# Patient Record
Sex: MALE | Race: BLACK OR AFRICAN AMERICAN | NOT HISPANIC OR LATINO | Employment: OTHER | URBAN - METROPOLITAN AREA
[De-identification: names, ages, dates, MRNs, and addresses within clinical notes are randomized per-mention and may not be internally consistent; named-entity substitution may affect disease eponyms.]

---

## 2017-01-04 ENCOUNTER — CLINICAL SUPPORT (OUTPATIENT)
Dept: SMOKING CESSATION | Facility: CLINIC | Age: 72
End: 2017-01-04
Payer: COMMERCIAL

## 2017-01-04 DIAGNOSIS — F17.200 SMOKES AND MOTIVATED TO QUIT: Primary | ICD-10-CM

## 2017-01-04 PROCEDURE — 99402 PREV MED CNSL INDIV APPRX 30: CPT | Mod: S$GLB,,,

## 2017-01-05 DIAGNOSIS — F17.200 SMOKES AND MOTIVATED TO QUIT: ICD-10-CM

## 2017-01-05 RX ORDER — DM/P-EPHED/ACETAMINOPH/DOXYLAM 30-7.5/3
LIQUID (ML) ORAL
Qty: 168 LOZENGE | Refills: 0 | Status: SHIPPED | OUTPATIENT
Start: 2017-01-05 | End: 2018-07-09

## 2017-01-05 RX ORDER — IBUPROFEN 200 MG
1 TABLET ORAL DAILY
Qty: 28 PATCH | Refills: 0 | Status: SHIPPED | OUTPATIENT
Start: 2017-01-05 | End: 2018-07-09

## 2017-01-25 ENCOUNTER — OFFICE VISIT (OUTPATIENT)
Dept: DERMATOLOGY | Facility: CLINIC | Age: 72
End: 2017-01-25
Payer: MEDICARE

## 2017-01-25 DIAGNOSIS — L30.9 ECZEMA, UNSPECIFIED TYPE: Primary | ICD-10-CM

## 2017-01-25 DIAGNOSIS — L28.0 LSC (LICHEN SIMPLEX CHRONICUS): ICD-10-CM

## 2017-01-25 PROCEDURE — 1160F RVW MEDS BY RX/DR IN RCRD: CPT | Mod: S$GLB,,, | Performed by: DERMATOLOGY

## 2017-01-25 PROCEDURE — 1157F ADVNC CARE PLAN IN RCRD: CPT | Mod: S$GLB,,, | Performed by: DERMATOLOGY

## 2017-01-25 PROCEDURE — 11900 INJECT SKIN LESIONS </W 7: CPT | Mod: S$GLB,,, | Performed by: DERMATOLOGY

## 2017-01-25 PROCEDURE — 99213 OFFICE O/P EST LOW 20 MIN: CPT | Mod: 25,S$GLB,, | Performed by: DERMATOLOGY

## 2017-01-25 PROCEDURE — 1159F MED LIST DOCD IN RCRD: CPT | Mod: S$GLB,,, | Performed by: DERMATOLOGY

## 2017-01-25 PROCEDURE — 99999 PR PBB SHADOW E&M-EST. PATIENT-LVL II: CPT | Mod: PBBFAC,,, | Performed by: DERMATOLOGY

## 2017-01-25 RX ORDER — ATORVASTATIN CALCIUM 20 MG/1
TABLET, FILM COATED ORAL
Qty: 90 TABLET | Refills: 0 | Status: SHIPPED | OUTPATIENT
Start: 2017-01-25 | End: 2017-03-24 | Stop reason: SDUPTHER

## 2017-01-25 RX ORDER — RAMIPRIL 10 MG/1
CAPSULE ORAL
Qty: 90 CAPSULE | Refills: 0 | Status: SHIPPED | OUTPATIENT
Start: 2017-01-25 | End: 2017-03-24 | Stop reason: SDUPTHER

## 2017-01-25 NOTE — MR AVS SNAPSHOT
[facility]  [address]  [facility] [address]  Phone: [phone]  Fax: [phone]                  Leonides Burns   2017 9:30 AM   Office Visit    Description:  Male : 1945   Provider:  Nona Persaud MD   Department:  [facility]           Reason for Visit     Rash           Diagnoses this Visit        Comments    Eczema, unspecified type    -  Primary     LSC (lichen simplex chronicus)                To Do List           Future Appointments        Provider Department Dept Phone    2/3/2017 9:00 AM [facility] [phone]    2017 8:00 AM Tien Thomas MD [place] [facility] [phone]    3/27/2017 9:30 AM [facility]atology [phone]      Goals (5 Years of Data)     None      Follow-Up and Disposition     Return in about 3 months (around 2017).      [facility]     [facility] Care [facility] -  Assistance  Registered nurses in the [facility] provide clinical advisement, health education, appointment booking, and other advisory services.  Call for this free service at [phone].             Medications                Verify that the below list of medications is an accurate representation of the medications you are currently taking.  If none reported, the list may be blank. If incorrect, please contact your healthcare provider. Carry this list with you in case of emergency.           Current Medications     allopurinol (ZYLOPRIM) 100 MG tablet Take 2 tablets (200 mg total) by mouth once daily.    amlodipine (NORVASC) 5 MG tablet Take 1 tablet (5 mg total) by mouth once daily.    bimatoprost (LUMIGAN) 0.01 % Drop Place 1 drop into both eyes every evening.    clobetasol (TEMOVATE) 0.05 % external solution Pt to mix in 1 jar of cerave cream and apply to affected areas bid    colchicine 0.6 mg tablet Take 1 tablet (0.6 mg total) by mouth 2 (two) times  daily.    furosemide (LASIX) 20 MG tablet Take 1 tablet (20 mg total) by mouth once daily.    guaifenesin (MUCINEX) 600 mg 12 hr tablet Take 1 tablet (600 mg total) by mouth 2 (two) times daily.    nicotine (NICODERM CQ) 14 mg/24 hr Place 1 patch onto the skin once daily.    nicotine polacrilex 2 MG Lozg Nicorette Mini Lozenge -one 2 mg lozenge by mouth as needed, max 5 in 6 hr period. Generic only    sodium bicarbonate 650 MG tablet Take 1 tablet (650 mg total) by mouth 2 (two) times daily.    tadalafil (CIALIS) 10 MG tablet Take 1 tablet (10 mg total) by mouth daily as needed for Erectile Dysfunction (30 minutes prior to sex prn; max 1/day; avoid with NTG).    triamcinolone acetonide 0.1% (KENALOG) 0.1 % cream AAA bid    triamcinolone acetonide 0.1% (KENALOG) 0.1 % ointment AAA hands twice daily under occlusion    atorvastatin (LIPITOR) 20 MG tablet TAKE 1 TABLET EVERY MORNING    mometasone 0.1% (ELOCON) 0.1 % cream Apply topically daily as needed.    ramipril (ALTACE) 10 MG capsule TAKE 1 CAPSULE EVERY DAY    triamterene-hydrochlorothiazide 50-25 mg (DYAZIDE) 50-25 mg Cap TAKE 1 CAPSULE EVERY DAY           Clinical Reference Information           Allergies as of 1/25/2017     Latex, Natural Rubber    Iodine And Iodide Containing Products      Immunizations Administered on Date of Encounter - 1/25/2017     None      Instructions    n- acetyl cystine 600mg -- 3x/day         Smoking Cessation     If you would like to quit smoking:   You may be eligible for free services if you are a [place] resident and started smoking cigarettes before September 1, 1988.  Call the Smoking Cessation Trust (SCT) toll free at ([phone] [phone] or [phone].   Call 2-800-QUIT-NOW if you do not meet the above criteria.

## 2017-01-25 NOTE — PROGRESS NOTES
Subjective:       Patient ID:  Leonides Burns is a 71 y.o. 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 CD4:CD8 5:1, suggestive of Lichenoid dermatitis, cannot rule of mycosis fungoides. t cell gene rearrangement was negative. He notes rash has slightly improved with topical TAC 0.1% cream and intralesional kenalog in past. He notes he rubs his hands and arms frequently. He does not take antihistamines or other antipruritics.       Patient switched to Lasix, back in 6/2016. Patient states he noted eruption starting around or before 4th of July. Since then getting worse.        Rash  - Follow-up  Symptom course: improving (no new lesions)  Currently using: TAC oint bid and clob soln in cerave cream prn.   Affected locations: diffuse  Signs / symptoms: itching (hands and 1 area on forearm - o/w much less)        Review of Systems   Constitutional: Negative for fever, chills, weight loss, fatigue, night sweats and malaise.   Gastrointestinal: Negative for Sensitivity to oral antibiotics.   Skin: Positive for itching and rash.   Psychiatric/Behavioral: Positive for high stress (couple of weeks ago under more stress started itching).        Objective:    Physical Exam   Constitutional: He appears well-developed and well-nourished. No distress.   Neurological: He is alert and oriented to person, place, and time. He is not disoriented.   Psychiatric: He has a normal mood and affect.   Skin:   Areas Examined (abnormalities noted in diagram):   Scalp / Hair Palpated and Inspected  Head / Face Inspection Performed  Neck Inspection Performed  Chest / Axilla Inspection Performed  Abdomen Inspection Performed  Genitals / Buttocks / Groin Inspection Performed  Back Inspection Performed  RUE Inspected  LUE Inspection Performed  RLE Inspected  LLE Inspection  Performed  Nails and Digits Inspection Performed                  Diagram Legend     Erythematous scaling macule/papule c/w actinic keratosis       Vascular papule c/w angioma      Pigmented verrucoid papule/plaque c/w seborrheic keratosis      Yellow umbilicated papule c/w sebaceous hyperplasia      Irregularly shaped tan macule c/w lentigo     1-2 mm smooth white papules consistent with Milia      Movable subcutaneous cyst with punctum c/w epidermal inclusion cyst      Subcutaneous movable cyst c/w pilar cyst      Firm pink to brown papule c/w dermatofibroma      Pedunculated fleshy papule(s) c/w skin tag(s)      Evenly pigmented macule c/w junctional nevus     Mildly variegated pigmented, slightly irregular-bordered macule c/w mildly atypical nevus      Flesh colored to evenly pigmented papule c/w intradermal nevus       Pink pearly papule/plaque c/w basal cell carcinoma      Erythematous hyperkeratotic cursted plaque c/w SCC      Surgical scar with no sign of skin cancer recurrence      Open and closed comedones      Inflammatory papules and pustules      Verrucoid papule consistent consistent with wart     Erythematous eczematous patches and plaques     Dystrophic onycholytic nail with subungual debris c/w onychomycosis     Umbilicated papule    Erythematous-base heme-crusted tan verrucoid plaque consistent with inflamed seborrheic keratosis     Erythematous Silvery Scaling Plaque c/w Psoriasis     See annotation      Assessment / Plan:        Eczema, unspecified type with LSC (lichen simplex chronicus) - improved  Cont good skin care regimen  Cont clob soln in cerave cream bid  Cont TAC oint bid to AA  Intralesional Kenalog 10mg/cc (1.0 cc total) injected into 7 lesions on the arms and legs today after obtaining verbal consent including risk of surrounding hypopigmentation. Patient tolerated procedure well.  Start n- acetyl cystine 600mg tid  D/c manipulation of hands  Increase working hands cream to bid -  tid           Return in about 3 months (around 4/25/2017).

## 2017-03-06 ENCOUNTER — OFFICE VISIT (OUTPATIENT)
Dept: INTERNAL MEDICINE | Facility: CLINIC | Age: 72
End: 2017-03-06
Payer: MEDICARE

## 2017-03-06 VITALS
SYSTOLIC BLOOD PRESSURE: 120 MMHG | BODY MASS INDEX: 27.86 KG/M2 | DIASTOLIC BLOOD PRESSURE: 60 MMHG | WEIGHT: 177.5 LBS | HEIGHT: 67 IN | HEART RATE: 76 BPM

## 2017-03-06 DIAGNOSIS — F17.200 TOBACCO DEPENDENCE: ICD-10-CM

## 2017-03-06 DIAGNOSIS — E66.3 OVERWEIGHT: ICD-10-CM

## 2017-03-06 DIAGNOSIS — I10 ESSENTIAL HYPERTENSION: Primary | ICD-10-CM

## 2017-03-06 PROCEDURE — 3078F DIAST BP <80 MM HG: CPT | Mod: S$GLB,,, | Performed by: INTERNAL MEDICINE

## 2017-03-06 PROCEDURE — 3074F SYST BP LT 130 MM HG: CPT | Mod: S$GLB,,, | Performed by: INTERNAL MEDICINE

## 2017-03-06 PROCEDURE — 1126F AMNT PAIN NOTED NONE PRSNT: CPT | Mod: S$GLB,,, | Performed by: INTERNAL MEDICINE

## 2017-03-06 PROCEDURE — 99213 OFFICE O/P EST LOW 20 MIN: CPT | Mod: S$GLB,,, | Performed by: INTERNAL MEDICINE

## 2017-03-06 PROCEDURE — 1159F MED LIST DOCD IN RCRD: CPT | Mod: S$GLB,,, | Performed by: INTERNAL MEDICINE

## 2017-03-06 PROCEDURE — 1160F RVW MEDS BY RX/DR IN RCRD: CPT | Mod: S$GLB,,, | Performed by: INTERNAL MEDICINE

## 2017-03-06 PROCEDURE — 1157F ADVNC CARE PLAN IN RCRD: CPT | Mod: S$GLB,,, | Performed by: INTERNAL MEDICINE

## 2017-03-06 PROCEDURE — 99499 UNLISTED E&M SERVICE: CPT | Mod: S$GLB,,, | Performed by: INTERNAL MEDICINE

## 2017-03-06 PROCEDURE — 99999 PR PBB SHADOW E&M-EST. PATIENT-LVL III: CPT | Mod: PBBFAC,,, | Performed by: INTERNAL MEDICINE

## 2017-03-06 NOTE — MR AVS SNAPSHOT
[facility]   [place]  [place] [address]  Phone: [phone]  Fax: [phone]                  Leonides Burns   3/6/2017 4:00 PM   Office Visit    Description:  Male : 1945   Provider:  Tien Thomas MD   Department:  [facility]           Reason for Visit     Hypertension           Diagnoses this Visit        Comments    Essential hypertension    -  Primary     Overweight         Tobacco dependence                To Do List           Future Appointments        Provider Department Dept Phone    3/7/2017 7:00 AM [facility] [phone]    3/27/2017 9:30 AM Nona Persaud MD [facility] - Dermatology [phone]    2017 9:00 AM Tien Thomas MD [facility] [phone]      Goals (5 Years of Data)     None      [facility]     [facility] -  Assistance  Registered nurses in the [facility] provide clinical advisement, health education, appointment booking, and other advisory services.  Call for this free service at [phone].             Medications                Verify that the below list of medications is an accurate representation of the medications you are currently taking.  If none reported, the list may be blank. If incorrect, please contact your healthcare provider. Carry this list with you in case of emergency.           Current Medications     allopurinol (ZYLOPRIM) 100 MG tablet Take 2 tablets (200 mg total) by mouth once daily.    amlodipine (NORVASC) 5 MG tablet Take 1 tablet (5 mg total) by mouth once daily.    atorvastatin (LIPITOR) 20 MG tablet TAKE 1 TABLET EVERY MORNING    bimatoprost (LUMIGAN) 0.01 % Drop Place 1 drop into both eyes every evening.    clobetasol (TEMOVATE) 0.05 % external solution Pt to mix in 1 jar of cerave cream and apply to affected areas bid    colchicine 0.6 mg tablet Take 1 tablet (0.6 mg total) by mouth 2 (two) times  "daily.    furosemide (LASIX) 20 MG tablet Take 1 tablet (20 mg total) by mouth once daily.    guaifenesin (MUCINEX) 600 mg 12 hr tablet Take 1 tablet (600 mg total) by mouth 2 (two) times daily.    nicotine (NICODERM CQ) 14 mg/24 hr Place 1 patch onto the skin once daily.    nicotine polacrilex 2 MG Lozg Nicorette Mini Lozenge -one 2 mg lozenge by mouth as needed, max 5 in 6 hr period. Generic only    ramipril (ALTACE) 10 MG capsule TAKE 1 CAPSULE EVERY DAY    sodium bicarbonate 650 MG tablet Take 1 tablet (650 mg total) by mouth 2 (two) times daily.    tadalafil (CIALIS) 10 MG tablet Take 1 tablet (10 mg total) by mouth daily as needed for Erectile Dysfunction (30 minutes prior to sex prn; max 1/day; avoid with NTG).    triamcinolone acetonide 0.1% (KENALOG) 0.1 % cream AAA bid    triamcinolone acetonide 0.1% (KENALOG) 0.1 % ointment AAA hands twice daily under occlusion    triamterene-hydrochlorothiazide 50-25 mg (DYAZIDE) 50-25 mg Cap TAKE 1 CAPSULE EVERY DAY    mometasone 0.1% (ELOCON) 0.1 % cream Apply topically daily as needed.           Clinical Reference Information           Your Vitals Were     BP Pulse Height Weight BMI    120/60 (BP Location: Left arm, Patient Position: Sitting, BP Method: Manual) 76 5' 7" (1.702 m) 80.5 kg (177 lb 7.5 oz) 27.8 kg/m2      Blood Pressure          Most Recent Value    BP  120/60      Allergies as of 3/6/2017     Latex, Natural Rubber    Iodine And Iodide Containing Products      Immunizations Administered on Date of Encounter - 3/6/2017     None      [facility] Sign-Up     Activating your [facility] account is as easy as 1-2-3!     1) Visit [email or web address], select Sign Up Now, enter this activation code and your date of birth, then select Next.  Activation code not generated  Current Patient Portal Status: Account disabled      2) Create a username and password to use when you visit [facility] in the future and select a security question in case you lose your password and " select Next.    3) Enter your e-mail address and click Sign Up!    Additional Information  If you have questions, please e-mail [email or web address] or call [phone] to talk to our [facility] staff. Remember, [facility] is NOT to be used for urgent needs. For medical emergencies, dial 911.         Smoking Cessation     If you would like to quit smoking:   You may be eligible for free services if you are a [place] resident and started smoking cigarettes before September 1, 1988.  Call the Smoking Cessation Trust (SCT) toll free at [phone] or (985) 886-2881.   Call 0-777-QUIT-NOW if you do not meet the above criteria.            Language Assistance Services     ATTENTION: Language assistance services are available, free of charge. Please call [phone].      ATENCIÓN: Si habla bri, tiene a russo disposición servicios gratuitos de asistencia lingüística. Llame al [phone].     CHÚ Ý: N?u b?n nói Ti?ng Vi?t, có các d?ch v? h? tr? ngôn ng? mi?n phí dành cho b?n. G?i s? [phone].         [facility] complies with applicable Federal civil rights laws and does not discriminate on the basis of race, color, national origin, age, disability, or sex.

## 2017-03-06 NOTE — PROGRESS NOTES
Subjective:       Patient ID: Leonides Burns is a 72 y.o. male.    Chief Complaint: Hypertension    HPI Pt. Here for f/u for HTN; BP is WNL;  pt. Has not gotten labs and is scheduled fasting; he does not want HTN digital program; he has lost a few pounds; he smokes PP 2 days and he is in smoking cessation  Review of Systems   Constitutional: Negative for chills, fatigue and fever.   HENT: Negative for congestion, rhinorrhea and sore throat.    Respiratory: Negative for cough, shortness of breath and wheezing.    Cardiovascular: Negative for chest pain.   Gastrointestinal: Negative for abdominal pain, nausea and vomiting.   Genitourinary: Negative for dysuria.   Musculoskeletal: Negative for arthralgias.   Skin: Negative for rash.   Neurological: Negative for dizziness and headaches.   Psychiatric/Behavioral: Negative for sleep disturbance. The patient is not nervous/anxious.        Objective:      Physical Exam   Constitutional: He is oriented to person, place, and time.   overweight   Eyes: EOM are normal.   Neck: Normal range of motion.   Cardiovascular: Normal rate, regular rhythm and normal heart sounds.    Pulmonary/Chest: Effort normal and breath sounds normal.   Abdominal: Soft. There is no tenderness. There is no rebound and no guarding.   Genitourinary: No penile tenderness.   Musculoskeletal: Normal range of motion.   Neurological: He is alert and oriented to person, place, and time.   Skin: No rash noted.       Assessment:       1. Essential hypertension Well controlled   2. Overweight Sub-optimally controlled   3. Tobacco dependence Sub-optimally controlled       Plan:         Essential hypertension  Comments:  continue current regimen and encouraged low Na diet and weight loss    Overweight  Comments:  encouraged diet     Tobacco dependence  Comments:  encouraged cessation and explained risks

## 2017-03-07 ENCOUNTER — LAB VISIT (OUTPATIENT)
Dept: LAB | Facility: HOSPITAL | Age: 72
End: 2017-03-07
Attending: INTERNAL MEDICINE
Payer: MEDICARE

## 2017-03-07 DIAGNOSIS — R79.9 ABNORMAL FINDING OF BLOOD CHEMISTRY: ICD-10-CM

## 2017-03-07 DIAGNOSIS — N18.30 CHRONIC KIDNEY DISEASE, STAGE III (MODERATE): Chronic | ICD-10-CM

## 2017-03-07 DIAGNOSIS — Z00.00 PREVENTATIVE HEALTH CARE: ICD-10-CM

## 2017-03-07 DIAGNOSIS — I10 ESSENTIAL HYPERTENSION: Chronic | ICD-10-CM

## 2017-03-07 DIAGNOSIS — Z12.5 PROSTATE CANCER SCREENING: ICD-10-CM

## 2017-03-07 LAB
ALBUMIN SERPL BCP-MCNC: 4 G/DL
ALP SERPL-CCNC: 71 U/L
ALT SERPL W/O P-5'-P-CCNC: 16 U/L
ANION GAP SERPL CALC-SCNC: 9 MMOL/L
AST SERPL-CCNC: 20 U/L
BASOPHILS # BLD AUTO: 0.04 K/UL
BASOPHILS NFR BLD: 0.8 %
BILIRUB SERPL-MCNC: 0.7 MG/DL
BUN SERPL-MCNC: 49 MG/DL
CALCIUM SERPL-MCNC: 9.7 MG/DL
CHLORIDE SERPL-SCNC: 103 MMOL/L
CHOLEST/HDLC SERPL: 5 {RATIO}
CO2 SERPL-SCNC: 25 MMOL/L
COMPLEXED PSA SERPL-MCNC: 6.6 NG/ML
CREAT SERPL-MCNC: 2.4 MG/DL
DIFFERENTIAL METHOD: ABNORMAL
EOSINOPHIL # BLD AUTO: 0.4 K/UL
EOSINOPHIL NFR BLD: 8.5 %
ERYTHROCYTE [DISTWIDTH] IN BLOOD BY AUTOMATED COUNT: 16 %
EST. GFR  (AFRICAN AMERICAN): 29.8 ML/MIN/1.73 M^2
EST. GFR  (NON AFRICAN AMERICAN): 25.8 ML/MIN/1.73 M^2
GLUCOSE SERPL-MCNC: 98 MG/DL
HCT VFR BLD AUTO: 49.1 %
HDL/CHOLESTEROL RATIO: 19.8 %
HDLC SERPL-MCNC: 207 MG/DL
HDLC SERPL-MCNC: 41 MG/DL
HGB BLD-MCNC: 16.7 G/DL
LDLC SERPL CALC-MCNC: 139.2 MG/DL
LYMPHOCYTES # BLD AUTO: 2 K/UL
LYMPHOCYTES NFR BLD: 39.7 %
MCH RBC QN AUTO: 27.2 PG
MCHC RBC AUTO-ENTMCNC: 34 %
MCV RBC AUTO: 80 FL
MONOCYTES # BLD AUTO: 1.1 K/UL
MONOCYTES NFR BLD: 22.2 %
NEUTROPHILS # BLD AUTO: 1.4 K/UL
NEUTROPHILS NFR BLD: 28.6 %
NONHDLC SERPL-MCNC: 166 MG/DL
PLATELET # BLD AUTO: 272 K/UL
PMV BLD AUTO: 11.3 FL
POTASSIUM SERPL-SCNC: 4.6 MMOL/L
PROT SERPL-MCNC: 8.3 G/DL
RBC # BLD AUTO: 6.13 M/UL
SODIUM SERPL-SCNC: 137 MMOL/L
TRIGL SERPL-MCNC: 134 MG/DL
WBC # BLD AUTO: 5.04 K/UL

## 2017-03-07 PROCEDURE — 85025 COMPLETE CBC W/AUTO DIFF WBC: CPT

## 2017-03-07 PROCEDURE — 80061 LIPID PANEL: CPT

## 2017-03-07 PROCEDURE — 36415 COLL VENOUS BLD VENIPUNCTURE: CPT | Mod: PO

## 2017-03-07 PROCEDURE — 80053 COMPREHEN METABOLIC PANEL: CPT

## 2017-03-07 PROCEDURE — 84153 ASSAY OF PSA TOTAL: CPT

## 2017-03-15 ENCOUNTER — TELEPHONE (OUTPATIENT)
Dept: INTERNAL MEDICINE | Facility: CLINIC | Age: 72
End: 2017-03-15

## 2017-03-15 DIAGNOSIS — R97.20 ELEVATED PSA: Primary | ICD-10-CM

## 2017-03-15 NOTE — TELEPHONE ENCOUNTER
Have pt. Come in to discuss labs; kidney fxn has worsened; pt. Needs to f/u with his nephrologist, Dr. Blackmon for further evaluation within next few weeks and avoid NSAIDs

## 2017-03-15 NOTE — TELEPHONE ENCOUNTER
Spoke to pt and states that [place] 3 m[place] and requesting his results over the phone.

## 2017-03-16 NOTE — TELEPHONE ENCOUNTER
Notify pt. PSA is elevated; refer back to urologist which he has not f/u since 2015; cholesterol is mildly elevated; lower the cholesterol in the diet; kidney function went up further; f/u renal and avoid NSAIDs

## 2017-03-16 NOTE — TELEPHONE ENCOUNTER
Informed pt his PSA is elevated. Pt will follow-up with his Urologist and Nephrologist when he gets back in town. Cholesterol is mildly elevated. Lower cholesterol in diet. Kidney function went up further. Avoid NSAID's.

## 2017-03-24 RX ORDER — RAMIPRIL 10 MG/1
CAPSULE ORAL
Qty: 90 CAPSULE | Refills: 1 | Status: SHIPPED | OUTPATIENT
Start: 2017-03-24 | End: 2017-08-18 | Stop reason: SDUPTHER

## 2017-03-24 RX ORDER — ATORVASTATIN CALCIUM 20 MG/1
20 TABLET, FILM COATED ORAL NIGHTLY
Qty: 90 TABLET | Refills: 1 | Status: SHIPPED | OUTPATIENT
Start: 2017-03-24 | End: 2017-08-18 | Stop reason: SDUPTHER

## 2017-03-28 DIAGNOSIS — L98.9 DISEASE OF SKIN AND SUBCUTANEOUS TISSUE: ICD-10-CM

## 2017-03-28 DIAGNOSIS — M1A.9XX0 CHRONIC GOUT WITHOUT TOPHUS, UNSPECIFIED CAUSE, UNSPECIFIED SITE: ICD-10-CM

## 2017-03-28 NOTE — TELEPHONE ENCOUNTER
----- Message from Lula Hurd sent at 3/28/2017  1:15 PM CDT -----  Contact: [phone]/self   Patient called in requesting to speak with you. Did not specify why. Please advise.

## 2017-03-29 RX ORDER — TRIAMCINOLONE ACETONIDE 1 MG/G
CREAM TOPICAL
Qty: 80 G | Refills: 1 | Status: SHIPPED | OUTPATIENT
Start: 2017-03-29 | End: 2017-09-06 | Stop reason: SDUPTHER

## 2017-03-29 RX ORDER — ALLOPURINOL 100 MG/1
200 TABLET ORAL DAILY
Qty: 180 TABLET | Refills: 1 | Status: SHIPPED | OUTPATIENT
Start: 2017-03-29 | End: 2017-12-27 | Stop reason: SDUPTHER

## 2017-03-29 RX ORDER — SODIUM BICARBONATE 650 MG/1
650 TABLET ORAL 2 TIMES DAILY
Qty: 180 TABLET | Refills: 0 | OUTPATIENT
Start: 2017-03-29 | End: 2017-04-28

## 2017-04-11 ENCOUNTER — CLINICAL SUPPORT (OUTPATIENT)
Dept: SMOKING CESSATION | Facility: CLINIC | Age: 72
End: 2017-04-11
Payer: COMMERCIAL

## 2017-04-11 DIAGNOSIS — F17.200 NICOTINE DEPENDENCE: Primary | ICD-10-CM

## 2017-04-11 PROCEDURE — 99407 BEHAV CHNG SMOKING > 10 MIN: CPT | Mod: S$GLB,,, | Performed by: GENERAL PRACTICE

## 2017-05-29 ENCOUNTER — TELEPHONE (OUTPATIENT)
Dept: INTERNAL MEDICINE | Facility: CLINIC | Age: 72
End: 2017-05-29

## 2017-05-29 DIAGNOSIS — I10 ESSENTIAL HYPERTENSION: ICD-10-CM

## 2017-05-29 RX ORDER — SODIUM BICARBONATE 650 MG/1
650 TABLET ORAL 2 TIMES DAILY
Qty: 180 TABLET | Refills: 3 | OUTPATIENT
Start: 2017-05-29 | End: 2017-06-28

## 2017-05-29 RX ORDER — AMLODIPINE BESYLATE 5 MG/1
5 TABLET ORAL DAILY
Qty: 90 TABLET | Refills: 0 | Status: SHIPPED | OUTPATIENT
Start: 2017-05-29 | End: 2017-09-06 | Stop reason: SDUPTHER

## 2017-05-29 NOTE — TELEPHONE ENCOUNTER
----- Message from Rosanna Brooks sent at 5/29/2017  1:43 PM CDT -----  Contact: Self/[phone]  Patient said he is returning your call. Please advise

## 2017-05-29 NOTE — TELEPHONE ENCOUNTER
----- Message from Eugenia Lockhart sent at 5/29/2017 12:59 PM CDT -----  Contact: [phone]  Patient would like to speak with you regarding his blood pressure medication

## 2017-06-22 ENCOUNTER — TELEPHONE (OUTPATIENT)
Dept: INTERNAL MEDICINE | Facility: CLINIC | Age: 72
End: 2017-06-22

## 2017-06-22 NOTE — TELEPHONE ENCOUNTER
----- Message from Juli Johnson sent at 6/22/2017  3:53 PM CDT -----  Contact: [phone]  Pt returning your call. Please advise.

## 2017-06-22 NOTE — TELEPHONE ENCOUNTER
----- Message from Logan Johnson sent at 6/22/2017  4:01 PM CDT -----  Contact: self/[phone]  Patient is returning your call.  Please advise.

## 2017-08-18 RX ORDER — ATORVASTATIN CALCIUM 20 MG/1
TABLET, FILM COATED ORAL
Qty: 90 TABLET | Refills: 0 | Status: ON HOLD | OUTPATIENT
Start: 2017-08-18 | End: 2022-10-11 | Stop reason: SDUPTHER

## 2017-08-18 RX ORDER — FUROSEMIDE 20 MG/1
TABLET ORAL
Qty: 90 TABLET | Refills: 6 | Status: SHIPPED | OUTPATIENT
Start: 2017-08-18 | End: 2018-07-09

## 2017-08-18 RX ORDER — RAMIPRIL 10 MG/1
CAPSULE ORAL
Qty: 90 CAPSULE | Refills: 0 | Status: SHIPPED | OUTPATIENT
Start: 2017-08-18 | End: 2019-02-28

## 2017-08-21 DIAGNOSIS — Z12.11 COLON CANCER SCREENING: ICD-10-CM

## 2017-09-06 ENCOUNTER — LAB VISIT (OUTPATIENT)
Dept: LAB | Facility: HOSPITAL | Age: 72
End: 2017-09-06
Attending: INTERNAL MEDICINE
Payer: MEDICARE

## 2017-09-06 ENCOUNTER — OFFICE VISIT (OUTPATIENT)
Dept: INTERNAL MEDICINE | Facility: CLINIC | Age: 72
End: 2017-09-06
Payer: MEDICARE

## 2017-09-06 VITALS
DIASTOLIC BLOOD PRESSURE: 80 MMHG | OXYGEN SATURATION: 97 % | HEART RATE: 82 BPM | BODY MASS INDEX: 29.41 KG/M2 | SYSTOLIC BLOOD PRESSURE: 122 MMHG | WEIGHT: 187.38 LBS | HEIGHT: 67 IN

## 2017-09-06 DIAGNOSIS — I10 ESSENTIAL HYPERTENSION: Chronic | ICD-10-CM

## 2017-09-06 DIAGNOSIS — N18.30 CHRONIC KIDNEY DISEASE, STAGE III (MODERATE): Chronic | ICD-10-CM

## 2017-09-06 DIAGNOSIS — F10.10 ALCOHOL ABUSE: ICD-10-CM

## 2017-09-06 DIAGNOSIS — E87.1 HYPONATREMIA: ICD-10-CM

## 2017-09-06 DIAGNOSIS — E78.00 HYPERCHOLESTEREMIA: ICD-10-CM

## 2017-09-06 DIAGNOSIS — R97.20 ELEVATED PSA: ICD-10-CM

## 2017-09-06 DIAGNOSIS — E66.3 OVERWEIGHT (BMI 25.0-29.9): ICD-10-CM

## 2017-09-06 DIAGNOSIS — L40.9 PSORIASIS: ICD-10-CM

## 2017-09-06 DIAGNOSIS — I10 ESSENTIAL HYPERTENSION: Primary | Chronic | ICD-10-CM

## 2017-09-06 DIAGNOSIS — F17.200 TOBACCO DEPENDENCE: ICD-10-CM

## 2017-09-06 DIAGNOSIS — Z13.6 ENCOUNTER FOR ABDOMINAL AORTIC ANEURYSM (AAA) SCREENING: ICD-10-CM

## 2017-09-06 LAB
ALBUMIN SERPL BCP-MCNC: 3.7 G/DL
ALP SERPL-CCNC: 76 U/L
ALT SERPL W/O P-5'-P-CCNC: 26 U/L
ANION GAP SERPL CALC-SCNC: 10 MMOL/L
AST SERPL-CCNC: 33 U/L
BILIRUB SERPL-MCNC: 0.4 MG/DL
BUN SERPL-MCNC: 18 MG/DL
CALCIUM SERPL-MCNC: 9.4 MG/DL
CHLORIDE SERPL-SCNC: 108 MMOL/L
CO2 SERPL-SCNC: 23 MMOL/L
CREAT SERPL-MCNC: 1.5 MG/DL
EST. GFR  (AFRICAN AMERICAN): 52.6 ML/MIN/1.73 M^2
EST. GFR  (NON AFRICAN AMERICAN): 45.5 ML/MIN/1.73 M^2
GLUCOSE SERPL-MCNC: 95 MG/DL
POTASSIUM SERPL-SCNC: 4.3 MMOL/L
PROT SERPL-MCNC: 7.6 G/DL
SODIUM SERPL-SCNC: 141 MMOL/L

## 2017-09-06 PROCEDURE — 36415 COLL VENOUS BLD VENIPUNCTURE: CPT | Mod: PO

## 2017-09-06 PROCEDURE — 1159F MED LIST DOCD IN RCRD: CPT | Mod: S$GLB,,, | Performed by: INTERNAL MEDICINE

## 2017-09-06 PROCEDURE — 80053 COMPREHEN METABOLIC PANEL: CPT

## 2017-09-06 PROCEDURE — 1126F AMNT PAIN NOTED NONE PRSNT: CPT | Mod: S$GLB,,, | Performed by: INTERNAL MEDICINE

## 2017-09-06 PROCEDURE — 99499 UNLISTED E&M SERVICE: CPT | Mod: S$GLB,,, | Performed by: INTERNAL MEDICINE

## 2017-09-06 PROCEDURE — 99214 OFFICE O/P EST MOD 30 MIN: CPT | Mod: S$GLB,,, | Performed by: INTERNAL MEDICINE

## 2017-09-06 PROCEDURE — 3008F BODY MASS INDEX DOCD: CPT | Mod: S$GLB,,, | Performed by: INTERNAL MEDICINE

## 2017-09-06 PROCEDURE — 99999 PR PBB SHADOW E&M-EST. PATIENT-LVL V: CPT | Mod: PBBFAC,,, | Performed by: INTERNAL MEDICINE

## 2017-09-06 PROCEDURE — 3079F DIAST BP 80-89 MM HG: CPT | Mod: S$GLB,,, | Performed by: INTERNAL MEDICINE

## 2017-09-06 PROCEDURE — 3074F SYST BP LT 130 MM HG: CPT | Mod: S$GLB,,, | Performed by: INTERNAL MEDICINE

## 2017-09-06 RX ORDER — AMLODIPINE BESYLATE 5 MG/1
5 TABLET ORAL DAILY
Qty: 90 TABLET | Refills: 1 | Status: SHIPPED | OUTPATIENT
Start: 2017-09-06 | End: 2018-04-28 | Stop reason: SDUPTHER

## 2017-09-06 RX ORDER — TRIAMCINOLONE ACETONIDE 1 MG/G
CREAM TOPICAL
Qty: 45 G | Refills: 0 | Status: SHIPPED | OUTPATIENT
Start: 2017-09-06 | End: 2018-07-09

## 2017-09-06 NOTE — PROGRESS NOTES
Pt. ID: Leonides Burns is a 72 y.o. male      Chief complaint:   Chief Complaint   Patient presents with    Hypertension     follow up       HPI: Pt. Here for f/u for HTN; he is compliant with meds; of note, his PSA was elevated and he has not f/u urology as instructed; I will refer again and explained risks of non-compliance; he still smokes PP1 1/2 days; he was in smoking cessation; he drinks 6 pack beer per day and glass of bourban a day; I reviewed labs dated 3/7/17; sodium has normalized; kidney fxn went up and he is seeing renal and I advised him to f/u; cholesterol was elevated; he has gained weight; he needs refill on steroid cream that he uses for psoriasis prn and norvasc; he is seeing dermatology and is scheduled for f/u in 1 month; I advised him to get future refills from dermatology who is managing; he does not want tetanus, pneumonia or shingles vaccine; I explained risks of non-compliance; colonoscopy was done and he will get date of test; he would like AAA screening US      Review of Systems   Constitutional: Negative for chills and fever.   Respiratory: Negative for cough and shortness of breath.    Cardiovascular: Negative for chest pain.   Gastrointestinal: Negative for abdominal pain, nausea and vomiting.   Genitourinary: Negative for dysuria.   Skin: Positive for rash.        Psoriatic rash noted extensively across extremities and neck   Psychiatric/Behavioral: Negative for depression. The patient is not nervous/anxious.          Objective:    Physical Exam   Constitutional: He is oriented to person, place, and time.   overweight   Eyes: EOM are normal.   Neck: Normal range of motion.   Cardiovascular: Normal rate, regular rhythm and normal heart sounds.    Pulmonary/Chest: Effort normal and breath sounds normal.   Abdominal: Soft. There is no tenderness. There is no rebound and no guarding.   Musculoskeletal: Normal range of motion.   Neurological: He is alert and oriented to person, place,  and time.   Skin: Rash noted.   Psoriatic rash noted to B/L extremities and neck         Health Maintenance   Topic Date Due    Abdominal Aortic Aneurysm Screening  01/31/2009    Influenza Vaccine  08/01/2017    Lipid Panel  03/07/2022    TETANUS VACCINE  09/06/2027    Colonoscopy  09/06/2027    Zoster Vaccine  Addressed    Pneumococcal (65+)  Excluded         Assessment:     1. Essential hypertension Well controlled   2. Elevated PSA Active   3. Chronic kidney disease, stage III (moderate) Sub-optimally controlled   4. Hypercholesteremia Sub-optimally controlled   5. Hyponatremia Well controlled   6. Psoriasis Active   7. Tobacco dependence Sub-optimally controlled   8. Alcohol abuse Sub-optimally controlled   9. Encounter for abdominal aortic aneurysm (AAA) screening    10. Overweight (BMI 25.0-29.9) Sub-optimally controlled         Plan: Leonides was seen today for hypertension.    Diagnoses and all orders for this visit:    Essential hypertension  Comments:  continue current regimen and encouraged low Na diet and weight loss  Orders:  -     Comprehensive metabolic panel; Future  -     amlodipine (NORVASC) 5 MG tablet; Take 1 tablet (5 mg total) by mouth once daily.    Elevated PSA  Comments:  re-refer to urology for evaluation   Orders:  -     Ambulatory Referral to Urology    Chronic kidney disease, stage III (moderate)  Comments:  f/u renal who is managing; repeat CMP and avoid NSAIDs  Orders:  -     Comprehensive metabolic panel; Future    Hypercholesteremia  Comments:  encouraged low cholesterol diet     Hyponatremia  Comments:  normalized; monitor  Orders:  -     Comprehensive metabolic panel; Future    Psoriasis  Comments:  continue steroid cream and f/u dermatology who is managing   Orders:  -     triamcinolone acetonide 0.1% (KENALOG) 0.1 % cream; Apply to affected area daily prn    Tobacco dependence  Comments:  encouraged cessation and explained risks     Alcohol abuse  Comments:  encouraged  cessation and explained risks     Encounter for abdominal aortic aneurysm (AAA) screening  -     US Abdominal Aorta; Future    Overweight (BMI 25.0-29.9)  Comments:  encouraged diet         Problem List Items Addressed This Visit        Psychiatric    Alcohol abuse       Derm    Psoriasis    Relevant Medications    triamcinolone acetonide 0.1% (KENALOG) 0.1 % cream       Cardiac/Vascular    Hypertension - Primary (Chronic)    Relevant Medications    amlodipine (NORVASC) 5 MG tablet    Other Relevant Orders    Comprehensive metabolic panel    Hypercholesteremia (Chronic)    Encounter for abdominal aortic aneurysm (AAA) screening    Relevant Orders    US Abdominal Aorta       Renal/    Elevated PSA    Relevant Orders    Ambulatory Referral to Urology    Hyponatremia    Relevant Orders    Comprehensive metabolic panel    Chronic kidney disease, stage III (moderate) (Chronic)    Relevant Orders    Comprehensive metabolic panel       Endocrine    Overweight (BMI 25.0-29.9)       Other    Tobacco dependence (Chronic)      Other Visit Diagnoses    None.

## 2017-09-07 ENCOUNTER — OFFICE VISIT (OUTPATIENT)
Dept: UROLOGY | Facility: CLINIC | Age: 72
End: 2017-09-07
Payer: MEDICARE

## 2017-09-07 ENCOUNTER — LAB VISIT (OUTPATIENT)
Dept: LAB | Facility: HOSPITAL | Age: 72
End: 2017-09-07
Attending: UROLOGY
Payer: MEDICARE

## 2017-09-07 VITALS
WEIGHT: 187 LBS | BODY MASS INDEX: 29.35 KG/M2 | SYSTOLIC BLOOD PRESSURE: 142 MMHG | HEIGHT: 67 IN | DIASTOLIC BLOOD PRESSURE: 68 MMHG | HEART RATE: 83 BPM

## 2017-09-07 DIAGNOSIS — N52.9 ERECTILE DYSFUNCTION, UNSPECIFIED ERECTILE DYSFUNCTION TYPE: ICD-10-CM

## 2017-09-07 DIAGNOSIS — N42.31 PIN (PROSTATIC INTRAEPITHELIAL NEOPLASIA): ICD-10-CM

## 2017-09-07 DIAGNOSIS — R97.20 ELEVATED PSA: Primary | ICD-10-CM

## 2017-09-07 DIAGNOSIS — N28.1 RENAL CYST: ICD-10-CM

## 2017-09-07 DIAGNOSIS — R82.90 ABNORMAL RESULT ON SCREENING URINE TEST: ICD-10-CM

## 2017-09-07 DIAGNOSIS — R97.20 ELEVATED PSA: ICD-10-CM

## 2017-09-07 DIAGNOSIS — I10 ESSENTIAL HYPERTENSION: ICD-10-CM

## 2017-09-07 DIAGNOSIS — N40.1 BPH NOS W UR OBS/LUTS: ICD-10-CM

## 2017-09-07 LAB
BILIRUB SERPL-MCNC: NORMAL MG/DL
BLOOD URINE, POC: NORMAL
COLOR, POC UA: NORMAL
COMPLEXED PSA SERPL-MCNC: 7.6 NG/ML
GLUCOSE UR QL STRIP: NORMAL
KETONES UR QL STRIP: NORMAL
LEUKOCYTE ESTERASE URINE, POC: NORMAL
MICROSCOPIC COMMENT: NORMAL
NITRITE, POC UA: NORMAL
PH, POC UA: 5
PROTEIN, POC: 250
RBC #/AREA URNS AUTO: 1 /HPF (ref 0–4)
SPECIFIC GRAVITY, POC UA: 1.02
UROBILINOGEN, POC UA: NORMAL
WBC #/AREA URNS AUTO: 2 /HPF (ref 0–5)

## 2017-09-07 PROCEDURE — 99499 UNLISTED E&M SERVICE: CPT | Mod: S$GLB,,, | Performed by: UROLOGY

## 2017-09-07 PROCEDURE — 3008F BODY MASS INDEX DOCD: CPT | Mod: S$GLB,,, | Performed by: UROLOGY

## 2017-09-07 PROCEDURE — 81001 URINALYSIS AUTO W/SCOPE: CPT | Mod: S$GLB,,, | Performed by: UROLOGY

## 2017-09-07 PROCEDURE — 84153 ASSAY OF PSA TOTAL: CPT

## 2017-09-07 PROCEDURE — 81001 URINALYSIS AUTO W/SCOPE: CPT

## 2017-09-07 PROCEDURE — 3077F SYST BP >= 140 MM HG: CPT | Mod: S$GLB,,, | Performed by: UROLOGY

## 2017-09-07 PROCEDURE — 87086 URINE CULTURE/COLONY COUNT: CPT

## 2017-09-07 PROCEDURE — 1126F AMNT PAIN NOTED NONE PRSNT: CPT | Mod: S$GLB,,, | Performed by: UROLOGY

## 2017-09-07 PROCEDURE — 3078F DIAST BP <80 MM HG: CPT | Mod: S$GLB,,, | Performed by: UROLOGY

## 2017-09-07 PROCEDURE — 99999 PR PBB SHADOW E&M-EST. PATIENT-LVL III: CPT | Mod: PBBFAC,,, | Performed by: UROLOGY

## 2017-09-07 PROCEDURE — 99215 OFFICE O/P EST HI 40 MIN: CPT | Mod: 25,S$GLB,, | Performed by: UROLOGY

## 2017-09-07 PROCEDURE — 1159F MED LIST DOCD IN RCRD: CPT | Mod: S$GLB,,, | Performed by: UROLOGY

## 2017-09-07 PROCEDURE — 36415 COLL VENOUS BLD VENIPUNCTURE: CPT

## 2017-09-07 NOTE — PROGRESS NOTES
HPI:  Leonides Burns is a 72 y.o. year old male that  presents with No chief complaint on file.  .  This patient referred by his PCP for all for elevated PSA.  Should previously seen by [facility] in 2015 but is new to me.  He had a prostate biopsy in 2015 for elevated PSA with one core coming back high-grade PIN and has been lost to follow-up.  His most recent PSA was in March of this year which was 6.6.  Back in [place] 2015 PSA was 6.1 with highest value being 17.5 in March 2015.  Lab Results   Component Value Date    PSA 6.6 (H) 03/07/2017    PSA 6.1 (H) 02/05/2015    PSADIAG 4.5 (H) 08/18/2015    PSADIAG 7.5 (H) 03/05/2015       Patient has in-between strength of stream with nocturia ×1-2.  Has no dysuria    There is no family history of prostate cancer and other than prostate biopsies the patient is never had any urological surgery.    Patient takes Cialis for erectile dysfunction.  Chart review shows no from his PCP from yesterday showing high blood pressure chronic kidney disease and referral back to urology.  This month GFR was stable at 52.  In October 2015 renal ultrasound shows right renal cyst.  This is reviewed by me and I agree with the presence of a cyst      Past Medical History:   Diagnosis Date    Disorder of kidney and ureter     Elevated PSA     Glaucoma     Hyperlipidemia     Hypertension     Psoriasis      Social History     Social History    Marital status: Single     Spouse name: N/A    Number of children: N/A    Years of education: N/A     Occupational History    Not on file.     Social History Main Topics    Smoking status: Current Some Day Smoker     Packs/day: 1.00     Years: 40.00    Smokeless tobacco: Current User    Alcohol use 2.4 oz/week     4 Cans of beer per week      Comment: socially    Drug use: No    Sexual activity: Not Currently     Other Topics Concern    Not on file     Social History Narrative    No narrative on file     History reviewed. No  "pertinent surgical history.  Family History   Problem Relation Age of Onset    Hypertension Mother     Diabetes Mother     Kidney disease Neg Hx     Prostate cancer Neg Hx     Melanoma Neg Hx            Review of Systems  The patient has no chest pains.  The patient has no shortness of breath  Patient wears        glasses.  All other review of systems are negative.      Physical Exam:  BP (!) 142/68   Pulse 83   Ht 5' 7" (1.702 m)   Wt 84.8 kg (187 lb)   BMI 29.29 kg/m²   General appearance: alert, cooperative, no distress  Constitutional:Oriented to person, place, and time.appears well-developed and well-nourished.   HEENT: Normocephalic, atraumatic, neck symmetrical, no nasal discharge   Eyes: conjunctivae/corneas clear, PERRL, EOM's intact  Lungs: clear to auscultation bilaterally, no dullness to percussion bilaterally  Heart: regular rate and rhythm without rub; no displacement of the PMI   Abdomen: soft, non-tender; bowel sounds normoactive; no organomegaly  :Penis/perineum without lesions, scrotum without rash/cysts, epididymis nontender bilaterally, urethral meatus in normal location normal size, no penile plaques palpated, prostate:    Smooth enlarged and not suspicious.  Some asymmetry which has been noted on previous exams                   seminal vesicles not palpated.  No rectal masses, sphincter tone normal.  Testes equal in size without masses  Extremities: extremities symmetric; no clubbing, cyanosis, or edema  Integument: Skin color, texture, turgor normal; no rashes; hair distrubution normal  Neurologic: Alert and oriented X 3, normal strength, normal coordination and gait  Psychiatric: no pressured speech; normal affect; no evidence of impaired cognition     LABS:    Complete Blood Count  Lab Results   Component Value Date    RBC 6.13 03/07/2017    HGB 16.7 03/07/2017    HCT 49.1 03/07/2017    MCV 80 (L) 03/07/2017    MCH 27.2 03/07/2017    MCHC 34.0 03/07/2017    RDW 16.0 (H) " 03/07/2017     03/07/2017    MPV 11.3 03/07/2017    GRAN 1.4 (L) 03/07/2017    GRAN 28.6 (L) 03/07/2017    LYMPH 2.0 03/07/2017    LYMPH 39.7 03/07/2017    MONO 1.1 (H) 03/07/2017    MONO 22.2 (H) 03/07/2017    EOS 0.4 03/07/2017    BASO 0.04 03/07/2017    EOSINOPHIL 8.5 (H) 03/07/2017    BASOPHIL 0.8 03/07/2017    DIFFMETHOD Automated 03/07/2017       Comprehensive Metabolic Panel  Lab Results   Component Value Date    GLU 95 09/06/2017    BUN 18 09/06/2017    CREATININE 1.5 (H) 09/06/2017     09/06/2017    K 4.3 09/06/2017     09/06/2017    PROT 7.6 09/06/2017    ALBUMIN 3.7 09/06/2017    BILITOT 0.4 09/06/2017    AST 33 09/06/2017    ALKPHOS 76 09/06/2017    CO2 23 09/06/2017    ALT 26 09/06/2017    ANIONGAP 10 09/06/2017    EGFRNONAA 45.5 (A) 09/06/2017    ESTGFRAFRICA 52.6 (A) 09/06/2017       PSA  Lab Results   Component Value Date    PSA 6.6 (H) 03/07/2017         Assessment:    ICD-10-CM ICD-9-CM    1. Elevated PSA R97.20 790.93 Prostate Specific Antigen, Diagnostic   2. PIN (prostatic intraepithelial neoplasia) N42.31 602.3    3. Renal cyst N28.1 753.10    4. Abnormal result on screening urine test R82.90 791.9 Urinalysis Microscopic      Urine culture      POCT urinalysis, dipstick or tablet reag   5. Essential hypertension I10 401.9    6. BPH NOS w ur obs/LUTS N40.1 600.91    7. Erectile dysfunction, unspecified erectile dysfunction type N52.9 607.84      The primary encounter diagnosis was Elevated PSA. Diagnoses of PIN (prostatic intraepithelial neoplasia), Renal cyst, Abnormal result on screening urine test, Essential hypertension, BPH NOS w ur obs/LUTS, and Erectile dysfunction, unspecified erectile dysfunction type were also pertinent to this visit.      Plan: 1 and 2.  A PSA with prior biopsy showing high-grade PIN.  With stable PSA and unchanged and physical exam, I do not recommend repeat biopsy at this time.  I did emphasize the patient the importance of continued urology  follow-up to which he voices understanding.  Check PSA today and we will contact the patient with any significant finding.  Otherwise recheck 6 months    #3.Renal cyst.  I discussed the benign nature of renal cysts and that no intervention and no further evaluation is needed.    #4.    Dip urine positive for blood.  I discussed with the patient that the dip urine test has a high false positive result for blood.  We will send the patient's urine for microscopic analysis and contact the patient if in fact significant blood is present and if evaluation is indicated.    #5.   Elevated blood pressure.  Plan.  This finding pointed out to the patient.  I recommend that the patient follow up with the patient's PCP for this.    #6.  BPH.  Plan.  I discussed medical therapy for voiding symptoms but patient states that at this point he is satisfied with his voiding pattern on no BPH medicines    #7.  Erectile dysfunction.  Plan.  Patient has prescription for Cialis        Orders Placed This Encounter   Procedures    Urine culture    Urinalysis Microscopic    Prostate Specific Antigen, Diagnostic    POCT urinalysis, dipstick or tablet reag           Nadeem Markham MD    PLEASE NOTE:  Please be advised that portions of this note were dictated using voice recognition software and may contain dictation related errors in spelling/grammar/appropriate pronouns/syntax or other errors that might have not been found and or corrected on text review.

## 2017-09-07 NOTE — LETTER
September 7, 2017      Tien Thomas MD  [address] [facility]  [place] [address]           [facility]  [address] [place] [address]  [place] [address]  Phone: [phone]          Patient: Leonides Burns   MR Number: [id number]   YOB: 1945   Date of Visit: 9/7/2017       Dear Dr. Tien Thomas:    Thank you for referring Leonides Burns to me for evaluation. Attached you will find relevant portions of my assessment and plan of care.    If you have questions, please do not hesitate to call me. I look forward to following Leonides Burns along with you.    Sincerely,    Nadeem Markham MD    Enclosure  CC:  No Recipients    If you would like to receive this communication electronically, please contact [email or web address] or [phone] to request more information on [employer] Link access.    For providers and/or their staff who would like to refer a patient to [facility], please contact us through our one-stop-shop provider referral line, [facility] , at [phone].    If you feel you have received this communication in error or would no longer like to receive these types of communications, please e-mail [email or web address]

## 2017-09-09 LAB — BACTERIA UR CULT: NO GROWTH

## 2017-10-03 ENCOUNTER — OFFICE VISIT (OUTPATIENT)
Dept: DERMATOLOGY | Facility: CLINIC | Age: 72
End: 2017-10-03
Payer: MEDICARE

## 2017-10-03 DIAGNOSIS — L98.9 DISEASE OF SKIN AND SUBCUTANEOUS TISSUE: Primary | ICD-10-CM

## 2017-10-03 PROCEDURE — 99999 PR PBB SHADOW E&M-EST. PATIENT-LVL II: CPT | Mod: PBBFAC,,, | Performed by: DERMATOLOGY

## 2017-10-03 PROCEDURE — 88305 TISSUE EXAM BY PATHOLOGIST: CPT | Performed by: PATHOLOGY

## 2017-10-03 PROCEDURE — 11100 PR BIOPSY OF SKIN LESION: CPT | Mod: S$GLB,,, | Performed by: DERMATOLOGY

## 2017-10-03 PROCEDURE — 99214 OFFICE O/P EST MOD 30 MIN: CPT | Mod: 25,S$GLB,, | Performed by: DERMATOLOGY

## 2017-10-03 PROCEDURE — 11101 PR BIOPSY, EACH ADDED LESION: CPT | Mod: S$GLB,,, | Performed by: DERMATOLOGY

## 2017-10-03 RX ORDER — TRIAMCINOLONE ACETONIDE 1 MG/G
CREAM TOPICAL
Qty: 454 G | Refills: 3 | Status: SHIPPED | OUTPATIENT
Start: 2017-10-03 | End: 2019-02-28

## 2017-10-03 RX ORDER — TRIAMCINOLONE ACETONIDE 1 MG/G
OINTMENT TOPICAL
Qty: 454 G | Refills: 3 | Status: SHIPPED | OUTPATIENT
Start: 2017-10-03 | End: 2018-07-09

## 2017-10-03 NOTE — PATIENT INSTRUCTIONS
Shave Biopsy Wound Care    Your doctor has performed a shave biopsy today.  A band aid and vaseline ointment has been placed over the site.  This should remain in place for 24 hours.  It is recommended that you keep the area dry for the first 24 hours.  After 24 hours, you may remove the band aid and wash the area with warm soap and water and apply Vaseline jelly.  Many patients prefer to use Neosporin or Bacitracin ointment.  This is acceptable; however, know that you can develop an allergy to this medication even if you have used it safely for years.  It is important to keep the area moist.  Letting it dry out and get air slows healing time, and will worsen the scar.  Band aid is optional after first 24 hours.      If you notice increasing redness, tenderness, pain, or yellow drainage at the biopsy site, please notify your doctor.  These are signs of an infection.    If your biopsy site is bleeding, apply firm pressure for 15 minutes straight.  Repeat for another 15 minutes, if it is still bleeding.   If the surgical site continues to bleed, then please contact your doctor.      [facility] [place] [address]/ [phone] FAX/ [email or web address]        XEROSIS (DRY SKIN)        1. Definition    Xerosis is the term for dry skin.  We all have a natural oil coating over our skin produced by the skin oil glands.  If this oil is removed, the skin becomes dry which can lead to cracking, which can lead to inflammation.  Xerosis is usually a long-term problem that recurs often, especially in the winter.    2. Cause     Long hot baths or showers can remove our natural oil and lead to xerosis.  One should never take more than one bath or shower a day and for no longer than ten minutes.   Use of harsh soaps such as Zest, Dial, and Ivory can worsen and cause xerosis.   Cold winter weather worsens xerosis because the amount of moisture contained in cold air is much less than the amount of  moisture in warm air.    3. Treatment     Treatment is intended to restore the natural oil to your skin.  Keep the skin lubricated.     Do not take more than one bath or shower a day.  Use lukewarm water, not hot.  Hot water dries out the skin.     Use a gentle moisturizing soap such as Cetaphil soap, Oil of Olay, Dove, Basis, Ivory moisture care, Restoraderm cleanser.     When toweling dry, dont rub.  Blot the skin so there is still some water left on the skin.  You should apply a moisturizing cream to all of the skin such as Cerave cream, Cetaphil cream, Restoraderm or Eucerin Original Formula cream.   Alpha hydroxyacid lotions, i.e., AmLactin, also work very well for preventing dry skin, but may burn when used on inflamed or reddened skin.     If you like to swim during the winter months, you should not use soap when getting out of the pool.  When you have finished swimming, rinse off the chlorine with cool to warm water.  If this will be the only shower of the day, then you may use Cetaphil or another mild soap to cleanse your skin.  After the shower, apply a moisturizing cream to all of the skin as above.        [address] [place] [address]/ [phone] FAX/ [email or web address]

## 2017-10-03 NOTE — PROGRESS NOTES
Subjective:       Patient ID:  Leonides Burns is a 72 y.o. male who presents for   Chief Complaint   Patient presents with    Eczema     worse, spreading to new areas very itchy only using topicals TAC  cream and oint when needed      11/23/15:  FINAL PATHOLOGIC DIAGNOSIS  1. Skin, upper back, shave biopsy:  - LICHENOID AND FOCALLY SPONGIOTIC DERMATITIS (SEE COMMENT).  MICROSCOPIC DESCRIPTION: Sections show a shave biopsy of skin extending to the level of the middle reticular  dermis. The epidermis exhibits mild acanthosis with overlying orthokeratosis and minimal parakeratosis. Isolated  spongiotic foci are noted with exocytosis of lymphocytes. Multifocal vacuolar interface alteration is also noted along  the dermoepidermal junction with associated colloid bodies and rare apoptotic keratinocytes. This lichenoid  interface alteration extends to involve the hair follicles. There is an immediately underlying superficial perivascular  and lichenoid lymphohistiocytic infiltrate with plasma cells and rarer eosinophils. Prominent melanophages are also  noted within the superficial dermis. PAS stain is negative for fungi. The majority of the lymphocytes within the  superficial infiltrate stain positive for both CD3 and CD4, while CD8 stains a sparser population of cells. The  approximate CD4 to CD8 ratio is 5:1. CD20 stains only a few rare scattered positive cells within the superficial  infiltrate. CD30 is negative. Appropriately reactive controls were reviewed. Multiple levels were examined. [place]  red stain, spirochete stain and T-cell receptor gene rearrangement analysis is negative  COMMENT: These features are most suggestive of a lichenoid dermatitis such as a lichenoid drug eruption,  erythema dyschromicum perstans, or lichen planus pigmentosus. Evolving mycosis fungoides cannot be entirely  Excluded.    Rash began 2003 on hands      Eczema  - Follow-up  Symptom course: worsening (x 1 - 2 months)  Currently  using: TAC cream and TAC oint. last seen by me 1/25/17.  Affected locations: diffuse  Signs / symptoms: itching        Review of Systems   Constitutional: Negative for fever, chills, weight loss, fatigue, night sweats and malaise.   Gastrointestinal: Negative for Sensitivity to oral antibiotics.   Skin: Positive for itching and rash.   Psychiatric/Behavioral: Positive for high stress (more stress more itching ).        Objective:    Physical Exam   Constitutional: He appears well-developed and well-nourished. No distress.   Neurological: He is alert and oriented to person, place, and time. He is not disoriented.   Psychiatric: He has a normal mood and affect.   Skin:   Areas Examined (abnormalities noted in diagram):   Scalp / Hair Palpated and Inspected  Head / Face Inspection Performed  Neck Inspection Performed  Chest / Axilla Inspection Performed  Abdomen Inspection Performed  Genitals / Buttocks / Groin Inspection Performed  Back Inspection Performed  RUE Inspected  LUE Inspection Performed  RLE Inspected  LLE Inspection Performed  Nails and Digits Inspection Performed              Diagram Legend     Erythematous scaling macule/papule c/w actinic keratosis       Vascular papule c/w angioma      Pigmented verrucoid papule/plaque c/w seborrheic keratosis      Yellow umbilicated papule c/w sebaceous hyperplasia      Irregularly shaped tan macule c/w lentigo     1-2 mm smooth white papules consistent with Milia      Movable subcutaneous cyst with punctum c/w epidermal inclusion cyst      Subcutaneous movable cyst c/w pilar cyst      Firm pink to brown papule c/w dermatofibroma      Pedunculated fleshy papule(s) c/w skin tag(s)      Evenly pigmented macule c/w junctional nevus     Mildly variegated pigmented, slightly irregular-bordered macule c/w mildly atypical nevus      Flesh colored to evenly pigmented papule c/w intradermal nevus       Pink pearly papule/plaque c/w basal cell carcinoma      Erythematous  hyperkeratotic cursted plaque c/w SCC      Surgical scar with no sign of skin cancer recurrence      Open and closed comedones      Inflammatory papules and pustules      Verrucoid papule consistent consistent with wart     Erythematous eczematous patches and plaques     Dystrophic onycholytic nail with subungual debris c/w onychomycosis     Umbilicated papule    Erythematous-base heme-crusted tan verrucoid plaque consistent with inflamed seborrheic keratosis     Erythematous Silvery Scaling Plaque c/w Psoriasis     See annotation      Assessment / Plan:      Pathology Orders:      Normal Orders This Visit    Tissue Specimen To Pathology, Dermatology     Questions:    Directional Terms:  Other(comment)    Clinical information:  r/o ACD vs psoriasis vs parapsoriasis vs CTCL    Specific Site:  left lower back    Tissue Specimen To Pathology, Dermatology     Questions:    Directional Terms:  Other(comment)    Clinical information:  r/o ACD vs psoriasis vs parapsoriasis vs CTCL    Specific Site:  left post thigh        Disease of skin and subcutaneous tissue  -     Tissue Specimen To Pathology, Dermatology  -     Tissue Specimen To Pathology, Dermatology  Shave biopsy procedure note:    Shave biopsy x 2 performed after verbal consent including risk of infection, scar, recurrence, need for additional treatment of site. Area prepped with alcohol, anesthetized with approximately 1.0cc of 1% lidocaine with epinephrine. Lesional tissue shaved with razor blade. Hemostasis achieved with application of aluminum chloride followed by hyfrecation. No complications. Dressing applied. Wound care explained.      -     triamcinolone acetonide 0.1% (KENALOG) 0.1 % ointment; AAA bid  Dispense: 454 g; Refill: 3  -     triamcinolone acetonide 0.1% (KENALOG) 0.1 % cream; AAA bid  Dispense: 454 g; Refill: 3    Safe list provided - encouraged pt to buy soap and moisturizer off list. Currently using a generic cream containing DMDM  maría  Pt asked to bring all topicals to next visit           Return in about 2 weeks (around 10/17/2017). to present at staff conference

## 2017-11-28 ENCOUNTER — HOSPITAL ENCOUNTER (OUTPATIENT)
Dept: RADIOLOGY | Facility: HOSPITAL | Age: 72
Discharge: HOME | End: 2017-11-28
Attending: INTERNAL MEDICINE
Payer: MEDICARE

## 2017-11-28 DIAGNOSIS — Z13.6 ENCOUNTER FOR ABDOMINAL AORTIC ANEURYSM (AAA) SCREENING: ICD-10-CM

## 2017-11-28 PROCEDURE — 76775 US EXAM ABDO BACK WALL LIM: CPT | Mod: TC

## 2017-11-28 PROCEDURE — 76775 US EXAM ABDO BACK WALL LIM: CPT | Mod: 26,,, | Performed by: RADIOLOGY

## 2017-12-27 DIAGNOSIS — M1A.9XX0 CHRONIC GOUT WITHOUT TOPHUS, UNSPECIFIED CAUSE, UNSPECIFIED SITE: ICD-10-CM

## 2017-12-27 RX ORDER — ALLOPURINOL 100 MG/1
TABLET ORAL
Qty: 180 TABLET | Refills: 0 | Status: SHIPPED | OUTPATIENT
Start: 2017-12-27 | End: 2018-04-28 | Stop reason: SDUPTHER

## 2018-01-08 ENCOUNTER — OFFICE VISIT (OUTPATIENT)
Dept: INTERNAL MEDICINE | Facility: CLINIC | Age: 73
End: 2018-01-08
Payer: MEDICARE

## 2018-01-08 VITALS
HEIGHT: 67 IN | OXYGEN SATURATION: 97 % | HEART RATE: 70 BPM | WEIGHT: 176.56 LBS | DIASTOLIC BLOOD PRESSURE: 78 MMHG | SYSTOLIC BLOOD PRESSURE: 121 MMHG | BODY MASS INDEX: 27.71 KG/M2

## 2018-01-08 DIAGNOSIS — I10 ESSENTIAL HYPERTENSION: Primary | Chronic | ICD-10-CM

## 2018-01-08 DIAGNOSIS — R97.20 ELEVATED PSA: ICD-10-CM

## 2018-01-08 DIAGNOSIS — Z00.00 PREVENTATIVE HEALTH CARE: ICD-10-CM

## 2018-01-08 DIAGNOSIS — E78.5 HYPERLIPIDEMIA, UNSPECIFIED HYPERLIPIDEMIA TYPE: ICD-10-CM

## 2018-01-08 DIAGNOSIS — F10.10 ALCOHOL ABUSE: ICD-10-CM

## 2018-01-08 DIAGNOSIS — N28.9 RENAL INSUFFICIENCY: ICD-10-CM

## 2018-01-08 DIAGNOSIS — Z00.00 ROUTINE GENERAL MEDICAL EXAMINATION AT A HEALTH CARE FACILITY: ICD-10-CM

## 2018-01-08 DIAGNOSIS — F17.200 TOBACCO DEPENDENCE: Chronic | ICD-10-CM

## 2018-01-08 PROCEDURE — 99499 UNLISTED E&M SERVICE: CPT | Mod: S$GLB,,, | Performed by: INTERNAL MEDICINE

## 2018-01-08 PROCEDURE — 99397 PER PM REEVAL EST PAT 65+ YR: CPT | Mod: S$GLB,,, | Performed by: INTERNAL MEDICINE

## 2018-01-08 PROCEDURE — 99999 PR PBB SHADOW E&M-EST. PATIENT-LVL III: CPT | Mod: PBBFAC,,, | Performed by: INTERNAL MEDICINE

## 2018-01-08 NOTE — PROGRESS NOTES
Pt. ID: Leonides Burns is a 72 y.o. male      Chief complaint:   Chief Complaint   Patient presents with    Hypertension     follow up       HPI: Pt. Here for well visit; of note, urology is managing elevated PSA; he is compliant with meds; he has lost weight and he states he is dieting; he does not want flu, tetanus, shingles, pneumonia shots; he does not want colonoscopy or IFOBT I explained risks of non-compliance; he has cut down ETOH use; he drinks bottle wine a week; he smokes PP2 days and he does not want smoking cessation; kidney fxn is improving on labs dated 9/6/17      Review of Systems   Constitutional: Negative for fever.   Respiratory: Negative for cough and shortness of breath.    Cardiovascular: Negative for chest pain.   Gastrointestinal: Negative for abdominal pain, nausea and vomiting.   Genitourinary: Negative for dysuria.   Psychiatric/Behavioral: Negative for depression. The patient is not nervous/anxious.          Objective:    Physical Exam   Constitutional: He is oriented to person, place, and time.   overweight   Eyes: EOM are normal.   Neck: Normal range of motion.   Cardiovascular: Normal rate, regular rhythm and normal heart sounds.    Pulmonary/Chest: Effort normal and breath sounds normal.   Abdominal: Soft. There is no tenderness. There is no rebound and no guarding.   Musculoskeletal: Normal range of motion.   Neurological: He is alert and oriented to person, place, and time.   Skin: No rash noted.         Health Maintenance   Topic Date Due    Lipid Panel  03/07/2022    TETANUS VACCINE  01/08/2028    Colonoscopy  01/08/2028    Zoster Vaccine  Addressed    Influenza Vaccine  Addressed    Abdominal Aortic Aneurysm Screening  Completed    Pneumococcal (65+)  Excluded         Assessment:     1. Essential hypertension Well controlled   2. Hyperlipidemia, unspecified hyperlipidemia type Active   3. Renal insufficiency Stable   4. Tobacco dependence Sub-optimally controlled   5.  Alcohol abuse Active   6. Elevated PSA Active   7. Routine general medical examination at a health care facility    8. Preventative health care Active         Plan: Essential hypertension  Comments:  continue current regimen and encouraged low Na diet and weight loss   Orders:  -     CBC auto differential; Future; Expected date: 06/08/2018  -     Comprehensive metabolic panel; Future; Expected date: 06/08/2018  -     Urinalysis; Future; Expected date: 06/08/2018    Hyperlipidemia, unspecified hyperlipidemia type  Comments:  continue current regimen and encouraged diet modification   Orders:  -     Lipid panel; Future; Expected date: 06/08/2018    Renal insufficiency  Comments:  improving; monitor and asked pt. to avoid NSAIDs  Orders:  -     Comprehensive metabolic panel; Future; Expected date: 06/08/2018    Tobacco dependence  Comments:  encouraged cessation and explained risks     Alcohol abuse  Comments:  encouraged cessation and explained risks     Elevated PSA  Comments:  f/u urology who is managing    Routine general medical examination at a health care facility    Preventative health care  -     CBC auto differential; Future; Expected date: 06/08/2018  -     Comprehensive metabolic panel; Future; Expected date: 06/08/2018  -     Lipid panel; Future; Expected date: 06/08/2018  -     Urinalysis; Future; Expected date: 06/08/2018        Problem List Items Addressed This Visit        Psychiatric    Alcohol abuse       Cardiac/Vascular    Hypertension - Primary (Chronic)    Relevant Orders    CBC auto differential    Comprehensive metabolic panel    Urinalysis    Hyperlipidemia (Chronic)    Relevant Orders    Lipid panel       Renal/    Elevated PSA    Renal insufficiency    Relevant Orders    Comprehensive metabolic panel       Other    Tobacco dependence (Chronic)    Routine general medical examination at a health care facility

## 2018-01-09 ENCOUNTER — OFFICE VISIT (OUTPATIENT)
Dept: DERMATOLOGY | Facility: CLINIC | Age: 73
End: 2018-01-09
Payer: MEDICARE

## 2018-01-09 DIAGNOSIS — L30.9 ECZEMA, UNSPECIFIED TYPE: Primary | ICD-10-CM

## 2018-01-09 PROCEDURE — 99999 PR PBB SHADOW E&M-EST. PATIENT-LVL II: CPT | Mod: PBBFAC,,, | Performed by: DERMATOLOGY

## 2018-01-09 PROCEDURE — 99213 OFFICE O/P EST LOW 20 MIN: CPT | Mod: S$GLB,,, | Performed by: DERMATOLOGY

## 2018-01-09 NOTE — PATIENT INSTRUCTIONS
XEROSIS (DRY SKIN)        1. Definition    Xerosis is the term for dry skin.  We all have a natural oil coating over our skin produced by the skin oil glands.  If this oil is removed, the skin becomes dry which can lead to cracking, which can lead to inflammation.  Xerosis is usually a long-term problem that recurs often, especially in the winter.    2. Cause     Long hot baths or showers can remove our natural oil and lead to xerosis.  One should never take more than one bath or shower a day and for no longer than ten minutes.   Use of harsh soaps such as Zest, Dial, and Ivory can worsen and cause xerosis.   Cold winter weather worsens xerosis because the amount of moisture contained in cold air is much less than the amount of moisture in warm air.    3. Treatment     Treatment is intended to restore the natural oil to your skin.  Keep the skin lubricated.     Do not take more than one bath or shower a day.  Use lukewarm water, not hot.  Hot water dries out the skin.     Use a gentle moisturizing soap such as Cetaphil soap, Oil of Olay, Dove, Basis, Ivory moisture care, Restoraderm cleanser.     When toweling dry, dont rub.  Blot the skin so there is still some water left on the skin.  You should apply a moisturizing cream to all of the skin such as Cerave cream, Cetaphil cream, Restoraderm or Eucerin Original Formula cream.   Alpha hydroxyacid lotions, i.e., AmLactin, also work very well for preventing dry skin, but may burn when used on inflamed or reddened skin.     If you like to swim during the winter months, you should not use soap when getting out of the pool.  When you have finished swimming, rinse off the chlorine with cool to warm water.  If this will be the only shower of the day, then you may use Cetaphil or another mild soap to cleanse your skin.  After the shower, apply a moisturizing cream to all of the skin as above.        [address] [place] [address]/ [phone]  [phone] FAX/ [email or web address]

## 2018-01-09 NOTE — ASSESSMENT & PLAN NOTE
Today's Plan:      Currently clear with only PIPA  Good skin care regimen discussed including limiting to one bath or shower/day, using lukewarm water with mild soap and moisturizing cream to skin 1 - 2x/day. Brochure was provided and reviewed with patient.  cetaphil soap and cerave cream bid  D/c TAC and only use prn flare    Pt to call prn flare

## 2018-01-09 NOTE — PROGRESS NOTES
Subjective:       Patient ID:  Leonides Burns is a 72 y.o. male who presents for   Chief Complaint   Patient presents with    Rash     f/u much improved not itchy still using topicals oint and cream      Pt with h/o 10+ year itchy diffuse rash. 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  Comment:  These are very interesting slides. Both biopsies show similar histology. There is mild epidermal hyperplasia  associated with spongiosis. I do not see a significant increase in the lymphocytes in the epidermis that would be  more than would be expected with this degree of spongiosis. Within the inflammatory cell infiltrate I do not see  significant cytologic atypia. However, there is evidence of chronicity with fibrosis of the superficial dermis. Evolving  T-cell dyscrasia in a background of eczematous dermatitides can be a difficult diagnosis to make . If there is  continued clinical concern for a lymphoproliferative disorder, then additional biopsies may be indicated      Rash  - Follow-up  Symptom course: resolved  Currently using: TAC cream qam and TAC oint qhs - all over. using same generic cream containing DMDM hydantoin on hands only. no moisturizer for body. dial soap.  Affected locations: currently clear  Signs / symptoms: asymptomatic        Review of Systems   Skin: Negative for itching and rash.        Objective:    Physical Exam   Constitutional: He appears well-developed and well-nourished. No distress.   Neurological: He is alert and oriented to person, place, and time. He is not disoriented.   Psychiatric: He has a normal mood and affect.   Skin:    Areas Examined (abnormalities noted in diagram):   Scalp / Hair Palpated and Inspected  Head / Face Inspection Performed  Neck Inspection Performed  Chest / Axilla Inspection Performed  Abdomen Inspection Performed  Genitals / Buttocks / Groin Inspection Performed  Back Inspection Performed  RUE Inspected  LUE Inspection Performed  RLE Inspected  LLE Inspection Performed  Nails and Digits Inspection Performed              Diagram Legend     Erythematous scaling macule/papule c/w actinic keratosis       Vascular papule c/w angioma      Pigmented verrucoid papule/plaque c/w seborrheic keratosis      Yellow umbilicated papule c/w sebaceous hyperplasia      Irregularly shaped tan macule c/w lentigo     1-2 mm smooth white papules consistent with Milia      Movable subcutaneous cyst with punctum c/w epidermal inclusion cyst      Subcutaneous movable cyst c/w pilar cyst      Firm pink to brown papule c/w dermatofibroma      Pedunculated fleshy papule(s) c/w skin tag(s)      Evenly pigmented macule c/w junctional nevus     Mildly variegated pigmented, slightly irregular-bordered macule c/w mildly atypical nevus      Flesh colored to evenly pigmented papule c/w intradermal nevus       Pink pearly papule/plaque c/w basal cell carcinoma      Erythematous hyperkeratotic cursted plaque c/w SCC      Surgical scar with no sign of skin cancer recurrence      Open and closed comedones      Inflammatory papules and pustules      Verrucoid papule consistent consistent with wart     Erythematous eczematous patches and plaques     Dystrophic onycholytic nail with subungual debris c/w onychomycosis     Umbilicated papule    Erythematous-base heme-crusted tan verrucoid plaque consistent with inflamed seborrheic keratosis     Erythematous Silvery Scaling Plaque c/w Psoriasis     See annotation      Assessment / Plan:        Eczema, unspecified type      Eczema  Today's Plan:      Currently clear with only PIPA  Good skin care regimen  discussed including limiting to one bath or shower/day, using lukewarm water with mild soap and moisturizing cream to skin 1 - 2x/day. Brochure was provided and reviewed with patient.  cetaphil soap and cerave cream bid  D/c TAC and only use prn flare    Pt to call prn flare      Return if symptoms worsen or fail to improve.

## 2018-01-19 ENCOUNTER — PES CALL (OUTPATIENT)
Dept: ADMINISTRATIVE | Facility: CLINIC | Age: 73
End: 2018-01-19

## 2018-03-20 ENCOUNTER — TELEPHONE (OUTPATIENT)
Dept: SMOKING CESSATION | Facility: CLINIC | Age: 73
End: 2018-03-20

## 2018-03-23 ENCOUNTER — TELEPHONE (OUTPATIENT)
Dept: SMOKING CESSATION | Facility: CLINIC | Age: 73
End: 2018-03-23

## 2018-03-27 ENCOUNTER — CLINICAL SUPPORT (OUTPATIENT)
Dept: SMOKING CESSATION | Facility: CLINIC | Age: 73
End: 2018-03-27
Payer: COMMERCIAL

## 2018-03-27 DIAGNOSIS — F17.200 NICOTINE DEPENDENCE: Primary | ICD-10-CM

## 2018-03-27 PROCEDURE — 99407 BEHAV CHNG SMOKING > 10 MIN: CPT | Mod: S$GLB,,,

## 2018-04-28 DIAGNOSIS — I10 ESSENTIAL HYPERTENSION: Chronic | ICD-10-CM

## 2018-04-28 DIAGNOSIS — M1A.9XX0 CHRONIC GOUT WITHOUT TOPHUS, UNSPECIFIED CAUSE, UNSPECIFIED SITE: ICD-10-CM

## 2018-04-30 RX ORDER — ALLOPURINOL 100 MG/1
TABLET ORAL
Qty: 180 TABLET | Refills: 0 | OUTPATIENT
Start: 2018-04-30

## 2018-04-30 RX ORDER — ALLOPURINOL 100 MG/1
TABLET ORAL
Qty: 180 TABLET | Refills: 0 | Status: ON HOLD | OUTPATIENT
Start: 2018-04-30 | End: 2022-11-14

## 2018-04-30 RX ORDER — AMLODIPINE BESYLATE 5 MG/1
TABLET ORAL
Qty: 90 TABLET | Refills: 0 | Status: ON HOLD | OUTPATIENT
Start: 2018-04-30 | End: 2022-10-18 | Stop reason: SDUPTHER

## 2018-04-30 RX ORDER — AMLODIPINE BESYLATE 5 MG/1
TABLET ORAL
Qty: 90 TABLET | Refills: 0 | OUTPATIENT
Start: 2018-04-30

## 2018-06-14 ENCOUNTER — PES CALL (OUTPATIENT)
Dept: ADMINISTRATIVE | Facility: CLINIC | Age: 73
End: 2018-06-14

## 2018-07-02 ENCOUNTER — LAB VISIT (OUTPATIENT)
Dept: LAB | Facility: HOSPITAL | Age: 73
End: 2018-07-02
Attending: INTERNAL MEDICINE
Payer: MEDICARE

## 2018-07-02 DIAGNOSIS — E78.5 HYPERLIPIDEMIA, UNSPECIFIED HYPERLIPIDEMIA TYPE: ICD-10-CM

## 2018-07-02 DIAGNOSIS — Z00.00 PREVENTATIVE HEALTH CARE: ICD-10-CM

## 2018-07-02 DIAGNOSIS — I10 ESSENTIAL HYPERTENSION: Chronic | ICD-10-CM

## 2018-07-02 DIAGNOSIS — N28.9 RENAL INSUFFICIENCY: ICD-10-CM

## 2018-07-02 LAB
ALBUMIN SERPL BCP-MCNC: 3.3 G/DL
ALP SERPL-CCNC: 70 U/L
ALT SERPL W/O P-5'-P-CCNC: 16 U/L
ANION GAP SERPL CALC-SCNC: 9 MMOL/L
AST SERPL-CCNC: 24 U/L
BASOPHILS # BLD AUTO: 0.07 K/UL
BASOPHILS NFR BLD: 0.9 %
BILIRUB SERPL-MCNC: 0.6 MG/DL
BUN SERPL-MCNC: 18 MG/DL
CALCIUM SERPL-MCNC: 9.3 MG/DL
CHLORIDE SERPL-SCNC: 110 MMOL/L
CHOLEST SERPL-MCNC: 191 MG/DL
CHOLEST/HDLC SERPL: 4.2 {RATIO}
CO2 SERPL-SCNC: 21 MMOL/L
CREAT SERPL-MCNC: 1.5 MG/DL
DIFFERENTIAL METHOD: ABNORMAL
EOSINOPHIL # BLD AUTO: 0.5 K/UL
EOSINOPHIL NFR BLD: 7.1 %
ERYTHROCYTE [DISTWIDTH] IN BLOOD BY AUTOMATED COUNT: 18.3 %
EST. GFR  (AFRICAN AMERICAN): 52.3 ML/MIN/1.73 M^2
EST. GFR  (NON AFRICAN AMERICAN): 45.2 ML/MIN/1.73 M^2
GLUCOSE SERPL-MCNC: 108 MG/DL
HCT VFR BLD AUTO: 50.2 %
HDLC SERPL-MCNC: 45 MG/DL
HDLC SERPL: 23.6 %
HGB BLD-MCNC: 16.1 G/DL
IMM GRANULOCYTES # BLD AUTO: 0.06 K/UL
IMM GRANULOCYTES NFR BLD AUTO: 0.8 %
LDLC SERPL CALC-MCNC: 125.6 MG/DL
LYMPHOCYTES # BLD AUTO: 2.1 K/UL
LYMPHOCYTES NFR BLD: 27.4 %
MCH RBC QN AUTO: 27.2 PG
MCHC RBC AUTO-ENTMCNC: 32.1 G/DL
MCV RBC AUTO: 85 FL
MONOCYTES # BLD AUTO: 1.3 K/UL
MONOCYTES NFR BLD: 17.6 %
NEUTROPHILS # BLD AUTO: 3.5 K/UL
NEUTROPHILS NFR BLD: 46.2 %
NONHDLC SERPL-MCNC: 146 MG/DL
NRBC BLD-RTO: 0 /100 WBC
PLATELET # BLD AUTO: 284 K/UL
PMV BLD AUTO: 11.2 FL
POTASSIUM SERPL-SCNC: 4 MMOL/L
PROT SERPL-MCNC: 7 G/DL
RBC # BLD AUTO: 5.92 M/UL
SODIUM SERPL-SCNC: 140 MMOL/L
TRIGL SERPL-MCNC: 102 MG/DL
WBC # BLD AUTO: 7.51 K/UL

## 2018-07-02 PROCEDURE — 85025 COMPLETE CBC W/AUTO DIFF WBC: CPT

## 2018-07-02 PROCEDURE — 36415 COLL VENOUS BLD VENIPUNCTURE: CPT | Mod: PO

## 2018-07-02 PROCEDURE — 80061 LIPID PANEL: CPT

## 2018-07-02 PROCEDURE — 80053 COMPREHEN METABOLIC PANEL: CPT

## 2018-07-09 ENCOUNTER — OFFICE VISIT (OUTPATIENT)
Dept: FAMILY MEDICINE | Facility: CLINIC | Age: 73
End: 2018-07-09
Payer: MEDICARE

## 2018-07-09 ENCOUNTER — OFFICE VISIT (OUTPATIENT)
Dept: INTERNAL MEDICINE | Facility: CLINIC | Age: 73
End: 2018-07-09
Payer: MEDICARE

## 2018-07-09 VITALS
BODY MASS INDEX: 30.13 KG/M2 | SYSTOLIC BLOOD PRESSURE: 156 MMHG | WEIGHT: 192 LBS | HEIGHT: 67 IN | OXYGEN SATURATION: 96 % | HEART RATE: 67 BPM | DIASTOLIC BLOOD PRESSURE: 82 MMHG

## 2018-07-09 VITALS
OXYGEN SATURATION: 96 % | SYSTOLIC BLOOD PRESSURE: 150 MMHG | HEART RATE: 67 BPM | DIASTOLIC BLOOD PRESSURE: 86 MMHG | WEIGHT: 192 LBS | BODY MASS INDEX: 30.07 KG/M2

## 2018-07-09 DIAGNOSIS — E78.5 HYPERLIPIDEMIA, UNSPECIFIED HYPERLIPIDEMIA TYPE: Chronic | ICD-10-CM

## 2018-07-09 DIAGNOSIS — I10 ESSENTIAL HYPERTENSION: Primary | Chronic | ICD-10-CM

## 2018-07-09 DIAGNOSIS — Z00.00 PREVENTATIVE HEALTH CARE: ICD-10-CM

## 2018-07-09 DIAGNOSIS — R41.3 MEMORY IMPAIRMENT: ICD-10-CM

## 2018-07-09 DIAGNOSIS — F10.20 UNCOMPLICATED ALCOHOL DEPENDENCE: Chronic | ICD-10-CM

## 2018-07-09 DIAGNOSIS — E66.9 CLASS 1 OBESITY WITH SERIOUS COMORBIDITY AND BODY MASS INDEX (BMI) OF 30.0 TO 30.9 IN ADULT, UNSPECIFIED OBESITY TYPE: ICD-10-CM

## 2018-07-09 DIAGNOSIS — N18.30 CHRONIC KIDNEY DISEASE, STAGE III (MODERATE): Chronic | ICD-10-CM

## 2018-07-09 DIAGNOSIS — H40.9 GLAUCOMA, UNSPECIFIED GLAUCOMA TYPE, UNSPECIFIED LATERALITY: Chronic | ICD-10-CM

## 2018-07-09 DIAGNOSIS — M1A.0790 IDIOPATHIC CHRONIC GOUT OF FOOT WITHOUT TOPHUS, UNSPECIFIED LATERALITY: ICD-10-CM

## 2018-07-09 DIAGNOSIS — L30.9 ECZEMA, UNSPECIFIED TYPE: ICD-10-CM

## 2018-07-09 DIAGNOSIS — I10 ESSENTIAL HYPERTENSION: Chronic | ICD-10-CM

## 2018-07-09 DIAGNOSIS — F17.200 TOBACCO DEPENDENCE: Chronic | ICD-10-CM

## 2018-07-09 DIAGNOSIS — R80.9 PROTEINURIA, UNSPECIFIED TYPE: ICD-10-CM

## 2018-07-09 DIAGNOSIS — E66.9 OBESITY (BMI 30.0-34.9): ICD-10-CM

## 2018-07-09 DIAGNOSIS — Z00.00 ENCOUNTER FOR PREVENTIVE HEALTH EXAMINATION: Primary | ICD-10-CM

## 2018-07-09 DIAGNOSIS — R79.9 ABNORMAL FINDING OF BLOOD CHEMISTRY: ICD-10-CM

## 2018-07-09 DIAGNOSIS — Z12.11 COLON CANCER SCREENING: ICD-10-CM

## 2018-07-09 PROCEDURE — 99999 PR PBB SHADOW E&M-EST. PATIENT-LVL IV: CPT | Mod: PBBFAC,,, | Performed by: NURSE PRACTITIONER

## 2018-07-09 PROCEDURE — 3077F SYST BP >= 140 MM HG: CPT | Mod: CPTII,S$GLB,, | Performed by: INTERNAL MEDICINE

## 2018-07-09 PROCEDURE — 3079F DIAST BP 80-89 MM HG: CPT | Mod: CPTII,S$GLB,, | Performed by: NURSE PRACTITIONER

## 2018-07-09 PROCEDURE — 3079F DIAST BP 80-89 MM HG: CPT | Mod: CPTII,S$GLB,, | Performed by: INTERNAL MEDICINE

## 2018-07-09 PROCEDURE — G0439 PPPS, SUBSEQ VISIT: HCPCS | Mod: S$GLB,,, | Performed by: NURSE PRACTITIONER

## 2018-07-09 PROCEDURE — 99499 UNLISTED E&M SERVICE: CPT | Mod: S$GLB,,, | Performed by: NURSE PRACTITIONER

## 2018-07-09 PROCEDURE — 3077F SYST BP >= 140 MM HG: CPT | Mod: CPTII,S$GLB,, | Performed by: NURSE PRACTITIONER

## 2018-07-09 PROCEDURE — 99999 PR PBB SHADOW E&M-EST. PATIENT-LVL IV: CPT | Mod: PBBFAC,,, | Performed by: INTERNAL MEDICINE

## 2018-07-09 PROCEDURE — 99214 OFFICE O/P EST MOD 30 MIN: CPT | Mod: S$GLB,,, | Performed by: INTERNAL MEDICINE

## 2018-07-09 RX ORDER — NAPROXEN SODIUM 220 MG/1
81 TABLET, FILM COATED ORAL DAILY
Refills: 0 | Status: ON HOLD | COMMUNITY
Start: 2018-07-09 | End: 2022-10-18

## 2018-07-09 NOTE — PROGRESS NOTES
Leonides Burns presented for a  Medicare AWV and comprehensive Health Risk Assessment today. The following components were reviewed and updated:    · Medical history  · Family History  · Social history  · Allergies and Current Medications  · Health Risk Assessment  · Health Maintenance  · Care Team     ** See Completed Assessments for Annual Wellness Visit within the encounter summary.**       The following assessments were completed:  · Living Situation  · CAGE  · Depression Screening  · Timed Get Up and Go  · Whisper Test  · Cognitive Function Screening  · Nutrition Screening  · ADL Screening  · PAQ Screening    Vitals:    07/09/18 0922   BP: (!) 150/86   BP Location: Right arm   Patient Position: Sitting   BP Method: Medium (Manual)   Pulse: 67   SpO2: 96%   Weight: 87.1 kg (192 lb 0.3 oz)     Body mass index is 30.07 kg/m².     Physical Exam   Constitutional: He is oriented to person, place, and time. He appears well-developed. No distress.   obese   HENT:   Head: Normocephalic and atraumatic.   Eyes: EOM are normal. Pupils are equal, round, and reactive to light.   Neck: Neck supple. No JVD present. No tracheal deviation present.   Cardiovascular: Normal rate, regular rhythm, normal heart sounds and intact distal pulses.    No murmur heard.  Pulmonary/Chest: Effort normal and breath sounds normal. No respiratory distress. He has no wheezes. He has no rales.   Abdominal: Soft. Bowel sounds are normal. He exhibits no distension and no mass. There is no tenderness.   Musculoskeletal: Normal range of motion. He exhibits no edema or tenderness.   Neurological: He is alert and oriented to person, place, and time. Coordination normal.   Skin: Skin is warm and dry. No erythema. No pallor.   Psychiatric: He has a normal mood and affect. His behavior is normal. Judgment and thought content normal. Cognition and memory are impaired. He expresses no homicidal and no suicidal ideation.   Nursing note and vitals reviewed.             Diagnoses and health risks identified today and associated recommendations/orders:    1. Encounter for preventive health examination    2. Essential hypertension  Chronic; stable on medication.  Pt did not take BP medication this morning.  Followed by PCP.    3. Hyperlipidemia, unspecified hyperlipidemia type  Chronic; stable on medication.  Followed by PCP.    4. Chronic kidney disease, stage III (moderate)  Chronic; stable.  Followed by PCP.    5. Idiopathic chronic gout of foot without tophus, unspecified laterality  Chronic; stable on medication.  Followed by PCP.    6. Eczema, unspecified type  Chronic; stable on medication.  Followed by Dermatology.    7. Glaucoma, unspecified glaucoma type, unspecified laterality  Chronic; stable on medication.  Followed by external Ophthalmology.    8. Uncomplicated alcohol dependence  Chronic; stable.  Followed by PCP.    9. Tobacco dependence  Chronic; stable.  Followed by PCP.    10. Memory impairment  Memory deficit on screening; Mini Cognitive score: 4    Referral to Neurology for dementia evaluation refused.    11. Obesity (BMI 30.0-34.9)  Chronic, stable. Therapeutic lifestyle changes discussed. Followed by PCP.      Provided Leonides with a 5-10 year written screening schedule and personal prevention plan. Recommendations were developed using the USPSTF age appropriate recommendations. Education, counseling, and referrals were provided as needed. After Visit Summary printed and given to patient which includes a list of additional screenings\tests needed.    Follow-up for Annual Wellness Visit in 1 year.    Lianet Mancini NP

## 2018-07-09 NOTE — PATIENT INSTRUCTIONS
Counseling and Referral of Other Preventative  (Italic type indicates deductible and co-insurance are waived)    Patient Name: Leonides Burns  Today's Date: 7/9/2018    Health Maintenance       Date Due Completion Date    Influenza Vaccine 08/01/2018 1/8/2018 (Declined)    Override on 1/8/2018: Declined    Lipid Panel 07/02/2023 7/2/2018    TETANUS VACCINE 01/08/2028 1/8/2018 (Declined)    Override on 1/8/2018: Declined    Override on 9/6/2017: Declined    Colonoscopy 01/08/2028 1/8/2018 (Declined)    Override on 1/8/2018: Declined    Override on 9/6/2017: Declined        No orders of the defined types were placed in this encounter.    The following information is provided to all patients.  This information is to help you find resources for any of the problems found today that may be affecting your health:                Living healthy guide: www.[facility].[place].gov      Understanding Diabetes: www.diabetes.org      Eating healthy: www.cdc.gov/healthyweight      [facility] home safety checklist: www.cdc.gov/steadi/patient.html      Agency on Aging: www.goea.[place].[facility]      Alcoholics anonymous (AA): www.aa.org      Physical Activity: www.anjali.nih.gov/uf9orqr      Tobacco use: www.quitwithusla.org

## 2018-07-16 ENCOUNTER — LAB VISIT (OUTPATIENT)
Dept: LAB | Facility: HOSPITAL | Age: 73
End: 2018-07-16
Attending: INTERNAL MEDICINE
Payer: MEDICARE

## 2018-07-16 DIAGNOSIS — Z12.11 COLON CANCER SCREENING: ICD-10-CM

## 2018-07-16 LAB — HEMOCCULT STL QL IA: NEGATIVE

## 2018-07-16 PROCEDURE — 82274 ASSAY TEST FOR BLOOD FECAL: CPT

## 2019-02-22 ENCOUNTER — HOSPITAL ENCOUNTER (EMERGENCY)
Facility: HOSPITAL | Age: 74
Discharge: HOME | End: 2019-02-22
Attending: EMERGENCY MEDICINE
Payer: MEDICARE

## 2019-02-22 VITALS
BODY MASS INDEX: 29.76 KG/M2 | WEIGHT: 190 LBS | DIASTOLIC BLOOD PRESSURE: 79 MMHG | TEMPERATURE: 97 F | RESPIRATION RATE: 20 BRPM | SYSTOLIC BLOOD PRESSURE: 168 MMHG | HEART RATE: 78 BPM | OXYGEN SATURATION: 97 %

## 2019-02-22 DIAGNOSIS — R04.0 ACUTE ANTERIOR EPISTAXIS: Primary | ICD-10-CM

## 2019-02-22 PROCEDURE — 99283 EMERGENCY DEPT VISIT LOW MDM: CPT | Mod: 25,,, | Performed by: EMERGENCY MEDICINE

## 2019-02-22 PROCEDURE — 25000003 PHARM REV CODE 250: Performed by: EMERGENCY MEDICINE

## 2019-02-22 PROCEDURE — 30901 CONTROL OF NOSEBLEED: CPT | Mod: LT,,, | Performed by: EMERGENCY MEDICINE

## 2019-02-22 PROCEDURE — 99283 PR EMERGENCY DEPT VISIT,LEVEL III: ICD-10-PCS | Mod: 25,,, | Performed by: EMERGENCY MEDICINE

## 2019-02-22 PROCEDURE — 99282 EMERGENCY DEPT VISIT SF MDM: CPT

## 2019-02-22 PROCEDURE — 30901 PR CTRL 2SEBLEED,ANTER,SIMPLE: ICD-10-PCS | Mod: LT,,, | Performed by: EMERGENCY MEDICINE

## 2019-02-22 RX ORDER — OXYMETAZOLINE HCL 0.05 %
2 SPRAY, NON-AEROSOL (ML) NASAL
Status: COMPLETED | OUTPATIENT
Start: 2019-02-22 | End: 2019-02-22

## 2019-02-22 RX ORDER — SILVER NITRATE 38.21; 12.74 MG/1; MG/1
2 STICK TOPICAL
Status: DISCONTINUED | OUTPATIENT
Start: 2019-02-22 | End: 2019-02-22 | Stop reason: HOSPADM

## 2019-02-22 RX ORDER — POTASSIUM CHLORIDE 7.45 MG/ML
10 INJECTION INTRAVENOUS
Status: DISCONTINUED | OUTPATIENT
Start: 2019-02-22 | End: 2019-02-22

## 2019-02-22 RX ADMIN — Medication 2 SPRAY: at 12:02

## 2019-02-22 NOTE — ED NOTES
Patient identifiers verified and correct for Leonides Burns.     LOC: The patient is awake, alert and aware of environment with an appropriate affect, the patient is oriented x 3 and speaking appropriately.   APPEARANCE: Patient appears comfortable and in no acute distress, patient is clean and well groomed.  SKIN: The skin is warm and dry, color consistent with ethnicity, patient has normal skin turgor and moist mucus membranes, skin intact, no breakdown or bruising noted.   MUSCULOSKELETAL: Patient moving all extremities spontaneously, no swelling noted.  RESPIRATORY: Airway is open and patent, respirations are spontaneous, patient has a normal effort and rate, no accessory muscle use noted, pt placed on pulse ox with O2 sats noted at 97% on room air. Nose bleeding since 11 this morning.  CARDIAC: Patient has a normal rate and regular rhythm, no edema noted, capillary refill < 3 seconds.   GASTRO: Soft and non tender to palpation, no distention noted. Pt states bowel movements have been regular.  : Pt denies any pain or frequency with urination.  NEURO: Pt opens eyes spontaneously, behavior appropriate to situation, follows commands, facial expression symmetrical, bilateral hand grasp equal and even, purposeful motor response noted, normal sensation in all extremities when touched with a finger.

## 2019-02-22 NOTE — ED PROVIDER NOTES
"Encounter Date: 2/22/2019       History     Chief Complaint   Patient presents with    Epistaxis     started this morning; on aspirin      HPI   73 yo male with hx of HTN, HLD who presents with epistaxis from L nostril. States that bleeding started at 7 am but quickly resolved with pressure. However, bleeding recurred approx 30-40 min pta. He does take aspirin but no other anticoagulants. Reports nosebleeds "as a kid" and one "a few months ago." He states that he just restarted aspirin 4 months ago. Reports recent cough and congestion which he states may be contributing. Denies trauma or intranasal drug use.     Review of patient's allergies indicates:   Allergen Reactions    Latex, natural rubber Rash    Iodine and iodide containing products Rash     Past Medical History:   Diagnosis Date    Disorder of kidney and ureter     Elevated PSA     Glaucoma     Hyperlipidemia     Hypertension     Psoriasis      History reviewed. No pertinent surgical history.  Family History   Problem Relation Age of Onset    Hypertension Mother     Diabetes Mother     No Known Problems Father     Hypertension Sister     KEVIN disease Sister     Anemia Sister     Cancer Brother     Anemia Daughter     No Known Problems Son     Kidney disease Neg Hx     Prostate cancer Neg Hx     Melanoma Neg Hx      Social History     Tobacco Use    Smoking status: Current Some Day Smoker     Packs/day: 0.50     Years: 40.00     Pack years: 20.00     Types: Cigarettes    Smokeless tobacco: Current User   Substance Use Topics    Alcohol use: Yes     Alcohol/week: 5.4 oz     Types: 9 Cans of beer per week    Drug use: No     Review of Systems   Constitutional: Negative for chills and fever.   HENT: Positive for congestion and nosebleeds.    Eyes: Negative for redness and visual disturbance.   Respiratory: Negative for shortness of breath.    Cardiovascular: Negative for chest pain.   Gastrointestinal: Negative for vomiting. "   Genitourinary: Negative for hematuria.   Musculoskeletal: Negative for neck pain.   Neurological: Negative for syncope and weakness.   Psychiatric/Behavioral: Negative for confusion and decreased concentration.       Physical Exam     Initial Vitals [02/22/19 1210]   BP Pulse Resp Temp SpO2   (!) 168/79 78 20 96.6 °F (35.9 °C) 97 %      MAP       --         Physical Exam    Nursing note and vitals reviewed.  Constitutional: He appears well-developed and well-nourished. He is not diaphoretic.   HENT:   Head: Normocephalic and atraumatic.   Nose: Epistaxis (L nare with large clot) is observed.   Eyes: Conjunctivae and EOM are normal. Right eye exhibits no discharge. Left eye exhibits no discharge.   Neck: Normal range of motion. Neck supple.   Cardiovascular: Normal rate and regular rhythm.   Pulmonary/Chest: No respiratory distress.   Musculoskeletal: Normal range of motion.   Neurological: He is alert and oriented to person, place, and time. GCS score is 15. GCS eye subscore is 4. GCS verbal subscore is 5. GCS motor subscore is 6.   Skin: Skin is warm and dry.   Psychiatric: He has a normal mood and affect.         ED Course   Epistaxis Mgmt  Date/Time: 2/22/2019 1:22 PM  Performed by: Wong Melgoza MD  Authorized by: Nicholas Harrison MD   Repair method: oxymetazoline  Post-procedure assessment: bleeding stopped  Treatment complexity: simple  Patient tolerance: Patient tolerated the procedure well with no immediate complications        Labs Reviewed - No data to display       Imaging Results    None                APC / Resident Notes:   .   73 yo male with hx of HTN, HLD presenting with nosebleed from L nostril that began this morning. AFVSS. Large clot removed from L nare. No active bleeding on nasal spec exam. Will have patient hold pressure. Afrin ordered as well as silver nitrate should pt have active bleeding on re examination. Rhino rocket at the bedside.   Wong Melgoza, PGY-3  12:44 PM    No bleeding on  re-examination. No evidence of posterior bleeding. Afrin spray applied. Observed patient for an additional 20 minutes to ensure no rebleeding. Patient remains asymptomatic. Stable for discharge. Counseled patient on use of afrin,   ocean spray, and avoiding trauma. Return precautions discussed.    Wong Melgoza, PGY-3  1:14 PM          Attending Attestation:   Physician Attestation Statement for Resident:  As the supervising MD   Physician Attestation Statement: I have personally seen and examined this patient.   I agree with the above history. -: Symptoms resolved with afrin and pressure.   As the supervising MD I agree with the above PE.    As the supervising MD I agree with the above treatment, course, plan, and disposition.                       Clinical Impression:       ICD-10-CM ICD-9-CM   1. Acute anterior epistaxis R04.0 784.7                                Wong Melgoza MD  Resident  02/22/19 1315       Nicholas Harrison MD  02/22/19 1320

## 2019-02-22 NOTE — ED TRIAGE NOTES
Pt to the ED with nose bleed. States is started around 6am, then stopped. Around 11am the patient began bleeding again and the bleeding has not stopped. No other complaints.

## 2019-02-28 ENCOUNTER — ANESTHESIA (OUTPATIENT)
Dept: SURGERY | Facility: OTHER | Age: 74
End: 2019-02-28
Payer: MEDICARE

## 2019-02-28 ENCOUNTER — HOSPITAL ENCOUNTER (OUTPATIENT)
Facility: OTHER | Age: 74
Discharge: HOME | End: 2019-03-02
Attending: EMERGENCY MEDICINE | Admitting: EMERGENCY MEDICINE
Payer: MEDICARE

## 2019-02-28 ENCOUNTER — ANESTHESIA EVENT (OUTPATIENT)
Dept: SURGERY | Facility: OTHER | Age: 74
End: 2019-02-28
Payer: MEDICARE

## 2019-02-28 DIAGNOSIS — R04.0 EPISTAXIS: Primary | ICD-10-CM

## 2019-02-28 DIAGNOSIS — N18.30 CHRONIC KIDNEY DISEASE, STAGE III (MODERATE): Chronic | ICD-10-CM

## 2019-02-28 DIAGNOSIS — F10.20 UNCOMPLICATED ALCOHOL DEPENDENCE: Chronic | ICD-10-CM

## 2019-02-28 DIAGNOSIS — Z01.818 PRE-OP EVALUATION: ICD-10-CM

## 2019-02-28 DIAGNOSIS — E78.00 PURE HYPERCHOLESTEROLEMIA: Chronic | ICD-10-CM

## 2019-02-28 DIAGNOSIS — I10 ESSENTIAL HYPERTENSION: Chronic | ICD-10-CM

## 2019-02-28 DIAGNOSIS — H40.9 GLAUCOMA, UNSPECIFIED GLAUCOMA TYPE, UNSPECIFIED LATERALITY: Chronic | ICD-10-CM

## 2019-02-28 DIAGNOSIS — R41.3 MEMORY IMPAIRMENT: ICD-10-CM

## 2019-02-28 LAB
ABO + RH BLD: NORMAL
ALBUMIN SERPL BCP-MCNC: 3.2 G/DL
ALP SERPL-CCNC: 69 U/L
ALT SERPL W/O P-5'-P-CCNC: 16 U/L
ANION GAP SERPL CALC-SCNC: 9 MMOL/L
AST SERPL-CCNC: 19 U/L
BACTERIA #/AREA URNS HPF: NORMAL /HPF
BASOPHILS # BLD AUTO: 0.05 K/UL
BASOPHILS NFR BLD: 0.7 %
BILIRUB SERPL-MCNC: 0.5 MG/DL
BILIRUB UR QL STRIP: NEGATIVE
BLD GP AB SCN CELLS X3 SERPL QL: NORMAL
BUN SERPL-MCNC: 37 MG/DL
CALCIUM SERPL-MCNC: 9 MG/DL
CHLORIDE SERPL-SCNC: 112 MMOL/L
CLARITY UR: CLEAR
CO2 SERPL-SCNC: 21 MMOL/L
COLOR UR: YELLOW
CREAT SERPL-MCNC: 1.5 MG/DL
DIFFERENTIAL METHOD: ABNORMAL
EOSINOPHIL # BLD AUTO: 0.5 K/UL
EOSINOPHIL NFR BLD: 6.2 %
ERYTHROCYTE [DISTWIDTH] IN BLOOD BY AUTOMATED COUNT: 16.3 %
EST. GFR  (AFRICAN AMERICAN): 52 ML/MIN/1.73 M^2
EST. GFR  (NON AFRICAN AMERICAN): 45 ML/MIN/1.73 M^2
GLUCOSE SERPL-MCNC: 96 MG/DL
GLUCOSE UR QL STRIP: NEGATIVE
HCT VFR BLD AUTO: 34.6 %
HGB BLD-MCNC: 11.3 G/DL
HGB UR QL STRIP: NEGATIVE
HYALINE CASTS #/AREA URNS LPF: 1 /LPF
INR PPP: 0.9
KETONES UR QL STRIP: NEGATIVE
LEUKOCYTE ESTERASE UR QL STRIP: NEGATIVE
LYMPHOCYTES # BLD AUTO: 1.6 K/UL
LYMPHOCYTES NFR BLD: 20.6 %
MCH RBC QN AUTO: 26.8 PG
MCHC RBC AUTO-ENTMCNC: 32.7 G/DL
MCV RBC AUTO: 82 FL
MICROSCOPIC COMMENT: NORMAL
MONOCYTES # BLD AUTO: 1.2 K/UL
MONOCYTES NFR BLD: 15.1 %
NEUTROPHILS # BLD AUTO: 4.4 K/UL
NEUTROPHILS NFR BLD: 57.1 %
NITRITE UR QL STRIP: NEGATIVE
PH UR STRIP: 6 [PH] (ref 5–8)
PLATELET # BLD AUTO: 299 K/UL
PMV BLD AUTO: 10.7 FL
POTASSIUM SERPL-SCNC: 4.4 MMOL/L
PROT SERPL-MCNC: 7 G/DL
PROT UR QL STRIP: ABNORMAL
PROTHROMBIN TIME: 10 SEC
RBC # BLD AUTO: 4.21 M/UL
RBC #/AREA URNS HPF: 1 /HPF (ref 0–4)
SODIUM SERPL-SCNC: 142 MMOL/L
SP GR UR STRIP: 1.02 (ref 1–1.03)
SQUAMOUS #/AREA URNS HPF: 1 /HPF
TROPONIN I SERPL DL<=0.01 NG/ML-MCNC: 0.01 NG/ML
URN SPEC COLLECT METH UR: ABNORMAL
UROBILINOGEN UR STRIP-ACNC: NEGATIVE EU/DL
WBC # BLD AUTO: 7.63 K/UL
WBC #/AREA URNS HPF: 3 /HPF (ref 0–5)

## 2019-02-28 PROCEDURE — 96360 HYDRATION IV INFUSION INIT: CPT

## 2019-02-28 PROCEDURE — 94761 N-INVAS EAR/PLS OXIMETRY MLT: CPT

## 2019-02-28 PROCEDURE — 85610 PROTHROMBIN TIME: CPT

## 2019-02-28 PROCEDURE — 80053 COMPREHEN METABOLIC PANEL: CPT

## 2019-02-28 PROCEDURE — 93010 ELECTROCARDIOGRAM REPORT: CPT | Mod: ,,, | Performed by: INTERNAL MEDICINE

## 2019-02-28 PROCEDURE — 25000003 PHARM REV CODE 250: Performed by: NURSE ANESTHETIST, CERTIFIED REGISTERED

## 2019-02-28 PROCEDURE — 88305 TISSUE EXAM BY PATHOLOGIST: CPT | Performed by: PATHOLOGY

## 2019-02-28 PROCEDURE — 71000039 HC RECOVERY, EACH ADD'L HOUR: Performed by: OTOLARYNGOLOGY

## 2019-02-28 PROCEDURE — 99220 PR INITIAL OBSERVATION CARE,LEVL III: CPT | Mod: ,,, | Performed by: NURSE PRACTITIONER

## 2019-02-28 PROCEDURE — 84484 ASSAY OF TROPONIN QUANT: CPT

## 2019-02-28 PROCEDURE — 25000003 PHARM REV CODE 250: Performed by: ANESTHESIOLOGY

## 2019-02-28 PROCEDURE — 25000003 PHARM REV CODE 250: Performed by: NURSE PRACTITIONER

## 2019-02-28 PROCEDURE — 88311 TISSUE SPECIMEN TO PATHOLOGY - SURGERY: ICD-10-PCS | Mod: 26,,, | Performed by: PATHOLOGY

## 2019-02-28 PROCEDURE — G0378 HOSPITAL OBSERVATION PER HR: HCPCS

## 2019-02-28 PROCEDURE — 99285 EMERGENCY DEPT VISIT HI MDM: CPT | Mod: 25

## 2019-02-28 PROCEDURE — 99220 PR INITIAL OBSERVATION CARE,LEVL III: ICD-10-PCS | Mod: ,,, | Performed by: NURSE PRACTITIONER

## 2019-02-28 PROCEDURE — 93010 EKG 12-LEAD: ICD-10-PCS | Mod: ,,, | Performed by: INTERNAL MEDICINE

## 2019-02-28 PROCEDURE — 36000711: Performed by: OTOLARYNGOLOGY

## 2019-02-28 PROCEDURE — 96361 HYDRATE IV INFUSION ADD-ON: CPT

## 2019-02-28 PROCEDURE — 88305 TISSUE EXAM BY PATHOLOGIST: CPT | Mod: 26,,, | Performed by: PATHOLOGY

## 2019-02-28 PROCEDURE — 37000008 HC ANESTHESIA 1ST 15 MINUTES: Performed by: OTOLARYNGOLOGY

## 2019-02-28 PROCEDURE — 71000033 HC RECOVERY, INTIAL HOUR: Performed by: OTOLARYNGOLOGY

## 2019-02-28 PROCEDURE — 27201423 OPTIME MED/SURG SUP & DEVICES STERILE SUPPLY: Performed by: OTOLARYNGOLOGY

## 2019-02-28 PROCEDURE — 63600175 PHARM REV CODE 636 W HCPCS: Performed by: NURSE ANESTHETIST, CERTIFIED REGISTERED

## 2019-02-28 PROCEDURE — 85025 COMPLETE CBC W/AUTO DIFF WBC: CPT

## 2019-02-28 PROCEDURE — 86850 RBC ANTIBODY SCREEN: CPT

## 2019-02-28 PROCEDURE — 93005 ELECTROCARDIOGRAM TRACING: CPT

## 2019-02-28 PROCEDURE — 63600175 PHARM REV CODE 636 W HCPCS: Performed by: OTOLARYNGOLOGY

## 2019-02-28 PROCEDURE — 37000009 HC ANESTHESIA EA ADD 15 MINS: Performed by: OTOLARYNGOLOGY

## 2019-02-28 PROCEDURE — 36000710: Performed by: OTOLARYNGOLOGY

## 2019-02-28 PROCEDURE — 88311 DECALCIFY TISSUE: CPT | Mod: 26,,, | Performed by: PATHOLOGY

## 2019-02-28 PROCEDURE — 25000003 PHARM REV CODE 250: Performed by: EMERGENCY MEDICINE

## 2019-02-28 PROCEDURE — 88305 TISSUE SPECIMEN TO PATHOLOGY - SURGERY: ICD-10-PCS | Mod: 26,,, | Performed by: PATHOLOGY

## 2019-02-28 PROCEDURE — P9045 ALBUMIN (HUMAN), 5%, 250 ML: HCPCS | Mod: JG | Performed by: NURSE ANESTHETIST, CERTIFIED REGISTERED

## 2019-02-28 PROCEDURE — 25000003 PHARM REV CODE 250: Performed by: OTOLARYNGOLOGY

## 2019-02-28 PROCEDURE — 81000 URINALYSIS NONAUTO W/SCOPE: CPT

## 2019-02-28 RX ORDER — SODIUM CHLORIDE 0.9 % (FLUSH) 0.9 %
3 SYRINGE (ML) INJECTION
Status: DISCONTINUED | OUTPATIENT
Start: 2019-02-28 | End: 2019-03-02 | Stop reason: HOSPADM

## 2019-02-28 RX ORDER — ONDANSETRON 2 MG/ML
INJECTION INTRAMUSCULAR; INTRAVENOUS
Status: DISCONTINUED | OUTPATIENT
Start: 2019-02-28 | End: 2019-02-28

## 2019-02-28 RX ORDER — EPHEDRINE SULFATE 50 MG/ML
INJECTION, SOLUTION INTRAVENOUS
Status: DISCONTINUED | OUTPATIENT
Start: 2019-02-28 | End: 2019-02-28

## 2019-02-28 RX ORDER — ONDANSETRON 2 MG/ML
4 INJECTION INTRAMUSCULAR; INTRAVENOUS EVERY 8 HOURS PRN
Status: DISCONTINUED | OUTPATIENT
Start: 2019-02-28 | End: 2019-03-02 | Stop reason: HOSPADM

## 2019-02-28 RX ORDER — IBUPROFEN 200 MG
16 TABLET ORAL
Status: DISCONTINUED | OUTPATIENT
Start: 2019-02-28 | End: 2019-03-02 | Stop reason: HOSPADM

## 2019-02-28 RX ORDER — MEPERIDINE HYDROCHLORIDE 25 MG/ML
12.5 INJECTION INTRAMUSCULAR; INTRAVENOUS; SUBCUTANEOUS ONCE AS NEEDED
Status: DISCONTINUED | OUTPATIENT
Start: 2019-02-28 | End: 2019-02-28

## 2019-02-28 RX ORDER — LIDOCAINE HYDROCHLORIDE AND EPINEPHRINE 10; 10 MG/ML; UG/ML
INJECTION, SOLUTION INFILTRATION; PERINEURAL
Status: DISCONTINUED | OUTPATIENT
Start: 2019-02-28 | End: 2019-02-28 | Stop reason: HOSPADM

## 2019-02-28 RX ORDER — ALPRAZOLAM 0.5 MG/1
0.5 TABLET ORAL EVERY 8 HOURS PRN
Status: DISCONTINUED | OUTPATIENT
Start: 2019-02-28 | End: 2019-03-02 | Stop reason: HOSPADM

## 2019-02-28 RX ORDER — OXYCODONE HYDROCHLORIDE 5 MG/1
5 TABLET ORAL
Status: DISCONTINUED | OUTPATIENT
Start: 2019-02-28 | End: 2019-02-28

## 2019-02-28 RX ORDER — ALBUMIN HUMAN 50 G/1000ML
SOLUTION INTRAVENOUS CONTINUOUS PRN
Status: DISCONTINUED | OUTPATIENT
Start: 2019-02-28 | End: 2019-02-28

## 2019-02-28 RX ORDER — FENTANYL CITRATE 50 UG/ML
25 INJECTION, SOLUTION INTRAMUSCULAR; INTRAVENOUS EVERY 5 MIN PRN
Status: DISCONTINUED | OUTPATIENT
Start: 2019-02-28 | End: 2019-02-28 | Stop reason: HOSPADM

## 2019-02-28 RX ORDER — SODIUM CHLORIDE 9 MG/ML
500 INJECTION, SOLUTION INTRAVENOUS
Status: COMPLETED | OUTPATIENT
Start: 2019-02-28 | End: 2019-02-28

## 2019-02-28 RX ORDER — LISINOPRIL 20 MG/1
20 TABLET ORAL DAILY
Status: DISCONTINUED | OUTPATIENT
Start: 2019-02-28 | End: 2019-03-02 | Stop reason: HOSPADM

## 2019-02-28 RX ORDER — KETOCONAZOLE 20 MG/G
CREAM TOPICAL DAILY
Status: ON HOLD | COMMUNITY
End: 2022-10-18 | Stop reason: HOSPADM

## 2019-02-28 RX ORDER — ONDANSETRON 2 MG/ML
4 INJECTION INTRAMUSCULAR; INTRAVENOUS DAILY PRN
Status: DISCONTINUED | OUTPATIENT
Start: 2019-02-28 | End: 2019-02-28

## 2019-02-28 RX ORDER — LISINOPRIL 20 MG/1
20 TABLET ORAL DAILY
Status: ON HOLD | COMMUNITY
End: 2022-10-18 | Stop reason: HOSPADM

## 2019-02-28 RX ORDER — LIDOCAINE HCL/PF 100 MG/5ML
SYRINGE (ML) INTRAVENOUS
Status: DISCONTINUED | OUTPATIENT
Start: 2019-02-28 | End: 2019-02-28

## 2019-02-28 RX ORDER — ATORVASTATIN CALCIUM 20 MG/1
20 TABLET, FILM COATED ORAL NIGHTLY
Status: DISCONTINUED | OUTPATIENT
Start: 2019-02-28 | End: 2019-03-02 | Stop reason: HOSPADM

## 2019-02-28 RX ORDER — HYDROCODONE BITARTRATE AND ACETAMINOPHEN 5; 325 MG/1; MG/1
1 TABLET ORAL EVERY 4 HOURS PRN
Status: DISCONTINUED | OUTPATIENT
Start: 2019-02-28 | End: 2019-03-02 | Stop reason: HOSPADM

## 2019-02-28 RX ORDER — SODIUM CHLORIDE 9 MG/ML
500 INJECTION, SOLUTION INTRAVENOUS CONTINUOUS
Status: DISCONTINUED | OUTPATIENT
Start: 2019-02-28 | End: 2019-03-02 | Stop reason: HOSPADM

## 2019-02-28 RX ORDER — PHENYLEPHRINE HYDROCHLORIDE 10 MG/ML
INJECTION INTRAVENOUS
Status: DISCONTINUED | OUTPATIENT
Start: 2019-02-28 | End: 2019-02-28

## 2019-02-28 RX ORDER — SUCCINYLCHOLINE CHLORIDE 20 MG/ML
INJECTION INTRAMUSCULAR; INTRAVENOUS
Status: DISCONTINUED | OUTPATIENT
Start: 2019-02-28 | End: 2019-02-28

## 2019-02-28 RX ORDER — ACETAMINOPHEN 325 MG/1
650 TABLET ORAL EVERY 4 HOURS PRN
Status: DISCONTINUED | OUTPATIENT
Start: 2019-02-28 | End: 2019-03-02 | Stop reason: HOSPADM

## 2019-02-28 RX ORDER — EPINEPHRINE 1 MG/ML
INJECTION, SOLUTION INTRACARDIAC; INTRAMUSCULAR; INTRAVENOUS; SUBCUTANEOUS
Status: DISCONTINUED | OUTPATIENT
Start: 2019-02-28 | End: 2019-02-28 | Stop reason: HOSPADM

## 2019-02-28 RX ORDER — BACITRACIN ZINC 500 UNIT/G
OINTMENT (GRAM) TOPICAL
Status: DISCONTINUED | OUTPATIENT
Start: 2019-02-28 | End: 2019-02-28 | Stop reason: HOSPADM

## 2019-02-28 RX ORDER — NEOSTIGMINE METHYLSULFATE 1 MG/ML
INJECTION, SOLUTION INTRAVENOUS
Status: DISCONTINUED | OUTPATIENT
Start: 2019-02-28 | End: 2019-02-28

## 2019-02-28 RX ORDER — SODIUM CHLORIDE 0.9 % (FLUSH) 0.9 %
5 SYRINGE (ML) INJECTION
Status: DISCONTINUED | OUTPATIENT
Start: 2019-02-28 | End: 2019-03-02 | Stop reason: HOSPADM

## 2019-02-28 RX ORDER — ONDANSETRON 8 MG/1
8 TABLET, ORALLY DISINTEGRATING ORAL EVERY 8 HOURS PRN
Status: DISCONTINUED | OUTPATIENT
Start: 2019-02-28 | End: 2019-03-02 | Stop reason: HOSPADM

## 2019-02-28 RX ORDER — GLUCAGON 1 MG
1 KIT INJECTION
Status: DISCONTINUED | OUTPATIENT
Start: 2019-02-28 | End: 2019-03-02 | Stop reason: HOSPADM

## 2019-02-28 RX ORDER — AMLODIPINE BESYLATE 5 MG/1
5 TABLET ORAL DAILY
Status: DISCONTINUED | OUTPATIENT
Start: 2019-03-01 | End: 2019-02-28

## 2019-02-28 RX ORDER — HYDROMORPHONE HYDROCHLORIDE 2 MG/ML
0.4 INJECTION, SOLUTION INTRAMUSCULAR; INTRAVENOUS; SUBCUTANEOUS EVERY 5 MIN PRN
Status: DISCONTINUED | OUTPATIENT
Start: 2019-02-28 | End: 2019-02-28

## 2019-02-28 RX ORDER — LISINOPRIL 10 MG/1
20 TABLET ORAL DAILY
Status: DISCONTINUED | OUTPATIENT
Start: 2019-03-01 | End: 2019-02-28

## 2019-02-28 RX ORDER — GLYCOPYRROLATE 0.2 MG/ML
INJECTION INTRAMUSCULAR; INTRAVENOUS
Status: DISCONTINUED | OUTPATIENT
Start: 2019-02-28 | End: 2019-02-28

## 2019-02-28 RX ORDER — FENTANYL CITRATE 50 UG/ML
INJECTION, SOLUTION INTRAMUSCULAR; INTRAVENOUS
Status: DISCONTINUED | OUTPATIENT
Start: 2019-02-28 | End: 2019-02-28

## 2019-02-28 RX ORDER — OXYCODONE HYDROCHLORIDE 5 MG/1
10 TABLET ORAL EVERY 4 HOURS PRN
Status: DISCONTINUED | OUTPATIENT
Start: 2019-02-28 | End: 2019-03-02 | Stop reason: HOSPADM

## 2019-02-28 RX ORDER — IBUPROFEN 200 MG
24 TABLET ORAL
Status: DISCONTINUED | OUTPATIENT
Start: 2019-02-28 | End: 2019-03-02 | Stop reason: HOSPADM

## 2019-02-28 RX ORDER — PROPOFOL 10 MG/ML
VIAL (ML) INTRAVENOUS
Status: DISCONTINUED | OUTPATIENT
Start: 2019-02-28 | End: 2019-02-28

## 2019-02-28 RX ORDER — ROCURONIUM BROMIDE 10 MG/ML
INJECTION, SOLUTION INTRAVENOUS
Status: DISCONTINUED | OUTPATIENT
Start: 2019-02-28 | End: 2019-02-28

## 2019-02-28 RX ORDER — SODIUM CHLORIDE, SODIUM LACTATE, POTASSIUM CHLORIDE, CALCIUM CHLORIDE 600; 310; 30; 20 MG/100ML; MG/100ML; MG/100ML; MG/100ML
INJECTION, SOLUTION INTRAVENOUS CONTINUOUS PRN
Status: DISCONTINUED | OUTPATIENT
Start: 2019-02-28 | End: 2019-02-28

## 2019-02-28 RX ORDER — AMLODIPINE BESYLATE 5 MG/1
5 TABLET ORAL DAILY
Status: DISCONTINUED | OUTPATIENT
Start: 2019-02-28 | End: 2019-03-02 | Stop reason: HOSPADM

## 2019-02-28 RX ADMIN — FENTANYL CITRATE 50 MCG: 50 INJECTION, SOLUTION INTRAMUSCULAR; INTRAVENOUS at 05:02

## 2019-02-28 RX ADMIN — ATORVASTATIN CALCIUM 20 MG: 20 TABLET, FILM COATED ORAL at 09:02

## 2019-02-28 RX ADMIN — PHENYLEPHRINE HYDROCHLORIDE 200 MCG: 10 INJECTION INTRAVENOUS at 04:02

## 2019-02-28 RX ADMIN — EPHEDRINE SULFATE 5 MG: 50 INJECTION INTRAMUSCULAR; INTRAVENOUS; SUBCUTANEOUS at 04:02

## 2019-02-28 RX ADMIN — EPHEDRINE SULFATE 15 MG: 50 INJECTION INTRAMUSCULAR; INTRAVENOUS; SUBCUTANEOUS at 05:02

## 2019-02-28 RX ADMIN — ALBUMIN (HUMAN): 2.5 SOLUTION INTRAVENOUS at 04:02

## 2019-02-28 RX ADMIN — PHENYLEPHRINE HYDROCHLORIDE 100 MCG: 10 INJECTION INTRAVENOUS at 04:02

## 2019-02-28 RX ADMIN — EPHEDRINE SULFATE 10 MG: 50 INJECTION INTRAMUSCULAR; INTRAVENOUS; SUBCUTANEOUS at 05:02

## 2019-02-28 RX ADMIN — NEOSTIGMINE METHYLSULFATE 5 MG: 1 INJECTION INTRAVENOUS at 06:02

## 2019-02-28 RX ADMIN — PHYTONADIONE 10 MG: 10 INJECTION, EMULSION INTRAMUSCULAR; INTRAVENOUS; SUBCUTANEOUS at 07:02

## 2019-02-28 RX ADMIN — ROCURONIUM BROMIDE 20 MG: 10 INJECTION, SOLUTION INTRAVENOUS at 04:02

## 2019-02-28 RX ADMIN — SODIUM CHLORIDE, SODIUM LACTATE, POTASSIUM CHLORIDE, AND CALCIUM CHLORIDE: .6; .31; .03; .02 INJECTION, SOLUTION INTRAVENOUS at 05:02

## 2019-02-28 RX ADMIN — PROPOFOL 150 MG: 10 INJECTION, EMULSION INTRAVENOUS at 04:02

## 2019-02-28 RX ADMIN — LISINOPRIL 20 MG: 10 TABLET ORAL at 02:02

## 2019-02-28 RX ADMIN — OXYCODONE HYDROCHLORIDE 5 MG: 5 TABLET ORAL at 06:02

## 2019-02-28 RX ADMIN — AMLODIPINE BESYLATE 5 MG: 5 TABLET ORAL at 02:02

## 2019-02-28 RX ADMIN — ROCURONIUM BROMIDE 10 MG: 10 INJECTION, SOLUTION INTRAVENOUS at 04:02

## 2019-02-28 RX ADMIN — LIDOCAINE HYDROCHLORIDE 100 MG: 20 INJECTION, SOLUTION INTRAVENOUS at 04:02

## 2019-02-28 RX ADMIN — FENTANYL CITRATE 50 MCG: 50 INJECTION, SOLUTION INTRAMUSCULAR; INTRAVENOUS at 04:02

## 2019-02-28 RX ADMIN — FENTANYL CITRATE 100 MCG: 50 INJECTION, SOLUTION INTRAMUSCULAR; INTRAVENOUS at 04:02

## 2019-02-28 RX ADMIN — EPHEDRINE SULFATE 10 MG: 50 INJECTION INTRAMUSCULAR; INTRAVENOUS; SUBCUTANEOUS at 04:02

## 2019-02-28 RX ADMIN — SUCCINYLCHOLINE CHLORIDE 140 MG: 20 INJECTION, SOLUTION INTRAMUSCULAR; INTRAVENOUS at 04:02

## 2019-02-28 RX ADMIN — ROCURONIUM BROMIDE 10 MG: 10 INJECTION, SOLUTION INTRAVENOUS at 05:02

## 2019-02-28 RX ADMIN — SODIUM CHLORIDE, SODIUM LACTATE, POTASSIUM CHLORIDE, AND CALCIUM CHLORIDE: .6; .31; .03; .02 INJECTION, SOLUTION INTRAVENOUS at 03:02

## 2019-02-28 RX ADMIN — SODIUM CHLORIDE 500 ML: 0.9 INJECTION, SOLUTION INTRAVENOUS at 12:02

## 2019-02-28 RX ADMIN — CARBOXYMETHYLCELLULOSE SODIUM 2 DROP: 2.5 SOLUTION/ DROPS OPHTHALMIC at 04:02

## 2019-02-28 RX ADMIN — GLYCOPYRROLATE 0.6 MG: 0.2 INJECTION, SOLUTION INTRAMUSCULAR; INTRAVENOUS at 06:02

## 2019-02-28 NOTE — ED TRIAGE NOTES
Pt presents to ED with c/o nosebleed since Friday. Pt seen by Dr. Serrano today in office who packed his nose and called ED to request packing not be removed, pt receive basic blood work in preparation for surgery later today. Pt ambulatory, AAOx4, calm, cooperative, responding appropriately to questions, speaking in full sentences, respirations even and unlabored, skin warm and dry, pt appears in NAD.

## 2019-02-28 NOTE — H&P
[facility] Medicine  History & Physical    Patient Name: Leonides Burns  MRN: [id number]  Admission Date: 2/28/2019  Attending Physician: Marian Howard NP  Primary Care Provider: Tien Thomas MD         Patient information was obtained from relative(s), past medical records and ER records.     Subjective:     Principal Problem:Epistaxis    Chief Complaint:   Chief Complaint   Patient presents with    Epistaxis     Pt c/o epistaxis since Friday. Pt arrives to triage with nasal packing & moustache dressing. Pt sent to ED for H&H level before surgery today.        HPI: Leonides Burns is a 74 year old male with a medical  history of hypertension, gout, glaucoma and recent history of epistaxis who presented to the [facility] with complaint of continued nosebleeds despite multiple attempts to stop bleeding.  The patient reports bleeding began approximately six days ago. He reports left nostril was packed [facility] 2/24 He received bilateral nasal packing by Dr. Serrano in the ENT clinic prior to arrival in the ED today.  He denies shortness of breath, chest pain, nausea, vomiting, headache, lightheadedness, dizziness, or weakness. He reports taking 81 mg of Asprin, but states his last dose was 6 days ago at onset of bleeding. He denies other blood thinners. He reports using Bleedcease with little relief. He states he has not missed a dose of antihypertensives in a few weeks. He reports first episode of epistasis was 12 years ago, but denies having them regularly. He will be admitted under Observation. He is scheduled to have FESS surgery per ENT.         Past Medical History:   Diagnosis Date    Disorder of kidney and ureter     Elevated PSA     Glaucoma     Hyperlipidemia     Hypertension     Psoriasis        History reviewed. No pertinent surgical history.    Review of patient's allergies indicates:   Allergen Reactions    Latex, natural rubber  Rash    Iodine and iodide containing products Rash       Current Facility-Administered Medications on File Prior to Encounter   Medication    [COMPLETED] phenylephrine HCL 0.5% nasal spray 2 spray     Current Outpatient Medications on File Prior to Encounter   Medication Sig    allopurinol (ZYLOPRIM) 100 MG tablet TAKE 2 TABLETS BY MOUTH EVERY DAY    amLODIPine (NORVASC) 5 MG tablet TAKE 1 TABLET(5 MG) BY MOUTH EVERY DAY    aspirin 81 MG Chew Take 1 tablet (81 mg total) by mouth once daily.    atorvastatin (LIPITOR) 20 MG tablet TAKE 1 TABLET EVERY EVENING    bimatoprost (LUMIGAN) 0.01 % Drop Place 1 drop into both eyes every evening.    ketoconazole (NIZORAL) 2 % cream Apply topically once daily.    lisinopril (PRINIVIL,ZESTRIL) 20 MG tablet Take 20 mg by mouth once daily.    [DISCONTINUED] colchicine 0.6 mg tablet Take 1 tablet (0.6 mg total) by mouth 2 (two) times daily.    [DISCONTINUED] guaifenesin (MUCINEX) 600 mg 12 hr tablet Take 1 tablet (600 mg total) by mouth 2 (two) times daily.    [DISCONTINUED] ramipril (ALTACE) 10 MG capsule TAKE 1 CAPSULE EVERY DAY    [DISCONTINUED] sodium bicarbonate 650 MG tablet Take 1 tablet (650 mg total) by mouth 2 (two) times daily.    [DISCONTINUED] tadalafil (CIALIS) 10 MG tablet Take 1 tablet (10 mg total) by mouth daily as needed for Erectile Dysfunction (30 minutes prior to sex prn; max 1/day; avoid with NTG).    [DISCONTINUED] triamcinolone acetonide 0.1% (KENALOG) 0.1 % cream AAA bid    [DISCONTINUED] triamterene-hydrochlorothiazide 50-25 mg (DYAZIDE) 50-25 mg Cap Take 1 capsule by mouth once daily.     Family History     Problem Relation (Age of Onset)    Anemia Sister, Daughter    Cancer Brother    Diabetes Mother    KEVIN disease Sister    Hypertension Mother, Sister    No Known Problems Father, Son        Tobacco Use    Smoking status: Current Some Day Smoker     Packs/day: 0.50     Years: 40.00     Pack years: 20.00     Types: Cigarettes     Smokeless tobacco: Current User   Substance and Sexual Activity    Alcohol use: Yes     Alcohol/week: 5.4 oz     Types: 9 Cans of beer per week    Drug use: No    Sexual activity: Yes     Partners: Female     Review of Systems   Constitutional: Negative for chills and fever.   HENT: Positive for congestion, nosebleeds and postnasal drip. Negative for trouble swallowing.    Eyes: Negative for visual disturbance.   Respiratory: Negative for cough, chest tightness and shortness of breath.    Cardiovascular: Negative for chest pain, palpitations and leg swelling.   Gastrointestinal: Negative for abdominal pain, diarrhea, nausea and vomiting.   Endocrine: Negative.    Genitourinary: Negative for dysuria and hematuria.   Musculoskeletal: Negative.    Skin: Negative.    Allergic/Immunologic: Positive for environmental allergies.   Neurological: Negative for seizures, weakness and headaches.   Hematological: Does not bruise/bleed easily.   Psychiatric/Behavioral: Negative.      Objective:     Vital Signs (Most Recent):  Temp: 97.7 °F (36.5 °C) (02/28/19 1413)  Pulse: 98 (02/28/19 1413)  Resp: 20 (02/28/19 1413)  BP: (!) 185/85 (02/28/19 1413)  SpO2: 99 % (02/28/19 1556) Vital Signs (24h Range):  Temp:  [97.7 °F (36.5 °C)-98.4 °F (36.9 °C)] 97.7 °F (36.5 °C)  Pulse:  [72-98] 98  Resp:  [13-20] 20  SpO2:  [97 %-100 %] 99 %  BP: (152-185)/(71-85) 185/85     Weight: 80.6 kg (177 lb 11.1 oz)  Body mass index is 30.5 kg/m².    Physical Exam   Constitutional: He is oriented to person, place, and time. He appears well-developed and well-nourished. No distress.   HENT:   Head: Normocephalic and atraumatic.   Nose: Epistaxis is observed. Right sinus exhibits frontal sinus tenderness. Left sinus exhibits frontal sinus tenderness.   Mouth/Throat: Oropharynx is clear and moist.   Eyes: Conjunctivae and lids are normal. Pupils are equal, round, and reactive to light.   Neck: No JVD present.   Cardiovascular: Normal rate, regular  rhythm and normal heart sounds.   Pulmonary/Chest: Effort normal and breath sounds normal.   Abdominal: Soft. Normal appearance and bowel sounds are normal. There is no tenderness.   Neurological: He is alert and oriented to person, place, and time. He has normal strength.   Skin: Skin is warm, dry and intact.   Psychiatric: He has a normal mood and affect. His behavior is normal. Cognition and memory are normal.   Nursing note and vitals reviewed.        CRANIAL NERVES     CN III, IV, VI   Pupils are equal, round, and reactive to light.       Significant Labs:   CBC:   Recent Labs   Lab 02/28/19  1119   WBC 7.63   HGB 11.3*   HCT 34.6*        CMP:   Recent Labs   Lab 02/28/19  1119      K 4.4   *   CO2 21*   GLU 96   BUN 37*   CREATININE 1.5*   CALCIUM 9.0   PROT 7.0   ALBUMIN 3.2*   BILITOT 0.5   ALKPHOS 69   AST 19   ALT 16   ANIONGAP 9   EGFRNONAA 45*     Coagulation:   Recent Labs   Lab 02/28/19  1119   INR 0.9     All pertinent labs within the past 24 hours have been reviewed.    Significant Imaging: I have reviewed all pertinent imaging results/findings within the past 24 hours.     Imaging Results          X-Ray Chest PA And Lateral (Final result)  Result time 02/28/19 11:45:34    Final result by Nain Duran MD (02/28/19 11:45:34)                 Impression:      No acute abnormality.      Electronically signed by: Nain Duran MD  Date:    02/28/2019  Time:    11:45             Narrative:    EXAMINATION:  XR CHEST PA AND LATERAL    CLINICAL HISTORY:  bleeding;    TECHNIQUE:  PA and lateral views of the chest were performed.    COMPARISON:  None    FINDINGS:  The lungs are clear, with normal appearance of pulmonary vasculature and no pleural effusion or pneumothorax.    The cardiac silhouette is normal in size. The hilar and mediastinal contours are unremarkable.    Bones are intact.                                      Assessment/Plan:     * Epistaxis    - ENT to take patient  for FESS  - rhino rockets in place   - H/H mild decrease from baseline; will monitor         Hypertension    -continue home dose of amlodipine 5 mg daily and lisinopril 20 mg       Chronic kidney disease, stage III (moderate)    - Cr 1.5 and appears to be at baseline  - intravenous fluid hydration  - will monitor       Hyperlipidemia    - continue home dose of atorvastatin 20 mg daily       Memory impairment    - per chart review; per patient son he is stable and baseline       Glaucoma    - stable per patient       Tobacco dependence             VTE Risk Mitigation (From admission, onward)        Ordered     IP VTE HIGH RISK PATIENT  Once      02/28/19 1426     Place sequential compression device  Until discontinued      02/28/19 1426     Place sequential compression device  Until discontinued      02/28/19 1225             Marian Howard NP  Department of Hospital Medicine   [facility], [place], operated by [facility]

## 2019-02-28 NOTE — ED PROVIDER NOTES
Encounter Date: 2/28/2019    SCRIBE #1 NOTE: Lucille MALDONADO am scribing for, and in the presence of, Dr. Crisostomo.       History     Chief Complaint   Patient presents with    Epistaxis     Pt c/o epistaxis since Friday. Pt arrives to triage with nasal packing & moustache dressing. Pt sent to ED for H&H level before surgery today.     Time seen by provider: 11:43 AM    This is a 74 y.o. male, with a history of HTN, who presents with complaint of nosebleed that worsened today. Patient reports intermittent nosebleeds that began six days ago. He received bilateral nasal packing by Dr. Serrano in the ENT clinic prior to arrival in the ED by Dr. Serrano, ENT. He reports left nostril was packed [facility] 2/24. Patient was seen in the ED in [facility] this morning for the same complaint. He denies shortness of breath, chest pain, nausea, vomiting, headache, lightheadedness, dizziness, or weakness. He reports taking 81 mg of Asprin, but states his last dose was 6 days ago at onset of bleeding. He denies other blood thinners. He reports using Bleedcease with little relief. He states he has not missed a dose of antihypertensives in a few weeks. He reports first episode of epistasis was 12 years ago, but denies having them regularly.   Son present at bedside.      The history is provided by the patient and a relative (Son).     Review of patient's allergies indicates:   Allergen Reactions    Latex, natural rubber Rash    Iodine and iodide containing products Rash     Past Medical History:   Diagnosis Date    Disorder of kidney and ureter     Elevated PSA     Glaucoma     Hyperlipidemia     Hypertension     Psoriasis      History reviewed. No pertinent surgical history.  Family History   Problem Relation Age of Onset    Hypertension Mother     Diabetes Mother     No Known Problems Father     Hypertension Sister     KEVIN disease Sister     Anemia Sister     Cancer Brother     Anemia  Daughter     No Known Problems Son     Kidney disease Neg Hx     Prostate cancer Neg Hx     Melanoma Neg Hx      Social History     Tobacco Use    Smoking status: Current Some Day Smoker     Packs/day: 0.50     Years: 40.00     Pack years: 20.00     Types: Cigarettes    Smokeless tobacco: Current User   Substance Use Topics    Alcohol use: Yes     Alcohol/week: 5.4 oz     Types: 9 Cans of beer per week    Drug use: No     Review of Systems   Constitutional: Negative for fever.   HENT: Positive for nosebleeds. Negative for sore throat.    Respiratory: Negative for cough and shortness of breath.    Cardiovascular: Negative for chest pain.   Gastrointestinal: Negative for abdominal pain, diarrhea, nausea and vomiting.   Genitourinary: Negative for dysuria.   Musculoskeletal: Negative for back pain.   Skin: Negative for rash.   Neurological: Negative for dizziness, weakness, light-headedness and headaches.       Physical Exam     Initial Vitals [02/28/19 1050]   BP Pulse Resp Temp SpO2   (!) 172/71 74 18 98.4 °F (36.9 °C) 99 %      MAP       --         Physical Exam    Nursing note and vitals reviewed.  Constitutional: He appears well-developed and well-nourished. No distress.   Holding emesis bag with blood. Evidence of blood on 4x4 under his nose.    HENT:   Head: Normocephalic and atraumatic.   Mouth/Throat: Mucous membranes are dry.   Nares bilaterally occluded with 4x4. No active brisk bleeding at this time. Oropharynx showing tinged saliva without bleeding noted posterior pharynx. Airway patent.    Eyes: Conjunctivae are normal. Pupils are equal, round, and reactive to light.   Neck: Normal range of motion. No JVD present.   Cardiovascular: Normal rate, regular rhythm, S1 normal, S2 normal and normal heart sounds.   Pulmonary/Chest: Breath sounds normal. No respiratory distress. He has no wheezes. He has no rhonchi. He has no rales.   Abdominal: Soft. There is no tenderness.   Musculoskeletal: Normal range  of motion. He exhibits no edema (  No pretibial edema) or tenderness.   Radial pulses equal.    Neurological: He is alert and oriented to person, place, and time. He has normal strength.   Cranial nerves intact. Equal strength bilaterally.    Skin: Skin is warm and dry.         ED Course   Procedures  Labs Reviewed   CBC W/ AUTO DIFFERENTIAL - Abnormal; Notable for the following components:       Result Value    RBC 4.21 (*)     Hemoglobin 11.3 (*)     Hematocrit 34.6 (*)     MCH 26.8 (*)     RDW 16.3 (*)     Mono # 1.2 (*)     Mono% 15.1 (*)     All other components within normal limits   COMPREHENSIVE METABOLIC PANEL - Abnormal; Notable for the following components:    Chloride 112 (*)     CO2 21 (*)     BUN, Bld 37 (*)     Creatinine 1.5 (*)     Albumin 3.2 (*)     eGFR if  52 (*)     eGFR if non  45 (*)     All other components within normal limits   URINALYSIS - Abnormal; Notable for the following components:    Protein, UA 2+ (*)     All other components within normal limits   PROTIME-INR   TROPONIN I   URINALYSIS MICROSCOPIC   TYPE & SCREEN     EKG Readings: (Independently Interpreted)   Normal sinus rhythm at a rate of 63 bpm. Left axis deviation. Narrow QRS. T wave flattening in aVL. T wave inversions in v5 and v6. Compared to April 21, 2016 T wave inversions are new.        Imaging Results          X-Ray Chest PA And Lateral (Final result)  Result time 02/28/19 11:45:34    Final result by Nain Duran MD (02/28/19 11:45:34)                 Impression:      No acute abnormality.      Electronically signed by: Nain Duran MD  Date:    02/28/2019  Time:    11:45             Narrative:    EXAMINATION:  XR CHEST PA AND LATERAL    CLINICAL HISTORY:  bleeding;    TECHNIQUE:  PA and lateral views of the chest were performed.    COMPARISON:  None    FINDINGS:  The lungs are clear, with normal appearance of pulmonary vasculature and no pleural effusion or  pneumothorax.    The cardiac silhouette is normal in size. The hilar and mediastinal contours are unremarkable.    Bones are intact.                              X-Rays:   Independently Interpreted Readings:   Chest X-Ray: No pneumothorax. No effusion. No infiltrate.      Medical Decision Making:   Independently Interpreted Test(s):   I have ordered and independently interpreted X-rays - see prior notes.  I have ordered and independently interpreted EKG Reading(s) - see prior notes  Clinical Tests:   Lab Tests: Ordered and Reviewed  Radiological Study: Ordered and Reviewed  Medical Tests: Ordered and Reviewed  ED Management:  12:12 PM Case discussed with Dr. Serrano, ENT, who recommends for hospitalist to admit and check labs.     Additional MDM:   Comments: 74-YEAR-OLD MALE WITH HISTORY OF HYPERTENSION PRESENTING WITH PERSISTENT EPISTAXIS.  PATIENT REPORTS BEING COMPLIANT WITH THIS MEDICATION ALTHOUGH HE DOES HAVE A HISTORY OF OCCASIONAL NONCOMPLIANCE.  ALSO REPORTS TAKING A BABY ASPIRIN EVERY DAY UNTIL FRIDAY WHEN HIS NOSE 1ST STARTED BLEEDING.  HE HAD PACKING A FEW DAYS DAYS AGO WHICH WAS REMOVED AND THEN BLEEDING RESTARTED.  HE WAS SEEN THIS MORNING HAD NASAL PACKING AND THEN SENT TO DR. Serrano OFFICE, ENT.  DR. Maggie ASH RECOMMENDED HE COME TO [facility] TO GET LAB WORK AND THEN BE NPO IN PREPARATION FOR SURGERY/PROCEDURE.  PATIENT PRESENTS HYPERTENSIVE WITH SOME OOZING BUT PROTECTING HIS AIRWAY AND NO ACTIVE BRISK BLEEDING.  I CHECKED COAGS AS WELL AS H&H.  TYPED AND SCREENED TALKED TO dr SERRANO AND ADMITTED TO THE HOSPITALIST..          Scribe Attestation:   Scribe #1: I performed the above scribed service and the documentation accurately describes the services I performed. I attest to the accuracy of the note.    Attending Attestation:           Physician Attestation for Scribe:  Physician Attestation Statement for Scribe #1: I, Dr. Crisostomo, reviewed documentation, as scribed by Lucille Brand in my  presence, and it is both accurate and complete.                    Clinical Impression:     1. Epistaxis    2. Pre-op evaluation                                   Gricelda Crisostomo MD  02/28/19 [id number]

## 2019-02-28 NOTE — ASSESSMENT & PLAN NOTE
- ENT to take patient for FESS  - rhino rockets in place   - H/H mild decrease from baseline; will monitor

## 2019-02-28 NOTE — ED NOTES
Pt saturating bandage in place, advised per ED MD to only apply additional bandage to site, rather than remove existing bandage, per ENT. 4x4 folded gauze applied to nose, secured w/ silk tape, patient tolerated well, educated on limiting movements to avoid increased bleeding.

## 2019-02-28 NOTE — ANESTHESIA PREPROCEDURE EVALUATION
02/28/2019  Leonides Burns is a 74 y.o., male.    Anesthesia Evaluation    I have reviewed the Patient Summary Reports.    I have reviewed the Nursing Notes.   I have reviewed the Medications.     Review of Systems  Anesthesia Hx:  No problems with previous Anesthesia    Social:  Non-Smoker    Cardiovascular:   Hypertension, well controlled    Pulmonary:  Pulmonary Normal    Renal/:   Chronic Renal Disease, CRI    Hepatic/GI:  Hepatic/GI Normal    Endocrine:  Endocrine Normal    Psych:   Psychiatric History          Physical Exam  General:  Obesity    Airway/Jaw/Neck:  Airway Findings: Mouth Opening: Normal Tongue: Normal  General Airway Assessment: Adult  Mallampati: II  TM Distance: Normal, at least 6 cm  Jaw/Neck Findings:     Neck ROM: Normal ROM      Dental:  Dental Findings:              Anesthesia Plan  Type of Anesthesia, risks & benefits discussed:  Anesthesia Type:  general  Patient's Preference:   Intra-op Monitoring Plan:   Intra-op Monitoring Plan Comments:   Post Op Pain Control Plan:   Post Op Pain Control Plan Comments:   Induction:   IV  Beta Blocker:         Informed Consent: Patient understands risks and agrees with Anesthesia plan.  Questions answered. Anesthesia consent signed with patient.  ASA Score: 3  emergent   Day of Surgery Review of History & Physical:    H&P update referred to the surgeon.         Ready For Surgery From Anesthesia Perspective.

## 2019-02-28 NOTE — SUBJECTIVE & OBJECTIVE
Past Medical History:   Diagnosis Date    Disorder of kidney and ureter     Elevated PSA     Glaucoma     Hyperlipidemia     Hypertension     Psoriasis        History reviewed. No pertinent surgical history.    Review of patient's allergies indicates:   Allergen Reactions    Latex, natural rubber Rash    Iodine and iodide containing products Rash       Current Facility-Administered Medications on File Prior to Encounter   Medication    [COMPLETED] phenylephrine HCL 0.5% nasal spray 2 spray     Current Outpatient Medications on File Prior to Encounter   Medication Sig    allopurinol (ZYLOPRIM) 100 MG tablet TAKE 2 TABLETS BY MOUTH EVERY DAY    amLODIPine (NORVASC) 5 MG tablet TAKE 1 TABLET(5 MG) BY MOUTH EVERY DAY    aspirin 81 MG Chew Take 1 tablet (81 mg total) by mouth once daily.    atorvastatin (LIPITOR) 20 MG tablet TAKE 1 TABLET EVERY EVENING    bimatoprost (LUMIGAN) 0.01 % Drop Place 1 drop into both eyes every evening.    ketoconazole (NIZORAL) 2 % cream Apply topically once daily.    lisinopril (PRINIVIL,ZESTRIL) 20 MG tablet Take 20 mg by mouth once daily.    [DISCONTINUED] colchicine 0.6 mg tablet Take 1 tablet (0.6 mg total) by mouth 2 (two) times daily.    [DISCONTINUED] guaifenesin (MUCINEX) 600 mg 12 hr tablet Take 1 tablet (600 mg total) by mouth 2 (two) times daily.    [DISCONTINUED] ramipril (ALTACE) 10 MG capsule TAKE 1 CAPSULE EVERY DAY    [DISCONTINUED] sodium bicarbonate 650 MG tablet Take 1 tablet (650 mg total) by mouth 2 (two) times daily.    [DISCONTINUED] tadalafil (CIALIS) 10 MG tablet Take 1 tablet (10 mg total) by mouth daily as needed for Erectile Dysfunction (30 minutes prior to sex prn; max 1/day; avoid with NTG).    [DISCONTINUED] triamcinolone acetonide 0.1% (KENALOG) 0.1 % cream AAA bid    [DISCONTINUED] triamterene-hydrochlorothiazide 50-25 mg (DYAZIDE) 50-25 mg Cap Take 1 capsule by mouth once daily.     Family History     Problem Relation (Age of Onset)     Anemia Sister, Daughter    Cancer Brother    Diabetes Mother    KEVIN disease Sister    Hypertension Mother, Sister    No Known Problems Father, Son        Tobacco Use    Smoking status: Current Some Day Smoker     Packs/day: 0.50     Years: 40.00     Pack years: 20.00     Types: Cigarettes    Smokeless tobacco: Current User   Substance and Sexual Activity    Alcohol use: Yes     Alcohol/week: 5.4 oz     Types: 9 Cans of beer per week    Drug use: No    Sexual activity: Yes     Partners: Female     Review of Systems   Constitutional: Negative for chills and fever.   HENT: Positive for congestion, nosebleeds and postnasal drip. Negative for trouble swallowing.    Eyes: Negative for visual disturbance.   Respiratory: Negative for cough, chest tightness and shortness of breath.    Cardiovascular: Negative for chest pain, palpitations and leg swelling.   Gastrointestinal: Negative for abdominal pain, diarrhea, nausea and vomiting.   Endocrine: Negative.    Genitourinary: Negative for dysuria and hematuria.   Musculoskeletal: Negative.    Skin: Negative.    Allergic/Immunologic: Positive for environmental allergies.   Neurological: Negative for seizures, weakness and headaches.   Hematological: Does not bruise/bleed easily.   Psychiatric/Behavioral: Negative.      Objective:     Vital Signs (Most Recent):  Temp: 97.7 °F (36.5 °C) (02/28/19 1413)  Pulse: 98 (02/28/19 1413)  Resp: 20 (02/28/19 1413)  BP: (!) 185/85 (02/28/19 1413)  SpO2: 99 % (02/28/19 1556) Vital Signs (24h Range):  Temp:  [97.7 °F (36.5 °C)-98.4 °F (36.9 °C)] 97.7 °F (36.5 °C)  Pulse:  [72-98] 98  Resp:  [13-20] 20  SpO2:  [97 %-100 %] 99 %  BP: (152-185)/(71-85) 185/85     Weight: 80.6 kg (177 lb 11.1 oz)  Body mass index is 30.5 kg/m².    Physical Exam   Constitutional: He is oriented to person, place, and time. He appears well-developed and well-nourished. No distress.   HENT:   Head: Normocephalic and atraumatic.   Nose: Epistaxis is observed.  Right sinus exhibits frontal sinus tenderness. Left sinus exhibits frontal sinus tenderness.   Mouth/Throat: Oropharynx is clear and moist.   Eyes: Conjunctivae and lids are normal. Pupils are equal, round, and reactive to light.   Neck: No JVD present.   Cardiovascular: Normal rate, regular rhythm and normal heart sounds.   Pulmonary/Chest: Effort normal and breath sounds normal.   Abdominal: Soft. Normal appearance and bowel sounds are normal. There is no tenderness.   Neurological: He is alert and oriented to person, place, and time. He has normal strength.   Skin: Skin is warm, dry and intact.   Psychiatric: He has a normal mood and affect. His behavior is normal. Cognition and memory are normal.   Nursing note and vitals reviewed.        CRANIAL NERVES     CN III, IV, VI   Pupils are equal, round, and reactive to light.       Significant Labs:   CBC:   Recent Labs   Lab 02/28/19  1119   WBC 7.63   HGB 11.3*   HCT 34.6*        CMP:   Recent Labs   Lab 02/28/19  1119      K 4.4   *   CO2 21*   GLU 96   BUN 37*   CREATININE 1.5*   CALCIUM 9.0   PROT 7.0   ALBUMIN 3.2*   BILITOT 0.5   ALKPHOS 69   AST 19   ALT 16   ANIONGAP 9   EGFRNONAA 45*     Coagulation:   Recent Labs   Lab 02/28/19  1119   INR 0.9     All pertinent labs within the past 24 hours have been reviewed.    Significant Imaging: I have reviewed all pertinent imaging results/findings within the past 24 hours.     Imaging Results          X-Ray Chest PA And Lateral (Final result)  Result time 02/28/19 11:45:34    Final result by Nain Duran MD (02/28/19 11:45:34)                 Impression:      No acute abnormality.      Electronically signed by: Nain Duran MD  Date:    02/28/2019  Time:    11:45             Narrative:    EXAMINATION:  XR CHEST PA AND LATERAL    CLINICAL HISTORY:  bleeding;    TECHNIQUE:  PA and lateral views of the chest were performed.    COMPARISON:  None    FINDINGS:  The lungs are clear, with  normal appearance of pulmonary vasculature and no pleural effusion or pneumothorax.    The cardiac silhouette is normal in size. The hilar and mediastinal contours are unremarkable.    Bones are intact.

## 2019-02-28 NOTE — HPI
Leonides Burns is a 74 year old male with a medical  history of hypertension, gout, glaucoma and recent history of epistaxis who presented to the [facility] with complaint of continued nosebleeds despite multiple attempts to stop bleeding.  The patient reports bleeding began approximately six days ago. He reports left nostril was packed [facility] 2/24 He received bilateral nasal packing by Dr. Serrano in the ENT clinic prior to arrival in the ED today.  He denies shortness of breath, chest pain, nausea, vomiting, headache, lightheadedness, dizziness, or weakness. He reports taking 81 mg of Asprin, but states his last dose was 6 days ago at onset of bleeding. He denies other blood thinners. He reports using Bleedcease with little relief. He states he has not missed a dose of antihypertensives in a few weeks. He reports first episode of epistasis was 12 years ago, but denies having them regularly. He will be admitted under Observation. He is scheduled to have FESS surgery per ENT.

## 2019-03-01 LAB
ANION GAP SERPL CALC-SCNC: 10 MMOL/L
BUN SERPL-MCNC: 34 MG/DL
CALCIUM SERPL-MCNC: 8.9 MG/DL
CHLORIDE SERPL-SCNC: 112 MMOL/L
CO2 SERPL-SCNC: 20 MMOL/L
CREAT SERPL-MCNC: 1.6 MG/DL
ERYTHROCYTE [DISTWIDTH] IN BLOOD BY AUTOMATED COUNT: 16 %
EST. GFR  (AFRICAN AMERICAN): 48 ML/MIN/1.73 M^2
EST. GFR  (NON AFRICAN AMERICAN): 42 ML/MIN/1.73 M^2
GLUCOSE SERPL-MCNC: 112 MG/DL
HCT VFR BLD AUTO: 31.2 %
HGB BLD-MCNC: 10.3 G/DL
MCH RBC QN AUTO: 27 PG
MCHC RBC AUTO-ENTMCNC: 33 G/DL
MCV RBC AUTO: 82 FL
PLATELET # BLD AUTO: 299 K/UL
PMV BLD AUTO: 10 FL
POTASSIUM SERPL-SCNC: 4.5 MMOL/L
RBC # BLD AUTO: 3.82 M/UL
SODIUM SERPL-SCNC: 142 MMOL/L
WBC # BLD AUTO: 10.77 K/UL

## 2019-03-01 PROCEDURE — 25000003 PHARM REV CODE 250: Performed by: NURSE PRACTITIONER

## 2019-03-01 PROCEDURE — G0378 HOSPITAL OBSERVATION PER HR: HCPCS

## 2019-03-01 PROCEDURE — 85027 COMPLETE CBC AUTOMATED: CPT

## 2019-03-01 PROCEDURE — 36415 COLL VENOUS BLD VENIPUNCTURE: CPT

## 2019-03-01 PROCEDURE — 94761 N-INVAS EAR/PLS OXIMETRY MLT: CPT

## 2019-03-01 PROCEDURE — 80048 BASIC METABOLIC PNL TOTAL CA: CPT

## 2019-03-01 RX ORDER — POLYETHYLENE GLYCOL 3350 17 G/17G
17 POWDER, FOR SOLUTION ORAL DAILY
Status: DISCONTINUED | OUTPATIENT
Start: 2019-03-01 | End: 2019-03-02 | Stop reason: HOSPADM

## 2019-03-01 RX ADMIN — AMLODIPINE BESYLATE 5 MG: 5 TABLET ORAL at 08:03

## 2019-03-01 RX ADMIN — LISINOPRIL 20 MG: 10 TABLET ORAL at 08:03

## 2019-03-01 RX ADMIN — POLYETHYLENE GLYCOL 3350 17 G: 17 POWDER, FOR SOLUTION ORAL at 11:03

## 2019-03-01 RX ADMIN — ATORVASTATIN CALCIUM 20 MG: 20 TABLET, FILM COATED ORAL at 09:03

## 2019-03-01 NOTE — PROGRESS NOTES
Stable overnight without epistaxis.  Will leave packing in place.  Ok for discharge today with follow up with Dr. Martin at 9 am on Monday.

## 2019-03-01 NOTE — PLAN OF CARE
03/01/19 1629   Final Note   Assessment Type Final Discharge Note   Hospital Follow Up  Appt(s) scheduled? Yes   Discharge plans and expectations educations in teach back method with documentation complete? Yes   Right Care Referral Info   Post Acute Recommendation No Care

## 2019-03-01 NOTE — OP NOTE
[facility]  Surgery Department  Operative Note    SUMMARY     Patient: Leonides Burns    Medical Record: [id number]    Date of Procedure: 2/28/2019     Surgeon: Surgeon(s) and Role:     * Mikael Serrano MD - Primary    Assisting Surgeon: None    Pre-Operative Diagnosis: Bleeding from the nose [R04.0]    Post-Operative Diagnosis: Post-Op Diagnosis Codes:     * Bleeding from the nose [R04.0]    Procedure:  1.  Nasal septoplasty 2.  Diagnostic nasal endoscopy, bilateral 3.  Anterior and posterior bilateral nasal packing      Operative  Findings:  Severe septal deviation to the left with marked excoriation of the septum with bleeding of the nasal septum and inferior turbinates, old coagulated blood in the nasopharynx and posterior nasal cavity and left maxillary sinus    Anesthesia:  General endotracheal    EBL:  Minimal    Specimens:  Nasal septal cartilage    Implants:  1.  Left Chetan pack between the septum and middle turbinates posteriorly 2.  Left Villanueva pack left nasal cavity 3.  Right Villanueva Merocel pack right inferior nasal cavity    Complications:  None    Indications:  The patient is 74 years old and was seen in the office yesterday and today for severe epistaxis.  He had been seen in the emergency room several times prior to admission yesterday with epistaxis and had been packed several times per the ER when he presented to the office with a pack half and half out.  He was controlled yesterday with vaso constriction and some packing with absorbable gelatin sponge on the left side only to present to the emergency room today with bilateral partially placed packs.  After a significant time today was in a was controlled with Vaseline gauze that because of the severity of the bleeding for the last 3 days it was recommended that he be taken operating room today for exam under anesthesia a septoplasty for the severe deviation left side which really precluded access to the entire nose to  determine where he was bleeding from and packing and cautery as needed form consent was obtained    Procedure in Detail:  The patient was taken operating room placed under general endotracheal anesthesia. A time-out was then performed according joint commission standards.  The Vaseline streps the 0.5 in by 72 in gauze was removed from the left and right nasal cavities.  The nasal cavities were decongested with Afrin-soaked pledgets.  The nasal septum was injected with local anesthetic using a total of 6 cc 1% lidocaine with epinephrine.    Nasal endoscopy was then performed with a 0 degree telescopes.  The both left and right nasal cavities were inspected.  There was no significant major epistaxis at the current time.  The nasal septum was found to be severely deviated with a large spur on the left side which really precluded access to the posterior left side of the nose and would allow adequate visualization or packing in the office.  Visualization endoscopy around the spur and the spur did read did not reveal any significant bleeding in the nasopharynx posteriorly or along the septum superiorly.  In the right side was also inspected in a diagnostic endoscopy revealed no significant pathology on that side.  The decision was made to address the septum.    The septum was addressed through a left-sided [place] incision.  Mucoperichondrial periosteal flaps over the large spur were elevated.  The deviated portions of cartilage and bone were then carefully removed with combination of instruments which included double-action forceps, well Amrik sleeve forceps, osteotomes and a Bulloch.  Once the deviated portions of cartilage and bone were removed the flaps were reapproximated with 4 plain on a Ricardo needle using a interrupted mattress-type suture.  Two separate 4 0 Ricardo sutures were used.  Complete apposition of the flaps was performed both to prevent septal hematoma but also to prevent significant nasal  hemorrhage.    At that point diagnostic nasal endoscopy was again repeated.  There was some significant trauma along the inferior turbinate on the left side.  The area between the septum and middle turbinate had some minimal bleeding and some old blood but no active bleeding.  There is no active bleeding at the posterior attachment of the middle turbinate where an area of the sphenopalatine artery.  It was inspected with the 0 and 30 degree telescopes.  There is no bleeding seen under the inferior turbinate either.  There was some old blood evacuated from the os of the maxillary sinus on the left.  Decision was made at this time to pack the nose on each side since definitive diagnosis of the epistaxis could not be obtained.  He was not clear whether this was from an ethmoid arteries sphenopalatine.  It is also not clear whether was from the septum or from the turbinate area.  Decision was made to pack.  Packing was placed with a small Chetan sinus pack between the septum and the middle turbinates.  Two nguyen 8 cm packs were placed against the inferior meatus in the septum.  Stomach contents were then evacuated with an OG tube.  Patient was then brought the anesthetic extubated brought to recovery in stable condition  No discharge procedures on file.

## 2019-03-01 NOTE — TRANSFER OF CARE
"Anesthesia Transfer of Care Note    Patient: Leonides Burns    Procedure(s) Performed: Procedure(s) (LRB):  FESS, USING COMPUTER-ASSISTED NAVIGATION (N/A)  SEPTOPLASTY, NOSE (N/A)    Patient location: PACU    Anesthesia Type: general    Transport from OR: Transported from OR on 6-10 L/min O2 by face mask with adequate spontaneous ventilation    Post pain: adequate analgesia    Post assessment: no apparent anesthetic complications    Post vital signs: stable    Level of consciousness: awake, alert and oriented    Nausea/Vomiting: no nausea/vomiting    Complications: none    Transfer of care protocol was followed      Last vitals:   Visit Vitals  BP (!) 185/85   Pulse 98   Temp 36.5 °C (97.7 °F) (Axillary)   Resp 20   Ht 5' 4" (1.626 m)   Wt 80.6 kg (177 lb 11.1 oz)   SpO2 99%   BMI 30.50 kg/m²     "

## 2019-03-01 NOTE — DISCHARGE INSTRUCTIONS
Nosebleed (Adult)    Bleeding from the nose most commonly occurs because of injury or drying and cracking of the inner lining of the nose. Most nosebleeds are because of dry air or nose-picking. They can occur during a common cold or an allergy attack. They can also occur on a very hot day, or from dry air in the winter.  If the bleeding site is found, it may be cauterized. This means it is treated to cause a blood clot to form. This may be done with a chemical, heat, or electricity. If the bleeding continues after the site is cauterized, or if the site cannot be found, packing may be put in your nose. This is to apply pressure and stop the bleeding. The packing may be made of gauze or sponge. A small balloon catheter is sometimes used. These must be removed by your doctor. Some types of packing dissolve on their own.  Home care  · If packing was put in your nose, unless told otherwise, do not pull on it or try to remove it yourself. You will be given an appointment to have it removed. You may also have been given antibiotics to prevent a sinus infection. If so, finish all of the medicine.  · Do not blow your nose for 12 hours after the bleeding stops. This will allow a strong blood clot to form. Do not pick your nose. This may restart bleeding.  · Avoid drinking alcohol and hot liquids for the next 2 days. Alcohol or hot liquids in your mouth can dilate blood vessels in your nose. This can cause bleeding to start again.  · Do not take ibuprofen, naproxen, or medicines that contain aspirin. These thin the blood and may cause your nose to bleed. You may take acetaminophen for pain, unless another pain medicine was prescribed.  · If the bleeding starts again, sit up and lean forward to prevent swallowing blood. Pinch your nose tightly on both sides, as shown above, for 10 to 15 minutes. Time yourself. Dont release the pressure on your nose until 10 minutes is up. If bleeding does not stop, continue to pinch your  nose and call your healthcare provider or return to this facility.  · If you have a cold, allergies, or dry nasal membranes, lubricate the nasal passages. Apply a small amount of petroleum jelly inside the nose with a cotton swab twice a day (morning and night).  · Avoid overheating your home. This can dry the air and make your condition worse.  · Put a humidifier in the room where you sleep. This will add moisture to the air.  · Use a saline nasal spray to keep nasal passages moist.  · Do not pick your nose. Keep fingernails trimmed to decrease risk of bleeds.  · Do not smoke.  Follow-up care  Follow up with your healthcare provider, or as advised. Nasal packing should be rechecked or removed within 2 to 3 days.  When to seek medical advice  Call your healthcare provider right away if any of these occur.  · You have another nosebleed that you cannot control  · Dizziness, weakness, or fainting  · You become tired or confused  · Fever of 100.4ºF (38ºC) or higher, or as directed by your healthcare provider  · Headache  · Sinus or facial pain  · Shortness of breath or trouble breathing  Date Last Reviewed: 3/22/2015  © 6236-6407 [employer]. [address] [place] [address]. All rights reserved. This information is not intended as a substitute for professional medical care. Always follow your healthcare professional's instructions.        Nasal Surgery: Septoplasty    Youre scheduled to have nasal surgery. The type of nasal surgery youre having is called septoplasty. Read on to learn more about what to expect during this surgery.During surgery, the surgeon may remove cartilage and bone to reshape the deviated septum. After surgery, there is more breathing space. Enough cartilage and bone remain to give the nose support.  What to expect during septoplasty  This surgery repairs a blockage inside the nose caused by a deviated septum. With a deviated septum, there is a problem with the wall that  divides the nose into two chambers. A deviated septum may block air coming through one or both nostrils. This makes it harder for you to breathe through your nose. During septoplasty, the surgeon makes incisions inside the nose. Then the surgeon trims, reshapes, moves, or removes cartilage and sometimes bone from the septum.  Risks and possible complications  As with any surgery, nasal surgery has some risks. These include a slight risk of bleeding and infection. Your doctor will discuss any other risks and complications with you.   After septoplasty  After septoplasty, youll be taken to a recovery area or to your hospital room. Your experience may be as follows:  · You may have packing material inside your nose. This reduces bleeding and promotes healing. You may also have bandages (dressings) on the outside of your nose.  · Its normal to have some mucus and blood drain from your nose. Until packing is removed, you may have to breathe through your mouth.  · You may have some swelling or bruising around the eyelids if a rhinoplasty was also done.  · Expect some throat dryness and irritation.  · Pain medicine will be prescribed as needed. Dont take medicine that contains aspirin or ibuprofen. These can cause increased bleeding.  Follow-up care  Youll need to follow up with your doctor within a week after your surgery. Here is what to expect:  · Any packing, splint, or dressings will probably be removed. You may feel slight discomfort and bleed a little when this is done.  · After the splint or packing is removed, youll most likely breathe better than you did before surgery.  · You may have minor numbness, pain, swelling, and a little stiffness under the tip of the nose.  · In a few days, the inside of your nose may swell and briefly block your breathing. Or a scab or crust may block breathing for a short time. Leave the scab alone. Your doctor can remove it. Using saline (irrigation or aerosol) regularly after  surgery helps to reduce the amount of crusting at each visit.  · Contact your healthcare provider if you have any questions or concerns.  Date Last Reviewed: 11/1/2016 © 2000-2017 [employer]. [address] [place] [address], [place] [address]. All rights reserved. This information is not intended as a substitute for professional medical care. Always follow your healthcare professional's instructions.

## 2019-03-01 NOTE — ANESTHESIA POSTPROCEDURE EVALUATION
"Anesthesia Post Evaluation    Patient: Leonides Burns    Procedure(s) Performed: Procedure(s) (LRB):  FESS, USING COMPUTER-ASSISTED NAVIGATION (N/A)  SEPTOPLASTY, NOSE (N/A)    Final Anesthesia Type: general  Patient location during evaluation: PACU  Patient participation: Yes- Able to Participate  Level of consciousness: awake and alert  Post-procedure vital signs: reviewed and stable  Pain management: adequate  Airway patency: patent  PONV status at discharge: No PONV  Anesthetic complications: no      Cardiovascular status: hemodynamically stable  Respiratory status: unassisted  Hydration status: euvolemic  Follow-up not needed.        Visit Vitals  BP (!) 174/76   Pulse 86   Temp 36.4 °C (97.6 °F) (Axillary)   Resp 18   Ht 5' 4" (1.626 m)   Wt 80.6 kg (177 lb 11.1 oz)   SpO2 95%   BMI 30.50 kg/m²       Pain/Farrah Score: Pain Rating Prior to Med Admin: 4 (2/28/2019  6:35 PM)  Farrah Score: 8 (2/28/2019  6:14 PM)        "

## 2019-03-01 NOTE — PLAN OF CARE
SW met with pt at bedside to complete discharge assessment; pt's PCP is at [facility] and uses [facility].  Pt's son, Leonides will provide transportation home.  Friend, Shanelle Molina [phone] will provide assistance, if needed.  Pt's address is [address] [place] [address].  No needs identified that this time.     03/01/19 1047   Discharge Assessment   Assessment Type Discharge Planning Assessment   Confirmed/corrected address and phone number on facesheet? Yes   Assessment information obtained from? Patient   Communicated expected length of stay with patient/caregiver no   Prior to hospitilization cognitive status: Alert/Oriented   Prior to hospitalization functional status: Independent   Current cognitive status: Alert/Oriented   Current Functional Status: Independent   Lives With alone   Able to Return to Prior Arrangements yes   Is patient able to care for self after discharge? Unable to determine at this time (comments)   Who are your caregiver(s) and their phone number(s)? (Shanelle Molina, friend [phone])   Readmission Within the Last 30 Days no previous admission in last 30 days   Patient currently being followed by outpatient case management? No   Patient currently receives any other outside agency services? No   Equipment Currently Used at Home cane, straight   Do you have any problems affording any of your prescribed medications? No   Is the patient taking medications as prescribed? yes   Does the patient have transportation home? Yes   Transportation Anticipated family or friend will provide   Does the patient receive services at the [facility]madin Clinic? No   Discharge Plan A Home   DME Needed Upon Discharge  none   Patient/Family in Agreement with Plan yes

## 2019-03-01 NOTE — NURSING
Patient has discharge orders but no transportation home until son (Leonides Blackmon) gets off of work between 12am-1am.  Remains free from fall, injury, and skin breakdown. Ambulates independently to bathroom. SS stable on RA and afebrile. Positions self independently. Refused SCDs due to frequent ambulation. Tolerating ordered diet. IV site WNL. Plan of care reviewed with patient and all questions answered. Bed low, locked w/ bed alarm on. Call light within reach. Purposeful rounding performed. No other complaints at this time.

## 2019-03-02 VITALS
SYSTOLIC BLOOD PRESSURE: 170 MMHG | HEIGHT: 64 IN | HEART RATE: 89 BPM | WEIGHT: 184.06 LBS | DIASTOLIC BLOOD PRESSURE: 81 MMHG | OXYGEN SATURATION: 98 % | BODY MASS INDEX: 31.42 KG/M2 | TEMPERATURE: 98 F | RESPIRATION RATE: 20 BRPM

## 2019-03-02 PROCEDURE — 99217 PR OBSERVATION CARE DISCHARGE: ICD-10-PCS | Mod: ,,, | Performed by: NURSE PRACTITIONER

## 2019-03-02 PROCEDURE — 99217 PR OBSERVATION CARE DISCHARGE: CPT | Mod: ,,, | Performed by: NURSE PRACTITIONER

## 2019-03-02 PROCEDURE — G0378 HOSPITAL OBSERVATION PER HR: HCPCS

## 2019-03-02 NOTE — DISCHARGE SUMMARY
[facility]  Hospital Medicine  Discharge Summary      Patient Name: Leonides Burns  MRN: [id number]  Admission Date: 2/28/2019  Hospital Length of Stay: 0 days  Discharge Date and Time: 3/2/2019  1:32 AM  Attending Physician: Miladis Aguirre MD   Discharging Provider: Marian Howard NP  Primary Care Provider: Tien Thomas MD      HPI:   Leonides Burns is a 74 year old male with a medical  history of hypertension, gout, glaucoma and recent history of epistaxis who presented to the [facility] with complaint of continued nosebleeds despite multiple attempts to stop bleeding.  The patient reports bleeding began approximately six days ago. He reports left nostril was packed [facility] 2/24 He received bilateral nasal packing by Dr. Serrano in the ENT clinic prior to arrival in the ED today.  He denies shortness of breath, chest pain, nausea, vomiting, headache, lightheadedness, dizziness, or weakness. He reports taking 81 mg of Asprin, but states his last dose was 6 days ago at onset of bleeding. He denies other blood thinners. He reports using Bleedcease with little relief. He states he has not missed a dose of antihypertensives in a few weeks. He reports first episode of epistasis was 12 years ago, but denies having them regularly. He will be admitted under Observation. He is scheduled to have FESS surgery per ENT.         Procedure(s) (LRB):  FESS, USING COMPUTER-ASSISTED NAVIGATION (ADD ON ) (N/A)  SEPTOPLASTY, NOSE (N/A)      Hospital Course:   Leonides Burns was admitted under Observation for nose bleed.  He underwent diagnostic bilateral nasal endoscopy, nasal septoplasty and anterior and posterior bilateral nasal packing under general anesthesia per ENT on March 1, 2019.   The patient tolerated the procedure well.  The patient was without evidence of bleeding and there were not post-operative or post-anesthesia complications.  He was discharged in good  condition with son and will follow up with his ENT as an outpatient.        Consults:   ENT    Final Active Diagnoses:    Diagnosis Date Noted POA    PRINCIPAL PROBLEM:  Epistaxis [R04.0] 02/28/2019 Yes    Hypertension [I10] 02/02/2015 Yes     Chronic    Chronic kidney disease, stage III (moderate) [N18.3] 06/02/2015 Yes     Chronic    Hyperlipidemia [E78.5] 02/02/2015 Yes     Chronic    Memory impairment [R41.3] 07/09/2018 Yes    Glaucoma [H40.9] 02/02/2015 Yes     Chronic      Problems Resolved During this Admission:       Discharged Condition: stable    Disposition: Home or Self Care    Follow Up:  Follow-up Information     Leisa Martin MD. Go on 3/4/2019.    Specialty:  Otolaryngology  Why:  follow up after surgical procedure at 9:00 am  Contact information:  [address]  [place] [facility] [address]  [phone]                 Patient Instructions:      Diet Cardiac     Notify your health care provider if you experience any of the following:  increased confusion or weakness     Notify your health care provider if you experience any of the following:  temperature >100.4     Notify your health care provider if you experience any of the following:  severe uncontrolled pain     Notify your health care provider if you experience any of the following:  redness, tenderness, or signs of infection (pain, swelling, redness, odor or green/yellow discharge around incision site)     Activity as tolerated       Significant Diagnostic Studies: Labs:   CMP   Recent Labs   Lab 03/01/19  0545      K 4.5   *   CO2 20*   *   BUN 34*   CREATININE 1.6*   CALCIUM 8.9   ANIONGAP 10   ESTGFRAFRICA 48*   EGFRNONAA 42*   , CBC   Recent Labs   Lab 03/01/19  0545   WBC 10.77   HGB 10.3*   HCT 31.2*        INR   Lab Results   Component Value Date    INR 0.9 02/28/2019    and All labs within the past 24 hours have been reviewed        Medications:  Reconciled Home  Medications:      Medication List      CONTINUE taking these medications    allopurinol 100 MG tablet  Commonly known as:  ZYLOPRIM  TAKE 2 TABLETS BY MOUTH EVERY DAY     amLODIPine 5 MG tablet  Commonly known as:  NORVASC  TAKE 1 TABLET(5 MG) BY MOUTH EVERY DAY     aspirin 81 MG Chew  Take 1 tablet (81 mg total) by mouth once daily.     atorvastatin 20 MG tablet  Commonly known as:  LIPITOR  TAKE 1 TABLET EVERY EVENING     ketoconazole 2 % cream  Commonly known as:  NIZORAL  Apply topically once daily.     lisinopril 20 MG tablet  Commonly known as:  PRINIVIL,ZESTRIL  Take 20 mg by mouth once daily.     LUMIGAN 0.01 % Drop  Generic drug:  bimatoprost  Place 1 drop into both eyes every evening.             Time spent on the discharge of patient: >30 minutes  Patient was seen and examined on the date of discharge and determined to be suitable for discharge.         Marian Howard NP  Department of Hospital Medicine  [facility]

## 2019-03-02 NOTE — NURSING
Patient in no acute distress/discomfort. Packing and dressing intact, clean and dry to nose and bilateral nares. Patient denies pain. No SOB. Discharge paperwork discussed with patient and son and both verbalized understanding. IV removed by writer without incidence. AVS completed. Patient is ambulating with SBA of son out of facility. Will ride home in personal vehicle.

## 2019-03-02 NOTE — HOSPITAL COURSE
Leonides Burns was admitted under Observation for nose bleed.  He underwent diagnostic bilateral nasal endoscopy, nasal septoplasty and anterior and posterior bilateral nasal packing under general anesthesia per ENT on March 1, 2019.   The patient tolerated the procedure well.  The patient was without evidence of bleeding and there were not post-operative or post-anesthesia complications.  He was discharged in good condition with son and will follow up with his ENT as an outpatient.

## 2019-03-02 NOTE — NURSING
Patient has discharge orders. States his son will be here to pick him up tonight after he gets off from work.Patient is pain free. No acute distress/discomfort observed. Assessment per flow sheet. Bed in lowest position, locked and call bell in reach.

## 2019-03-15 ENCOUNTER — PES CALL (OUTPATIENT)
Dept: ADMINISTRATIVE | Facility: CLINIC | Age: 74
End: 2019-03-15

## 2019-04-26 ENCOUNTER — PES CALL (OUTPATIENT)
Dept: ADMINISTRATIVE | Facility: CLINIC | Age: 74
End: 2019-04-26

## 2019-11-11 NOTE — TELEPHONE ENCOUNTER
Assessment/Plan:    Osteoarthritis of left knee  Continue with home exercises and PRN NSAIDs  Essential hypertension  Will start lisinopril 5 mg daily  Follow up in 1 month  Diagnoses and all orders for this visit:    Primary osteoarthritis of left knee    Essential hypertension  -     lisinopril (ZESTRIL) 5 mg tablet; Take 1 tablet (5 mg total) by mouth daily    BMI 38 0-38 9,adult                Subjective:      Patient ID: Kate Winston is a 27 y o  male  27year old male is seen today for follow up of chronic conditions  No active complaint or concerns at this time  No labs to review today  He main concern is his left knee pain, which is intermittent and worse with over exertion  He was adhering to a diet and exercise regimen, however was not adherent since 1-2 months  Hypertension   This is a chronic problem  The current episode started more than 1 month ago  The problem is unchanged  The problem is uncontrolled  Pertinent negatives include no anxiety, blurred vision, chest pain, headaches, malaise/fatigue, neck pain, orthopnea, palpitations, peripheral edema, PND, shortness of breath or sweats  Risk factors for coronary artery disease include male gender and obesity  Past treatments include lifestyle changes  The current treatment provides no improvement  There are no compliance problems  Knee Pain    There was no injury mechanism  The pain is present in the left knee  The pain is mild  The pain has been intermittent since onset  Pertinent negatives include no numbness  He reports no foreign bodies present  He has tried NSAIDs for the symptoms  The treatment provided mild relief         The following portions of the patient's history were reviewed and updated as appropriate: allergies, current medications, past family history, past medical history, past social history, past surgical history and problem list     Review of Systems   Constitutional: Negative for activity change, Notify pt. That he would need to get sodium bicarb refill from renal and I can refill allopurinal; please find out why he needs kenalog cream refill so I can refill   appetite change, chills, diaphoresis, fatigue, fever and malaise/fatigue  HENT: Negative for congestion, postnasal drip, rhinorrhea, sinus pressure, sinus pain, sneezing and sore throat  Eyes: Negative for blurred vision and visual disturbance  Respiratory: Negative for apnea, cough, choking, chest tightness, shortness of breath and wheezing  Cardiovascular: Negative for chest pain, palpitations, orthopnea, leg swelling and PND  Gastrointestinal: Negative for abdominal distention, abdominal pain, anal bleeding, blood in stool, constipation, diarrhea, nausea and vomiting  Endocrine: Negative for cold intolerance and heat intolerance  Genitourinary: Negative for difficulty urinating, dysuria and hematuria  Musculoskeletal: Negative  Negative for neck pain  Skin: Negative  Neurological: Negative for dizziness, weakness, light-headedness, numbness and headaches  Hematological: Negative for adenopathy  Psychiatric/Behavioral: Negative for agitation, sleep disturbance and suicidal ideas  All other systems reviewed and are negative  History reviewed  No pertinent past medical history  Current Outpatient Medications:     lisinopril (ZESTRIL) 5 mg tablet, Take 1 tablet (5 mg total) by mouth daily, Disp: 30 tablet, Rfl: 1    No Known Allergies    Social History   History reviewed  No pertinent surgical history    Family History   Problem Relation Age of Onset    Hypothyroidism Mother     Heart attack Father     Hypothyroidism Maternal Grandmother     Arthritis Maternal Grandmother     Arthritis Maternal Grandfather     Diabetes Paternal Grandmother     Diabetes Paternal Grandfather        Objective:  /100 (BP Location: Left arm, Patient Position: Sitting, Cuff Size: Large)   Pulse 68   Temp 98 °F (36 7 °C) (Oral)   Ht 5' 9" (1 753 m)   Wt 115 kg (254 lb 6 4 oz)   SpO2 99%   BMI 37 57 kg/m²     No results found for this or any previous visit (from the past 9307 hour(s))  Physical Exam   Constitutional: He is oriented to person, place, and time  He appears well-developed and well-nourished  No distress  HENT:   Head: Normocephalic and atraumatic  Eyes: Pupils are equal, round, and reactive to light  Conjunctivae and EOM are normal  Right eye exhibits no discharge  Left eye exhibits no discharge  No scleral icterus  Neck: Normal range of motion  Neck supple  No JVD present  No thyromegaly present  Cardiovascular: Normal rate, regular rhythm, normal heart sounds and intact distal pulses  Exam reveals no gallop and no friction rub  No murmur heard  Pulmonary/Chest: Effort normal and breath sounds normal  No respiratory distress  He has no wheezes  He has no rales  He exhibits no tenderness  Abdominal: Soft  Bowel sounds are normal  He exhibits no distension and no mass  There is no tenderness  There is no rebound and no guarding  Musculoskeletal: Normal range of motion  He exhibits no edema, tenderness or deformity  Lymphadenopathy:     He has no cervical adenopathy  Neurological: He is alert and oriented to person, place, and time  He has normal reflexes  No cranial nerve deficit  Coordination normal    Skin: Skin is warm and dry  No rash noted  He is not diaphoretic  No erythema  No pallor  Psychiatric: He has a normal mood and affect  His behavior is normal  Judgment and thought content normal    Nursing note and vitals reviewed

## 2021-01-16 ENCOUNTER — IMMUNIZATION (OUTPATIENT)
Dept: INTERNAL MEDICINE | Facility: CLINIC | Age: 76
End: 2021-01-16
Payer: MEDICARE

## 2021-01-16 DIAGNOSIS — Z23 NEED FOR VACCINATION: Primary | ICD-10-CM

## 2021-01-16 PROCEDURE — 91300 COVID-19, MRNA, LNP-S, PF, 30 MCG/0.3 ML DOSE VACCINE: ICD-10-PCS | Mod: ,,, | Performed by: INTERNAL MEDICINE

## 2021-01-16 PROCEDURE — 0001A COVID-19, MRNA, LNP-S, PF, 30 MCG/0.3 ML DOSE VACCINE: ICD-10-PCS | Mod: CV19,,, | Performed by: INTERNAL MEDICINE

## 2021-01-16 PROCEDURE — 91300 COVID-19, MRNA, LNP-S, PF, 30 MCG/0.3 ML DOSE VACCINE: CPT | Mod: ,,, | Performed by: INTERNAL MEDICINE

## 2021-01-16 PROCEDURE — 0001A COVID-19, MRNA, LNP-S, PF, 30 MCG/0.3 ML DOSE VACCINE: CPT | Mod: CV19,,, | Performed by: INTERNAL MEDICINE

## 2021-02-06 ENCOUNTER — IMMUNIZATION (OUTPATIENT)
Dept: INTERNAL MEDICINE | Facility: CLINIC | Age: 76
End: 2021-02-06
Payer: MEDICARE

## 2021-02-06 DIAGNOSIS — Z23 NEED FOR VACCINATION: Primary | ICD-10-CM

## 2021-02-06 PROCEDURE — 0002A COVID-19, MRNA, LNP-S, PF, 30 MCG/0.3 ML DOSE VACCINE: CPT | Mod: CV19,,, | Performed by: INTERNAL MEDICINE

## 2021-02-06 PROCEDURE — 91300 COVID-19, MRNA, LNP-S, PF, 30 MCG/0.3 ML DOSE VACCINE: CPT | Mod: ,,, | Performed by: INTERNAL MEDICINE

## 2021-02-06 PROCEDURE — 91300 COVID-19, MRNA, LNP-S, PF, 30 MCG/0.3 ML DOSE VACCINE: ICD-10-PCS | Mod: ,,, | Performed by: INTERNAL MEDICINE

## 2021-02-06 PROCEDURE — 0002A COVID-19, MRNA, LNP-S, PF, 30 MCG/0.3 ML DOSE VACCINE: ICD-10-PCS | Mod: CV19,,, | Performed by: INTERNAL MEDICINE

## 2021-12-09 NOTE — PROGRESS NOTES
Individual Follow-Up Form    1/5/2017    Quit Date:   Has not quit yet    Clinical Status of Patient: Outpatient    Length of Service: 30 minutes    Continuing Medication:   Nicotine patch & lozenges    Target Symptoms: Withdrawal and medication side effects. The following were  rated moderate (3) to severe (4) on TCRS:  · Moderate (3): urges  · Severe (4): none    Comments:   Patient still smoking 3 - 4 cigarettes/day.  He is still buying them.  He has limited the places where he allows himself to smoke, doesn't smoke in the house or when he is driving.  He identified alcohol as the major triggering factor for him.  He said he has been buying his own patches and I reminded him he didn't need to do that because the [facility] would pay for them.  We discussed his readiness, reasons, picking a quit date, strategies to get past triggers without smoking, and intent to quit completely.  Patient said he quit completely in the past and knew he could do it again.    Diagnosis: F17.210    Next Visit:   2/2/17   home

## 2022-10-09 ENCOUNTER — HOSPITAL ENCOUNTER (INPATIENT)
Facility: HOSPITAL | Age: 77
LOS: 17 days | Discharge: NURSING, REHAB OR LONG-TERM CARE | DRG: 065 | End: 2022-10-28
Attending: STUDENT IN AN ORGANIZED HEALTH CARE EDUCATION/TRAINING PROGRAM | Admitting: STUDENT IN AN ORGANIZED HEALTH CARE EDUCATION/TRAINING PROGRAM
Payer: MEDICARE

## 2022-10-09 DIAGNOSIS — I63.9 CVA (CEREBRAL VASCULAR ACCIDENT): ICD-10-CM

## 2022-10-09 DIAGNOSIS — R56.9 SEIZURE: Primary | ICD-10-CM

## 2022-10-09 DIAGNOSIS — I10 ESSENTIAL HYPERTENSION: Chronic | ICD-10-CM

## 2022-10-09 DIAGNOSIS — R29.898 RIGHT LEG WEAKNESS: ICD-10-CM

## 2022-10-09 DIAGNOSIS — G45.9 TIA (TRANSIENT ISCHEMIC ATTACK): ICD-10-CM

## 2022-10-09 PROCEDURE — 25000003 PHARM REV CODE 250: Performed by: PHYSICIAN ASSISTANT

## 2022-10-09 PROCEDURE — G0378 HOSPITAL OBSERVATION PER HR: HCPCS

## 2022-10-09 PROCEDURE — G0379 DIRECT REFER HOSPITAL OBSERV: HCPCS

## 2022-10-09 RX ORDER — ACETAMINOPHEN 500 MG
500 TABLET ORAL EVERY 6 HOURS PRN
Status: DISCONTINUED | OUTPATIENT
Start: 2022-10-09 | End: 2022-10-28 | Stop reason: HOSPADM

## 2022-10-09 RX ORDER — SODIUM CHLORIDE 0.9 % (FLUSH) 0.9 %
10 SYRINGE (ML) INJECTION
Status: DISCONTINUED | OUTPATIENT
Start: 2022-10-09 | End: 2022-10-28 | Stop reason: HOSPADM

## 2022-10-09 RX ORDER — ATORVASTATIN CALCIUM 10 MG/1
20 TABLET, FILM COATED ORAL NIGHTLY
Status: DISCONTINUED | OUTPATIENT
Start: 2022-10-09 | End: 2022-10-10

## 2022-10-09 RX ORDER — AMOXICILLIN 250 MG
1 CAPSULE ORAL DAILY PRN
Status: DISCONTINUED | OUTPATIENT
Start: 2022-10-09 | End: 2022-10-28 | Stop reason: HOSPADM

## 2022-10-09 RX ORDER — TALC
6 POWDER (GRAM) TOPICAL NIGHTLY PRN
Status: DISCONTINUED | OUTPATIENT
Start: 2022-10-09 | End: 2022-10-28 | Stop reason: HOSPADM

## 2022-10-09 RX ADMIN — ATORVASTATIN CALCIUM 20 MG: 10 TABLET, FILM COATED ORAL at 11:10

## 2022-10-10 PROBLEM — I63.9 CVA (CEREBRAL VASCULAR ACCIDENT): Status: ACTIVE | Noted: 2022-10-10

## 2022-10-10 LAB
ALBUMIN SERPL BCP-MCNC: 3 G/DL (ref 3.5–5.2)
ALP SERPL-CCNC: 63 U/L (ref 55–135)
ALT SERPL W/O P-5'-P-CCNC: 14 U/L (ref 10–44)
ANION GAP SERPL CALC-SCNC: 8 MMOL/L (ref 8–16)
APTT BLDCRRT: 29.9 SEC (ref 21–32)
ASCENDING AORTA: 3.18 CM
AST SERPL-CCNC: 33 U/L (ref 10–40)
AV PEAK GRADIENT: 5 MMHG
AV VELOCITY RATIO: 0.8
BASOPHILS # BLD AUTO: 0.07 K/UL (ref 0–0.2)
BASOPHILS NFR BLD: 1.1 % (ref 0–1.9)
BILIRUB SERPL-MCNC: 0.5 MG/DL (ref 0.1–1)
BSA FOR ECHO PROCEDURE: 2.1 M2
BUN SERPL-MCNC: 22 MG/DL (ref 8–23)
CALCIUM SERPL-MCNC: 9.3 MG/DL (ref 8.7–10.5)
CHLORIDE SERPL-SCNC: 108 MMOL/L (ref 95–110)
CK MB SERPL-MCNC: 8.9 NG/ML (ref 0.1–6.5)
CK MB SERPL-RTO: 0.6 % (ref 0–5)
CK SERPL-CCNC: 1516 U/L (ref 20–200)
CO2 SERPL-SCNC: 23 MMOL/L (ref 23–29)
CREAT SERPL-MCNC: 1.8 MG/DL (ref 0.5–1.4)
CV ECHO LV RWT: 0.43 CM
DIFFERENTIAL METHOD: ABNORMAL
DOP CALC AO PEAK VEL: 1.13 M/S
DOP CALC LVOT AREA: 3.2 CM2
DOP CALC LVOT DIAMETER: 2.01 CM
DOP CALC LVOT PEAK VEL: 0.9 M/S
DOP CALC LVOT STROKE VOLUME: 54.23 CM3
DOP CALC MV VTI: 0 CM
DOP CALCLVOT PEAK VEL VTI: 17.1 CM
E WAVE DECELERATION TIME: 166.73 MSEC
E/A RATIO: 0.51
ECHO LV POSTERIOR WALL: 1.1 CM (ref 0.6–1.1)
EJECTION FRACTION: 60 %
EOSINOPHIL # BLD AUTO: 0.7 K/UL (ref 0–0.5)
EOSINOPHIL NFR BLD: 11.4 % (ref 0–8)
ERYTHROCYTE [DISTWIDTH] IN BLOOD BY AUTOMATED COUNT: 19.6 % (ref 11.5–14.5)
EST. GFR  (NO RACE VARIABLE): 38 ML/MIN/1.73 M^2
ESTIMATED AVG GLUCOSE: 114 MG/DL (ref 68–131)
FRACTIONAL SHORTENING: 34 % (ref 28–44)
GLUCOSE SERPL-MCNC: 85 MG/DL (ref 70–110)
HBA1C MFR BLD: 5.6 % (ref 4–5.6)
HCT VFR BLD AUTO: 45.6 % (ref 40–54)
HGB BLD-MCNC: 14.5 G/DL (ref 14–18)
IMM GRANULOCYTES # BLD AUTO: 0.02 K/UL (ref 0–0.04)
IMM GRANULOCYTES NFR BLD AUTO: 0.3 % (ref 0–0.5)
INR PPP: 1 (ref 0.8–1.2)
INTERVENTRICULAR SEPTUM: 0.96 CM (ref 0.6–1.1)
IVC DIAMETER: 1.55 CM
IVRT: 89.97 MSEC
LA MAJOR: 5.63 CM
LA MINOR: 5.4 CM
LA WIDTH: 4.7 CM
LEFT ATRIUM SIZE: 3.62 CM
LEFT ATRIUM VOLUME INDEX: 38.7 ML/M2
LEFT ATRIUM VOLUME: 79.72 CM3
LEFT INTERNAL DIMENSION IN SYSTOLE: 3.37 CM (ref 2.1–4)
LEFT VENTRICLE DIASTOLIC VOLUME INDEX: 59.38 ML/M2
LEFT VENTRICLE DIASTOLIC VOLUME: 122.32 ML
LEFT VENTRICLE MASS INDEX: 94 G/M2
LEFT VENTRICLE SYSTOLIC VOLUME INDEX: 22.5 ML/M2
LEFT VENTRICLE SYSTOLIC VOLUME: 46.31 ML
LEFT VENTRICULAR INTERNAL DIMENSION IN DIASTOLE: 5.07 CM (ref 3.5–6)
LEFT VENTRICULAR MASS: 193.74 G
LVOT MG: 1.99 MMHG
LVOT MV: 0.67 CM/S
LYMPHOCYTES # BLD AUTO: 1.9 K/UL (ref 1–4.8)
LYMPHOCYTES NFR BLD: 30.5 % (ref 18–48)
MAGNESIUM SERPL-MCNC: 2.2 MG/DL (ref 1.6–2.6)
MCH RBC QN AUTO: 25.8 PG (ref 27–31)
MCHC RBC AUTO-ENTMCNC: 31.8 G/DL (ref 32–36)
MCV RBC AUTO: 81 FL (ref 82–98)
MONOCYTES # BLD AUTO: 1.2 K/UL (ref 0.3–1)
MONOCYTES NFR BLD: 18.2 % (ref 4–15)
MV MEAN GRADIENT: 0 MMHG
MV PEAK A VEL: 1 M/S
MV PEAK E VEL: 0.51 M/S
MV PEAK GRADIENT: 0 MMHG
MV STENOSIS PRESSURE HALF TIME: 48.35 MS
MV VALVE AREA P 1/2 METHOD: 4.55 CM2
NEUTROPHILS # BLD AUTO: 2.4 K/UL (ref 1.8–7.7)
NEUTROPHILS NFR BLD: 38.5 % (ref 38–73)
NRBC BLD-RTO: 0 /100 WBC
PHOSPHATE SERPL-MCNC: 3.9 MG/DL (ref 2.7–4.5)
PISA TR MAX VEL: 2.5 M/S
PLATELET # BLD AUTO: 318 K/UL (ref 150–450)
PMV BLD AUTO: 10.5 FL (ref 9.2–12.9)
POTASSIUM SERPL-SCNC: 4.6 MMOL/L (ref 3.5–5.1)
PROT SERPL-MCNC: 6.8 G/DL (ref 6–8.4)
PROTHROMBIN TIME: 10.8 SEC (ref 9–12.5)
PV PEAK VELOCITY: 0.78 CM/S
RA MAJOR: 5.31 CM
RA PRESSURE: 3 MMHG
RA WIDTH: 4.3 CM
RBC # BLD AUTO: 5.62 M/UL (ref 4.6–6.2)
RIGHT VENTRICULAR END-DIASTOLIC DIMENSION: 3.59 CM
SINUS: 3.5 CM
SODIUM SERPL-SCNC: 139 MMOL/L (ref 136–145)
STJ: 2.41 CM
TR MAX PG: 25 MMHG
TRICUSPID ANNULAR PLANE SYSTOLIC EXCURSION: 2.1 CM
TROPONIN I SERPL DL<=0.01 NG/ML-MCNC: 0.02 NG/ML (ref 0–0.03)
TV REST PULMONARY ARTERY PRESSURE: 28 MMHG
WBC # BLD AUTO: 6.33 K/UL (ref 3.9–12.7)

## 2022-10-10 PROCEDURE — 92523 SPEECH SOUND LANG COMPREHEN: CPT

## 2022-10-10 PROCEDURE — 84484 ASSAY OF TROPONIN QUANT: CPT | Performed by: PHYSICIAN ASSISTANT

## 2022-10-10 PROCEDURE — 99222 1ST HOSP IP/OBS MODERATE 55: CPT | Mod: ,,, | Performed by: PSYCHIATRY & NEUROLOGY

## 2022-10-10 PROCEDURE — 83036 HEMOGLOBIN GLYCOSYLATED A1C: CPT | Performed by: PHYSICIAN ASSISTANT

## 2022-10-10 PROCEDURE — 83735 ASSAY OF MAGNESIUM: CPT | Performed by: PHYSICIAN ASSISTANT

## 2022-10-10 PROCEDURE — 99222 PR INITIAL HOSPITAL CARE,LEVL II: ICD-10-PCS | Mod: ,,, | Performed by: PSYCHIATRY & NEUROLOGY

## 2022-10-10 PROCEDURE — 92610 EVALUATE SWALLOWING FUNCTION: CPT

## 2022-10-10 PROCEDURE — 85610 PROTHROMBIN TIME: CPT | Performed by: PHYSICIAN ASSISTANT

## 2022-10-10 PROCEDURE — 97161 PT EVAL LOW COMPLEX 20 MIN: CPT

## 2022-10-10 PROCEDURE — 84100 ASSAY OF PHOSPHORUS: CPT | Performed by: PHYSICIAN ASSISTANT

## 2022-10-10 PROCEDURE — 25000003 PHARM REV CODE 250: Performed by: STUDENT IN AN ORGANIZED HEALTH CARE EDUCATION/TRAINING PROGRAM

## 2022-10-10 PROCEDURE — 82553 CREATINE MB FRACTION: CPT | Performed by: PHYSICIAN ASSISTANT

## 2022-10-10 PROCEDURE — 97165 OT EVAL LOW COMPLEX 30 MIN: CPT

## 2022-10-10 PROCEDURE — 80053 COMPREHEN METABOLIC PANEL: CPT | Performed by: PHYSICIAN ASSISTANT

## 2022-10-10 PROCEDURE — 85730 THROMBOPLASTIN TIME PARTIAL: CPT | Performed by: PHYSICIAN ASSISTANT

## 2022-10-10 PROCEDURE — G0378 HOSPITAL OBSERVATION PER HR: HCPCS

## 2022-10-10 PROCEDURE — 36415 COLL VENOUS BLD VENIPUNCTURE: CPT | Performed by: PHYSICIAN ASSISTANT

## 2022-10-10 PROCEDURE — 85025 COMPLETE CBC W/AUTO DIFF WBC: CPT | Performed by: PHYSICIAN ASSISTANT

## 2022-10-10 RX ORDER — ATORVASTATIN CALCIUM 40 MG/1
40 TABLET, FILM COATED ORAL NIGHTLY
Status: DISCONTINUED | OUTPATIENT
Start: 2022-10-10 | End: 2022-10-28 | Stop reason: HOSPADM

## 2022-10-10 RX ORDER — ASPIRIN 81 MG/1
81 TABLET ORAL DAILY
Status: DISCONTINUED | OUTPATIENT
Start: 2022-10-10 | End: 2022-10-28 | Stop reason: HOSPADM

## 2022-10-10 RX ADMIN — ATORVASTATIN CALCIUM 40 MG: 40 TABLET, FILM COATED ORAL at 09:10

## 2022-10-10 RX ADMIN — ASPIRIN 81 MG: 81 TABLET, COATED ORAL at 03:10

## 2022-10-10 NOTE — ASSESSMENT & PLAN NOTE
Presents with 2 days of right leg weakness and dyscoordination. CT head without acute hemorrhage or infarct.  MRI with Small acute punctate left parasagittal frontal lobe  infarct is suspected on diffusion imaging.  Patient also has multiple somewhat larger remote infarcts in bilateral frontal lobes and cerebellar hemispheres as well as a small remote right putamen hemorrhage.  Echo/carotid US pending  Continue home statin  Received ASA, will hold further given report severe epistaxis previous requiring stopping of antiplatelets  PT/OT/SLP  Neuro checks/fall precautions/aspiration precautions/telemetry  Permissive HTN  Nursing swallow assessment  Neurology consulted

## 2022-10-10 NOTE — H&P
[facility] Medicine  History & Physical    Patient Name: Leonides Burns  MRN: [id number]  Patient Class: OP- Observation  Admission Date: 10/9/2022  Attending Physician: Srinivasa Andujar III, MD   Primary Care Provider: Suzette Akhtar MD         Patient information was obtained from patient, past medical records and ER records.     Subjective:     Principal Problem:Right leg weakness    Chief Complaint: No chief complaint on file.       HPI: Leonides Burns 77 y.o. male with CKD, HTN, tobacco abuse presents to the hospital with a chief complaint of right leg weakness.  He reports 2 days of right lower extremity weakness and discoordination and double vision.  States the symptoms have been constant without aggravating factors.  He sometimes feels that improved with lying flat.  He reports he previously had stop aspirin due to severe and recurrent epistaxis.  He denies fevers chest pain shortness breast nausea vomiting abdominal pain leg swelling syncope numbness weakness of an arm incontinence weakness of his left leg.    In the ED, creatinine 2.1 CT head without acute abnormality, chest x-ray without focal consolidation afebrile without leukocytosis COVID negative LDL 80.6 TSH within normal limits.      Past Medical History:   Diagnosis Date    Disorder of kidney and ureter     Elevated PSA     Glaucoma     Hyperlipidemia     Hypertension     Psoriasis        Past Surgical History:   Procedure Laterality Date    FUNCTIONAL ENDOSCOPIC SINUS SURGERY (FESS) USING COMPUTER-ASSISTED NAVIGATION N/A 2/28/2019    Procedure: FESS, USING COMPUTER-ASSISTED NAVIGATION (ADD ON );  Surgeon: Mikael Serrano MD;  Location: [facility] OR;  Service: ENT;  Laterality: N/A;  (ADD ON )    NASAL SEPTOPLASTY N/A 2/28/2019    Procedure: SEPTOPLASTY, NOSE;  Surgeon: Mikael Serrano MD;  Location: [facility] OR;  Service: ENT;  Laterality: N/A;       Review of patient's allergies indicates:    Allergen Reactions    Latex, natural rubber Rash    Iodine and iodide containing products Rash       Current Facility-Administered Medications on File Prior to Encounter   Medication    [COMPLETED] aspirin chewable tablet 81 mg     Current Outpatient Medications on File Prior to Encounter   Medication Sig    allopurinol (ZYLOPRIM) 100 MG tablet TAKE 2 TABLETS BY MOUTH EVERY DAY    amLODIPine (NORVASC) 5 MG tablet TAKE 1 TABLET(5 MG) BY MOUTH EVERY DAY    aspirin 81 MG Chew Take 1 tablet (81 mg total) by mouth once daily.    atorvastatin (LIPITOR) 20 MG tablet TAKE 1 TABLET EVERY EVENING    bimatoprost (LUMIGAN) 0.01 % Drop Place 1 drop into both eyes every evening.    ketoconazole (NIZORAL) 2 % cream Apply topically once daily.    lisinopril (PRINIVIL,ZESTRIL) 20 MG tablet Take 20 mg by mouth once daily.     Family History       Problem Relation (Age of Onset)    Anemia Sister, Daughter    Cancer Brother    Diabetes Mother    KEVIN disease Sister    Hypertension Mother, Sister    No Known Problems Father, Son          Tobacco Use    Smoking status: Some Days     Packs/day: 0.50     Years: 40.00     Pack years: 20.00     Types: Cigarettes    Smokeless tobacco: Current   Substance and Sexual Activity    Alcohol use: Yes     Alcohol/week: 9.0 standard drinks     Types: 9 Cans of beer per week    Drug use: No    Sexual activity: Yes     Partners: Female     Review of Systems   Constitutional:  Negative for chills and fever.   HENT:  Negative for nosebleeds and tinnitus.    Eyes:  Negative for photophobia and visual disturbance.   Respiratory:  Negative for shortness of breath and wheezing.    Cardiovascular:  Negative for chest pain, palpitations and leg swelling.   Gastrointestinal:  Negative for abdominal distention, nausea and vomiting.   Genitourinary:  Negative for dysuria, flank pain and hematuria.   Musculoskeletal:  Negative for gait problem and joint swelling.   Skin:  Negative for rash and  wound.   Neurological:  Positive for weakness. Negative for seizures and syncope.   Objective:     Vital Signs (Most Recent):  Temp: 98.1 °F (36.7 °C) (10/09/22 2000)  Pulse: 71 (10/09/22 2000)  Resp: 18 (10/09/22 2000)  BP: (!) 155/72 (10/09/22 2000)  SpO2: 97 % (10/09/22 2000)   Vital Signs (24h Range):  Temp:  [97.7 °F (36.5 °C)-98.1 °F (36.7 °C)] 98.1 °F (36.7 °C)  Pulse:  [68-80] 71  Resp:  [18] 18  SpO2:  [95 %-98 %] 97 %  BP: (155-176)/(72-83) 155/72     Weight: 92.1 kg (203 lb)  Body mass index is 30.87 kg/m².    Physical Exam  Vitals and nursing note reviewed.   Constitutional:       General: He is not in acute distress.     Appearance: He is well-developed. He is not ill-appearing.   HENT:      Head: Normocephalic and atraumatic.      Right Ear: External ear normal.      Left Ear: External ear normal.   Eyes:      General:         Right eye: No discharge.         Left eye: No discharge.      Conjunctiva/sclera: Conjunctivae normal.   Neck:      Thyroid: No thyromegaly.   Cardiovascular:      Rate and Rhythm: Normal rate and regular rhythm.      Heart sounds: No murmur heard.  Pulmonary:      Effort: Pulmonary effort is normal. No respiratory distress.      Breath sounds: Normal breath sounds.   Abdominal:      General: Bowel sounds are normal. There is no distension.      Palpations: Abdomen is soft. There is no mass.      Tenderness: There is no abdominal tenderness.   Musculoskeletal:         General: No deformity.      Cervical back: Normal range of motion and neck supple.   Skin:     General: Skin is warm and dry.   Neurological:      Mental Status: He is alert and oriented to person, place, and time.      Sensory: No sensory deficit.      Comments: Symmetric movement of BUE and BLE against gravity. . No sensation deficits, no past pointing on finger to nose. Heel to shin intact.    Psychiatric:         Mood and Affect: Mood normal.         Behavior: Behavior normal.           Significant Labs: CBC:    Recent Labs   Lab 10/09/22  1248   WBC 7.39   HGB 14.8   HCT 45.9        CMP:   Recent Labs   Lab 10/09/22  1248      K 4.6      CO2 21*   GLU 77   BUN 24*   CREATININE 2.1*   CALCIUM 9.6   PROT 7.6   ALBUMIN 3.3*   BILITOT 0.4   ALKPHOS 67   AST 23   ALT 15   ANIONGAP 11     Lipid Panel:   Recent Labs   Lab 10/09/22  1248   CHOL 159   HDL 48   LDLCALC 80.6   TRIG 152*   CHOLHDL 30.2     TSH:   Recent Labs   Lab 10/09/22  1248   TSH 2.018       Significant Imaging:   Chest x-ray  FINDINGS:  Heart size is not enlarged.  No pleural effusion.  Calcification present along the wall of the aorta.  No focal consolidation.     Impression:     As above        Electronically signed by: Nona Cadena MD  Date:                                            10/09/2022  Time:                                           13:49      CT head  Narrative & Impression  EXAMINATION:  CT HEAD WITHOUT CONTRAST     CLINICAL HISTORY:  Neuro deficit, acute, stroke suspected; right lower extremity weakness.     TECHNIQUE:  Low dose axial images were obtained through the head.  Coronal and sagittal reformations were also performed. Contrast was not administered.     COMPARISON:  02/16/2016     FINDINGS:  There is no evidence of hydrocephalus mass effect or intracranial hemorrhage.     The right basal ganglia lacunar infarct is again identified.  Since the prior study there has been the development of new infarcts involving the bilateral frontal cortex/subcortical white matter and left cerebellum.  These are thought to be more likely chronic in nature.  No acute territorial infarct is identified.     Decreased attenuation within the periventricular white matter is nonspecific but may reflect moderate chronic small vessel ischemic change.     Marked atherosclerotic vascular calcifications are noted at the skull base.  Tiny calcification right frontal cortical region of unclear etiology but may be vascular or dystrophic.     The  visualized sinuses and mastoid air cells are clear.     Impression:     No intracranial hemorrhage or definite acute territorial infarct.  Multiple infarcts are new from 2016 which are thought more likely chronic.  A left frontal infarct potentially could cause right lower extremity weakness in light of the history however this also appears more likely chronic.  If suspicious for acute ischemic injury, MR imaging to be considered.        Electronically signed by: Biju Estrada  Date:                                            10/09/2022  Time:                                           14:37    Assessment/Plan:     * Right leg weakness  Presents with 2 days of right leg weakness and dyscoordination. CT head without acute hemorrhage or infarct.  MRI/echo/carotid US pending  Continue home statin  Received ASA, will hold further given report severe epistaxis previous requiring stopping of antiplatelets  PT/OT/SLP  Neuro checks/fall precautions/aspiration precautions/telemetry  Permissive HTN  Nursing swallow assessment    Obesity (BMI 30.0-34.9)  Will recommend diet and lifestyle modifications outpatient    Tobacco dependence  Smokes rare cigarettes per day. Greater than 3 minutes spent counseling patient on dangers of continued tobacco abuse. Will provide tobacco cessation education prior to discharge    Chronic kidney disease, stage III (moderate)  Cr today of 2.1. baseline of 1.7 to 2.1  Renal dose medications avoid nephrotoxic drugs monitor intake and output    Hypertension  Holding home amlodipine and lisinopril for permissive HTN pending MRI    VTE Risk Mitigation (From admission, onward)         Ordered     IP VTE HIGH RISK PATIENT  Once         10/09/22 2026     Place sequential compression device  Until discontinued         10/09/22 2026               VTE: SCd  Code: Full  Diet: renal passed MADDY  Dispo: pending MRI and neuro eval  As clarification, on 10/9/2022, patient should be admitted for hospital  observation services under my care in collaboration with Juventino Turner MD. Louie Farley PA-C  Department of [facility] Medicine   [facility] [place] [facility]

## 2022-10-10 NOTE — ASSESSMENT & PLAN NOTE
Cr today of 2.1. baseline of 1.7 to 2.1  Renal dose medications avoid nephrotoxic drugs monitor intake and output

## 2022-10-10 NOTE — PLAN OF CARE
Problem: Physical Therapy  Goal: Physical Therapy Goal  Description: Goals to be met by: 10/14/22     Patient will increase functional independence with mobility by performin. Pt to be mod I with bed mobility.  2. Pt to transfer with supervision.  3. Pt to ambulate 150' w/sc and supervision  4. Pt to be (I) with written HEP.    Outcome: Ongoing, Progressing   Initial eval completed. See in chart for details.

## 2022-10-10 NOTE — PT/OT/SLP PROGRESS
Physical Therapy      Patient Name:  Leonides Burns   MRN:  [id number]    Patient not seen today secondary to Testing/imaging (xray/CT/MRI) (Pt in echo). Will follow-up later.

## 2022-10-10 NOTE — ASSESSMENT & PLAN NOTE
Smokes rare cigarettes per day. Greater than 3 minutes spent counseling patient on dangers of continued tobacco abuse. Will provide tobacco cessation education prior to discharge

## 2022-10-10 NOTE — PT/OT/SLP EVAL
Occupational Therapy   Evaluation    Name: Leonides Burns  MRN: [id number]  Admitting Diagnosis:  Right leg weakness  Recent Surgery: * No surgery found *      Recommendations:     Discharge Recommendations: home health OT (with family assistance)  Discharge Equipment Recommendations:  bath bench  Barriers to discharge:  None    Assessment:     Leonides Burns is a 77 y.o. male with a medical diagnosis of Right leg weakness. Performance deficits affecting function: weakness, gait instability, impaired endurance, impaired balance, decreased upper extremity function, decreased lower extremity function, decreased safety awareness, impaired self care skills, impaired functional mobility.      OT rec HHOT with bath bench with family assistance at d/c in order to increase safety and independence with ADLs and all aspects of functional mobility.     Mild shakiness to BUE noted with FMC assessment and grooming ADLs at the sink- pt reports this is new. Pt still able to complete ADLs without difficulty.     Rehab Prognosis: Good; patient would benefit from acute skilled OT services to address these deficits and reach maximum level of function.       Plan:     Patient to be seen 5 x/week to address the above listed problems via self-care/home management, therapeutic activities, therapeutic exercises  Plan of Care Expires: 10/24/22  Plan of Care Reviewed with: patient    Subjective     Chief Complaint: has been in bed for 2 days   Patient/Family Comments/goals: nervous to get up for the first time; happy to be up in the chair; supportive son present     Occupational Profile:  Living Environment: pt lives alone on the second floor apartment with elevator access. Bathroom set-up: tub/shower combo.   Previous level of function: MOD I with functional mobility using str c. MOD I with ADLs.   Roles and Routines: does not drive; walks to the bus station or son assists with transportation   Equipment Used at Home:  cane,  straight  Assistance upon Discharge: son; other siblings live in [place], [place] or [place], [place]     Pain/Comfort:  Pain Rating 1: 0/10    Patients cultural, spiritual, Yarsanism conflicts given the current situation: no    Objective:     Communicated with: nurseSteevn, prior to session.  Patient found HOB elevated with bed alarm, peripheral IV, telemetry upon OT entry to room.    General Precautions: Standard, fall, hearing impaired   Orthopedic Precautions:N/A   Braces: N/A  Respiratory Status: Room air    Occupational Performance:    Bed Mobility:    Patient completed Scooting with supervision  Patient completed Supine to Sit with supervision and HOB elevated    Functional Mobility/Transfers:  Patient completed Sit <> Stand Transfer with contact guard assistance  with  straight cane   Patient completed Bed <> Chair Transfer using Step Transfer technique with SBA-CGA with straight cane  Functional Mobility:  Please refer to PT note for gait assessment. pt completed in-room functional mobility using straight cane with CGA.  Please refer to PT note for gait assessment.     Activities of Daily Living:  Feeding:  independence drinking lemonade seated in the chair   Grooming: stand by assistance standing at the sink to open ADL items and brush teeth, use mouthwash, and wash face   Upper Body Dressing: supervision seated EOB to don back gown   Lower Body Dressing: supervision seated EOB to adjust B/L socks     Cognitive/Visual Perceptual:  Cognitive/Psychosocial Skills:     -       Oriented to: Person, Place, Time, and Situation   -       Follows Commands/attention:Follows multistep  commands  -       Communication: clear/fluent  -       Memory: No Deficits noted  -       Safety awareness/insight to disability: mildly impaired    -       Mood/Affect/Coping skills/emotional control: Cooperative and Pleasant  Visual/Perceptual:      -Intact  R/L discrimination      Physical Exam:  Balance:    -       seated: SUP; standing:  CGA with str c  Postural examination/scapula alignment:    -       Rounded shoulders  Skin integrity: Visible skin intact  Edema:  no BUE edema noted   Sensation:    -       Intact  light/touch BUE  Dominant hand:    -       Right  Upper Extremity Range of Motion:     -       Right Upper Extremity: WFL  -       Left Upper Extremity: WFL  Upper Extremity Strength:    -       Right Upper Extremity: WFL  -       Left Upper Extremity: WFL   Strength:    -       Right Upper Extremity: WFL  -       Left Upper Extremity: WFL  Fine Motor Coordination:    -       Intact  Left hand, finger to nose, Right hand, finger to nose, Left hand thumb/finger opposition skills, Right hand thumb/finger opposition skills, Left hand, manipulation of objects, and Right hand, manipulation of objects  -       mild shakiness to BUE   Gross motor coordination:   WFL    AMPAC 6 Click ADL:  AMPAC Total Score: 22    Treatment & Education:  Pt educated on OT role/POC.   Importance of OOB activity with staff assistance. OT provided a chair to promote compliance with OOB daily.   Safety during functional t/f and mobility   Multiple self-care tasks/functional mobility completed- assistance level noted above   All questions/concerns answered within OT scope of practice       Patient left  seated in the chair with tray table in front of pt with lunch set-up  with all lines intact, call button in reach, nurse, Steven, notified, son and SLP, Annmarie, present, and all needs met/within reach     GOALS:   Multidisciplinary Problems       Occupational Therapy Goals          Problem: Occupational Therapy    Goal Priority Disciplines Outcome Interventions   Occupational Therapy Goal     OT, PT/OT Ongoing, Progressing    Description: Goals to be met by: 10/24/22    Patient will increase functional independence with ADLs by performing:    UE Dressing with Modified [place].  LE Dressing with Modified [place].  Grooming while standing at sink with  Modified [place].  Toileting from toilet with Modified [place] for hygiene and clothing management.   Supine to sit with Modified [place].  Step transfer with Modified [place]  Toilet transfer to toilet with Modified [place].  Upper extremity exercise program x15 reps per handout, with independence.                         History:     Past Medical History:   Diagnosis Date    Disorder of kidney and ureter     Elevated PSA     Glaucoma     Hyperlipidemia     Hypertension     Psoriasis          Past Surgical History:   Procedure Laterality Date    FUNCTIONAL ENDOSCOPIC SINUS SURGERY (FESS) USING COMPUTER-ASSISTED NAVIGATION N/A 2/28/2019    Procedure: FESS, USING COMPUTER-ASSISTED NAVIGATION (ADD ON );  Surgeon: Mikael Serrano MD;  Location: [facility];  Service: ENT;  Laterality: N/A;  (ADD ON )    NASAL SEPTOPLASTY N/A 2/28/2019    Procedure: SEPTOPLASTY, NOSE;  Surgeon: Mikael Serrano MD;  Location: [facility];  Service: ENT;  Laterality: N/A;       Time Tracking:     OT Date of Treatment: 10/10/22  OT Start Time: 1152  OT Stop Time: 1211  OT Total Time (min): 19 min    Billable Minutes:Evaluation 19 min     10/10/2022

## 2022-10-10 NOTE — PLAN OF CARE
Problem: Adjustment to Illness (Stroke, Ischemic/Transient Ischemic Attack)  Goal: Optimal Coping  Outcome: Ongoing, Progressing     Problem: Cerebral Tissue Perfusion (Stroke, Ischemic/Transient Ischemic Attack)  Goal: Optimal Cerebral Tissue Perfusion  Outcome: Ongoing, Progressing     Problem: Cognitive Impairment (Stroke, Ischemic/Transient Ischemic Attack)  Goal: Optimal Cognitive Function  Outcome: Ongoing, Progressing     Problem: Functional Ability Impaired (Stroke, Ischemic/Transient Ischemic Attack)  Goal: Optimal Functional Ability  Outcome: Ongoing, Progressing     Problem: Swallowing Impairment (Stroke, Ischemic/Transient Ischemic Attack)  Goal: Optimal Eating and Swallowing without Aspiration  Outcome: Ongoing, Progressing     Problem: Fall Injury Risk  Goal: Absence of Fall and Fall-Related Injury  Outcome: Ongoing, Progressing

## 2022-10-10 NOTE — CONSULTS
[facility] - Telemetry  Neurology  Consult Note    Patient Name: Leonides Burns  MRN: [id number]  Admission Date: 10/9/2022  Hospital Length of Stay: 0 days  Code Status: Full Code   Attending Provider: Srinivasa Andujar III, MD   Consulting Provider: Ahmet Lara MD  Primary Care Physician: Suzette Akhtar MD  Principal Problem:Right leg weakness    Inpatient consult to Neurology  Consult performed by: Ahmet Lara MD  Consult ordered by: Louie Farley PA-C      Subjective:     Chief Complaint:  Right leg weakness    HPI: 76 y/o male with CKD, HTN, tobacco abuse presents to the hospital with a chief complaint of right leg weakness.  He reports 2 days of right lower extremity weakness and discoordination and double vision.  States the symptoms have been constant without aggravating factors.  He sometimes feels that improved with lying flat.  He reports he previously had stop aspirin due to severe and recurrent epistaxis.  He denies fevers chest pain shortness breast nausea vomiting abdominal pain leg swelling syncope numbness weakness of an arm incontinence weakness of his left leg.    Past Medical History:   Diagnosis Date    Disorder of kidney and ureter     Elevated PSA     Glaucoma     Hyperlipidemia     Hypertension     Psoriasis        Past Surgical History:   Procedure Laterality Date    FUNCTIONAL ENDOSCOPIC SINUS SURGERY (FESS) USING COMPUTER-ASSISTED NAVIGATION N/A 2/28/2019    Procedure: FESS, USING COMPUTER-ASSISTED NAVIGATION (ADD ON );  Surgeon: Mikael Serrano MD;  Location: [facility];  Service: ENT;  Laterality: N/A;  (ADD ON )    NASAL SEPTOPLASTY N/A 2/28/2019    Procedure: SEPTOPLASTY, NOSE;  Surgeon: Mikael Serrano MD;  Location: [facility];  Service: ENT;  Laterality: N/A;       Review of patient's allergies indicates:   Allergen Reactions    Latex, natural rubber Rash    Iodine and iodide containing products Rash       Current Neurological Medications:     Current  Facility-Administered Medications on File Prior to Encounter   Medication    [COMPLETED] aspirin chewable tablet 81 mg     Current Outpatient Medications on File Prior to Encounter   Medication Sig    allopurinol (ZYLOPRIM) 100 MG tablet TAKE 2 TABLETS BY MOUTH EVERY DAY    amLODIPine (NORVASC) 5 MG tablet TAKE 1 TABLET(5 MG) BY MOUTH EVERY DAY    aspirin 81 MG Chew Take 1 tablet (81 mg total) by mouth once daily.    atorvastatin (LIPITOR) 20 MG tablet TAKE 1 TABLET EVERY EVENING    bimatoprost (LUMIGAN) 0.01 % Drop Place 1 drop into both eyes every evening.    ketoconazole (NIZORAL) 2 % cream Apply topically once daily.    lisinopril (PRINIVIL,ZESTRIL) 20 MG tablet Take 20 mg by mouth once daily.      Family History       Problem Relation (Age of Onset)    Anemia Sister, Daughter    Cancer Brother    Diabetes Mother    KEVIN disease Sister    Hypertension Mother, Sister    No Known Problems Father, Son          Tobacco Use    Smoking status: Some Days     Packs/day: 0.50     Years: 40.00     Pack years: 20.00     Types: Cigarettes    Smokeless tobacco: Current   Substance and Sexual Activity    Alcohol use: Yes     Alcohol/week: 9.0 standard drinks     Types: 9 Cans of beer per week    Drug use: No    Sexual activity: Yes     Partners: Female     Review of Systems   Constitutional:  Negative for fever.   HENT:  Negative for trouble swallowing.    Eyes:  Negative for photophobia.   Respiratory:  Negative for shortness of breath.    Cardiovascular:  Negative for chest pain.   Gastrointestinal:  Negative for abdominal pain.   Genitourinary:  Negative for flank pain.   Musculoskeletal:  Negative for back pain.   Neurological:  Negative for speech difficulty.   Psychiatric/Behavioral:  Negative for confusion.    Objective:     Vital Signs (Most Recent):  Temp: 97.4 °F (36.3 °C) (10/10/22 1147)  Pulse: 78 (10/10/22 1147)  Resp: 18 (10/10/22 1147)  BP: (!) 175/79 (10/10/22 1147)  SpO2: 95 % (10/10/22 1147)   Vital Signs  (24h Range):  Temp:  [97.4 °F (36.3 °C)-98.1 °F (36.7 °C)] 97.4 °F (36.3 °C)  Pulse:  [63-80] 78  Resp:  [18-19] 18  SpO2:  [93 %-98 %] 95 %  BP: (154-178)/(72-86) 175/79     Weight: 92.1 kg (203 lb)  Body mass index is 30.87 kg/m².    Physical Exam  Constitutional:       General: He is not in acute distress.  HENT:      Head: Normocephalic.   Eyes:      General:         Right eye: No discharge.         Left eye: No discharge.   Cardiovascular:      Rate and Rhythm: Normal rate.   Pulmonary:      Effort: No respiratory distress.   Abdominal:      Palpations: Abdomen is soft.   Musculoskeletal:         General: No swelling.      Cervical back: Neck supple.   Skin:     Findings: No rash.   Neurological:      Mental Status: He is oriented to person, place, and time.   Psychiatric:         Speech: Speech normal.       NEUROLOGICAL EXAMINATION:     MENTAL STATUS   Oriented to person, place, and time.   Speech: speech is normal   Level of consciousness: alert    CRANIAL NERVES     CN III, IV, VI   Right pupil: Size: 2 mm. Shape: regular.   Left pupil: Size: 2 mm. Shape: regular.   Nystagmus: none   Conjugate gaze: present    CN V   Right facial sensation deficit: none  Left facial sensation deficit: none    CN VII   Right facial weakness: central  Left facial weakness: none    CN XII   Tongue deviation: none    MOTOR EXAM     Strength   Strength 5/5 except as noted.        RLE drift before 5 seconds.     SENSORY EXAM   Right arm light touch: normal  Left arm light touch: normal  Right leg light touch: normal  Left leg light touch: normal      Significant Labs: CBC:   Recent Labs   Lab 10/09/22  1248 10/10/22  0408   WBC 7.39 6.33   HGB 14.8 14.5   HCT 45.9 45.6    318     CMP:   Recent Labs   Lab 10/09/22  1248 10/10/22  0407   GLU 77 85    139   K 4.6 4.6    108   CO2 21* 23   BUN 24* 22   CREATININE 2.1* 1.8*   CALCIUM 9.6 9.3   MG  --  2.2   PROT 7.6 6.8   ALBUMIN 3.3* 3.0*   BILITOT 0.4 0.5    ALKPHOS 67 63   AST 23 33   ALT 15 14   ANIONGAP 11 8       Significant Imaging: I have reviewed all pertinent imaging results/findings within the past 24 hours.    MRI brain  Impression:     Small acute punctate left parasagittal frontal lobe  infarct is suspected on diffusion imaging.  Patient also has multiple somewhat larger remote infarcts in bilateral frontal lobes and cerebellar hemispheres as well as a small remote right putamen hemorrhage.     This report was flagged in Epic as abnormal.        Electronically signed by: Wong Jimenez MD  Date:                                            10/10/2022  Time:                                           11:31    Assessment and Plan:     76 y/o male with acute stroke    Stroke: small stroke on paramedian left frontal lobe. PAM territory. US of carotids with no significant stenosis. No LAE on echo.  ASA 81 mg daily and clopidogrel 75 mg daily x 21 days is recommended. Noted Hx of epistaxis.  Statin.  PT/OT.  OK for -160's.      Active Diagnoses:    Diagnosis Date Noted POA    PRINCIPAL PROBLEM:  Right leg weakness [R29.898] 10/09/2022 Yes    CVA (cerebral vascular accident) [I63.9] 10/10/2022 Yes    Obesity (BMI 30.0-34.9) [E66.9] 07/09/2018 Yes    Tobacco dependence [F17.200] 02/17/2016 Yes     Chronic    Chronic kidney disease, stage III (moderate) [N18.30] 06/02/2015 Yes     Chronic    Hypertension [I10] 02/02/2015 Yes     Chronic      Problems Resolved During this Admission:       VTE Risk Mitigation (From admission, onward)           Ordered     IP VTE HIGH RISK PATIENT  Once         10/09/22 2026     Place sequential compression device  Until discontinued         10/09/22 2026                    Thank you for your consult. I will follow-up with patient. Please contact us if you have any additional questions.    Ahmet aLra MD  Neurology  [facility]

## 2022-10-10 NOTE — SUBJECTIVE & OBJECTIVE
Interval History: Pt states he is still trying to tell if his right leg weakness is improved. Currently working with speech. Pt states he is feeling fine otherwise. Pt denies cp, sob, fever,chills, n/v at this time. Awaiting test results and pt/ot    Review of Systems  Objective:     Vital Signs (Most Recent):  Temp: 98.1 °F (36.7 °C) (10/10/22 0801)  Pulse: 63 (10/10/22 0801)  Resp: 18 (10/10/22 0801)  BP: (!) 158/78 (10/10/22 0801)  SpO2: (!) 93 % (10/10/22 0801)   Vital Signs (24h Range):  Temp:  [97.7 °F (36.5 °C)-98.1 °F (36.7 °C)] 98.1 °F (36.7 °C)  Pulse:  [63-80] 63  Resp:  [18-19] 18  SpO2:  [93 %-98 %] 93 %  BP: (154-178)/(72-86) 158/78     Weight: 92.1 kg (203 lb)  Body mass index is 30.87 kg/m².    Intake/Output Summary (Last 24 hours) at 10/10/2022 0907  Last data filed at 10/10/2022 0845  Gross per 24 hour   Intake 120 ml   Output 750 ml   Net -630 ml      Physical Exam  Vitals reviewed.   Constitutional:       General: He is not in acute distress.  HENT:      Head: Normocephalic and atraumatic.      Nose: No congestion or rhinorrhea.   Eyes:      General: No scleral icterus.     Extraocular Movements: Extraocular movements intact.   Cardiovascular:      Rate and Rhythm: Normal rate and regular rhythm.   Pulmonary:      Effort: Pulmonary effort is normal. No respiratory distress.   Abdominal:      General: There is no distension.      Palpations: Abdomen is soft.      Tenderness: There is no abdominal tenderness.   Musculoskeletal:         General: No swelling or tenderness.      Cervical back: Neck supple. No rigidity.   Skin:     General: Skin is warm and dry.   Neurological:      General: No focal deficit present.      Mental Status: He is alert and oriented to person, place, and time.   Psychiatric:         Mood and Affect: Mood normal.         Behavior: Behavior normal.       Significant Labs: All pertinent labs within the past 24 hours have been reviewed.    Significant Imaging: I have reviewed  all pertinent imaging results/findings within the past 24 hours.

## 2022-10-10 NOTE — ASSESSMENT & PLAN NOTE
Presents with 2 days of right leg weakness and dyscoordination. CT head without acute hemorrhage or infarct.  MRI/echo/carotid US pending  Continue home statin  Received ASA, will hold further given report severe epistaxis previous requiring stopping of antiplatelets  PT/OT/SLP  Neuro checks/fall precautions/aspiration precautions/telemetry  Permissive HTN  Nursing swallow assessment

## 2022-10-10 NOTE — PROGRESS NOTES
[facility] Medicine  Progress Note    Patient Name: Leonides Burns  MRN: [id number]  Patient Class: OP- Observation   Admission Date: 10/9/2022  Length of Stay: 0 days  Attending Physician: Srinivasa Andujar III, MD  Primary Care Provider: Suzette Akhtar MD        Subjective:     Principal Problem:Right leg weakness        HPI:  Leonides Burns 77 y.o. male with CKD, HTN, tobacco abuse presents to the hospital with a chief complaint of right leg weakness.  He reports 2 days of right lower extremity weakness and discoordination and double vision.  States the symptoms have been constant without aggravating factors.  He sometimes feels that improved with lying flat.  He reports he previously had stop aspirin due to severe and recurrent epistaxis.  He denies fevers chest pain shortness breast nausea vomiting abdominal pain leg swelling syncope numbness weakness of an arm incontinence weakness of his left leg.    In the ED, creatinine 2.1 CT head without acute abnormality, chest x-ray without focal consolidation afebrile without leukocytosis COVID negative LDL 80.6 TSH within normal limits.      Overview/Hospital Course:  No notes on file    Interval History: Pt states he is still trying to tell if his right leg weakness is improved. Currently working with speech. Pt states he is feeling fine otherwise. Pt denies cp, sob, fever,chills, n/v at this time. Awaiting test results and pt/ot    Review of Systems  Objective:     Vital Signs (Most Recent):  Temp: 98.1 °F (36.7 °C) (10/10/22 0801)  Pulse: 63 (10/10/22 0801)  Resp: 18 (10/10/22 0801)  BP: (!) 158/78 (10/10/22 0801)  SpO2: (!) 93 % (10/10/22 0801)   Vital Signs (24h Range):  Temp:  [97.7 °F (36.5 °C)-98.1 °F (36.7 °C)] 98.1 °F (36.7 °C)  Pulse:  [63-80] 63  Resp:  [18-19] 18  SpO2:  [93 %-98 %] 93 %  BP: (154-178)/(72-86) 158/78     Weight: 92.1 kg (203 lb)  Body mass index is 30.87 kg/m².    Intake/Output Summary (Last 24 hours) at  10/10/2022 0907  Last data filed at 10/10/2022 0845  Gross per 24 hour   Intake 120 ml   Output 750 ml   Net -630 ml      Physical Exam  Vitals reviewed.   Constitutional:       General: He is not in acute distress.  HENT:      Head: Normocephalic and atraumatic.      Nose: No congestion or rhinorrhea.   Eyes:      General: No scleral icterus.     Extraocular Movements: Extraocular movements intact.   Cardiovascular:      Rate and Rhythm: Normal rate and regular rhythm.   Pulmonary:      Effort: Pulmonary effort is normal. No respiratory distress.   Abdominal:      General: There is no distension.      Palpations: Abdomen is soft.      Tenderness: There is no abdominal tenderness.   Musculoskeletal:         General: No swelling or tenderness.      Cervical back: Neck supple. No rigidity.   Skin:     General: Skin is warm and dry.   Neurological:      General: No focal deficit present.      Mental Status: He is alert and oriented to person, place, and time.   Psychiatric:         Mood and Affect: Mood normal.         Behavior: Behavior normal.       Significant Labs: All pertinent labs within the past 24 hours have been reviewed.    Significant Imaging: I have reviewed all pertinent imaging results/findings within the past 24 hours.      Assessment/Plan:      * Right leg weakness  Presents with 2 days of right leg weakness and dyscoordination. CT head without acute hemorrhage or infarct.  MRI with Small acute punctate left parasagittal frontal lobe  infarct is suspected on diffusion imaging.  Patient also has multiple somewhat larger remote infarcts in bilateral frontal lobes and cerebellar hemispheres as well as a small remote right putamen hemorrhage.  Echo/carotid US pending  Continue home statin  Received ASA, will hold further given report severe epistaxis previous requiring stopping of antiplatelets  PT/OT/SLP  Neuro checks/fall precautions/aspiration precautions/telemetry  Permissive HTN  Nursing swallow  assessment  Neurology consulted    CVA (cerebral vascular accident)  tx as stated above      Obesity (BMI 30.0-34.9)  Will recommend diet and lifestyle modifications outpatient    Tobacco dependence  Smokes rare cigarettes per day. Greater than 3 minutes spent counseling patient on dangers of continued tobacco abuse. Will provide tobacco cessation education prior to discharge    Chronic kidney disease, stage III (moderate)  Crt on admission of 2.1. baseline of 1.7 to 2.1  Renal dose medications avoid nephrotoxic drugs monitor intake and output  -improved and at baseline      Hypertension  Holding home amlodipine and lisinopril for permissive HTN      VTE Risk Mitigation (From admission, onward)         Ordered     IP VTE HIGH RISK PATIENT  Once         10/09/22 2026     Place sequential compression device  Until discontinued         10/09/22 2026                Discharge Planning   DEVIKA:      Code Status: Full Code   Is the patient medically ready for discharge?:     Reason for patient still in hospital (select all that apply): Treatment, Consult recommendations and PT / OT recommendations                     Srinivasa Andujar III, MD  Department of Hospital Medicine   [facility] [place] - [facility] hospital

## 2022-10-10 NOTE — ASSESSMENT & PLAN NOTE
Crt on admission of 2.1. baseline of 1.7 to 2.1  Renal dose medications avoid nephrotoxic drugs monitor intake and output  -improved and at baseline

## 2022-10-10 NOTE — PT/OT/SLP EVAL
Speech Language Pathology Evaluation  Cognitive/Bedside Swallow    Patient Name:  Leonides Burns   MRN:  [id number]  Admitting Diagnosis: Right leg weakness    Recommendations:                  General Recommendations:  Cognitive-linguistic therapy  Diet recommendations:  Regular, Thin   Aspiration Precautions: 1 bite/sip at a time   General Precautions: Standard,    Communication strategies:   Pt [place]    History:     Past Medical History:   Diagnosis Date    Disorder of kidney and ureter     Elevated PSA     Glaucoma     Hyperlipidemia     Hypertension     Psoriasis        Past Surgical History:   Procedure Laterality Date    FUNCTIONAL ENDOSCOPIC SINUS SURGERY (FESS) USING COMPUTER-ASSISTED NAVIGATION N/A 2/28/2019    Procedure: FESS, USING COMPUTER-ASSISTED NAVIGATION (ADD ON );  Surgeon: Mikael Serrano MD;  Location: [facility];  Service: ENT;  Laterality: N/A;  (ADD ON )    NASAL SEPTOPLASTY N/A 2/28/2019    Procedure: SEPTOPLASTY, NOSE;  Surgeon: Mikael Serrano MD;  Location: [facility];  Service: ENT;  Laterality: N/A;       Social History: Patient lives with in senior apartments     Chest X-Rays: 10/9/22 Heart size is not enlarged.  No pleural effusion.  Calcification present along the wall of the aorta.  No focal consolidation.    Prior diet: unrestricted    Occupation/hobbies/homemaking: retired steel     Subjective   Pt and son reporting Pt's cognitive linguistic skills are about at baseline however performance on some ST tasks revealed some skills are slightly below. Pt was noted to struggle with specificity at level of conversation.  Of noted he reported concern for recent onset of worsening UE tremors.   Patient goals: return home    Pain/Comfort:  Pain Rating 1: 0/10    Respiratory Status: Room air    Objective:     Cognitive Status:    Ox4  Pt immediately recalled up to 7 digits and up to 3 words  Attention was wfl  Pt required mod assist to recall 3 unrelated targets after 5  minute delay     Receptive Language:   Comprehension:   Simple personal y/n questions were answered with 100% acc, comparative y/n questions revealed dealy and requirement for repetition.   Pt performed 3 step commands with 100% acc. Pt required max assist and model to follow commands regarding before and after, significant delay noted.     Pragmatics:    wfl    Expressive Language:  Verbal:    Confrontational and responsive naming 100%  Pt required mod assist to perform divergent naming task  Speech at level of conversation was frequently non specific and delayed for word finding/organization.       Motor Speech:  WFL    Voice:   WFL    Visual-Spatial:  WFL      Oral Musculature Evaluation  Oral Musculature: WFL  Dentition: scattered dentition  Secretion Management: adequate  Mucosal Quality: good  Mandibular Strength and Mobility: WFL  Oral Labial Strength and Mobility: [facility]  Lingual Strength and Mobility: [facility]  Velar Elevation: [facility]  Buccal Strength and Mobility: [facility]    Bedside Swallow Eval:   Consistencies Assessed:  Thin liquids X5+ self presented  Solids X5+ self presented      Oral Phase:   WFL    Pharyngeal Phase:   no overt clinical signs/symptoms of aspiration    Compensatory Strategies  None    Treatment: please note silent aspiration cannot be r/o at bedside ST RECS discussed with Pt and son to include ST post d/c for cognitive linguistic deficits     Handout attached to chart    Assessment:     Leonides Burns is a 77 y.o. male with dx of CVA he presents with functional swallow and mild cognitive linguistic deficits slightly below baseline for which he would benefit from ST post d/c.     Goals:   Multidisciplinary Problems       SLP Goals          Problem: SLP    Goal Priority Disciplines Outcome   SLP Goal    Low SLP Ongoing, Progressing   Description: STGS  1. Pt will perform multiple step commands regarding before and after with 90% acc.  2. Pt will perform divergent naming tasks with min assist  3. Pt  will delayed recall tasks with min assist.                        Plan:     Patient to be seen:  3 x/week   Plan of Care expires:  10/20/22  Plan of Care reviewed with:  patient   SLP Follow-Up:  Yes       Discharge recommendations:  Discharge Facility/Level of Care Needs: home health speech therapy   Barriers to Discharge:  None    Time Tracking:     SLP Treatment Date:   10/10/22  Speech Start Time:  1208  Speech Stop Time:  1241     Speech Total Time (min):  33 min    Billable Minutes: Eval 17  and Eval Swallow and Oral Function 16    10/10/2022

## 2022-10-10 NOTE — CONSULTS
Consulted for Cardiac Diet education. Per pt age, hx of memory impairment, diet education not provided - Liberalize diet / pt diet as pt desires - attached clinical reference materials handouts to discharge documents, in-person education as warranted per pt request - education materials to be provided with discharge instructions.      Please re-consult as needed.  Thank you.   Anusha Ramirez, BS, RDN, LDN

## 2022-10-10 NOTE — SUBJECTIVE & OBJECTIVE
Past Medical History:   Diagnosis Date    Disorder of kidney and ureter     Elevated PSA     Glaucoma     Hyperlipidemia     Hypertension     Psoriasis        Past Surgical History:   Procedure Laterality Date    FUNCTIONAL ENDOSCOPIC SINUS SURGERY (FESS) USING COMPUTER-ASSISTED NAVIGATION N/A 2/28/2019    Procedure: FESS, USING COMPUTER-ASSISTED NAVIGATION (ADD ON );  Surgeon: Mikael Serrano MD;  Location: [facility];  Service: ENT;  Laterality: N/A;  (ADD ON )    NASAL SEPTOPLASTY N/A 2/28/2019    Procedure: SEPTOPLASTY, NOSE;  Surgeon: Mikael Serrano MD;  Location: [facility];  Service: ENT;  Laterality: N/A;       Review of patient's allergies indicates:   Allergen Reactions    Latex, natural rubber Rash    Iodine and iodide containing products Rash       Current Facility-Administered Medications on File Prior to Encounter   Medication    [COMPLETED] aspirin chewable tablet 81 mg     Current Outpatient Medications on File Prior to Encounter   Medication Sig    allopurinol (ZYLOPRIM) 100 MG tablet TAKE 2 TABLETS BY MOUTH EVERY DAY    amLODIPine (NORVASC) 5 MG tablet TAKE 1 TABLET(5 MG) BY MOUTH EVERY DAY    aspirin 81 MG Chew Take 1 tablet (81 mg total) by mouth once daily.    atorvastatin (LIPITOR) 20 MG tablet TAKE 1 TABLET EVERY EVENING    bimatoprost (LUMIGAN) 0.01 % Drop Place 1 drop into both eyes every evening.    ketoconazole (NIZORAL) 2 % cream Apply topically once daily.    lisinopril (PRINIVIL,ZESTRIL) 20 MG tablet Take 20 mg by mouth once daily.     Family History       Problem Relation (Age of Onset)    Anemia Sister, Daughter    Cancer Brother    Diabetes Mother    KEVIN disease Sister    Hypertension Mother, Sister    No Known Problems Father, Son          Tobacco Use    Smoking status: Some Days     Packs/day: 0.50     Years: 40.00     Pack years: 20.00     Types: Cigarettes    Smokeless tobacco: Current   Substance and Sexual Activity    Alcohol use: Yes     Alcohol/week: 9.0 standard  drinks     Types: 9 Cans of beer per week    Drug use: No    Sexual activity: Yes     Partners: Female     Review of Systems   Constitutional:  Negative for chills and fever.   HENT:  Negative for nosebleeds and tinnitus.    Eyes:  Negative for photophobia and visual disturbance.   Respiratory:  Negative for shortness of breath and wheezing.    Cardiovascular:  Negative for chest pain, palpitations and leg swelling.   Gastrointestinal:  Negative for abdominal distention, nausea and vomiting.   Genitourinary:  Negative for dysuria, flank pain and hematuria.   Musculoskeletal:  Negative for gait problem and joint swelling.   Skin:  Negative for rash and wound.   Neurological:  Positive for weakness. Negative for seizures and syncope.   Objective:     Vital Signs (Most Recent):  Temp: 98.1 °F (36.7 °C) (10/09/22 2000)  Pulse: 71 (10/09/22 2000)  Resp: 18 (10/09/22 2000)  BP: (!) 155/72 (10/09/22 2000)  SpO2: 97 % (10/09/22 2000)   Vital Signs (24h Range):  Temp:  [97.7 °F (36.5 °C)-98.1 °F (36.7 °C)] 98.1 °F (36.7 °C)  Pulse:  [68-80] 71  Resp:  [18] 18  SpO2:  [95 %-98 %] 97 %  BP: (155-176)/(72-83) 155/72     Weight: 92.1 kg (203 lb)  Body mass index is 30.87 kg/m².    Physical Exam  Vitals and nursing note reviewed.   Constitutional:       General: He is not in acute distress.     Appearance: He is well-developed. He is not ill-appearing.   HENT:      Head: Normocephalic and atraumatic.      Right Ear: External ear normal.      Left Ear: External ear normal.   Eyes:      General:         Right eye: No discharge.         Left eye: No discharge.      Conjunctiva/sclera: Conjunctivae normal.   Neck:      Thyroid: No thyromegaly.   Cardiovascular:      Rate and Rhythm: Normal rate and regular rhythm.      Heart sounds: No murmur heard.  Pulmonary:      Effort: Pulmonary effort is normal. No respiratory distress.      Breath sounds: Normal breath sounds.   Abdominal:      General: Bowel sounds are normal. There is no  distension.      Palpations: Abdomen is soft. There is no mass.      Tenderness: There is no abdominal tenderness.   Musculoskeletal:         General: No deformity.      Cervical back: Normal range of motion and neck supple.   Skin:     General: Skin is warm and dry.   Neurological:      Mental Status: He is alert and oriented to person, place, and time.      Sensory: No sensory deficit.      Comments: Symmetric movement of BUE and BLE against gravity. . No sensation deficits, no past pointing on finger to nose. Heel to shin intact.    Psychiatric:         Mood and Affect: Mood normal.         Behavior: Behavior normal.           Significant Labs: CBC:   Recent Labs   Lab 10/09/22  1248   WBC 7.39   HGB 14.8   HCT 45.9        CMP:   Recent Labs   Lab 10/09/22  1248      K 4.6      CO2 21*   GLU 77   BUN 24*   CREATININE 2.1*   CALCIUM 9.6   PROT 7.6   ALBUMIN 3.3*   BILITOT 0.4   ALKPHOS 67   AST 23   ALT 15   ANIONGAP 11     Lipid Panel:   Recent Labs   Lab 10/09/22  1248   CHOL 159   HDL 48   LDLCALC 80.6   TRIG 152*   CHOLHDL 30.2     TSH:   Recent Labs   Lab 10/09/22  1248   TSH 2.018       Significant Imaging:   Chest x-ray  FINDINGS:  Heart size is not enlarged.  No pleural effusion.  Calcification present along the wall of the aorta.  No focal consolidation.     Impression:     As above        Electronically signed by: Nona Cadena MD  Date:                                            10/09/2022  Time:                                           13:49      CT head  Narrative & Impression  EXAMINATION:  CT HEAD WITHOUT CONTRAST     CLINICAL HISTORY:  Neuro deficit, acute, stroke suspected; right lower extremity weakness.     TECHNIQUE:  Low dose axial images were obtained through the head.  Coronal and sagittal reformations were also performed. Contrast was not administered.     COMPARISON:  02/16/2016     FINDINGS:  There is no evidence of hydrocephalus mass effect or intracranial  hemorrhage.     The right basal ganglia lacunar infarct is again identified.  Since the prior study there has been the development of new infarcts involving the bilateral frontal cortex/subcortical white matter and left cerebellum.  These are thought to be more likely chronic in nature.  No acute territorial infarct is identified.     Decreased attenuation within the periventricular white matter is nonspecific but may reflect moderate chronic small vessel ischemic change.     Marked atherosclerotic vascular calcifications are noted at the skull base.  Tiny calcification right frontal cortical region of unclear etiology but may be vascular or dystrophic.     The visualized sinuses and mastoid air cells are clear.     Impression:     No intracranial hemorrhage or definite acute territorial infarct.  Multiple infarcts are new from 2016 which are thought more likely chronic.  A left frontal infarct potentially could cause right lower extremity weakness in light of the history however this also appears more likely chronic.  If suspicious for acute ischemic injury, MR imaging to be considered.        Electronically signed by: Biju Estrada  Date:                                            10/09/2022  Time:                                           14:37

## 2022-10-10 NOTE — HPI
Leonides Burns 77 y.o. male with CKD, HTN, tobacco abuse presents to the hospital with a chief complaint of right leg weakness.  He reports 2 days of right lower extremity weakness and discoordination and double vision.  States the symptoms have been constant without aggravating factors.  He sometimes feels that improved with lying flat.  He reports he previously had stop aspirin due to severe and recurrent epistaxis.  He denies fevers chest pain shortness breast nausea vomiting abdominal pain leg swelling syncope numbness weakness of an arm incontinence weakness of his left leg.    In the ED, creatinine 2.1 CT head without acute abnormality, chest x-ray without focal consolidation afebrile without leukocytosis COVID negative LDL 80.6 TSH within normal limits.

## 2022-10-10 NOTE — PT/OT/SLP EVAL
Physical Therapy Evaluation    Patient Name:  Leonides Burns   MRN:  [id number]    Recommendations:     Discharge Recommendations:  home health PT   Discharge Equipment Recommendations: none   Barriers to discharge: None    Assessment:     Leonides Burns is a 77 y.o. male admitted with a medical diagnosis of Right leg weakness.  He presents with the following impairments/functional limitations:  impaired functional mobility, gait instability .    Rehab Prognosis: Good; patient would benefit from acute skilled PT services to address these deficits and reach maximum level of function.    Recent Surgery: * No surgery found *      Plan:     During this hospitalization, patient to be seen 5 x/week to address the identified rehab impairments via gait training, therapeutic activities, therapeutic exercises and progress toward the following goals:    Plan of Care Expires:  10/14/22    Subjective     Chief Complaint: None  Patient/Family Comments/goals: Pt agreeable to PT eval  Pain/Comfort:  Pain Rating 1: 0/10    Patients cultural, spiritual, Quaker conflicts given the current situation: no    Living Environment:  PTA pt lived alone in second floor apartment with elevator access.  Prior to admission, patients level of function was mod I.  Equipment used at home: cane, straight.  DME owned (not currently used):  quad cane .  Upon discharge, patient will have assistance from family.    Objective:     Communicated with nurseSteven prior to session.  Patient found  asleep in bed, awakened by PT  with telemetry  upon PT entry to room.    General Precautions: Standard,     Orthopedic Precautions:N/A   Braces: N/A  Respiratory Status: Room air    Exams:  RLE ROM: WFL  RLE Strength: WFL  LLE ROM: WFL  LLE Strength: WFL    Functional Mobility:  Bed Mobility:     Supine to Sit: SBA with multiple unsuccessful attempts  Transfers:     Sit to Stand:  stand by assistance with straight cane  Gait: 100' w/sc and S  Balance: Fair +  static/Fair dynamic standing    Therapeutic Activities and Exercises:   Educated on role of PT and POC    AM-PAC 6 CLICK MOBILITY  Total Score:20     Patient left up in chair with call button in reach, son present, and all needs in reach .    GOALS:   Multidisciplinary Problems       Physical Therapy Goals          Problem: Physical Therapy    Goal Priority Disciplines Outcome Goal Variances Interventions   Physical Therapy Goal     PT, PT/OT Ongoing, Progressing     Description: Goals to be met by: 10/14/22     Patient will increase functional independence with mobility by performin. Pt to be mod I with bed mobility.  2. Pt to transfer with supervision.  3. Pt to ambulate 150' w/sc and supervision  4. Pt to be (I) with written HEP.                         History:     Past Medical History:   Diagnosis Date    Disorder of kidney and ureter     Elevated PSA     Glaucoma     Hyperlipidemia     Hypertension     Psoriasis        Past Surgical History:   Procedure Laterality Date    FUNCTIONAL ENDOSCOPIC SINUS SURGERY (FESS) USING COMPUTER-ASSISTED NAVIGATION N/A 2019    Procedure: FESS, USING COMPUTER-ASSISTED NAVIGATION (ADD ON );  Surgeon: Mikael Serrano MD;  Location: [facility];  Service: ENT;  Laterality: N/A;  (ADD ON )    NASAL SEPTOPLASTY N/A 2019    Procedure: SEPTOPLASTY, NOSE;  Surgeon: Mikael Serrano MD;  Location: [facility];  Service: ENT;  Laterality: N/A;       Time Tracking:     PT Received On: 10/10/22  PT Start Time: 1408     PT Stop Time: 1420  PT Total Time (min): 12 min     Billable Minutes: Evaluation 12      10/10/2022

## 2022-10-10 NOTE — PLAN OF CARE
Problem: Occupational Therapy  Goal: Occupational Therapy Goal  Description: Goals to be met by: 10/24/22    Patient will increase functional independence with ADLs by performing:    UE Dressing with Modified [place].  LE Dressing with Modified [place].  Grooming while standing at sink with Modified [place].  Toileting from toilet with Modified [place] for hygiene and clothing management.   Supine to sit with Modified [place].  Step transfer with Modified [place]  Toilet transfer to toilet with Modified [place].  Upper extremity exercise program x15 reps per handout, with independence.    Outcome: Ongoing, Progressing     OT rec HHOT with bath bench with family assistance at d/c in order to increase safety and independence with ADLs and all aspects of functional mobility.

## 2022-10-11 PROBLEM — R29.898 RIGHT LEG WEAKNESS: Status: RESOLVED | Noted: 2022-10-09 | Resolved: 2022-10-11

## 2022-10-11 LAB
ALBUMIN SERPL BCP-MCNC: 2.9 G/DL (ref 3.5–5.2)
ALP SERPL-CCNC: 61 U/L (ref 55–135)
ALT SERPL W/O P-5'-P-CCNC: 14 U/L (ref 10–44)
ANION GAP SERPL CALC-SCNC: 8 MMOL/L (ref 8–16)
AST SERPL-CCNC: 32 U/L (ref 10–40)
BASOPHILS # BLD AUTO: 0.07 K/UL (ref 0–0.2)
BASOPHILS NFR BLD: 1.2 % (ref 0–1.9)
BILIRUB SERPL-MCNC: 0.4 MG/DL (ref 0.1–1)
BUN SERPL-MCNC: 25 MG/DL (ref 8–23)
CALCIUM SERPL-MCNC: 9 MG/DL (ref 8.7–10.5)
CHLORIDE SERPL-SCNC: 109 MMOL/L (ref 95–110)
CO2 SERPL-SCNC: 22 MMOL/L (ref 23–29)
CREAT SERPL-MCNC: 1.7 MG/DL (ref 0.5–1.4)
DIFFERENTIAL METHOD: ABNORMAL
EOSINOPHIL # BLD AUTO: 0.7 K/UL (ref 0–0.5)
EOSINOPHIL NFR BLD: 12 % (ref 0–8)
ERYTHROCYTE [DISTWIDTH] IN BLOOD BY AUTOMATED COUNT: 19.5 % (ref 11.5–14.5)
EST. GFR  (NO RACE VARIABLE): 41 ML/MIN/1.73 M^2
GLUCOSE SERPL-MCNC: 88 MG/DL (ref 70–110)
HCT VFR BLD AUTO: 44.8 % (ref 40–54)
HGB BLD-MCNC: 14.7 G/DL (ref 14–18)
IMM GRANULOCYTES # BLD AUTO: 0.02 K/UL (ref 0–0.04)
IMM GRANULOCYTES NFR BLD AUTO: 0.3 % (ref 0–0.5)
LYMPHOCYTES # BLD AUTO: 2.1 K/UL (ref 1–4.8)
LYMPHOCYTES NFR BLD: 34.2 % (ref 18–48)
MCH RBC QN AUTO: 26.7 PG (ref 27–31)
MCHC RBC AUTO-ENTMCNC: 32.8 G/DL (ref 32–36)
MCV RBC AUTO: 81 FL (ref 82–98)
MONOCYTES # BLD AUTO: 1 K/UL (ref 0.3–1)
MONOCYTES NFR BLD: 16.3 % (ref 4–15)
NEUTROPHILS # BLD AUTO: 2.2 K/UL (ref 1.8–7.7)
NEUTROPHILS NFR BLD: 36 % (ref 38–73)
NRBC BLD-RTO: 0 /100 WBC
PLATELET # BLD AUTO: 331 K/UL (ref 150–450)
PMV BLD AUTO: 10.9 FL (ref 9.2–12.9)
POTASSIUM SERPL-SCNC: 4.7 MMOL/L (ref 3.5–5.1)
PROT SERPL-MCNC: 6.7 G/DL (ref 6–8.4)
RBC # BLD AUTO: 5.51 M/UL (ref 4.6–6.2)
SODIUM SERPL-SCNC: 139 MMOL/L (ref 136–145)
WBC # BLD AUTO: 6.08 K/UL (ref 3.9–12.7)

## 2022-10-11 PROCEDURE — 25000003 PHARM REV CODE 250: Performed by: STUDENT IN AN ORGANIZED HEALTH CARE EDUCATION/TRAINING PROGRAM

## 2022-10-11 PROCEDURE — 97535 SELF CARE MNGMENT TRAINING: CPT

## 2022-10-11 PROCEDURE — 21400001 HC TELEMETRY ROOM

## 2022-10-11 PROCEDURE — 80053 COMPREHEN METABOLIC PANEL: CPT | Performed by: PHYSICIAN ASSISTANT

## 2022-10-11 PROCEDURE — 85025 COMPLETE CBC W/AUTO DIFF WBC: CPT | Performed by: PHYSICIAN ASSISTANT

## 2022-10-11 PROCEDURE — 36415 COLL VENOUS BLD VENIPUNCTURE: CPT | Performed by: PHYSICIAN ASSISTANT

## 2022-10-11 PROCEDURE — 97535 SELF CARE MNGMENT TRAINING: CPT | Mod: CO

## 2022-10-11 PROCEDURE — 97116 GAIT TRAINING THERAPY: CPT

## 2022-10-11 PROCEDURE — 92507 TX SP LANG VOICE COMM INDIV: CPT

## 2022-10-11 PROCEDURE — 25000003 PHARM REV CODE 250: Performed by: HOSPITALIST

## 2022-10-11 RX ORDER — CLOPIDOGREL BISULFATE 75 MG/1
75 TABLET ORAL DAILY
Qty: 21 TABLET | Refills: 0 | Status: ON HOLD | OUTPATIENT
Start: 2022-10-11 | End: 2022-11-14 | Stop reason: HOSPADM

## 2022-10-11 RX ORDER — CLOPIDOGREL BISULFATE 75 MG/1
75 TABLET ORAL DAILY
Status: DISCONTINUED | OUTPATIENT
Start: 2022-10-11 | End: 2022-10-28 | Stop reason: HOSPADM

## 2022-10-11 RX ORDER — ATORVASTATIN CALCIUM 40 MG/1
40 TABLET, FILM COATED ORAL NIGHTLY
Qty: 90 TABLET | Refills: 3 | Status: ON HOLD | OUTPATIENT
Start: 2022-10-11 | End: 2022-11-14

## 2022-10-11 RX ADMIN — ASPIRIN 81 MG: 81 TABLET, COATED ORAL at 08:10

## 2022-10-11 RX ADMIN — ATORVASTATIN CALCIUM 40 MG: 40 TABLET, FILM COATED ORAL at 09:10

## 2022-10-11 RX ADMIN — CLOPIDOGREL BISULFATE 75 MG: 75 TABLET ORAL at 12:10

## 2022-10-11 NOTE — SUBJECTIVE & OBJECTIVE
Interval History: no complaints.  Weaker right leg per therapy.    Review of Systems   HENT:  Negative for ear discharge and ear pain.    Eyes:  Negative for discharge and itching.   Endocrine: Negative for cold intolerance and heat intolerance.   Neurological:  Negative for seizures and syncope.   Objective:     Vital Signs (Most Recent):  Temp: 96.9 °F (36.1 °C) (10/11/22 0748)  Pulse: 64 (10/11/22 0748)  Resp: 18 (10/11/22 0748)  BP: (!) 171/76 (10/11/22 0748)  SpO2: 97 % (10/11/22 0748)   Vital Signs (24h Range):  Temp:  [96.9 °F (36.1 °C)-98.7 °F (37.1 °C)] 96.9 °F (36.1 °C)  Pulse:  [64-75] 64  Resp:  [18] 18  SpO2:  [96 %-98 %] 97 %  BP: (146-171)/(71-86) 171/76     Weight: 92.1 kg (203 lb)  Body mass index is 30.87 kg/m².    Intake/Output Summary (Last 24 hours) at 10/11/2022 1150  Last data filed at 10/11/2022 0748  Gross per 24 hour   Intake 240 ml   Output --   Net 240 ml      Physical Exam  Vitals reviewed.   Constitutional:       General: He is not in acute distress.  HENT:      Head: Normocephalic and atraumatic.      Nose: No congestion or rhinorrhea.   Eyes:      General: No scleral icterus.     Extraocular Movements: Extraocular movements intact.   Cardiovascular:      Rate and Rhythm: Normal rate and regular rhythm.   Pulmonary:      Effort: Pulmonary effort is normal. No respiratory distress.   Abdominal:      General: There is no distension.      Palpations: Abdomen is soft.      Tenderness: There is no abdominal tenderness.   Musculoskeletal:         General: No swelling or tenderness.      Cervical back: Neck supple. No rigidity.   Skin:     General: Skin is warm and dry.   Neurological:      Mental Status: He is alert and oriented to person, place, and time.      Gait: Gait abnormal.   Psychiatric:         Mood and Affect: Mood normal.         Behavior: Behavior normal.       Significant Labs: All pertinent labs within the past 24 hours have been reviewed.  BMP:   Recent Labs   Lab  10/10/22  0407 10/11/22  0336   GLU 85 88    139   K 4.6 4.7    109   CO2 23 22*   BUN 22 25*   CREATININE 1.8* 1.7*   CALCIUM 9.3 9.0   MG 2.2  --      CBC:   Recent Labs   Lab 10/09/22  1248 10/10/22  0408 10/11/22  0336   WBC 7.39 6.33 6.08   HGB 14.8 14.5 14.7   HCT 45.9 45.6 44.8    318 331       Significant Imaging: I have reviewed all pertinent imaging results/findings within the past 24 hours.

## 2022-10-11 NOTE — DISCHARGE SUMMARY
[facility] Medicine  Discharge Summary      Patient Name: Leonides Burns  MRN: [id number]  Patient Class: OP- Observation  Admission Date: 10/9/2022  Hospital Length of Stay: 0 days  Discharge Date and Time:  10/11/2022 8:41 AM  Attending Physician: Juventino Turner MD   Discharging Provider: Juventino Turner MD  Primary Care Provider: Suzette Akhtar MD      HPI:   Leonides Burns 77 y.o. male with CKD, HTN, tobacco abuse presents to the hospital with a chief complaint of right leg weakness.  He reports 2 days of right lower extremity weakness and discoordination and double vision.  States the symptoms have been constant without aggravating factors.  He sometimes feels that improved with lying flat.  He reports he previously had stop aspirin due to severe and recurrent epistaxis.  He denies fevers chest pain shortness breast nausea vomiting abdominal pain leg swelling syncope numbness weakness of an arm incontinence weakness of his left leg.    In the ED, creatinine 2.1 CT head without acute abnormality, chest x-ray without focal consolidation afebrile without leukocytosis COVID negative LDL 80.6 TSH within normal limits.      * No surgery found *      Hospital Course:   77M with pmh of  CKD, HTN, tobacco abuse presents to the hospital with a chief complaint of right leg weakness. MRI with Small acute punctate left parasagittal frontal lobe. PT/OT/SLP consulted. Echo and carotids unremarkable. Neurology consulted.  Patient continued on ASA.  His Statin increased to 40 mg daily and he will be started on Plavix for 21 days.  No progression of weakness and he has remained stable.  He has remained afebrile and hemodynamically stable.  PT/OT recommending HH.  He will be discharged home with HH to follow up with PCP and Neurology.         Goals of Care Treatment Preferences:  Code Status: Full Code      Consults:   Consults (From admission, onward)        Status Ordering Provider      "Inpatient consult to Registered Dietitian/Nutritionist  Once        Provider:  (Not yet assigned)    Completed DEANDRE SAM     IP consult to case management/social work  Once        Provider:  (Not yet assigned)    Acknowledged DEANDRE SAM     Inpatient consult to Neurology  Once        Provider:  Ahmet Lara MD    Completed DEANDRE SAM          No new Assessment & Plan notes have been filed under this hospital service since the last note was generated.  Service: Hospital Medicine    Final Active Diagnoses:    Diagnosis Date Noted POA    CVA (cerebral vascular accident) [I63.9] 10/10/2022 Yes    Obesity (BMI 30.0-34.9) [E66.9] 07/09/2018 Yes    Tobacco dependence [F17.200] 02/17/2016 Yes     Chronic    Chronic kidney disease, stage III (moderate) [N18.30] 06/02/2015 Yes     Chronic    Hypertension [I10] 02/02/2015 Yes     Chronic      Problems Resolved During this Admission:    Diagnosis Date Noted Date Resolved POA    PRINCIPAL PROBLEM:  Right leg weakness [R29.898] 10/09/2022 10/11/2022 Yes       Discharged Condition: stable    [place] [facility] [place]   Follow-up Information     Suzette Akhtar MD Follow up in 1 week(s).    Specialty: Geriatric Medicine  Contact information:  [address] [facility] [id number] [phone]                       Patient Instructions:      TRANSFER TUB BENCH FOR HOME USE     Order Specific Question Answer Comments   Type of Transfer Tub Bench: Unpadded    Height: 5' 8" (1.727 m)    Weight: 92.1 kg (203 lb)    Does patient have medical equipment at home? cane, straight    Length of need (1-99 months): 99    Patient notified - Not covered by insurance considered a convenience item No    Discussed financial responsibility with responsible party No      Ambulatory referral/consult to Neurology   Standing Status: Future   Referral Priority: Routine Referral Type: Consultation   Referral " Reason: Specialty Services Required   Requested Specialty: Neurology   Number of Visits Requested: 1     Diet renal     Notify your health care provider if you experience any of the following:  temperature >100.4     Notify your health care provider if you experience any of the following:  persistent nausea and vomiting or diarrhea     Notify your health care provider if you experience any of the following:  severe uncontrolled pain     Notify your health care provider if you experience any of the following:  difficulty breathing or increased cough     Notify your health care provider if you experience any of the following:  persistent dizziness, light-headedness, or visual disturbances     Notify your health care provider if you experience any of the following:  increased confusion or weakness     Activity as tolerated         Pending Diagnostic Studies:     Procedure Component Value Units Date/Time    Echo Saline Bubble? Yes [[id number]]     Order Status: Sent Lab Status: No result          Medications:  Reconciled Home Medications:      Medication List      START taking these medications    clopidogreL 75 mg tablet  Commonly known as: PLAVIX  Take 1 tablet (75 mg total) by mouth once daily. for 21 days        CHANGE how you take these medications    atorvastatin 40 MG tablet  Commonly known as: LIPITOR  Take 1 tablet (40 mg total) by mouth every evening.  What changed:   · medication strength  · how much to take        CONTINUE taking these medications    allopurinoL 100 MG tablet  Commonly known as: ZYLOPRIM  TAKE 2 TABLETS BY MOUTH EVERY DAY     amLODIPine 5 MG tablet  Commonly known as: NORVASC  TAKE 1 TABLET(5 MG) BY MOUTH EVERY DAY     aspirin 81 MG Chew  Take 1 tablet (81 mg total) by mouth once daily.     ketoconazole 2 % cream  Commonly known as: NIZORAL  Apply topically once daily.     lisinopriL 20 MG tablet  Commonly known as: PRINIVIL,ZESTRIL  Take 20 mg by mouth once daily.     LUMIGAN 0.01 %  Drop  Generic drug: bimatoprost  Place 1 drop into both eyes every evening.            Indwelling Lines/Drains at time of discharge:   Lines/Drains/Airways     None                 Time spent on the discharge of patient: >30 minutes         Juventino Turner MD  Department of [facility] Medicine  [facility] [place] - Telemetry

## 2022-10-11 NOTE — PT/OT/SLP PROGRESS
Speech Language Pathology Treatment    Patient Name:  Leonides Burns   MRN:  [id number]  Admitting Diagnosis: CVA (cerebral vascular accident)    Recommendations:                 General Recommendations:  Cognitive-linguistic therapy  Diet recommendations:  Regular, Liquid Diet Level: Thin   Aspiration Precautions: 1 bite/sip at a time   General Precautions: Standard, aspiration  Communication strategies:  provide increased time to answer    Subjective   Pt's son reporting he feels Pt's cognitive linguistic skills appeared obviously worsened as a result of this event and decline even since yesterday's initial eval.  PT/OT report worsening of weakness.   Patient goals: return to baseline    Pain/Comfort:  Pain Rating 1: 0/10    Respiratory Status: Room air    Objective:     Has the patient been evaluated by SLP for swallowing?   Yes  Keep patient NPO? No     Pt seen upright in chair for cognitive linguistic therapy. Pt performed divergent naming task with significant delay and moderate assistance. Pt performed automatic speech acts with 100% accuracy and trace-mild delay. Pt completed closed sentences with moderate delay. Pt's speech during informal conversation was noted with marked worsening of organization, delay, increased non specific speech acts and distractibility. At level of conversation he was noted to repeat information within 2 minutes of initial presentation. No change in articulation. No reported change in swallow function. MD informed.     Assessment:     Leonides Burns is a 77 y.o. male with dx of CVA he presents with worsening moderate cognitive linguistic deficits.     Goals:   Multidisciplinary Problems       SLP Goals          Problem: SLP    Goal Priority Disciplines Outcome   SLP Goal    Low SLP Ongoing, Not Progressing   Description: STGS  1. Pt will perform multiple step commands regarding before and after with 90% acc.  2. Pt will perform divergent naming tasks with min assist  3. Pt will  delayed recall tasks with min assist.                        Plan:     Patient to be seen:  3 x/week   Plan of Care expires:  10/20/22  Plan of Care reviewed with:  patient   SLP Follow-Up:  Yes       Discharge recommendations:  TBD  Barriers to Discharge:  Level of Skilled Assistance Needed .    Time Tracking:     SLP Treatment Date:   10/11/22  Speech Start Time:  1422  Speech Stop Time:  1451     Speech Total Time (min):  29 min    Billable Minutes: Speech Therapy Individual 15 and Self Care/Home Management Training 14    10/11/2022

## 2022-10-11 NOTE — HOSPITAL COURSE
Mr Leonides Burns is a 77 y.o. man admitted with R leg weakness secondary to acute punctate left parasagittal frontal lobe. PT/OT/SLP consulted. Echo and carotids unremarkable. Neurology consulted.  Patient continued on ASA, added plavix.  His Statin increased to 40 mg daily. Resumed BP medications. Plan was inpatient rehab, but he then had seizure activity on 10/13, prompting step up to ICU. Started keppra. He had difficulty with aspiration events. Speech therapy following; now safe for mechanical soft diet and thin liquids. He has been working with PT, OT.   Continued with intermittent dysphagia and ST continues to work with patient and adjust diet consistency as tolerated.  He is now medically stable for discharge to inpatient rehab when arranged.  Insurance denied inpatient rehab.  This has been appealed.  BP uncontrolled and started on oral Hydralazine. Now plan is for SNF. Patient was discharged to SNF on 10/28/22. Activity as tolerated. Diet- low NA. Follow up with PCP after SNF.

## 2022-10-11 NOTE — PROGRESS NOTES
[facility] Medicine  Progress Note    Patient Name: Leonides Burns  MRN: [id number]  Patient Class: OP- Observation   Admission Date: 10/9/2022  Length of Stay: 0 days  Attending Physician: Juventino Turner MD  Primary Care Provider: Suzette Akhtar MD        Subjective:     Principal Problem:CVA (cerebral vascular accident)        HPI:  Leonides Burns 77 y.o. male with CKD, HTN, tobacco abuse presents to the hospital with a chief complaint of right leg weakness.  He reports 2 days of right lower extremity weakness and discoordination and double vision.  States the symptoms have been constant without aggravating factors.  He sometimes feels that improved with lying flat.  He reports he previously had stop aspirin due to severe and recurrent epistaxis.  He denies fevers chest pain shortness breast nausea vomiting abdominal pain leg swelling syncope numbness weakness of an arm incontinence weakness of his left leg.    In the ED, creatinine 2.1 CT head without acute abnormality, chest x-ray without focal consolidation afebrile without leukocytosis COVID negative LDL 80.6 TSH within normal limits.      Overview/Hospital Course:  77M with pmh of  CKD, HTN, tobacco abuse presents to the hospital with a chief complaint of right leg weakness. MRI with Small acute punctate left parasagittal frontal lobe. PT/OT/SLP consulted. Echo and carotids unremarkable. Neurology consulted.  Patient continued on ASA.  His Statin increased to 40 mg daily and he will be started on Plavix for 21 days.        Interval History: no complaints.  Weaker right leg per therapy.    Review of Systems   HENT:  Negative for ear discharge and ear pain.    Eyes:  Negative for discharge and itching.   Endocrine: Negative for cold intolerance and heat intolerance.   Neurological:  Negative for seizures and syncope.   Objective:     Vital Signs (Most Recent):  Temp: 96.9 °F (36.1 °C) (10/11/22 0748)  Pulse: 64 (10/11/22  0748)  Resp: 18 (10/11/22 0748)  BP: (!) 171/76 (10/11/22 0748)  SpO2: 97 % (10/11/22 0748)   Vital Signs (24h Range):  Temp:  [96.9 °F (36.1 °C)-98.7 °F (37.1 °C)] 96.9 °F (36.1 °C)  Pulse:  [64-75] 64  Resp:  [18] 18  SpO2:  [96 %-98 %] 97 %  BP: (146-171)/(71-86) 171/76     Weight: 92.1 kg (203 lb)  Body mass index is 30.87 kg/m².    Intake/Output Summary (Last 24 hours) at 10/11/2022 1150  Last data filed at 10/11/2022 0748  Gross per 24 hour   Intake 240 ml   Output --   Net 240 ml      Physical Exam  Vitals reviewed.   Constitutional:       General: He is not in acute distress.  HENT:      Head: Normocephalic and atraumatic.      Nose: No congestion or rhinorrhea.   Eyes:      General: No scleral icterus.     Extraocular Movements: Extraocular movements intact.   Cardiovascular:      Rate and Rhythm: Normal rate and regular rhythm.   Pulmonary:      Effort: Pulmonary effort is normal. No respiratory distress.   Abdominal:      General: There is no distension.      Palpations: Abdomen is soft.      Tenderness: There is no abdominal tenderness.   Musculoskeletal:         General: No swelling or tenderness.      Cervical back: Neck supple. No rigidity.   Skin:     General: Skin is warm and dry.   Neurological:      Mental Status: He is alert and oriented to person, place, and time.      Gait: Gait abnormal.   Psychiatric:         Mood and Affect: Mood normal.         Behavior: Behavior normal.       Significant Labs: All pertinent labs within the past 24 hours have been reviewed.  BMP:   Recent Labs   Lab 10/10/22  0407 10/11/22  0336   GLU 85 88    139   K 4.6 4.7    109   CO2 23 22*   BUN 22 25*   CREATININE 1.8* 1.7*   CALCIUM 9.3 9.0   MG 2.2  --      CBC:   Recent Labs   Lab 10/09/22  1248 10/10/22  0408 10/11/22  0336   WBC 7.39 6.33 6.08   HGB 14.8 14.5 14.7   HCT 45.9 45.6 44.8    318 385       Significant Imaging: I have reviewed all pertinent imaging results/findings within the past  24 hours.      Assessment/Plan:      * CVA (cerebral vascular accident)  Patient presents with right leg weakness.  Started on ASA.  Statin increased to 40 mg daily.  Started on Plavix for 21 days.  PT/OT evaluated patient yesterday.  He was doing well and planned to be discharged with HH today.  Therapy evaluated patient today and weakness apparently worse.  Patient dragging right leg and unable to ambulate like yesterday.  Cancel discharge.  Continue therapy and await further recommendations.      Obesity (BMI 30.0-34.9)  Will recommend diet and lifestyle modifications outpatient    Tobacco dependence  Smokes rare cigarettes per day. Greater than 3 minutes spent counseling patient on dangers of continued tobacco abuse. Will provide tobacco cessation education prior to discharge    Chronic kidney disease, stage III (moderate)  Crt on admission of 2.1. baseline of 1.7 to 2.1  Renal dose medications avoid nephrotoxic drugs monitor intake and output  -improved and at baseline      Hypertension  Holding home amlodipine and lisinopril for permissive HTN      VTE Risk Mitigation (From admission, onward)         Ordered     IP VTE HIGH RISK PATIENT  Once         10/09/22 2026     Place sequential compression device  Until discontinued         10/09/22 2026                Discharge Planning   DEVIKA: 10/11/2022     Code Status: Full Code   Is the patient medically ready for discharge?:     Reason for patient still in hospital (select all that apply): Patient trending condition  Discharge Plan A: Home Health   Discharge Delays: None known at this time              Juventino Turner MD  Department of Hospital Medicine   [facility]

## 2022-10-11 NOTE — PLAN OF CARE
[facility] - Telemetry  Discharge Final Note    Primary Care Provider: Suzette Akhtar MD    Expected Discharge Date: 10/11/2022    All needs met. Appointment scheduled. [facility] to provide home health services. SW notified nurse Leia that patient is ready for discharge from case management standpoint.     Final Discharge Note (most recent)       Final Note - 10/11/22 1009          Final Note    Assessment Type Final Discharge Note     Anticipated Discharge Disposition Home-Health Care Svc     What phone number can be called within the next 1-3 days to see how you are doing after discharge? [phone]     Hospital Resources/Appts/Education Provided Appointments scheduled and added to AVS;Appointments scheduled by Navigator/Coordinator        Post-Acute Status    Post-Acute Authorization Home Health     Home Health Status Set-up Complete/Auth obtained     Discharge Delays None known at this time                     Important Message from Medicare             Contact Info       Suzette Akhtar MD   Specialty: Geriatric Medicine   Relationship: PCP - General    [address] [facility] [address]   Phone: [phone]       Next Steps: Go on 10/18/2022    Instructions: Hospital F/U with at 9:10 am.    [facility]   Specialty: Home Health Services, Home Therapy Services, Home Living Aide Services    [address]  [place] [address]   Phone: [phone]       Next Steps: Follow up    Instructions: Home Health will call to schedule first visit.

## 2022-10-11 NOTE — NURSING
Report received from off going nurse LAURO Mcintosh. Patient AAO. No signs of distress noted. Call light in reach. Bed low and locked. Will continue plan of care.

## 2022-10-11 NOTE — PLAN OF CARE
Problem: Occupational Therapy  Goal: Occupational Therapy Goal  Description: Goals to be met by: 10/24/22    Patient will increase functional independence with ADLs by performing:    UE Dressing with Modified [place]ie.  LE Dressing with Modified [place]ie.  Grooming while standing at sink with Modified [place].  Toileting from toilet with Modified [place] for hygiene and clothing management.   Supine to sit with Modified [place].  Step transfer with Modified [place]ie  Toilet transfer to toilet with Modified [place]ie.  Upper extremity exercise program x15 reps per handout, with independence.    Outcome: Ongoing, Progressing   Pt with increased RLE weakness today and unable to ambulate with SC. R foot externally rotated and dragging with mobility. Pt had increased difficulty mobilizing today. OTR to reassess tomorrow. MD and nurse notified.

## 2022-10-11 NOTE — PT/OT/SLP PROGRESS
Physical Therapy Treatment    Patient Name:  Leonides Burns   MRN:  [id number]    Recommendations:     Discharge Recommendations:   (TBD)   Discharge Equipment Recommendations: none   Barriers to discharge:  Pt with decline in mobility today; RLE externally rotated and patient dragging it.    Assessment:     Leonides Burns is a 77 y.o. male admitted with a medical diagnosis of CVA (cerebral vascular accident).  He presents with the following impairments/functional limitations:  weakness, impaired functional mobility, gait instability, decreased lower extremity function .    Rehab Prognosis: Good; patient would benefit from acute skilled PT services to address these deficits and reach maximum level of function.    Recent Surgery: * No surgery found *      Plan:     During this hospitalization, patient to be seen 5 x/week to address the identified rehab impairments via gait training, therapeutic activities, therapeutic exercises and progress toward the following goals:    Plan of Care Expires:  10/14/22    Subjective     Chief Complaint: Pt reports he has been waking up with weakness RLE the past few days.  Patient/Family Comments/goals: Pt agreeable to treatment.      Objective:     Communicated with nurse prior to session.  Patient found up in chair with telemetry upon PT entry to room. Tatianna FRAGA present attempting sit>stand from B/S chair to ambulate to BR.    General Precautions: Standard,     Orthopedic Precautions:N/A   Braces: N/A  Respiratory Status: Room air     Functional Mobility:  Transfers:     Sit to Stand:  moderate assistance and of 2 persons with straight cane  Gait: 10' x2 w/RW mod A and VCs to increase step length RLE and rotate forward.      AM-PAC 6 CLICK MOBILITY  Turning over in bed (including adjusting bedclothes, sheets and blankets)?: 3  Sitting down on and standing up from a chair with arms (e.g., wheelchair, bedside commode, etc.): 2  Moving from lying on back to sitting on the side of the  bed?: 3  Moving to and from a bed to a chair (including a wheelchair)?: 2  Need to walk in hospital room?: 2  Climbing 3-5 steps with a railing?: 2  Basic Mobility Total Score: 14       Therapeutic Activities and Exercises:   Pt assisted to BR with FRAGA then stood at sink to wash hands and do ADLs with FRAGA.  Spoke with nurse, Leia and Dr. Turner re: decline in status today.  Pt unsafe for discharge to home alone; at high risk for falls.    Patient left up in chair with call button in reach and nurse notified..    GOALS:   Multidisciplinary Problems       Physical Therapy Goals          Problem: Physical Therapy    Goal Priority Disciplines Outcome Goal Variances Interventions   Physical Therapy Goal     PT, PT/OT Ongoing, Not Progressing     Description: Goals to be met by: 10/14/22     Patient will increase functional independence with mobility by performin. Pt to be mod I with bed mobility.  2. Pt to transfer with supervision.  3. Pt to ambulate 150' w/sc and supervision  4. Pt to be (I) with written HEP.                         Time Tracking:     PT Received On: 10/11/22  PT Start Time: 1117     PT Stop Time: 1135  PT Total Time (min): 18 min     Billable Minutes: Gait Training 18    Treatment Type: Treatment  PT/PTA: PT           10/11/2022

## 2022-10-11 NOTE — PLAN OF CARE
[facility] Telemetry    HOME HEALTH ORDERS  FACE TO FACE ENCOUNTER    Patient Name: Leonides Burns  YOB: 1945    PCP: Suzette Akhtar MD   PCP Address: [address] [place] [address] / [facility]*  PCP Phone Number: [phone]  PCP Fax: [phone]       Encounter Date: 10/11/2022    Admit to Home Health    Diagnoses:  Active Hospital Problems    Diagnosis  POA    CVA (cerebral vascular accident) [I63.9]  Yes    Obesity (BMI 30.0-34.9) [E66.9]  Yes    Tobacco dependence [F17.200]  Yes     Chronic    Chronic kidney disease, stage III (moderate) [N18.30]  Yes     Chronic    Hypertension [I10]  Yes     Chronic      Resolved Hospital Problems    Diagnosis Date Resolved POA    *Right leg weakness [R29.898] 10/11/2022 Yes       No future appointments.   Follow-up Information       Suzette Akhtar MD Follow up in 1 week(s).    Specialty: Geriatric Medicine  Contact information:  [address] [place] [id number]  [phone]                               I have seen and examined this patient face to face today. My clinical findings that support the need for the home health skilled services and home bound status are the following:  Weakness/numbness causing balance and gait disturbance due to Stroke making it taxing to leave home.    Allergies:  Review of patient's allergies indicates:   Allergen Reactions    Latex, natural rubber Rash    Iodine and iodide containing products Rash       Diet: renal diet    Activities: activity as tolerated    Nursing:   SN to complete comprehensive assessment including routine vital signs. Instruct on disease process and s/s of complications to report to MD. Review/verify medication list sent home with the patient at time of discharge  and instruct patient/caregiver as needed. Frequency may be adjusted depending on start of care date.    Notify MD if SBP > 160 or < 90; DBP > 90 or <  50; HR > 120 or < 50; Temp > 101      CONSULTS:    Physical Therapy to evaluate and treat. Evaluate for home safety and equipment needs; Establish/upgrade home exercise program. Perform / instruct on therapeutic exercises, gait training, transfer training, and Range of Motion.  Occupational Therapy to evaluate and treat. Evaluate home environment for safety and equipment needs. Perform/Instruct on transfers, ADL training, ROM, and therapeutic exercises.    Medications: Review discharge medications with patient and family and provide education.      Current Discharge Medication List        START taking these medications    Details   clopidogreL (PLAVIX) 75 mg tablet Take 1 tablet (75 mg total) by mouth once daily. for 21 days  Qty: 21 tablet, Refills: 0           CONTINUE these medications which have CHANGED    Details   atorvastatin (LIPITOR) 40 MG tablet Take 1 tablet (40 mg total) by mouth every evening.  Qty: 90 tablet, Refills: 3           CONTINUE these medications which have NOT CHANGED    Details   allopurinol (ZYLOPRIM) 100 MG tablet TAKE 2 TABLETS BY MOUTH EVERY DAY  Qty: 180 tablet, Refills: 0    Associated Diagnoses: Chronic gout without tophus, unspecified cause, unspecified site      amLODIPine (NORVASC) 5 MG tablet TAKE 1 TABLET(5 MG) BY MOUTH EVERY DAY  Qty: 90 tablet, Refills: 0    Associated Diagnoses: Essential hypertension      aspirin 81 MG Chew Take 1 tablet (81 mg total) by mouth once daily.  Refills: 0      bimatoprost (LUMIGAN) 0.01 % Drop Place 1 drop into both eyes every evening.      ketoconazole (NIZORAL) 2 % cream Apply topically once daily.      lisinopril (PRINIVIL,ZESTRIL) 20 MG tablet Take 20 mg by mouth once daily.             I certify that this patient is confined to his home and needs intermittent skilled nursing care, physical therapy, and speech therapy.

## 2022-10-11 NOTE — PT/OT/SLP PROGRESS
Occupational Therapy   Treatment    Name: Leonides Burns  MRN: [id number]  Admitting Diagnosis:  CVA (cerebral vascular accident)       Recommendations:     Discharge Recommendations: OTR to reassess 10/11/22  Discharge Equipment Recommendations:  bath bench  Barriers to discharge: Pt requiring increased assistance to mobilize today.      Assessment:     Leonides Burns is a 77 y.o. male with a medical diagnosis of CVA (cerebral vascular accident).  He presents with the following performance deficits affecting function: weakness, impaired self care skills, impaired balance, decreased coordination, decreased safety awareness, decreased ROM, decreased lower extremity function, decreased upper extremity function, impaired functional mobility, impaired endurance, gait instability.     Pt with decline in functional status today. Unable to safely ambulate with SC and required mod A x 2 persons for sit<>stand, and mod A , RW to ambulate to sink and bathroom for self care. RLE dragging and externally rotated with mobility today; notified MD regarding pt decline. OTR to reassess tomorrow.     Rehab Prognosis:  Good; patient would benefit from acute skilled OT services to address these deficits and reach maximum level of function.       Plan:     Patient to be seen 5 x/week to address the above listed problems via self-care/home management, therapeutic activities, therapeutic exercises  Plan of Care Expires: 10/24/22  Plan of Care Reviewed with: patient    Subjective     Pain/Comfort:  Pain Rating 1: 0/10    Objective:     Communicated with: Nurse Dupree prior to session.  Patient found up in chair with telemetry upon OT entry to room.    General Precautions: Standard, fall, hearing impaired   Orthopedic Precautions:N/A   Braces: N/A  Respiratory Status: Room air     Occupational Performance:     Bed Mobility:    Pt found sitting up in chair.     Functional Mobility/Transfers:  Patient completed Sit <> Stand Transfer with  moderate assistance and of 2 persons  with  straight cane x 1 trial from chair, x 1 trial with RW from toilet  Patient completed Toilet Transfer with  moderate assistance and rolling walker; max verbal/tactile cues for safety and hand  /foot placement  Functional Mobility: ~10 ft x 2 with mod A, RW. RLE dragging and externally rotated. Required increased assistance for mobility and safety today. Unable to safely ambulate with straight cane today.    Activities of Daily Living:  Grooming: minimum assistance to maintain standing balance at sink while performing oral care, hand hygiene, and to wash face  Upper Body Dressing: stand by assistance to don back gown seated in chair  Toileting: able to perform posterior pericare in standing with set-up A; required MIN/MOD A to maintain standing balance during task      [facility] 6 Click ADL: 21    Treatment & Education:  Functional transfer/mobility , and ADL completed as noted above.   Pt educated on safety awareness with all OOB mobility and ADL .   Encouraged increased OOB activity with nursing staff.    Patient left up in chair with all lines intact, call button in reach, and Nurse and MD notified    GOALS:   Multidisciplinary Problems       Occupational Therapy Goals          Problem: Occupational Therapy    Goal Priority Disciplines Outcome Interventions   Occupational Therapy Goal     OT, PT/OT Ongoing, Progressing    Description: Goals to be met by: 10/24/22    Patient will increase functional independence with ADLs by performing:    UE Dressing with Modified [place].  LE Dressing with Modified [place].  Grooming while standing at sink with Modified [place].  Toileting from toilet with Modified [place] for hygiene and clothing management.   Supine to sit with Modified [place].  Step transfer with Modified [place]  Toilet transfer to toilet with Modified [place].  Upper extremity exercise program x15 reps per handout, with  independence.                         Time Tracking:     OT Date of Treatment: 10/11/22  OT Start Time: 1117  OT Stop Time: 1135  OT Total Time (min): 18 min    Billable Minutes:Self Care/Home Management 18    OT/CHANDNI: CHANDNI     CHANDNI Visit Number: 1    10/11/2022

## 2022-10-11 NOTE — PLAN OF CARE
Problem: Physical Therapy  Goal: Physical Therapy Goal  Description: Goals to be met by: 10/14/22     Patient will increase functional independence with mobility by performin. Pt to be mod I with bed mobility.  2. Pt to transfer with supervision.  3. Pt to ambulate 150' w/sc and supervision  4. Pt to be (I) with written HEP.    Outcome: Ongoing, Not Progressing   Pt with decline in status today; ambulating by dragging RLE which is externally rotated.

## 2022-10-11 NOTE — ASSESSMENT & PLAN NOTE
Patient presents with right leg weakness.  Started on ASA.  Statin increased to 40 mg daily.  Started on Plavix for 21 days.  PT/OT evaluated patient yesterday.  He was doing well and planned to be discharged with  today.  Therapy evaluated patient today and weakness apparently worse.  Patient dragging right leg and unable to ambulate like yesterday.  Cancel discharge.  Continue therapy and await further recommendations.

## 2022-10-11 NOTE — CARE UPDATE
Patient seen by PT/OT and much worse than yesterday.  Apparently dragging right leg and unable to ambulate like yesterday.  Will cancel discharge.  Continue with therapy and await new recommendations.

## 2022-10-11 NOTE — PLAN OF CARE
[facility] - Telemetry  Discharge Assessment    Primary Care Provider: Suzette Akhtar MD     Discharge Assessment (most recent)       BRIEF DISCHARGE ASSESSMENT - 10/11/22 0932          Discharge Planning    Assessment Type Discharge Planning Brief Assessment     Resource/Environmental Concerns none     Support Systems Children;Friends/neighbors     Equipment Currently Used at Home cane, straight     Current Living Arrangements home/apartment/condo     Patient/Family Anticipates Transition to home     Patient/Family Anticipated Services at Transition home health care     DME Needed Upon Discharge  none     Discharge Plan A Home Health     Discharge Plan B Home with family

## 2022-10-11 NOTE — PLAN OF CARE
Problem: Adjustment to Illness (Stroke, Ischemic/Transient Ischemic Attack)  Goal: Optimal Coping  Intervention: Support Psychosocial Response to Stroke  Flowsheets (Taken 10/11/2022 1801)  Supportive Measures:   positive reinforcement provided   active listening utilized  Family/Support System Care: presence promoted     Problem: Bowel Elimination Impaired (Stroke, Ischemic/Transient Ischemic Attack)  Goal: Effective Bowel Elimination  Intervention: Promote Effective Bowel Elimination  Flowsheets (Taken 10/11/2022 1801)  Bowel Elimination Management: relaxation techniques promoted     Problem: Cerebral Tissue Perfusion (Stroke, Ischemic/Transient Ischemic Attack)  Goal: Optimal Cerebral Tissue Perfusion  Intervention: Protect and Optimize Cerebral Perfusion  Flowsheets (Taken 10/11/2022 1801)  Cerebral Perfusion Promotion: blood pressure monitored     Problem: Communication Impairment (Stroke, Ischemic/Transient Ischemic Attack)  Goal: Improved Communication Skills  Intervention: Optimize Communication Skills  Flowsheets (Taken 10/11/2022 1801)  Communication Enhancement Strategies: call light answered in person     Problem: Sensorimotor Impairment (Stroke, Ischemic/Transient Ischemic Attack)  Goal: Improved Sensorimotor Function  Intervention: Optimize Range of Motion, Motor Control and Function  Flowsheets (Taken 10/11/2022 1801)  Range of Motion: active ROM (range of motion) encouraged     Problem: Fall Injury Risk  Goal: Absence of Fall and Fall-Related Injury  Intervention: Identify and Manage Contributors  Flowsheets (Taken 10/11/2022 1801)  Self-Care Promotion: safe use of adaptive equipment encouraged  Medication Review/Management: medications reviewed

## 2022-10-11 NOTE — PLAN OF CARE
LACY notified patient's son, Leonides that patient will not be discharging today. LACY informed Leonides that Dr. Turner cancelled discharge because patient is dragging right leg and unable to ambulate. SW informed that therapy is going to continue working with him and will give new recommendations. LACY inquired about SNF preferences. Leonides said that he lives in [place] and patient lives in the [facility]. Leonides stated that he would prefer that patient go to a facility near his home. Leonides stated that he works in [place] near the [employer]. LACY inquired about [facility] and gave location. Leonides stated that he will ride around facility today. Leonides stated that his mother in law went to to SNF before so he will ask his wife where she went. LACY informed Leonides that she will keep him updated.

## 2022-10-12 PROCEDURE — 97535 SELF CARE MNGMENT TRAINING: CPT

## 2022-10-12 PROCEDURE — 25000003 PHARM REV CODE 250: Performed by: STUDENT IN AN ORGANIZED HEALTH CARE EDUCATION/TRAINING PROGRAM

## 2022-10-12 PROCEDURE — 25000003 PHARM REV CODE 250: Performed by: HOSPITALIST

## 2022-10-12 PROCEDURE — 92507 TX SP LANG VOICE COMM INDIV: CPT

## 2022-10-12 PROCEDURE — 97116 GAIT TRAINING THERAPY: CPT

## 2022-10-12 PROCEDURE — 97164 PT RE-EVAL EST PLAN CARE: CPT

## 2022-10-12 PROCEDURE — 21400001 HC TELEMETRY ROOM

## 2022-10-12 PROCEDURE — 97168 OT RE-EVAL EST PLAN CARE: CPT

## 2022-10-12 PROCEDURE — 92526 ORAL FUNCTION THERAPY: CPT

## 2022-10-12 RX ORDER — AMLODIPINE BESYLATE 5 MG/1
10 TABLET ORAL DAILY
Status: DISCONTINUED | OUTPATIENT
Start: 2022-10-12 | End: 2022-10-28 | Stop reason: HOSPADM

## 2022-10-12 RX ADMIN — ASPIRIN 81 MG: 81 TABLET, COATED ORAL at 10:10

## 2022-10-12 RX ADMIN — ATORVASTATIN CALCIUM 40 MG: 40 TABLET, FILM COATED ORAL at 09:10

## 2022-10-12 RX ADMIN — AMLODIPINE BESYLATE 10 MG: 5 TABLET ORAL at 12:10

## 2022-10-12 RX ADMIN — CLOPIDOGREL BISULFATE 75 MG: 75 TABLET ORAL at 10:10

## 2022-10-12 NOTE — PLAN OF CARE
Problem: Adjustment to Illness (Stroke, Ischemic/Transient Ischemic Attack)  Goal: Optimal Coping  Intervention: Support Psychosocial Response to Stroke  Flowsheets (Taken 10/12/2022 1725)  Supportive Measures:   active listening utilized   positive reinforcement provided     Problem: Cognitive Impairment (Stroke, Ischemic/Transient Ischemic Attack)  Goal: Optimal Cognitive Function  Intervention: Optimize Cognitive Function  Flowsheets (Taken 10/12/2022 1725)  Environment Familiarity/Consistency: daily routine followed  Reorientation Measures: clock in view  Sensory Stimulation Regulation: quiet environment promoted     Problem: Functional Ability Impaired (Stroke, Ischemic/Transient Ischemic Attack)  Goal: Optimal Functional Ability  Intervention: Optimize Functional Ability  Flowsheets (Taken 10/12/2022 1725)  Self-Care Promotion: BADL personal objects within reach  Activity Management:   Rolling - L1   Walk with assistive devise and /or staff member - L3

## 2022-10-12 NOTE — PLAN OF CARE
Improved cognitive communication as compared to yesterday however impairments persist below baseline

## 2022-10-12 NOTE — PT/OT/SLP RE-EVAL
Occupational Therapy   Re-evaluation    Name: Leonides Burns  MRN: [id number]  Admitting Diagnosis:  CVA (cerebral vascular accident)  Recent Surgery: * No surgery found *      Recommendations:     Discharge Recommendations: rehabilitation facility  Discharge Equipment Recommendations:   (TBD)  Barriers to discharge:   (not at PLOF; high risk of falls, unplanned readmission, and morbidity if d/c home; pt was MOD I with functional mobility and ADLs PTA and now, MAX A x2 with ~8 ft and increased assist with ADLs)    Assessment:     Leonides Burns is a 77 y.o. male with a medical diagnosis of CVA (cerebral vascular accident). Performance deficits affecting function are weakness, impaired endurance, impaired cognition, decreased ROM, abnormal tone, impaired coordination, impaired self care skills, decreased upper extremity function, impaired fine motor, decreased lower extremity function, impaired functional mobility, decreased safety awareness, gait instability, impaired balance.      Re-evaluation completed 2* change in status. On 10/10/22, pt was CGA using personal straight cane for in-room functional mobility, supervision for upper/lower body dressing, and SBA for standing grooming ADLs at the sink. Today, pt is MAX A x2 with RW for ~8 ft and MOD A for attempt to complete grooming ADLs standing, requiring task adjusted to seated 2* fall risk.      OT rec inpatient rehab at d/c in order to increase safety and independence with ADLs and all aspects of functional mobility. Pt was living alone, walking to the bus stop with str c, and MOD I with ADLs PTA. Pt would greatly benefit from IPR in order to regain PLOF. Pt is motivated to participate.     Rehab Prognosis:  Good; patient would benefit from acute skilled OT services to address these deficits and reach maximum level of function.       Plan:     Patient to be seen  (5-6x/week) to address the above listed problems via self-care/home management, therapeutic activities,  therapeutic exercises, neuromuscular re-education  Plan of Care Expires: 10/24/22  Plan of Care Reviewed with: patient    Subjective     Chief Complaint: scared and frustrated that he can't move his R leg   Patient/Family stated goals: agreeable to IPR in order to get better; had difficulty when OT asked if he was dizzy sitting up     Pain/Comfort:  Pain Rating 1: 0/10    Objective:     Communicated with: nurseLeia, prior to session.  Patient found HOB elevated with: peripheral IV (avasys) upon OT entry to room.    General Precautions: Standard, fall   Orthopedic Precautions:N/A   Braces: N/A  Respiratory Status: Room air    Occupational Performance:    Bed Mobility:  pt required max verbal/tactile cueing for body mechanics with all parts of functional mobility  Patient completed Rolling/Turning to Right with stand by assistance and with side rail  Patient completed Scooting anteriorly with minimum assistance  Patient completed Supine to Sit with minimum assistance, with side rail, and HOB elevated   Patient completed Scooting in supine with minimal assistance for body mechanics   Patient completed Scooting posteriorly in the chair after ADLs with MAX A x2 with confusion on cueing.     Functional Mobility/Transfers:  Patient completed Sit <> Stand Transfer with moderate assistance and of 2 persons  with  rolling walker   Functional Mobility: Gait belt donned prior to transfer for safety during mobility/transfers. pt completed ~8 ft with MAX A x2 using RW. MAX verbal/tactile cueing for body mechanics.  Please refer to PT note for gait assessment. MAX A for RLE placement seated in the chair for ADLs.     Activities of Daily Living:  Grooming: pt attempted to brush teeth standing, but after pt required MOD A for dynamic standing balance while opening toothpaste, OT changed task to seated; pt with increased shakiness to RUE while reaching for water cup. OT directed pt to sit in the chair behind him with increased  time to process simple cue to place hand on RW first, as pt did not move hands from the sink counter. Pt easily distracted by running water, which OT turned off. Once seated, pt showed improved initiation to complete oral hygiene (brush teeth, wash face, use mouthwash) with SBA and min verbal cueing.   Lower Body Dressing: total assistance to don R sock seated EOB     Cognitive/Visual Perceptual:  Cognitive/Psychosocial Skills:     -       Follows Commands/attention:follows some simple commands  -       Communication: delay with speech   -       Memory: No Deficits noted  -       Safety awareness/insight to disability: impaired   -       Mood/Affect/Coping skills/emotional control: Appropriate to situation  Visual/Perceptual:      -Impaired  R/L discrimination      Physical Exam:  Balance:    -       seated: SBA/CGA; standing: MOD-MAX x2 with RW   Postural examination/scapula alignment:    -       Rounded shoulders  -       Forward head  Skin integrity: Visible skin intact  Edema:  no BUE edema noted   Sensation:    -       Intact  light/touch BUE; pt reports tingling to R hand   Dominant hand:    -       Right  Upper Extremity Range of Motion:     -       Right Upper Extremity: WFL  -       Left Upper Extremity: WFL  Upper Extremity Strength:    -       Right Upper Extremity: 4-/5 shoulder flexion; 5/5 elbow flexion   -       Left Upper Extremity: WFL   Strength:    -       Right Upper Extremity: WFL  -       Left Upper Extremity: WFL  Fine Motor Coordination:    -       Intact  Right hand, finger to nose and Right hand thumb/finger opposition skills  -       Impaired  Right hand, manipulation of objects    Gross motor coordination:   impaired     AMPAC 6 Click:  AMPAC Total Score: 15    Treatment & Education:  Pt re-educated on OT role/POC.   Importance of OOB activity with therapy assistance.  Safety during functional t/f and mobility   Multiple self-care tasks/functional mobility completed- assistance level  noted above   All questions/concerns answered within OT scope of practice       Patient left  reclined in the chair with B/L heels offloaded  with all lines intact, call button in reach, bed alarm on, nurse, Leia, notified, [employer]ys present, and tray table in front of pt with door left open. Lights on, tv on, blinds opened.     GOALS:   Multidisciplinary Problems       Occupational Therapy Goals          Problem: Occupational Therapy    Goal Priority Disciplines Outcome Interventions   Occupational Therapy Goal     OT, PT/OT Ongoing, Progressing    Description: Goals to be met by: 10/24/22    Patient will increase functional independence with ADLs by performing:    UE Dressing with Modified [place].  LE Dressing with Modified [place].  Grooming while standing at sink with Modified [place].  Toileting from toilet with Modified [place] for hygiene and clothing management.   Supine to sit with Modified [place].  Step transfer with Modified [place]  Toilet transfer to toilet with Modified [place].  Upper extremity exercise program x15 reps per handout, with independence.                         History:     Past Medical History:   Diagnosis Date    Disorder of kidney and ureter     Elevated PSA     Glaucoma     Hyperlipidemia     Hypertension     Psoriasis          Past Surgical History:   Procedure Laterality Date    FUNCTIONAL ENDOSCOPIC SINUS SURGERY (FESS) USING COMPUTER-ASSISTED NAVIGATION N/A 2/28/2019    Procedure: FESS, USING COMPUTER-ASSISTED NAVIGATION (ADD ON );  Surgeon: Mikael Serrano MD;  Location: [facility] OR;  Service: ENT;  Laterality: N/A;  (ADD ON )    NASAL SEPTOPLASTY N/A 2/28/2019    Procedure: SEPTOPLASTY, NOSE;  Surgeon: Mikael Serrano MD;  Location: [facility];  Service: ENT;  Laterality: N/A;       Time Tracking:     OT Date of Treatment: 10/12/22  OT Start Time: 0929  OT Stop Time: 1000  OT Total Time (min): 31 min    Billable Minutes:Re-eval 15  min  Self Care/Home Management 16 min  Total Time 31 min (PT present)     Co- treatment performed due to patient's multiple deficits requiring two skilled therapists to appropriately and safely assess patient's strength and endurance while facilitating functional tasks in addition to accommodating for patient's activity tolerance      10/12/2022

## 2022-10-12 NOTE — NURSING
Report received from off going nurse, LAURO Bell. Patient AAO. No signs of distress noted. Call light in reach. Bed low and locked. Will continue plan of care.

## 2022-10-12 NOTE — PLAN OF CARE
LACY discussed discharge planning with sonLeonides. LACY informed that PT/OT re-evaluated patient and recommended Rehab. LACY informed that patient has an [facility] [employer] plan and listed facilities on the [employer] that are in network with insurance. LACY inquired if referral could be sent to all facilities. Son said yes. SW sent referrals via [employer].        10/12/22 [id number]   Post-Acute Status   Post-Acute Authorization Placement;Home Health  (Rehab)   Post-Acute Placement Status Pending post-acute provider review/more information requested   Home Health Status Discharge Plan Changed   Discharge Delays (!) Post-Acute Set-up   Discharge Plan   Discharge Plan A Rehab   Discharge Plan B Home Health

## 2022-10-12 NOTE — PT/OT/SLP RE-EVAL
"Physical Therapy Re-evaluation    Patient Name:  Leonides Burns   MRN:  [id number]    Recommendations:     Discharge Recommendations:  rehabilitation facility   Discharge Equipment Recommendations:  (Per IPR)   Barriers to discharge:  Pt with decreased functional mobility/[place] since Initial PT evaluation dated 10/10/22.  Pt now requires Max A x 2 persons to ambulate with RW approx 9'.  IPR recommended as pt requires and aggressive Multidisciplinary approach to help restore PLOF and return to Independent Living    Assessment:     Leonides Burns is a 77 y.o. male admitted with a medical diagnosis of CVA (cerebral vascular accident).  He presents with the following impairments/functional limitations:  weakness, gait instability, decreased upper extremity function, impaired endurance, impaired balance, decreased lower extremity function, impaired coordination, decreased safety awareness, impaired fine motor, impaired self care skills, impaired cognition, impaired functional mobility, decreased coordination, abnormal tone Pt with decreased R UE/LE strength and increased tone in R LE today.  Difficulty following commands..    Rehab Prognosis:  Good; patient would benefit from acute skilled PT services to address these deficits and reach maximum level of function.      Recent Surgery: * No surgery found *      Plan:     During this hospitalization, patient to be seen  (5-6x/wk) to address the above listed problems via gait training, therapeutic activities, therapeutic exercises, neuromuscular re-education, wheelchair management/training  Plan of Care Expires:  10/26/22  Plan of Care Reviewed with: patient    Subjective     Communicated with nsg prior to session.  Patient found  slid down to bottom of bed with feet jammed against foot board.  with peripheral IV (Avasys) upon PT entry to room, agreeable to evaluation.      Chief Complaint: Pt states that he cannot move his R LE, R UE/LE "tingly"   Patient " "comments/goals: Pt agreeable to therapy session, Ultimate goal is to return to Independent Living    Pain/Comfort:  Pain Rating 1: 0/10    Patients cultural, spiritual, Confucianism conflicts given the current situation: no      Objective:     Patient found with: peripheral IV (Avasys)     General Precautions: Standard, fall   Orthopedic Precautions:N/A   Braces:  N/A  Respiratory Status: Room air    Exams:  Cognitive Exam:  Patient is oriented to Person and Place, does not remember what day he came to hospital or what day it is.  Gross Motor Coordination:  Impaired 2/2 R UE/LE weakness and increased tone R LE  Postural Exam:  Patient presented with the following abnormalities:    -       Rounded shoulders  -       Forward head  -       R LE in full extension in bed  Sensation:    -       Intact  light/touch B LE's, but pt states that R LE is "tingly"  Skin Integrity/Edema:      -       Skin integrity: Visible skin intact  -       Edema: None noted     RLE ROM: No AROM, unable to test PROM 2/2 increased ext tone in bed.  Able to sit at EOB 90/90  RLE Strength: 0/5  LLE ROM: WFL  LLE Strength: WFL  Tone:  Increased Ext tone in supine and increased flexor tone sitting at EOB for R LE     Functional Mobility:  Bed Mobility:     Rolling Right: minimum assistance for R LE and with VC/TC for reaching or BR  Scooting: minimum assistance and for R LE with Max VC/TC to bridge with L LE and hand placement   Supine to Sit: minimum assistance with max VC/TC for hand placement and body mechanics  Transfers:     Sit to Stand:  moderate assistance, of 2 persons, and    with  VC/TC for hand placement and forward weight shift over YOVANA with RW  Gait: Gait training with RW and Max A x 2 persons for balance, walker management, and advancement of R LE and max VC/TC for sequencing, R foot placement and walker management approx 9'.  Pt with poor coordination advancing R LE then inability to advance R LE.  R LE in max external " "Rotation  Balance: Fair+ static sit, Fair static stand    AM-PAC 6 CLICK MOBILITY  Total Score:13       Therapeutic Activities and Exercises:   Re-evaluation as above, bed mobility, transfer, and gait training as above to sink for ADL's with OT.  Pt unable to perform ADL's at sink with OT with Mod A for balance and RW.  Pt performed stand to sit with Min A and Max Vc/TC for hand placement and controlled descent and required Max A x 2 persons to scoot posteriorly in chair.    Patient left up in chair with all lines intact, call button in reach, and nsg notified.    GOALS:   Multidisciplinary Problems       Physical Therapy Goals          Problem: Physical Therapy    Goal Priority Disciplines Outcome Goal Variances Interventions   Physical Therapy Goal     PT, PT/OT Unable to Meet, Plan Revised     Description: Goals to be met by: 10/14/22     Patient will increase functional independence with mobility by performin. Pt to be mod I with bed mobility.  2. Pt to transfer sit to stand with Supervision.  3. Pt to ambulate with RW and SBA x 50'  4. Pt to be (I) with written HEP  5. Pt to transfer bed to chair with Supervision  6. Pt to ascend/descend 4" curb with RW and CGA                       History:     Past Medical History:   Diagnosis Date    Disorder of kidney and ureter     Elevated PSA     Glaucoma     Hyperlipidemia     Hypertension     Psoriasis        Past Surgical History:   Procedure Laterality Date    FUNCTIONAL ENDOSCOPIC SINUS SURGERY (FESS) USING COMPUTER-ASSISTED NAVIGATION N/A 2019    Procedure: FESS, USING COMPUTER-ASSISTED NAVIGATION (ADD ON );  Surgeon: Mikael Serrano MD;  Location: [facility];  Service: ENT;  Laterality: N/A;  (ADD ON )    NASAL SEPTOPLASTY N/A 2019    Procedure: SEPTOPLASTY, NOSE;  Surgeon: Mikael Serrano MD;  Location: [facility];  Service: ENT;  Laterality: N/A;       Time Tracking:     PT Received On: 10/12/22  PT Start Time: 928     PT Stop Time: " 1000  PT Total Time (min): 32 min     Billable Minutes: Re-eval 8  and Gait Training 8  Co-treat with OT      10/12/2022

## 2022-10-12 NOTE — NURSING
Patient awake, alert, oriented resting comfortably. No signs of distress observed. bed low and locked. Call light in reach. Report given to oncoming nurse, LAURO Bell. 12hour chart check complete. Will continue plan of care.

## 2022-10-12 NOTE — PLAN OF CARE
"  Problem: Physical Therapy  Goal: Physical Therapy Goal  Description: Goals to be met by: 10/14/22     Patient will increase functional independence with mobility by performin. Pt to be mod I with bed mobility.  2. Pt to transfer sit to stand with Supervision.  3. Pt to ambulate with RW and SBA x 50'  4. Pt to be (I) with written HEP  5. Pt to transfer bed to chair with Supervision  6. Pt to ascend/descend 4" curb with RW and CGA  Outcome: Unable to Meet, Plan Revised   PT Re-evaluation completed today.  Pt could benefit from skilled Pt services 5-6x/wk in order to maximize function prior to D/C.  Pt found to have R UE/LE weakness and increased tone in R LE.  Pt requires Max A x 2 persons to ambulate with RW x 9', is having increased difficulty with following commands, and is unable to perform ADL's in standing.  Goals and POC updated accordingly.  IPR recommended.    "

## 2022-10-12 NOTE — PLAN OF CARE
Problem: Occupational Therapy  Goal: Occupational Therapy Goal  Description: Goals to be met by: 10/24/22    Patient will increase functional independence with ADLs by performing:    UE Dressing with Modified [place].  LE Dressing with Modified [place].  Grooming while standing at sink with Modified [place].  Toileting from toilet with Modified [place] for hygiene and clothing management.   Supine to sit with Modified [place].  Step transfer with Modified [place]  Toilet transfer to toilet with Modified [place].  Upper extremity exercise program x15 reps per handout, with independence.    Outcome: Ongoing, Progressing     Re-evaluation completed 2* change in status. On 10/10/22, pt was CGA using personal straight cane for in-room functional mobility, supervision for upper/lower body dressing, and SBA for standing grooming ADLs at the sink. Today, pt is MAX A x2 with RW for ~8 ft and MOD A for attempt to complete grooming ADLs standing, requiring task adjusted to seated.     OT rec inpatient rehab at d/c in order to increase safety and independence with ADLs and all aspects of functional mobility. Pt was living alone, walking to the bus stop with str c, and MOD I with ADLs PTA. Pt would greatly benefit from IPR in order to regain PLOF. Pt is motivated to participate.

## 2022-10-12 NOTE — PROGRESS NOTES
[facility] Medicine  Progress Note    Patient Name: Leonides Burns  MRN: [id number]  Patient Class: IP- Inpatient   Admission Date: 10/9/2022  Length of Stay: 1 days  Attending Physician: Gerard Solano MD  Primary Care Provider: Suzette Akhtar MD        Subjective:     Principal Problem:CVA (cerebral vascular accident)        HPI:  Leonides Burns 77 y.o. male with CKD, HTN, tobacco abuse presents to the hospital with a chief complaint of right leg weakness.  He reports 2 days of right lower extremity weakness and discoordination and double vision.  States the symptoms have been constant without aggravating factors.  He sometimes feels that improved with lying flat.  He reports he previously had stop aspirin due to severe and recurrent epistaxis.  He denies fevers chest pain shortness breast nausea vomiting abdominal pain leg swelling syncope numbness weakness of an arm incontinence weakness of his left leg.    In the ED, creatinine 2.1 CT head without acute abnormality, chest x-ray without focal consolidation afebrile without leukocytosis COVID negative LDL 80.6 TSH within normal limits.      Overview/Hospital Course:  77M with pmh of  CKD, HTN, tobacco abuse presents to the hospital with a chief complaint of right leg weakness. MRI with Small acute punctate left parasagittal frontal lobe. PT/OT/SLP consulted. Echo and carotids unremarkable. Neurology consulted.  Patient continued on ASA.  His Statin increased to 40 mg daily and he will be started on Plavix for 21 days.  Started on BP med;s  PT,OT recommending rehab,consulted SW.  CC is weakness.          Interval History: no complaints.  Weaker right leg per therapy.    Review of Systems   HENT:  Negative for ear discharge and ear pain.    Eyes:  Negative for discharge and itching.   Endocrine: Negative for cold intolerance and heat intolerance.   Neurological:  Negative for seizures and syncope.   Objective:     Vital  Signs (Most Recent):  Temp: 97.1 °F (36.2 °C) (10/12/22 0829)  Pulse: 83 (10/12/22 0829)  Resp: 18 (10/12/22 0829)  BP: (!) 155/80 (10/12/22 0829)  SpO2: 97 % (10/12/22 0829)   Vital Signs (24h Range):  Temp:  [96.1 °F (35.6 °C)-98.7 °F (37.1 °C)] 97.1 °F (36.2 °C)  Pulse:  [67-83] 83  Resp:  [18-20] 18  SpO2:  [94 %-98 %] 97 %  BP: (155-181)/(75-90) 155/80     Weight: 92.1 kg (203 lb)  Body mass index is 30.87 kg/m².    Intake/Output Summary (Last 24 hours) at 10/12/2022 1037  Last data filed at 10/12/2022 0829  Gross per 24 hour   Intake 120 ml   Output 600 ml   Net -480 ml        Physical Exam  Vitals reviewed.   Constitutional:       General: He is not in acute distress.  HENT:      Head: Normocephalic and atraumatic.      Nose: No congestion or rhinorrhea.   Eyes:      General: No scleral icterus.     Extraocular Movements: Extraocular movements intact.   Cardiovascular:      Rate and Rhythm: Normal rate and regular rhythm.   Pulmonary:      Effort: Pulmonary effort is normal. No respiratory distress.   Abdominal:      General: There is no distension.      Palpations: Abdomen is soft.      Tenderness: There is no abdominal tenderness.   Musculoskeletal:         General: No swelling or tenderness.      Cervical back: Neck supple. No rigidity.   Skin:     General: Skin is warm and dry.   Neurological:      Mental Status: He is alert and oriented to person, place, and time.      Gait: Gait abnormal.   Psychiatric:         Mood and Affect: Mood normal.         Behavior: Behavior normal.       Significant Labs: All pertinent labs within the past 24 hours have been reviewed.  BMP:   Recent Labs   Lab 10/11/22  0336   GLU 88      K 4.7      CO2 22*   BUN 25*   CREATININE 1.7*   CALCIUM 9.0       CBC:   Recent Labs   Lab 10/11/22  0336   WBC 6.08   HGB 14.7   HCT 44.8            Significant Imaging: I have reviewed all pertinent imaging results/findings within the past 24 hours.    CC is  weakness.  Assessment/Plan:      * CVA (cerebral vascular accident)  Patient presents with right leg weakness.  Started on ASA.  Statin increased to 40 mg daily.  Started on Plavix for 21 days.  PT/OT evaluated patient yesterday.  He was doing well and planned to be discharged with  today.  Therapy evaluated patient today and weakness apparently worse.  Patient dragging right leg and unable to ambulate like yesterday.  Cancel discharge.  Continue therapy and await further recommendations.      PT,OT recommending rehab,consulted SW.    Obesity (BMI 30.0-34.9)  Will recommend diet and lifestyle modifications outpatient    Tobacco dependence  Smokes rare cigarettes per day. Greater than 3 minutes spent counseling patient on dangers of continued tobacco abuse. Will provide tobacco cessation education prior to discharge    Chronic kidney disease, stage III (moderate)  Crt on admission of 2.1. baseline of 1.7 to 2.1  Renal dose medications avoid nephrotoxic drugs monitor intake and output  -improved and at baseline      Hypertension  Was Holding home amlodipine and lisinopril for permissive HTN,  Started back on amlodipine today.      VTE Risk Mitigation (From admission, onward)         Ordered     IP VTE HIGH RISK PATIENT  Once         10/09/22 2026     Place sequential compression device  Until discontinued         10/09/22 2026                Discharge Planning   DEVIKA: 10/11/2022     Code Status: Full Code   Is the patient medically ready for discharge?:     Reason for patient still in hospital (select all that apply): Patient trending condition  Discharge Plan A: Home Health   Discharge Delays: None known at this time              Gerard Solano MD  Department of Hospital Medicine   [facility]

## 2022-10-12 NOTE — PT/OT/SLP PROGRESS
Speech Language Pathology Treatment    Patient Name:  Leonides Burns   MRN:  [id number]  Admitting Diagnosis: CVA (cerebral vascular accident)    Recommendations:                 General Recommendations:  Cognitive-linguistic therapy  Diet recommendations:  Regular, Liquid Diet Level: Thin   Aspiration Precautions: 1 bite/sip at a time   General Precautions: Standard, aspiration  Communication strategies:  provide increased time to answer    Subjective   Pt improved response time. Some non specific speech persists at level of conversation however marked improvement as compared to yesterday. Pt had brief tearful moment but was able to be distracted with humor, motivation was good throughout.   Patient goals: return to baseline     Pain/Comfort:  Pain Rating 1: 0/10    Respiratory Status: Room air    Objective:     Has the patient been evaluated by SLP for swallowing?   Yes  Keep patient NPO? No     Pt performed divergent naming task with min-mod assist. Pt was independently able to recognize when he repeated a target which is improved from yesterday. He was able to perform simple sequential story with significantly improved timeliness and specificity as compared to yesterday. Pt independently named 3 descriptors given target without delay conversely  Pt independently named simple target given 3 descriptors without delay. During informal conversation Pt self identified episode of anomia. He was educated regarding word finding strategies and performed circumlocution tasks with mod assist and independently performed gestures. Pt performed multiple step commands regarding before and after mild delay    Assessment:     Leonides Burns is a 77 y.o. male with dx of CVA he presents with moderate cognitive linguistic deficits improved from yesterday however deficits below baseline persist.     Goals:   Multidisciplinary Problems       SLP Goals          Problem: SLP    Goal Priority Disciplines Outcome   SLP Goal    Low SLP  Ongoing, Progressing   Description: STGS  1. Pt will perform multiple step commands regarding before and after with 90% acc.  2. Pt will perform divergent naming tasks with min assist  3. Pt will delayed recall tasks with min assist.                        Plan:     Patient to be seen:  3-5x  Plan of Care expires:  10/20/22  Plan of Care reviewed with:  patient   SLP Follow-Up:  Yes       Discharge recommendations: inpatient rehab  Barriers to Discharge:  None    Time Tracking:     SLP Treatment Date:   10/12/22  Speech Start Time:  1250  Speech Stop Time:  1314     Speech Total Time (min):  24 min    Billable Minutes: Speech Therapy Individual 12 and Treatment Swallowing Dysfunction 12    10/12/2022

## 2022-10-12 NOTE — SUBJECTIVE & OBJECTIVE
Interval History: no complaints.  Weaker right leg per therapy.    Review of Systems   HENT:  Negative for ear discharge and ear pain.    Eyes:  Negative for discharge and itching.   Endocrine: Negative for cold intolerance and heat intolerance.   Neurological:  Negative for seizures and syncope.   Objective:     Vital Signs (Most Recent):  Temp: 97.1 °F (36.2 °C) (10/12/22 0829)  Pulse: 83 (10/12/22 0829)  Resp: 18 (10/12/22 0829)  BP: (!) 155/80 (10/12/22 0829)  SpO2: 97 % (10/12/22 0829)   Vital Signs (24h Range):  Temp:  [96.1 °F (35.6 °C)-98.7 °F (37.1 °C)] 97.1 °F (36.2 °C)  Pulse:  [67-83] 83  Resp:  [18-20] 18  SpO2:  [94 %-98 %] 97 %  BP: (155-181)/(75-90) 155/80     Weight: 92.1 kg (203 lb)  Body mass index is 30.87 kg/m².    Intake/Output Summary (Last 24 hours) at 10/12/2022 1037  Last data filed at 10/12/2022 0829  Gross per 24 hour   Intake 120 ml   Output 600 ml   Net -480 ml        Physical Exam  Vitals reviewed.   Constitutional:       General: He is not in acute distress.  HENT:      Head: Normocephalic and atraumatic.      Nose: No congestion or rhinorrhea.   Eyes:      General: No scleral icterus.     Extraocular Movements: Extraocular movements intact.   Cardiovascular:      Rate and Rhythm: Normal rate and regular rhythm.   Pulmonary:      Effort: Pulmonary effort is normal. No respiratory distress.   Abdominal:      General: There is no distension.      Palpations: Abdomen is soft.      Tenderness: There is no abdominal tenderness.   Musculoskeletal:         General: No swelling or tenderness.      Cervical back: Neck supple. No rigidity.   Skin:     General: Skin is warm and dry.   Neurological:      Mental Status: He is alert and oriented to person, place, and time.      Gait: Gait abnormal.   Psychiatric:         Mood and Affect: Mood normal.         Behavior: Behavior normal.       Significant Labs: All pertinent labs within the past 24 hours have been reviewed.  BMP:   Recent Labs   Lab  10/11/22  0336   GLU 88      K 4.7      CO2 22*   BUN 25*   CREATININE 1.7*   CALCIUM 9.0       CBC:   Recent Labs   Lab 10/11/22  0336   WBC 6.08   HGB 14.7   HCT 44.8            Significant Imaging: I have reviewed all pertinent imaging results/findings within the past 24 hours.

## 2022-10-12 NOTE — ASSESSMENT & PLAN NOTE
Patient presents with right leg weakness.  Started on ASA.  Statin increased to 40 mg daily.  Started on Plavix for 21 days.  PT/OT evaluated patient yesterday.  He was doing well and planned to be discharged with  today.  Therapy evaluated patient today and weakness apparently worse.  Patient dragging right leg and unable to ambulate like yesterday.  Cancel discharge.  Continue therapy and await further recommendations.      PT,OT recommending rehab,consulted SW.

## 2022-10-12 NOTE — PLAN OF CARE
[facility] on [facility]. There was no answer. LACY left a message for Mini.     LACY called [facility] to follow up on referral. There was no answer. LACY left a message.

## 2022-10-13 LAB
ALBUMIN SERPL BCP-MCNC: 3 G/DL (ref 3.5–5.2)
ALP SERPL-CCNC: 72 U/L (ref 55–135)
ALT SERPL W/O P-5'-P-CCNC: 16 U/L (ref 10–44)
ANION GAP SERPL CALC-SCNC: 10 MMOL/L (ref 8–16)
AST SERPL-CCNC: 24 U/L (ref 10–40)
BILIRUB SERPL-MCNC: 0.4 MG/DL (ref 0.1–1)
BUN SERPL-MCNC: 26 MG/DL (ref 8–23)
CALCIUM SERPL-MCNC: 9.2 MG/DL (ref 8.7–10.5)
CHLORIDE SERPL-SCNC: 110 MMOL/L (ref 95–110)
CO2 SERPL-SCNC: 20 MMOL/L (ref 23–29)
CREAT SERPL-MCNC: 2 MG/DL (ref 0.5–1.4)
EST. GFR  (NO RACE VARIABLE): 34 ML/MIN/1.73 M^2
GLUCOSE SERPL-MCNC: 138 MG/DL (ref 70–110)
MAGNESIUM SERPL-MCNC: 2.1 MG/DL (ref 1.6–2.6)
POCT GLUCOSE: 104 MG/DL (ref 70–110)
POCT GLUCOSE: 116 MG/DL (ref 70–110)
POCT GLUCOSE: 127 MG/DL (ref 70–110)
POTASSIUM SERPL-SCNC: 4.6 MMOL/L (ref 3.5–5.1)
PROT SERPL-MCNC: 7 G/DL (ref 6–8.4)
SODIUM SERPL-SCNC: 140 MMOL/L (ref 136–145)

## 2022-10-13 PROCEDURE — 99223 1ST HOSP IP/OBS HIGH 75: CPT | Mod: ,,, | Performed by: REGISTERED NURSE

## 2022-10-13 PROCEDURE — 63600175 PHARM REV CODE 636 W HCPCS: Performed by: HOSPITALIST

## 2022-10-13 PROCEDURE — 27000221 HC OXYGEN, UP TO 24 HOURS

## 2022-10-13 PROCEDURE — 20000000 HC ICU ROOM

## 2022-10-13 PROCEDURE — 99497 ADVNCD CARE PLAN 30 MIN: CPT | Mod: 25,,, | Performed by: REGISTERED NURSE

## 2022-10-13 PROCEDURE — 25000003 PHARM REV CODE 250: Performed by: STUDENT IN AN ORGANIZED HEALTH CARE EDUCATION/TRAINING PROGRAM

## 2022-10-13 PROCEDURE — 25000003 PHARM REV CODE 250: Performed by: HOSPITALIST

## 2022-10-13 PROCEDURE — 99497 PR ADVNCD CARE PLAN 30 MIN: ICD-10-PCS | Mod: 25,,, | Performed by: REGISTERED NURSE

## 2022-10-13 PROCEDURE — 36415 COLL VENOUS BLD VENIPUNCTURE: CPT | Performed by: HOSPITALIST

## 2022-10-13 PROCEDURE — 63600175 PHARM REV CODE 636 W HCPCS: Performed by: INTERNAL MEDICINE

## 2022-10-13 PROCEDURE — 99223 PR INITIAL HOSPITAL CARE,LEVL III: ICD-10-PCS | Mod: ,,, | Performed by: REGISTERED NURSE

## 2022-10-13 PROCEDURE — 80053 COMPREHEN METABOLIC PANEL: CPT | Performed by: HOSPITALIST

## 2022-10-13 PROCEDURE — 83735 ASSAY OF MAGNESIUM: CPT | Performed by: HOSPITALIST

## 2022-10-13 PROCEDURE — 25000003 PHARM REV CODE 250

## 2022-10-13 PROCEDURE — 94761 N-INVAS EAR/PLS OXIMETRY MLT: CPT

## 2022-10-13 RX ORDER — LEVETIRACETAM 10 MG/ML
1000 INJECTION INTRAVASCULAR ONCE
Status: COMPLETED | OUTPATIENT
Start: 2022-10-13 | End: 2022-10-13

## 2022-10-13 RX ORDER — LEVETIRACETAM 10 MG/ML
1000 INJECTION INTRAVASCULAR EVERY 12 HOURS
Status: DISCONTINUED | OUTPATIENT
Start: 2022-10-13 | End: 2022-10-18

## 2022-10-13 RX ORDER — LABETALOL HYDROCHLORIDE 5 MG/ML
INJECTION, SOLUTION INTRAVENOUS
Status: COMPLETED
Start: 2022-10-13 | End: 2022-10-13

## 2022-10-13 RX ORDER — HYDRALAZINE HYDROCHLORIDE 25 MG/1
100 TABLET, FILM COATED ORAL EVERY 8 HOURS
Status: DISCONTINUED | OUTPATIENT
Start: 2022-10-13 | End: 2022-10-18

## 2022-10-13 RX ORDER — SODIUM CHLORIDE 450 MG/100ML
INJECTION, SOLUTION INTRAVENOUS CONTINUOUS
Status: DISCONTINUED | OUTPATIENT
Start: 2022-10-13 | End: 2022-10-16

## 2022-10-13 RX ORDER — LEVETIRACETAM 10 MG/ML
1000 INJECTION INTRAVASCULAR
Status: DISCONTINUED | OUTPATIENT
Start: 2022-10-13 | End: 2022-10-13

## 2022-10-13 RX ORDER — DEXTROSE MONOHYDRATE AND SODIUM CHLORIDE 5; .9 G/100ML; G/100ML
INJECTION, SOLUTION INTRAVENOUS CONTINUOUS
Status: DISCONTINUED | OUTPATIENT
Start: 2022-10-13 | End: 2022-10-13

## 2022-10-13 RX ORDER — LEVETIRACETAM 500 MG/1
500 TABLET ORAL 2 TIMES DAILY
Status: DISCONTINUED | OUTPATIENT
Start: 2022-10-13 | End: 2022-10-13

## 2022-10-13 RX ORDER — GLUCAGON 1 MG
1 KIT INJECTION
Status: DISCONTINUED | OUTPATIENT
Start: 2022-10-13 | End: 2022-10-28 | Stop reason: HOSPADM

## 2022-10-13 RX ORDER — LORAZEPAM 2 MG/ML
2 INJECTION INTRAMUSCULAR
Status: DISCONTINUED | OUTPATIENT
Start: 2022-10-13 | End: 2022-10-13

## 2022-10-13 RX ORDER — INSULIN ASPART 100 [IU]/ML
0-5 INJECTION, SOLUTION INTRAVENOUS; SUBCUTANEOUS EVERY 6 HOURS PRN
Status: DISCONTINUED | OUTPATIENT
Start: 2022-10-13 | End: 2022-10-18

## 2022-10-13 RX ORDER — HYDRALAZINE HYDROCHLORIDE 25 MG/1
50 TABLET, FILM COATED ORAL EVERY 8 HOURS
Status: DISCONTINUED | OUTPATIENT
Start: 2022-10-13 | End: 2022-10-13

## 2022-10-13 RX ORDER — LORAZEPAM 2 MG/ML
2 INJECTION INTRAMUSCULAR
Status: DISCONTINUED | OUTPATIENT
Start: 2022-10-13 | End: 2022-10-18

## 2022-10-13 RX ORDER — LABETALOL HYDROCHLORIDE 5 MG/ML
10 INJECTION, SOLUTION INTRAVENOUS ONCE
Status: COMPLETED | OUTPATIENT
Start: 2022-10-13 | End: 2022-10-13

## 2022-10-13 RX ORDER — HYDRALAZINE HYDROCHLORIDE 20 MG/ML
10 INJECTION INTRAMUSCULAR; INTRAVENOUS EVERY 4 HOURS PRN
Status: DISCONTINUED | OUTPATIENT
Start: 2022-10-13 | End: 2022-10-18

## 2022-10-13 RX ADMIN — HYDRALAZINE HYDROCHLORIDE 100 MG: 25 TABLET, FILM COATED ORAL at 09:10

## 2022-10-13 RX ADMIN — ASPIRIN 81 MG: 81 TABLET, COATED ORAL at 09:10

## 2022-10-13 RX ADMIN — ATORVASTATIN CALCIUM 40 MG: 40 TABLET, FILM COATED ORAL at 09:10

## 2022-10-13 RX ADMIN — HYDRALAZINE HYDROCHLORIDE 50 MG: 25 TABLET, FILM COATED ORAL at 09:10

## 2022-10-13 RX ADMIN — DEXTROSE AND SODIUM CHLORIDE: 5; .9 INJECTION, SOLUTION INTRAVENOUS at 02:10

## 2022-10-13 RX ADMIN — LABETALOL HYDROCHLORIDE 10 MG: 5 INJECTION, SOLUTION INTRAVENOUS at 01:10

## 2022-10-13 RX ADMIN — LEVETIRACETAM INJECTION 1000 MG: 10 INJECTION INTRAVENOUS at 09:10

## 2022-10-13 RX ADMIN — LEVETIRACETAM INJECTION 1000 MG: 10 INJECTION INTRAVENOUS at 02:10

## 2022-10-13 RX ADMIN — LORAZEPAM 2 MG: 2 INJECTION INTRAMUSCULAR; INTRAVENOUS at 03:10

## 2022-10-13 RX ADMIN — AMLODIPINE BESYLATE 10 MG: 5 TABLET ORAL at 09:10

## 2022-10-13 RX ADMIN — HYDRALAZINE HYDROCHLORIDE 10 MG: 20 INJECTION INTRAMUSCULAR; INTRAVENOUS at 05:10

## 2022-10-13 RX ADMIN — SODIUM CHLORIDE: 0.45 INJECTION, SOLUTION INTRAVENOUS at 05:10

## 2022-10-13 RX ADMIN — CLOPIDOGREL BISULFATE 75 MG: 75 TABLET ORAL at 09:10

## 2022-10-13 RX ADMIN — LABETALOL HYDROCHLORIDE 10 MG: 5 INJECTION INTRAVENOUS at 01:10

## 2022-10-13 NOTE — PLAN OF CARE
[facility] contacted [facility]Y to inform that a nurse will review referral and call SW back.     LACY received a call from [facility]. They are also going to review patient's referral for placement.       8:43 AM    [facility] to inform that nurse is going to review referral.    10:00 AM    Isela with [facility] notified SW that they have accepted patient. SW informed that son will be notified. LACY informed Isela that she will call her back.     10:25 am    LACY discussed discharge planning with patient's son, Leonides. LACY informed that [facility] wants to accept. Leonides stated that they called and he wanted to speak with LACY first. Leonides stated that he would really like to wait to hear if [facility] accepts before making a decision. LACY will follow up with [facility].        10/13/22 0845   Post-Acute Status   Post-Acute Authorization Placement  (Rehab)   Post-Acute Placement Status Pending post-acute provider review/more information requested   Discharge Delays (!) Post-Acute Set-up   Discharge Plan   Discharge Plan A Rehab   Discharge Plan B Home Health

## 2022-10-13 NOTE — PT/OT/SLP PROGRESS
Speech Language Pathology      Leonides Burns  MRN: [id number]    Patient not seen today secondary to rapid response called for seizure-like symptoms. ST will follow-up 10/14, if appropriate.    Mercy Pate, ELMER-SLP

## 2022-10-13 NOTE — CARE UPDATE
Responded to rapid response for second seizure in one hour.  Seizure resolved with Ativan.  Will move to ICU. May need to start on Ativan drip.  Notified Dr. Villaseñor

## 2022-10-13 NOTE — PT/OT/SLP PROGRESS
Occupational Therapy      Patient Name:  Leonides Burns   MRN:  [id number]    Patient not seen today secondary to:     AM attempt: pt seated up in the chair eating lunch with son present. OT donned B/L non-skid socks for safety and edu on importance of wearing them for all OOB activity.     PM attempt: rapid response called 2* seizure. Per chart, pt to transfer to ICU. Will follow-up 10/14/22.     10/13/2022

## 2022-10-13 NOTE — CONSULTS
- witnessed pt having active seizure and assisted in calling of rapid response  - provided emotional support to pt's son who was at bedside at time of seizure  - discussed pt's baseline with son and learned more about pt outside of hospital admission while comforting son in family waiting area, while rapid response team assessed pt and planned for transfer to CT   - pt discussed and consult received by Dr. Gee (primary during this time)  - pt later experienced another episode of seizure activity requiring additional rapid response, and planned transfer to ICU  - met with pt's sonZander Jr., to discusses GOC/ACP if seizures continue or if pt were to decline overnight.  Due to new seizure activity and pt's previous independence this is of shock to son and present family.  They advocate for continued full code at this time and would agree to intubation if needed to protect airway if continued seizure activity.    - planned for continued GOC/ACP discussion once more information available to son and pt.   - full consult note to follow

## 2022-10-13 NOTE — SUBJECTIVE & OBJECTIVE
Interval History: no complaints.  Weaker right leg per therapy.  Cc is weakness.  Review of Systems   HENT:  Negative for ear discharge and ear pain.    Eyes:  Negative for discharge and itching.   Endocrine: Negative for cold intolerance and heat intolerance.   Neurological:  Negative for seizures and syncope.   Objective:     Vital Signs (Most Recent):  Temp: 98 °F (36.7 °C) (10/13/22 0815)  Pulse: 82 (10/13/22 0815)  Resp: 18 (10/13/22 0815)  BP: (!) 140/73 (10/13/22 0815)  SpO2: (!) 94 % (10/13/22 0815)   Vital Signs (24h Range):  Temp:  [97 °F (36.1 °C)-98.6 °F (37 °C)] 98 °F (36.7 °C)  Pulse:  [68-88] 82  Resp:  [18] 18  SpO2:  [93 %-98 %] 94 %  BP: (140-183)/(67-85) 140/73     Weight: 92.1 kg (203 lb)  Body mass index is 30.87 kg/m².    Intake/Output Summary (Last 24 hours) at 10/13/2022 1057  Last data filed at 10/13/2022 0653  Gross per 24 hour   Intake 120 ml   Output 500 ml   Net -380 ml        Physical Exam  Vitals reviewed.   Constitutional:       General: He is not in acute distress.  HENT:      Head: Normocephalic and atraumatic.      Nose: No congestion or rhinorrhea.   Eyes:      General: No scleral icterus.     Extraocular Movements: Extraocular movements intact.   Cardiovascular:      Rate and Rhythm: Normal rate and regular rhythm.   Pulmonary:      Effort: Pulmonary effort is normal. No respiratory distress.   Abdominal:      General: There is no distension.      Palpations: Abdomen is soft.      Tenderness: There is no abdominal tenderness.   Musculoskeletal:         General: No swelling or tenderness.      Cervical back: Neck supple. No rigidity.   Skin:     General: Skin is warm and dry.   Neurological:      Mental Status: He is alert and oriented to person, place, and time.      Gait: Gait abnormal.   Psychiatric:         Mood and Affect: Mood normal.         Behavior: Behavior normal.       Significant Labs: All pertinent labs within the past 24 hours have been reviewed.  BMP:   No results  for input(s): GLU, NA, K, CL, CO2, BUN, CREATININE, CALCIUM, MG in the last 48 hours.    CBC:   No results for input(s): WBC, HGB, HCT, PLT in the last 48 hours.      Significant Imaging: I have reviewed all pertinent imaging results/findings within the past 24 hours.

## 2022-10-13 NOTE — PROGRESS NOTES
[facility]  Progress Note    Patient Name: Leonides Burns  MRN: [id number]  Patient Class: IP- Inpatient   Admission Date: 10/9/2022  Length of Stay: 2 days  Attending Physician: Gerard Solano MD  Primary Care Provider: Suzette Akhtar MD        Subjective:     Principal Problem:CVA (cerebral vascular accident)        HPI:  Leonides Burns 77 y.o. male with CKD, HTN, tobacco abuse presents to the hospital with a chief complaint of right leg weakness.  He reports 2 days of right lower extremity weakness and discoordination and double vision.  States the symptoms have been constant without aggravating factors.  He sometimes feels that improved with lying flat.  He reports he previously had stop aspirin due to severe and recurrent epistaxis.  He denies fevers chest pain shortness breast nausea vomiting abdominal pain leg swelling syncope numbness weakness of an arm incontinence weakness of his left leg.    In the ED, creatinine 2.1 CT head without acute abnormality, chest x-ray without focal consolidation afebrile without leukocytosis COVID negative LDL 80.6 TSH within normal limits.      Overview/Hospital Course:  77M with pmh of  CKD, HTN, tobacco abuse presents to the hospital with a chief complaint of right leg weakness. MRI with Small acute punctate left parasagittal frontal lobe. PT/OT/SLP consulted. Echo and carotids unremarkable. Neurology consulted.  Patient continued on ASA.  His Statin increased to 40 mg daily and he will be started on Plavix for 21 days.  Started gradually on BP med;s  PT,OT recommending rehab,consulted SW.  CC is weakness.          Interval History: no complaints.  Weaker right leg per therapy.  Cc is weakness.  Review of Systems   HENT:  Negative for ear discharge and ear pain.    Eyes:  Negative for discharge and itching.   Endocrine: Negative for cold intolerance and heat intolerance.   Neurological:  Negative for seizures and syncope.    Objective:     Vital Signs (Most Recent):  Temp: 98 °F (36.7 °C) (10/13/22 0815)  Pulse: 82 (10/13/22 0815)  Resp: 18 (10/13/22 0815)  BP: (!) 140/73 (10/13/22 0815)  SpO2: (!) 94 % (10/13/22 0815)   Vital Signs (24h Range):  Temp:  [97 °F (36.1 °C)-98.6 °F (37 °C)] 98 °F (36.7 °C)  Pulse:  [68-88] 82  Resp:  [18] 18  SpO2:  [93 %-98 %] 94 %  BP: (140-183)/(67-85) 140/73     Weight: 92.1 kg (203 lb)  Body mass index is 30.87 kg/m².    Intake/Output Summary (Last 24 hours) at 10/13/2022 1057  Last data filed at 10/13/2022 0653  Gross per 24 hour   Intake 120 ml   Output 500 ml   Net -380 ml        Physical Exam  Vitals reviewed.   Constitutional:       General: He is not in acute distress.  HENT:      Head: Normocephalic and atraumatic.      Nose: No congestion or rhinorrhea.   Eyes:      General: No scleral icterus.     Extraocular Movements: Extraocular movements intact.   Cardiovascular:      Rate and Rhythm: Normal rate and regular rhythm.   Pulmonary:      Effort: Pulmonary effort is normal. No respiratory distress.   Abdominal:      General: There is no distension.      Palpations: Abdomen is soft.      Tenderness: There is no abdominal tenderness.   Musculoskeletal:         General: No swelling or tenderness.      Cervical back: Neck supple. No rigidity.   Skin:     General: Skin is warm and dry.   Neurological:      Mental Status: He is alert and oriented to person, place, and time.      Gait: Gait abnormal.   Psychiatric:         Mood and Affect: Mood normal.         Behavior: Behavior normal.       Significant Labs: All pertinent labs within the past 24 hours have been reviewed.  BMP:   No results for input(s): GLU, NA, K, CL, CO2, BUN, CREATININE, CALCIUM, MG in the last 48 hours.    CBC:   No results for input(s): WBC, HGB, HCT, PLT in the last 48 hours.      Significant Imaging: I have reviewed all pertinent imaging results/findings within the past 24 hours.    CC is weakness.  Assessment/Plan:      *  CVA (cerebral vascular accident)  Patient presents with right leg weakness.  Started on ASA.  Statin increased to 40 mg daily.  Started on Plavix for 21 days.  PT/OT evaluated patient yesterday.  He was doing well and planned to be discharged with  today.  Therapy evaluated patient today and weakness apparently worse.  Patient dragging right leg and unable to ambulate like yesterday.  Cancel discharge.  Continue therapy and await further recommendations.  gradually started on BM med;s.    PT,OT recommending rehab,consulted SW.    Obesity (BMI 30.0-34.9)  Will recommend diet and lifestyle modifications outpatient    Tobacco dependence  Smokes rare cigarettes per day. Greater than 3 minutes spent counseling patient on dangers of continued tobacco abuse. Will provide tobacco cessation education prior to discharge    Chronic kidney disease, stage III (moderate)  Crt on admission of 2.1. baseline of 1.7 to 2.1  Renal dose medications avoid nephrotoxic drugs monitor intake and output  -improved and at baseline      Hypertension  Was Holding home amlodipine and lisinopril for permissive HTN,  gradually started on BM med;s.      VTE Risk Mitigation (From admission, onward)         Ordered     IP VTE HIGH RISK PATIENT  Once         10/09/22 2026     Place sequential compression device  Until discontinued         10/09/22 2026                Discharge Planning   DEVIKA: 10/11/2022     Code Status: Full Code   Is the patient medically ready for discharge?:     Reason for patient still in hospital (select all that apply): Patient trending condition  Discharge Plan A: Rehab   Discharge Delays: (!) Post-Acute Set-up              Gerard Solano MD  Department of [facility]   [facility]

## 2022-10-13 NOTE — ASSESSMENT & PLAN NOTE
Patient presents with right leg weakness.  Started on ASA.  Statin increased to 40 mg daily.  Started on Plavix for 21 days.  PT/OT evaluated patient yesterday.  He was doing well and planned to be discharged with  today.  Therapy evaluated patient today and weakness apparently worse.  Patient dragging right leg and unable to ambulate like yesterday.  Cancel discharge.  Continue therapy and await further recommendations.  gradually started on BM med;s.    PT,OT recommending rehab,consulted SW.

## 2022-10-13 NOTE — NURSING
RAPID RESPONSE NURSE NOTE        Admit Date: 10/9/2022  LOS: 2  Code Status: Full Code   Date of Consult: 10/13/2022  : 1945  Age: 77 y.o.  Weight:   Wt Readings from Last 1 Encounters:   10/09/22 92.1 kg (203 lb)     Sex: male  Race: Black or    Bed: [place] [address] A:   MRN: [id number]  Time Rapid Response Team page Received: 1504  Time Rapid Response Team at Bedside: 1510  Time Rapid Response Team left Bedside: 1545  Was the patient discharged from an ICU this admission? No   Was the patient discharged from a PACU within last 24 hours? No   Did the patient receive conscious sedation/general anesthesia in last 24 hours? No   Was the patient in the ED within the past 24 hours? No   Was the patient on NIPPV within the past 24 hours? No   Did this progress into an ARC or CPA:  No  Attending Physician: Gerard Solano MD  Primary Service: Hospitalist     SITUATION     Notified by overhead page.  Reason for alert: seizure   Called to evaluate the patient for Neuro    Why is the patient in the hospital?: CVA (cerebral vascular accident)    Patient has a past medical history of Disorder of kidney and ureter, Elevated PSA, Glaucoma, Hyperlipidemia, Hypertension, and Psoriasis.    Last Vitals:  Temp: 98.5 °F (36.9 °C) (10/13 1141)  Pulse: 84 (10/13 1316)  Resp: 20 (10/13 1310)  BP: 179/77 (10/13 1316)  SpO2: 97 % (10/13 1316)    24 Hours Vitals Range:  Temp:  [98 °F (36.7 °C)-98.6 °F (37 °C)]   Pulse:  [68-88]   Resp:  [18-20]   BP: (140-190)/(67-85)   SpO2:  [93 %-98 %]     Labs:  Recent Labs     10/11/22  0336   WBC 6.08   HGB 14.7   HCT 44.8          Recent Labs     10/11/22  0336 10/13/22  1400    140   K 4.7 4.6    110   CO2 22* 20*   CREATININE 1.7* 2.0*   GLU 88 138*   MG  --  2.1        No results for input(s): PH, PCO2, PO2, HCO3, POCSATURATED, BE in the last 72 hours.     ASSESSMENT/INTERVENTIONS    The patient was seen for a Neurological problem. Staff concerns included  seizure-like activity. The following interventions were performed: POCT glucose and seizure precautions.    RECOMMENDATIONS    We recommend:Per MD, pt received IV ativan and to be transferred to ICU     Discussed plan of care with bedside RNAdelina    PROVIDER ESCALATION    Orders received and case discussed with Dr. Oneill .    Disposition: Tx in ICU bed 266    FOLLOW UP  Call the Rapid Response NurseLexii at x [phone] for additional questions or concerns.

## 2022-10-13 NOTE — PLAN OF CARE
Problem: Adult Inpatient Plan of Care  Goal: Plan of Care Review  Outcome: Ongoing, Progressing  Goal: Patient-Specific Goal (Individualized)  Outcome: Ongoing, Progressing  Goal: Absence of Hospital-Acquired Illness or Injury  Outcome: Ongoing, Progressing  Goal: Optimal Comfort and Wellbeing  Outcome: Ongoing, Progressing  Goal: Readiness for Transition of Care  Outcome: Ongoing, Progressing     Problem: Fall Injury Risk  Goal: Absence of Fall and Fall-Related Injury  Outcome: Ongoing, Progressing     Problem: Infection  Goal: Absence of Infection Signs and Symptoms  Outcome: Ongoing, Progressing     Problem: Infection  Goal: Absence of Infection Signs and Symptoms  Outcome: Ongoing, Progressing     Problem: Skin Injury Risk Increased  Goal: Skin Health and Integrity  Outcome: Ongoing, Progressing

## 2022-10-13 NOTE — NURSING
[place] pt's son yell out for help. When RN arrived to room, pt was sitting in chair having seizure like activity. Pt did not fall from chair or hit head while in chair. Episode lasted approx 2-3 minutes. Staff held pt straight up and provided cushion for pt. Once help arrived after calling a Rapid, pt was placed in bed by 2 staff RNs. Rapid team arrived to assist with care.

## 2022-10-13 NOTE — NURSING
Rapid called overhead for patient having seizure like activity. PRN ativan given by LAURO Cano. Seizure like activity subsided. Vitals WNL. Patient transported to ICU for closer monitoring. Report given to Rachael GARAY RN. Son at bedside and notified of transfer.

## 2022-10-13 NOTE — NURSING
RAPID RESPONSE NURSE NOTE        Admit Date: 10/9/2022  LOS: 2  Code Status: Full Code   Date of Consult: 10/13/2022  : 1945  Age: 77 y.o.  Weight:   Wt Readings from Last 1 Encounters:   10/09/22 92.1 kg (203 lb)     Sex: male  Race: Black or    Bed: [place] [address] A:   MRN: [id number]  Time Rapid Response Team page Received: 1315  Time Rapid Response Team at Bedside: 1320  Time Rapid Response Team left Bedside: 1345  Was the patient discharged from an ICU this admission? No   Was the patient discharged from a PACU within last 24 hours? No   Did the patient receive conscious sedation/general anesthesia in last 24 hours? No   Was the patient in the ED within the past 24 hours? No   Was the patient on NIPPV within the past 24 hours? No   Did this progress into an ARC or CPA:  No  Attending Physician: Gerard Solano MD  Primary Service: Hospitalist     SITUATION     Notified by code pager.  Reason for alert: seizure  Called to evaluate the patient for Neuro    Why is the patient in the hospital?: CVA (cerebral vascular accident)    Patient has a past medical history of Disorder of kidney and ureter, Elevated PSA, Glaucoma, Hyperlipidemia, Hypertension, and Psoriasis.    Last Vitals:  Temp: 98.5 °F (36.9 °C) (10/13 1141)  Pulse: 87 (10/13 1141)  Resp: 18 (10/13 1141)  BP: 153/74 (10/13 1141)  SpO2: 96 % (10/13 1141)    24 Hours Vitals Range:  Temp:  [98 °F (36.7 °C)-98.6 °F (37 °C)]   Pulse:  [68-88]   Resp:  [18]   BP: (140-183)/(67-85)   SpO2:  [93 %-98 %]     Labs:  Recent Labs     10/11/22  0336   WBC 6.08   HGB 14.7   HCT 44.8          Recent Labs     10/11/22  0336      K 4.7      CO2 22*   CREATININE 1.7*   GLU 88        No results for input(s): PH, PCO2, PO2, HCO3, POCSATURATED, BE in the last 72 hours.     ASSESSMENT/INTERVENTIONS    The patient was seen for a Neurological problem. Staff concerns included seizure-like activity. The following interventions were  performed: POCT glucose and seizure precautions. Stat head CT and labetalol given per MD at bedside for hypertension.    RECOMMENDATIONS    We recommend: MD at bedside, orders received.     Discussed plan of care with bedside RNAdelina    PROVIDER ESCALATION    Orders received and case discussed with Dr. Villaseñor .    Disposition: Remain in room 339    FOLLOW UP  Call the Rapid Response NurseLexii at x [phone] for additional questions or concerns.

## 2022-10-14 PROBLEM — R56.9 SEIZURE: Status: ACTIVE | Noted: 2022-10-14

## 2022-10-14 LAB
ALBUMIN SERPL BCP-MCNC: 2.8 G/DL (ref 3.5–5.2)
ALP SERPL-CCNC: 68 U/L (ref 55–135)
ALT SERPL W/O P-5'-P-CCNC: 17 U/L (ref 10–44)
ANION GAP SERPL CALC-SCNC: 8 MMOL/L (ref 8–16)
AST SERPL-CCNC: 25 U/L (ref 10–40)
BILIRUB SERPL-MCNC: 0.5 MG/DL (ref 0.1–1)
BUN SERPL-MCNC: 20 MG/DL (ref 8–23)
CALCIUM SERPL-MCNC: 8.7 MG/DL (ref 8.7–10.5)
CHLORIDE SERPL-SCNC: 112 MMOL/L (ref 95–110)
CO2 SERPL-SCNC: 18 MMOL/L (ref 23–29)
CREAT SERPL-MCNC: 1.6 MG/DL (ref 0.5–1.4)
EST. GFR  (NO RACE VARIABLE): 44 ML/MIN/1.73 M^2
GLUCOSE SERPL-MCNC: 93 MG/DL (ref 70–110)
MAGNESIUM SERPL-MCNC: 2.1 MG/DL (ref 1.6–2.6)
POCT GLUCOSE: 100 MG/DL (ref 70–110)
POCT GLUCOSE: 89 MG/DL (ref 70–110)
POCT GLUCOSE: 91 MG/DL (ref 70–110)
POCT GLUCOSE: 92 MG/DL (ref 70–110)
POCT GLUCOSE: 97 MG/DL (ref 70–110)
POTASSIUM SERPL-SCNC: 4.2 MMOL/L (ref 3.5–5.1)
PROT SERPL-MCNC: 6.5 G/DL (ref 6–8.4)
SODIUM SERPL-SCNC: 138 MMOL/L (ref 136–145)

## 2022-10-14 PROCEDURE — 25000003 PHARM REV CODE 250: Performed by: HOSPITALIST

## 2022-10-14 PROCEDURE — 27000221 HC OXYGEN, UP TO 24 HOURS

## 2022-10-14 PROCEDURE — 99497 PR ADVNCD CARE PLAN 30 MIN: ICD-10-PCS | Mod: ,,, | Performed by: REGISTERED NURSE

## 2022-10-14 PROCEDURE — 36415 COLL VENOUS BLD VENIPUNCTURE: CPT | Performed by: HOSPITALIST

## 2022-10-14 PROCEDURE — 99233 PR SUBSEQUENT HOSPITAL CARE,LEVL III: ICD-10-PCS | Mod: ,,, | Performed by: REGISTERED NURSE

## 2022-10-14 PROCEDURE — 94761 N-INVAS EAR/PLS OXIMETRY MLT: CPT

## 2022-10-14 PROCEDURE — 97112 NEUROMUSCULAR REEDUCATION: CPT

## 2022-10-14 PROCEDURE — 99233 SBSQ HOSP IP/OBS HIGH 50: CPT | Mod: ,,, | Performed by: REGISTERED NURSE

## 2022-10-14 PROCEDURE — 92526 ORAL FUNCTION THERAPY: CPT

## 2022-10-14 PROCEDURE — 97164 PT RE-EVAL EST PLAN CARE: CPT

## 2022-10-14 PROCEDURE — 80053 COMPREHEN METABOLIC PANEL: CPT | Performed by: HOSPITALIST

## 2022-10-14 PROCEDURE — 92507 TX SP LANG VOICE COMM INDIV: CPT

## 2022-10-14 PROCEDURE — 20000000 HC ICU ROOM

## 2022-10-14 PROCEDURE — 25000003 PHARM REV CODE 250: Performed by: STUDENT IN AN ORGANIZED HEALTH CARE EDUCATION/TRAINING PROGRAM

## 2022-10-14 PROCEDURE — 63600175 PHARM REV CODE 636 W HCPCS: Performed by: INTERNAL MEDICINE

## 2022-10-14 PROCEDURE — 99497 ADVNCD CARE PLAN 30 MIN: CPT | Mod: ,,, | Performed by: REGISTERED NURSE

## 2022-10-14 PROCEDURE — 83735 ASSAY OF MAGNESIUM: CPT | Performed by: HOSPITALIST

## 2022-10-14 RX ADMIN — CLOPIDOGREL BISULFATE 75 MG: 75 TABLET ORAL at 08:10

## 2022-10-14 RX ADMIN — HYDRALAZINE HYDROCHLORIDE 100 MG: 25 TABLET, FILM COATED ORAL at 05:10

## 2022-10-14 RX ADMIN — SODIUM CHLORIDE: 0.45 INJECTION, SOLUTION INTRAVENOUS at 01:10

## 2022-10-14 RX ADMIN — ATORVASTATIN CALCIUM 40 MG: 40 TABLET, FILM COATED ORAL at 09:10

## 2022-10-14 RX ADMIN — AMLODIPINE BESYLATE 10 MG: 5 TABLET ORAL at 08:10

## 2022-10-14 RX ADMIN — LEVETIRACETAM INJECTION 1000 MG: 10 INJECTION INTRAVENOUS at 09:10

## 2022-10-14 RX ADMIN — ASPIRIN 81 MG: 81 TABLET, COATED ORAL at 08:10

## 2022-10-14 RX ADMIN — HYDRALAZINE HYDROCHLORIDE 100 MG: 25 TABLET, FILM COATED ORAL at 01:10

## 2022-10-14 RX ADMIN — SODIUM CHLORIDE: 0.45 INJECTION, SOLUTION INTRAVENOUS at 04:10

## 2022-10-14 RX ADMIN — LEVETIRACETAM INJECTION 1000 MG: 10 INJECTION INTRAVENOUS at 08:10

## 2022-10-14 RX ADMIN — HYDRALAZINE HYDROCHLORIDE 100 MG: 25 TABLET, FILM COATED ORAL at 09:10

## 2022-10-14 NOTE — SUBJECTIVE & OBJECTIVE
Past Medical History:   Diagnosis Date    Disorder of kidney and ureter     Elevated PSA     Glaucoma     Hyperlipidemia     Hypertension     Psoriasis      Past Surgical History:   Procedure Laterality Date    FUNCTIONAL ENDOSCOPIC SINUS SURGERY (FESS) USING COMPUTER-ASSISTED NAVIGATION N/A 2/28/2019    Procedure: FESS, USING COMPUTER-ASSISTED NAVIGATION (ADD ON );  Surgeon: Mikael Serrano MD;  Location: [facility];  Service: ENT;  Laterality: N/A;  (ADD ON )    NASAL SEPTOPLASTY N/A 2/28/2019    Procedure: SEPTOPLASTY, NOSE;  Surgeon: Mikael Serrano MD;  Location: [facility];  Service: ENT;  Laterality: N/A;     Review of patient's allergies indicates:   Allergen Reactions    Latex, natural rubber Rash    Iodine and iodide containing products Rash     Medications:  Continuous Infusions:   sodium chloride 0.45% 100 mL/hr at 10/14/22 1328     Scheduled Meds:   amLODIPine  10 mg Oral Daily    aspirin  81 mg Oral Daily    atorvastatin  40 mg Oral QHS    clopidogreL  75 mg Oral Daily    hydrALAZINE  100 mg Oral Q8H    levetiracetam IV  1,000 mg Intravenous Q12H     PRN Meds:acetaminophen, dextrose 10%, dextrose 10%, glucagon (human recombinant), hydrALAZINE, insulin aspart U-100, lorazepam, melatonin, senna-docusate 8.6-50 mg, sodium chloride 0.9%    Family History       Problem Relation (Age of Onset)    Anemia Sister, Daughter    Cancer Brother    Diabetes Mother    KEVIN disease Sister    Hypertension Mother, Sister    No Known Problems Father, Son          Tobacco Use    Smoking status: Some Days     Packs/day: 0.50     Years: 40.00     Pack years: 20.00     Types: Cigarettes    Smokeless tobacco: Current   Substance and Sexual Activity    Alcohol use: Yes     Alcohol/week: 9.0 standard drinks     Types: 9 Cans of beer per week    Drug use: No    Sexual activity: Yes     Partners: Female       Review of Systems   Constitutional:  Positive for fatigue.   HENT: Negative.          Right facial droop not  present prior to seizure activity yesterday    Respiratory:  Negative for shortness of breath.    Cardiovascular:  Negative for chest pain.   Musculoskeletal:         Unable to move right leg/foot   Neurological:  Positive for speech difficulty and weakness. Negative for dizziness, tremors and headaches.        Pt does not recall seizures from yesterday, not aware of any seizure activity besides 2 events yesterday; reports difficulty finding words and with memory    Psychiatric/Behavioral:          Frustrated with aphasia and memory difficulties    Objective:     Vital Signs (Most Recent):  Temp: 98.1 °F (36.7 °C) (10/14/22 0800)  Pulse: 84 (10/14/22 1100)  Resp: (!) 25 (10/14/22 1100)  BP: (!) 160/116 (10/14/22 1100)  SpO2: (!) 93 % (10/14/22 1100)   Vital Signs (24h Range):  Temp:  [97.9 °F (36.6 °C)-98.3 °F (36.8 °C)] 98.1 °F (36.7 °C)  Pulse:  [62-84] 84  Resp:  [17-46] 25  SpO2:  [92 %-98 %] 93 %  BP: (137-230)/() 160/116     Weight: 92.4 kg (203 lb 11.3 oz)  Body mass index is 30.97 kg/m².    Physical Exam  Vitals and nursing note reviewed.   Constitutional:       General: He is not in acute distress.  HENT:      Head:      Comments: Right lower face droop     Nose: Nose normal.      Mouth/Throat:      Comments: Right lip/mouth droop  Pulmonary:      Effort: Pulmonary effort is normal.      Comments: O2 via NC  Musculoskeletal:      Comments: Unable to move right leg/foot    Neurological:      Mental Status: He is alert and oriented to person, place, and time.      Cranial Nerves: Cranial nerve deficit: unable to move right leg/foot; Hand  L>R.      Motor: Weakness present.      Comments: Aphasia, having difficulty with remembering children's names but after some time could recall    Psychiatric:      Comments: Appears very frustrated/down during moments of aphasia, quickly wiped tear from eyes      Significant Labs: All pertinent labs within the past 24 hours have been reviewed.  CBC:   Recent Labs    Lab 10/11/22  0336   WBC 6.08   HGB 14.7   HCT 44.8   MCV 81*          BMP:  Recent Labs   Lab 10/14/22  0427   GLU 93      K 4.2   *   CO2 18*   BUN 20   CREATININE 1.6*   CALCIUM 8.7   MG 2.1       LFT:  Lab Results   Component Value Date    AST 25 10/14/2022    ALKPHOS 68 10/14/2022    BILITOT 0.5 10/14/2022     Albumin:   Albumin   Date Value Ref Range Status   10/14/2022 2.8 (L) 3.5 - 5.2 g/dL Final     Protein:   Total Protein   Date Value Ref Range Status   10/14/2022 6.5 6.0 - 8.4 g/dL Final     Lactic acid:   No results found for: LACTATE    Significant Imaging: I have reviewed all pertinent imaging results/findings within the past 24 hours.

## 2022-10-14 NOTE — CONSULTS
"[facility] [place] - Intensive Care  Palliative Medicine  Consult Note    Patient Name: Leonides Burns  MRN: [id number]  Admission Date: 10/9/2022  Hospital Length of Stay: 3 days  Code Status: Full Code   Attending Provider: Nain Oneill MD  Consulting Provider: Emelia Horton NP  Primary Care Physician: Suzette Akhtar MD  Principal Problem:CVA (cerebral vascular accident)    Patient information was obtained from relative(s), past medical records, ER records and primary team.      Consults  Assessment/Plan:   Advance Care Planning   Palliative Consult   Date: 10/13/2022  - witnessed pt having active seizure and assisted in calling of rapid response  - provided emotional support to pt's son, Leonides Blackmon,  who was at bedside at time of seizure; introduction to palliative medicine team and role in current care and admission discussed pt's baseline with son and learned more about pt outside of hospital admission while comforting son in family waiting area, while rapid response team assessed pt and planned for transfer to CT; prior to admission pt lives independently at his own home and able to ambulate and perform ADLs without difficulty; son helps when needed; pt otherwise typically "healthy for age" per son  - pt discussed and consult received by Dr. Gee (primary during this time); brief assessment of pt while in post ictal state, pt was stable, alert, but somnolent; pt later experienced another episode of seizure activity requiring additional rapid response, and planned transfer to ICU  - later again met with pt's son, Zander Blackmon, to discusses GOC/ACP if seizures continue or if pt were to decline overnight; due to new seizure activity and pt's previous independence this is of shock to son and present family; they advocate for continued full code at this time and would agree to intubation if needed to protect airway if continued seizure activity  - planned for continued GOC/ACP discussion once " "more information available to son and pt, and neurology assesses pt    - emotional support provided   - Allowed time for questions/concerns; all addressed; expressed availability of myself/palliative team for additional questions/concerns     CVA (cerebral vascular accident)  - presented with right leg weakness; no other major deficits; son reported mental status/cognition at pre-admit baseline prior to seizure activity   - was participating in OT/PT with planned discharge pending but pt exhibited increased weakness with therapy so plan switched to rehab prior to seizure activity on 10/13    Seizure   - 2 seizures pm 10/13; transferred to ICU following second seizure on ativan drop   - now on IV Kepra  - neurology consulted   - CT head 10/13 - without acute findings per report   - son reports only knowing of one epidode of seizure activty 5-6 years ago prior to discharge from a hospital stay, to son's knowledge pt did not require seizure medication following that event    Hypertension  Chronic kidney disease, stage III (moderate)  Tobacco dependence  - histories noted; management per primary     Thank you for your consult. I will follow-up with patient. Please contact us if you have any additional questions.    Subjective:     HPI:   From H&P: "Leonides Burns 77 y.o. male with CKD, HTN, tobacco abuse presents to the hospital with a chief complaint of right leg weakness.  He reports 2 days of right lower extremity weakness and discoordination and double vision.  States the symptoms have been constant without aggravating factors.  He sometimes feels that improved with lying flat.  He reports he previously had stop aspirin due to severe and recurrent epistaxis.  He denies fevers chest pain shortness breast nausea vomiting abdominal pain leg swelling syncope numbness weakness of an arm incontinence weakness of his left leg.     In the ED, creatinine 2.1 CT head without acute abnormality, chest x-ray without focal " "consolidation afebrile without leukocytosis COVID negative LDL 80.6 TSH within normal limits."     Palliative medicine consulted for advance care planning and continuity of care; Met with pt at bedside; for details of visit, see advance care planning section of plan.         Hospital Course:  No notes on file    Past Medical History:   Diagnosis Date    Disorder of kidney and ureter     Elevated PSA     Glaucoma     Hyperlipidemia     Hypertension     Psoriasis      Past Surgical History:   Procedure Laterality Date    FUNCTIONAL ENDOSCOPIC SINUS SURGERY (FESS) USING COMPUTER-ASSISTED NAVIGATION N/A 2/28/2019    Procedure: FESS, USING COMPUTER-ASSISTED NAVIGATION (ADD ON );  Surgeon: Mikael Serrano MD;  Location: [facility];  Service: ENT;  Laterality: N/A;  (ADD ON )    NASAL SEPTOPLASTY N/A 2/28/2019    Procedure: SEPTOPLASTY, NOSE;  Surgeon: Mikael Serrano MD;  Location: [facility];  Service: ENT;  Laterality: N/A;     Review of patient's allergies indicates:   Allergen Reactions    Latex, natural rubber Rash    Iodine and iodide containing products Rash     Medications:  Continuous Infusions:   sodium chloride 0.45% 100 mL/hr at 10/14/22 0600     Scheduled Meds:   amLODIPine  10 mg Oral Daily    aspirin  81 mg Oral Daily    atorvastatin  40 mg Oral QHS    clopidogreL  75 mg Oral Daily    hydrALAZINE  100 mg Oral Q8H    levetiracetam IV  1,000 mg Intravenous Q12H     PRN Meds:acetaminophen, dextrose 10%, dextrose 10%, glucagon (human recombinant), hydrALAZINE, insulin aspart U-100, lorazepam, melatonin, senna-docusate 8.6-50 mg, sodium chloride 0.9%    Family History       Problem Relation (Age of Onset)    Anemia Sister, Daughter    Cancer Brother    Diabetes Mother    KEVIN disease Sister    Hypertension Mother, Sister    No Known Problems Father, Son          Tobacco Use    Smoking status: Some Days     Packs/day: 0.50     Years: 40.00     Pack years: 20.00     Types: Cigarettes    Smokeless tobacco: " Current   Substance and Sexual Activity    Alcohol use: Yes     Alcohol/week: 9.0 standard drinks     Types: 9 Cans of beer per week    Drug use: No    Sexual activity: Yes     Partners: Female       Review of Systems   Unable to perform ROS: Acuity of condition   Objective:     Vital Signs (Most Recent):  Temp: 97.9 °F (36.6 °C) (10/14/22 0315)  Pulse: 68 (10/14/22 0630)  Resp: (!) 23 (10/14/22 0630)  BP: 137/68 (10/14/22 0630)  SpO2: (!) 94 % (10/14/22 0630)   Vital Signs (24h Range):  Temp:  [97.9 °F (36.6 °C)-98.5 °F (36.9 °C)] 97.9 °F (36.6 °C)  Pulse:  [62-87] 68  Resp:  [17-46] 23  SpO2:  [92 %-98 %] 94 %  BP: (137-230)/() 137/68     Weight: 92.4 kg (203 lb 11.3 oz)  Body mass index is 30.97 kg/m².    Physical Exam  Vitals and nursing note reviewed.   Constitutional:       Comments: Assessment brief as rapid response team attending to pt    HENT:      Head: Normocephalic and atraumatic.      Nose: Nose normal.   Pulmonary:      Effort: Pulmonary effort is normal.   Neurological:      Mental Status: He is alert.      Comments: Pt is postictal phase, appears dazed and somnolent but alert     Significant Labs: All pertinent labs within the past 24 hours have been reviewed.  CBC:   Recent Labs   Lab 10/11/22  0336   WBC 6.08   HGB 14.7   HCT 44.8   MCV 81*        BMP:  Recent Labs   Lab 10/14/22  0427   GLU 93      K 4.2   *   CO2 18*   BUN 20   CREATININE 1.6*   CALCIUM 8.7   MG 2.1     LFT:  Lab Results   Component Value Date    AST 25 10/14/2022    ALKPHOS 68 10/14/2022    BILITOT 0.5 10/14/2022     Albumin:   Albumin   Date Value Ref Range Status   10/14/2022 2.8 (L) 3.5 - 5.2 g/dL Final     Protein:   Total Protein   Date Value Ref Range Status   10/14/2022 6.5 6.0 - 8.4 g/dL Final     Lactic acid:   No results found for: LACTATE    Significant Imaging: I have reviewed all pertinent imaging results/findings within the past 24 hours.      Total visit time: 90 minutes    > 50% of  70   min visit spent in chart review, face to face discussion of symptom assessment, coordination of care with other specialists, and discharge planning.    20 min ACP time spent: goals of care, emotional support, formulating and communicating prognosis, exploring burden/ benefit of various approaches of treatment.     Emelia Horton NP  Palliative Medicine  [facility] [place] - Intensive Care

## 2022-10-14 NOTE — CARE UPDATE
[facility] Intensive Care  ICU Shift Summary  Date: 10/13/2022      Prehospitalization: Home  Admit Date / LOS : 10/9/2022/ 2 days    Diagnosis: CVA (cerebral vascular accident)    Consults:        Active: Neuro, OT, PT, ST, and SW       Needed: N/A     Code Status: Full Code   Advanced Directive: <no information>    LDA:  Lines/Drains/Airways       Peripheral Intravenous Line  Duration                  Peripheral IV - Single Lumen 10/13/22 1500 20 G Left;Posterior Hand <1 day         Peripheral IV - Single Lumen 10/13/22 1545 20 G Posterior;Right Hand <1 day                  Central Lines/Site/Justification:Patient Does Not Have Central Line  Urinary Cath/Order/Justification:Patient Does Not Have Urinary Catheter    Vasopressors/Infusions:    sodium chloride 0.45% 100 mL/hr at 10/13/22 1726          GOALS: Volume/ Hemodynamic: N/A                     RASS: N/A    Pain Management: none       Pain Controlled: not applicable     Rhythm: NSR    Respiratory Device: Nasal Cannula                  Most Recent SBT/ SAT: N/A       MOVE Screen: PT Consult  ICU Liberation: not applicable    VTE Prophylaxis: Pharm  Mobility: Bedrest  Stress Ulcer Prophylaxis: No    Isolation: No active isolations    Dietary:   Current Diet Order   Procedures    Diet renal    Diet Dysphagia Pureed (IDDSI Level 4) Thin     Order Specific Question:   Fluid consistency:     Answer:   Thin      Tolerance: not applicable  Advancement: yes    I & O (24h):    Intake/Output Summary (Last 24 hours) at 10/13/2022 1911  Last data filed at 10/13/2022 1713  Gross per 24 hour   Intake 513.75 ml   Output 500 ml   Net 13.75 ml        Restraints: No    Significant Dates:  Post Op Date: N/A  Rescue Date:  10/13 - RR - seizure  Imaging/ Diagnostics:  CT Head    Noteworthy Labs:  see below    COVID Test: (--)  CBC/Anemia Labs: Coags:    Recent Labs   Lab 10/10/22  0408 10/11/22  0336   WBC 6.33 6.08   HGB 14.5 14.7   HCT 45.6 44.8    331   MCV 81* 81*    RDW 19.6* 19.5*    Recent Labs   Lab 10/09/22  1248 10/10/22  0407   INR 0.9 1.0   APTT  --  29.9        Chemistries:   Recent Labs   Lab 10/10/22  0407 10/11/22  0336 10/13/22  1400    139 140   K 4.6 4.7 4.6    109 110   CO2 23 22* 20*   BUN 22 25* 26*   CREATININE 1.8* 1.7* 2.0*   CALCIUM 9.3 9.0 9.2   PROT 6.8 6.7 7.0   BILITOT 0.5 0.4 0.4   ALKPHOS 63 61 72   ALT 14 14 16   AST 33 32 24   MG 2.2  --  2.1   PHOS 3.9  --   --         Cardiac Enzymes: Ejection Fractions:    No results for input(s): CPK, CPKMB, MB, TROPONINI in the last 72 hours. EF   Date Value Ref Range Status   10/10/2022 60 % Final        POCT Glucose: HbA1c:    Recent Labs   Lab 10/13/22  1313 10/13/22  1502 10/13/22  1735   POCTGLUCOSE 127* 116* 104    Hemoglobin A1C   Date Value Ref Range Status   10/10/2022 5.6 4.0 - 5.6 % Final     Comment:     ADA Screening Guidelines:  5.7-6.4%  Consistent with prediabetes  >or=6.5%  Consistent with diabetes    High levels of fetal hemoglobin interfere with the HbA1C  assay. Heterozygous hemoglobin variants (HbS, HgC, etc)do  not significantly interfere with this assay.   However, presence of multiple variants may affect accuracy.             ICU LOS 3h  Level of Care: Critical Care    Chart Check: 12 HR Done  Shift Summary/Plan for the shift: Pt transferred to ICU to be closely monitored for seizures (x2 on the floor). No seizure activity during shift. Upon arrival to ICU, pt lethargic and required repeated stimuli -> incomprehensible speech -> AAOx3 by end of shift. Sister and son at bedside. Pt remains free from injury. Vs and assessment per flow sheet. Pt remains in ICU. Prn hydralazine given for elevated BP. Neurology and Dr. Villaseñor seen pt at bedside in ICU.

## 2022-10-14 NOTE — CARE UPDATE
[facility] Intensive Care  ICU Shift Summary  Date: 10/14/2022      Prehospitalization: Home  Admit Date / LOS : 10/9/2022/ 3 days    Diagnosis: CVA (cerebral vascular accident)    Consults:        Active: Neuro, OT, PT, ST, and SW       Needed: N/A     Code Status: Full Code   Advanced Directive: <no information>    LDA:  Lines/Drains/Airways       Drain  Duration             Male External Urinary Catheter 10/13/22 2000 <1 day              Peripheral Intravenous Line  Duration                  Peripheral IV - Single Lumen 10/13/22 1500 20 G Left;Posterior Hand <1 day         Peripheral IV - Single Lumen 10/13/22 1545 20 G Posterior;Right Hand <1 day                  Central Lines/Site/Justification:Patient Does Not Have Central Line  Urinary Cath/Order/Justification:Patient Does Not Have Urinary Catheter    Vasopressors/Infusions:    sodium chloride 0.45% 100 mL/hr at 10/14/22 0416          GOALS: Volume/ Hemodynamic: SBP <180                     RASS: 0  alert and calm    Pain Management: none       Pain Controlled: not applicable     Rhythm: NSR    Respiratory Device: Nasal Cannula                  Most Recent SBT/ SAT: N/A       MOVE Screen: PT Consult  ICU Liberation: not applicable    VTE Prophylaxis: Pharm  Mobility: Bedrest  Stress Ulcer Prophylaxis: No    Isolation: No active isolations    Dietary:   Current Diet Order   Procedures    Diet renal    Diet Dysphagia Pureed (IDDSI Level 4) Thin     Order Specific Question:   Fluid consistency:     Answer:   Thin      Tolerance: yes  Advancement: no    I & O (24h):    Intake/Output Summary (Last 24 hours) at 10/14/2022 0525  Last data filed at 10/14/2022 0400  Gross per 24 hour   Intake 1853.9 ml   Output 500 ml   Net 1353.9 ml        Restraints: No    Significant Dates:  Post Op Date: N/A  Rescue Date: N/A  Imaging/ Diagnostics: N/A    Noteworthy Labs:  see labs    COVID Test: (--)  CBC/Anemia Labs: Coags:    Recent Labs   Lab 10/10/22  0408 10/11/22  0338    WBC 6.33 6.08   HGB 14.5 14.7   HCT 45.6 44.8    331   MCV 81* 81*   RDW 19.6* 19.5*    Recent Labs   Lab 10/09/22  1248 10/10/22  0407   INR 0.9 1.0   APTT  --  29.9        Chemistries:   Recent Labs   Lab 10/10/22  0407 10/11/22  0336 10/13/22  1400 10/14/22  0427      < > 140 138   K 4.6   < > 4.6 4.2      < > 110 112*   CO2 23   < > 20* 18*   BUN 22   < > 26* 20   CREATININE 1.8*   < > 2.0* 1.6*   CALCIUM 9.3   < > 9.2 8.7   PROT 6.8   < > 7.0 6.5   BILITOT 0.5   < > 0.4 0.5   ALKPHOS 63   < > 72 68   ALT 14   < > 16 17   AST 33   < > 24 25   MG 2.2  --  2.1 2.1   PHOS 3.9  --   --   --     < > = values in this interval not displayed.        Cardiac Enzymes: Ejection Fractions:    No results for input(s): CPK, CPKMB, MB, TROPONINI in the last 72 hours. EF   Date Value Ref Range Status   10/10/2022 60 % Final        POCT Glucose: HbA1c:    Recent Labs   Lab 10/13/22  1502 10/13/22  1735 10/14/22  0001   POCTGLUCOSE 116* 104 97    Hemoglobin A1C   Date Value Ref Range Status   10/10/2022 5.6 4.0 - 5.6 % Final     Comment:     ADA Screening Guidelines:  5.7-6.4%  Consistent with prediabetes  >or=6.5%  Consistent with diabetes    High levels of fetal hemoglobin interfere with the HbA1C  assay. Heterozygous hemoglobin variants (HbS, HgC, etc)do  not significantly interfere with this assay.   However, presence of multiple variants may affect accuracy.             ICU LOS 13h  Level of Care: Critical Care    Chart Check: 24 HR Done  Shift Summary/Plan for the shift: see previous care plan note

## 2022-10-14 NOTE — CONSULTS
2025: Consult was conducted with the patient's medical provider , the patient's son and sister.  Medical provider reports that the patient has been resting, has had two (2) visitors today; the patient's son and his sister.  Patient sister reports that the patient has several grown children, but they all live out of the state. His son and his sister are the only ones that reside in [place].  Prayers were offered for the patient and his family.  The Spiritual Care Team will continue to provide spiritual support to this patient and his family.        RAVEN Bishop   [phone]

## 2022-10-14 NOTE — PLAN OF CARE
Pt remains in ICU on 5L nasal cannula, disoriented at times to place/situation, speech clear, mild right sided facial drooping noted as well as significant right sided weakness post-CVA, no witnessed seizure activity throughout shift, scheduled Keppra given, max SBP 200s relieved by scheduled Hydralazine, condom cath in place with adequate output, 1/2 NS infusing per order, pt tolerated swallowing pills with water and eating apple sauce.

## 2022-10-14 NOTE — PROGRESS NOTES
[facility] Medicine  Progress Note    Patient Name: Leonides Burns  MRN: [id number]  Patient Class: IP- Inpatient   Admission Date: 10/9/2022  Length of Stay: 3 days  Attending Physician: Nain Oneill MD  Primary Care Provider: Suzette Akhtar MD        Subjective:     Principal Problem:CVA (cerebral vascular accident)        HPI:  Leonides Burns 77 y.o. male with CKD, HTN, tobacco abuse presents to the hospital with a chief complaint of right leg weakness.  He reports 2 days of right lower extremity weakness and discoordination and double vision.  States the symptoms have been constant without aggravating factors.  He sometimes feels that improved with lying flat.  He reports he previously had stop aspirin due to severe and recurrent epistaxis.  He denies fevers chest pain shortness breast nausea vomiting abdominal pain leg swelling syncope numbness weakness of an arm incontinence weakness of his left leg.    In the ED, creatinine 2.1 CT head without acute abnormality, chest x-ray without focal consolidation afebrile without leukocytosis COVID negative LDL 80.6 TSH within normal limits.      Overview/Hospital Course:  77M with pmh of  CKD, HTN, tobacco abuse presents to the hospital with a chief complaint of right leg weakness. MRI with Small acute punctate left parasagittal frontal lobe. PT/OT/SLP consulted. Echo and carotids unremarkable. Neurology consulted.  Patient continued on ASA.  His Statin increased to 40 mg daily and he will be started on Plavix for 21 days.  Started gradually on BP med;s  PT,OT recommending rehab,consulted SW.  Patient had 2 seizures on 10/13 and was sent to the ICU.  Started on IV Keppra.             Interval History:  No complaints. No further seizures     Review of Systems   Constitutional:  Positive for activity change. Negative for appetite change.   HENT:  Negative for congestion and dental problem.    Eyes:  Negative for discharge and  itching.   Respiratory:  Negative for apnea and chest tightness.    Cardiovascular:  Negative for chest pain.   Gastrointestinal:  Negative for abdominal distention and abdominal pain.   Genitourinary:  Negative for difficulty urinating and dysuria.   Musculoskeletal:  Negative for arthralgias.   Allergic/Immunologic: Negative for environmental allergies.   Neurological:  Negative for dizziness and facial asymmetry.   Hematological:  Negative for adenopathy.   Psychiatric/Behavioral:  Negative for agitation and behavioral problems.    Objective:     Vital Signs (Most Recent):  Temp: 97.9 °F (36.6 °C) (10/14/22 0315)  Pulse: 68 (10/14/22 0630)  Resp: (!) 23 (10/14/22 0630)  BP: 137/68 (10/14/22 0630)  SpO2: (!) 94 % (10/14/22 0630)   Vital Signs (24h Range):  Temp:  [97.9 °F (36.6 °C)-98.5 °F (36.9 °C)] 97.9 °F (36.6 °C)  Pulse:  [62-87] 68  Resp:  [17-46] 23  SpO2:  [92 %-98 %] 94 %  BP: (137-230)/() 137/68     Weight: 92.4 kg (203 lb 11.3 oz)  Body mass index is 30.97 kg/m².    Intake/Output Summary (Last 24 hours) at 10/14/2022 0914  Last data filed at 10/14/2022 0600  Gross per 24 hour   Intake 1813.84 ml   Output 550 ml   Net 1263.84 ml      Physical Exam  Vitals and nursing note reviewed.   Constitutional:       General: He is not in acute distress.     Appearance: Normal appearance. He is obese. He is ill-appearing. He is not toxic-appearing or diaphoretic.   HENT:      Head: Normocephalic and atraumatic.      Mouth/Throat:      Pharynx: Oropharynx is clear.   Eyes:      Conjunctiva/sclera: Conjunctivae normal.   Cardiovascular:      Rate and Rhythm: Normal rate and regular rhythm.   Pulmonary:      Effort: Pulmonary effort is normal. No respiratory distress.      Breath sounds: No wheezing.   Abdominal:      General: Bowel sounds are normal. There is no distension.   Skin:     General: Skin is dry.   Neurological:      Mental Status: He is alert and oriented to person, place, and time.   Psychiatric:          Mood and Affect: Mood normal.         Behavior: Behavior normal.       Significant Labs: All pertinent labs within the past 24 hours have been reviewed.  CBC: No results for input(s): WBC, HGB, HCT, PLT in the last 48 hours.  CMP:   Recent Labs   Lab 10/13/22  1400 10/14/22  0427    138   K 4.6 4.2    112*   CO2 20* 18*   * 93   BUN 26* 20   CREATININE 2.0* 1.6*   CALCIUM 9.2 8.7   PROT 7.0 6.5   ALBUMIN 3.0* 2.8*   BILITOT 0.4 0.5   ALKPHOS 72 68   AST 24 25   ALT 16 17   ANIONGAP 10 8       Significant Imaging:       Assessment/Plan:      * CVA (cerebral vascular accident)  Patient presents with right leg weakness.  Started on ASA.  Statin increased to 40 mg daily.  Started on Plavix for 21 days.  PT/OT evaluated patient yesterday.  He was doing well and planned to be discharged with  today.  Therapy evaluated patient today and weakness apparently worse.  Patient dragging right leg and unable to ambulate like yesterday.  Cancel discharge.  Continue therapy and await further recommendations.  gradually started on BM med;s.  SNF vs. Rehab in the end    PT,OT recommending rehab,consulted SW.    Seizure  2 seizure on 10/13. Moved to ICU. Neuro consulted. On IV keppra. None since       Obesity (BMI 30.0-34.9)  Will recommend diet and lifestyle modifications outpatient    Tobacco dependence  Smokes rare cigarettes per day. Greater than 3 minutes spent counseling patient on dangers of continued tobacco abuse. Will provide tobacco cessation education prior to discharge    Chronic kidney disease, stage III (moderate)  Crt on admission of 2.1. baseline of 1.7 to 2.1  Renal dose medications avoid nephrotoxic drugs monitor intake and output  -improved and at baseline      Hypertension  Was Holding home amlodipine and lisinopril for permissive HTN,  gradually started on BM med;s.      VTE Risk Mitigation (From admission, onward)         Ordered     IP VTE HIGH RISK PATIENT  Once         10/09/22 2026      Place sequential compression device  Until discontinued         10/09/22 2026                Discharge Planning   DEVIKA: 10/11/2022     Code Status: Full Code   Is the patient medically ready for discharge?:     Reason for patient still in hospital (select all that apply): Patient unstable  Discharge Plan A: Rehab   Discharge Delays: (!) Post-Acute Set-up        Critical care time spent on the evaluation and treatment of severe organ dysfunction, review of pertinent labs and imaging studies, discussions with consulting providers and discussions with patient/family: 35  minutes.      Nain Cho MD  Department of Hospital Medicine   [facility]

## 2022-10-14 NOTE — HPI
"From H&P: "Leonides Burns 77 y.o. male with CKD, HTN, tobacco abuse presents to the hospital with a chief complaint of right leg weakness.  He reports 2 days of right lower extremity weakness and discoordination and double vision.  States the symptoms have been constant without aggravating factors.  He sometimes feels that improved with lying flat.  He reports he previously had stop aspirin due to severe and recurrent epistaxis.  He denies fevers chest pain shortness breast nausea vomiting abdominal pain leg swelling syncope numbness weakness of an arm incontinence weakness of his left leg.     In the ED, creatinine 2.1 CT head without acute abnormality, chest x-ray without focal consolidation afebrile without leukocytosis COVID negative LDL 80.6 TSH within normal limits."     Palliative medicine consulted for advance care planning and continuity of care; Met with pt at bedside; for details of visit, see advance care planning section of plan.     "

## 2022-10-14 NOTE — PROGRESS NOTES
[facility] - Intensive Care  Palliative Medicine  Progress Note    Patient Name: Leonides Burns  MRN: [id number]  Admission Date: 10/9/2022  Hospital Length of Stay: 3 days  Code Status: Full Code   Attending Provider: Nain Oneill MD  Consulting Provider: Emelia Horton NP  Primary Care Physician: Suzette Akhtar MD  Principal Problem:CVA (cerebral vascular accident)    Patient information was obtained from relative(s), past medical records, ER records and primary team.      Assessment/Plan:   Advance Care Planning    Palliative Encounter   Date: 10/14/2022  - interval chart reviewed in detail; discussed pt during ICU MDT rounds   - met with pt and son at ICU bedside, immediately noticed right sided facial droop which I didn't not recall when briefly assessing pt between first and second seizure episode yesterday; pt and son both confirm that was not present at that time, son does not recall it being present yesterday evening either; increased right sided weakness compared to prior to seizure activity, pt unable to move right foot or leg now (see objective below)   - pt now having difficulty with aphasia and memory which is new; had difficulty recalling children's names; confirms that this is difficulty both with finding the words and remembering; but was eventually able to recall their names on his own; having a lot of frustration with this, attempted to hide tears during discussion today during moments of aphasia   - He confirms that he wishes for children to have shared decision making at this time if he were to lack capacity; this was with son, Leonides Vu, at bedside and during above mentioned episode of difficulty with children's names so unclear if that affected desire to appoint one specific child as MPOA, will attempt to assess further; Pt has 4 adult children Leonides Jr., Santi, Felix, and Alonso; Jaun Jr. Is only child who lives locally   - pt also confirms with a nod yes desire  "for resuscitation and would agree to intubation if needed but did not discuss further in detail at this time   - during visit provided medical update to pt's other son Santi by phone per Zander Vu's request and pt's permission   - planned for continued GOC/ACP discussion following further evaluation and recommendations by neurology, speech therapy, and PT/OT.    - emotional support provided; allowed time for questions/concerns, all addressed     Palliative Consult   Date: 10/13/2022  - witnessed pt having active seizure and assisted in calling of rapid response  - provided emotional support to pt's son, Leonides Blackmon,  who was at bedside at time of seizure; introduction to palliative medicine team and role in current care and admission discussed pt's baseline with son and learned more about pt outside of hospital admission while comforting son in family waiting area, while rapid response team assessed pt and planned for transfer to CT; prior to admission pt lives independently at his own home and able to ambulate and perform ADLs without difficulty; son helps when needed; pt otherwise typically "healthy for age" per son  - pt discussed and consult received by Dr. Gee (primary during this time); brief assessment of pt while in post ictal state, pt was stable, alert, but somnolent; pt later experienced another episode of seizure activity requiring additional rapid response, and planned transfer to ICU  - later again met with pt's son, Zander Blackmon, to discusses GOC/ACP if seizures continue or if pt were to decline overnight; due to new seizure activity and pt's previous independence this is of shock to son and present family; they advocate for continued full code at this time and would agree to intubation if needed to protect airway if continued seizure activity  - planned for continued GOC/ACP discussion once more information available to son and pt, and neurology assesses pt    - emotional support provided   - " "Allowed time for questions/concerns; all addressed; expressed availability of myself/palliative team for additional questions/concerns     CVA (cerebral vascular accident)  - presented with right leg weakness; no other major deficits; son reported mental status/cognition at pre-admit baseline prior to seizure activity   - was participating in OT/PT with planned discharge pending but pt exhibited increased weakness with therapy so plan switched to rehab prior to seizure activity on 10/13    Seizure   - 2 seizures pm 10/13; transferred to ICU following second seizure on ativan drop   - now on IV Kepra  - neurology consulted   - CT head 10/13 - without acute findings per report   - son reports only knowing of one epidode of seizure activty 5-6 years ago prior to discharge from a hospital stay, to son's knowledge pt did not require seizure medication following that event    Hypertension  Chronic kidney disease, stage III (moderate)  Tobacco dependence  - histories noted; management per primary     Thank you for your consult. I will follow-up with patient. Please contact us if you have any additional questions.    Subjective:     HPI:   From H&P: "Leonides Burns 77 y.o. male with CKD, HTN, tobacco abuse presents to the hospital with a chief complaint of right leg weakness.  He reports 2 days of right lower extremity weakness and discoordination and double vision.  States the symptoms have been constant without aggravating factors.  He sometimes feels that improved with lying flat.  He reports he previously had stop aspirin due to severe and recurrent epistaxis.  He denies fevers chest pain shortness breast nausea vomiting abdominal pain leg swelling syncope numbness weakness of an arm incontinence weakness of his left leg.     In the ED, creatinine 2.1 CT head without acute abnormality, chest x-ray without focal consolidation afebrile without leukocytosis COVID negative LDL 80.6 TSH within normal limits."     Palliative " medicine consulted for advance care planning and continuity of care; Met with pt at bedside; for details of visit, see advance care planning section of plan.         Hospital Course:  No notes on file    Past Medical History:   Diagnosis Date    Disorder of kidney and ureter     Elevated PSA     Glaucoma     Hyperlipidemia     Hypertension     Psoriasis      Past Surgical History:   Procedure Laterality Date    FUNCTIONAL ENDOSCOPIC SINUS SURGERY (FESS) USING COMPUTER-ASSISTED NAVIGATION N/A 2/28/2019    Procedure: FESS, USING COMPUTER-ASSISTED NAVIGATION (ADD ON );  Surgeon: Mikael Serrano MD;  Location: [facility] OR;  Service: ENT;  Laterality: N/A;  (ADD ON )    NASAL SEPTOPLASTY N/A 2/28/2019    Procedure: SEPTOPLASTY, NOSE;  Surgeon: Mikael Serrano MD;  Location: [facility] OR;  Service: ENT;  Laterality: N/A;     Review of patient's allergies indicates:   Allergen Reactions    Latex, natural rubber Rash    Iodine and iodide containing products Rash     Medications:  Continuous Infusions:   sodium chloride 0.45% 100 mL/hr at 10/14/22 1328     Scheduled Meds:   amLODIPine  10 mg Oral Daily    aspirin  81 mg Oral Daily    atorvastatin  40 mg Oral QHS    clopidogreL  75 mg Oral Daily    hydrALAZINE  100 mg Oral Q8H    levetiracetam IV  1,000 mg Intravenous Q12H     PRN Meds:acetaminophen, dextrose 10%, dextrose 10%, glucagon (human recombinant), hydrALAZINE, insulin aspart U-100, lorazepam, melatonin, senna-docusate 8.6-50 mg, sodium chloride 0.9%    Family History       Problem Relation (Age of Onset)    Anemia Sister, Daughter    Cancer Brother    Diabetes Mother    KEVIN disease Sister    Hypertension Mother, Sister    No Known Problems Father, Son          Tobacco Use    Smoking status: Some Days     Packs/day: 0.50     Years: 40.00     Pack years: 20.00     Types: Cigarettes    Smokeless tobacco: Current   Substance and Sexual Activity    Alcohol use: Yes     Alcohol/week: 9.0  standard drinks     Types: 9 Cans of beer per week    Drug use: No    Sexual activity: Yes     Partners: Female       Review of Systems   Constitutional:  Positive for fatigue.   HENT: Negative.          Right facial droop not present prior to seizure activity yesterday    Respiratory:  Negative for shortness of breath.    Cardiovascular:  Negative for chest pain.   Musculoskeletal:         Unable to move right leg/foot   Neurological:  Positive for speech difficulty and weakness. Negative for dizziness, tremors and headaches.        Pt does not recall seizures from yesterday, not aware of any seizure activity besides 2 events yesterday; reports difficulty finding words and with memory    Psychiatric/Behavioral:          Frustrated with aphasia and memory difficulties    Objective:     Vital Signs (Most Recent):  Temp: 98.1 °F (36.7 °C) (10/14/22 0800)  Pulse: 84 (10/14/22 1100)  Resp: (!) 25 (10/14/22 1100)  BP: (!) 160/116 (10/14/22 1100)  SpO2: (!) 93 % (10/14/22 1100)   Vital Signs (24h Range):  Temp:  [97.9 °F (36.6 °C)-98.3 °F (36.8 °C)] 98.1 °F (36.7 °C)  Pulse:  [62-84] 84  Resp:  [17-46] 25  SpO2:  [92 %-98 %] 93 %  BP: (137-230)/() 160/116     Weight: 92.4 kg (203 lb 11.3 oz)  Body mass index is 30.97 kg/m².    Physical Exam  Vitals and nursing note reviewed.   Constitutional:       General: He is not in acute distress.  HENT:      Head:      Comments: Right lower face droop     Nose: Nose normal.      Mouth/Throat:      Comments: Right lip/mouth droop  Pulmonary:      Effort: Pulmonary effort is normal.      Comments: O2 via NC  Musculoskeletal:      Comments: Unable to move right leg/foot    Neurological:      Mental Status: He is alert and oriented to person, place, and time.      Cranial Nerves: Cranial nerve deficit: unable to move right leg/foot; Hand  L>R.      Motor: Weakness present.      Comments: Aphasia, having difficulty with remembering children's names but after some time could  recall    Psychiatric:      Comments: Appears very frustrated/down during moments of aphasia, quickly wiped tear from eyes      Significant Labs: All pertinent labs within the past 24 hours have been reviewed.  CBC:   Recent Labs   Lab 10/11/22  0336   WBC 6.08   HGB 14.7   HCT 44.8   MCV 81*          BMP:  Recent Labs   Lab 10/14/22  0427   GLU 93      K 4.2   *   CO2 18*   BUN 20   CREATININE 1.6*   CALCIUM 8.7   MG 2.1       LFT:  Lab Results   Component Value Date    AST 25 10/14/2022    ALKPHOS 68 10/14/2022    BILITOT 0.5 10/14/2022     Albumin:   Albumin   Date Value Ref Range Status   10/14/2022 2.8 (L) 3.5 - 5.2 g/dL Final     Protein:   Total Protein   Date Value Ref Range Status   10/14/2022 6.5 6.0 - 8.4 g/dL Final     Lactic acid:   No results found for: LACTATE    Significant Imaging: I have reviewed all pertinent imaging results/findings within the past 24 hours.      Total visit time: 55 minutes    > 50% of  35 min visit spent in chart review, face to face discussion of symptom assessment, coordination of care with other specialists, and discharge planning.    20 min ACP time spent: goals of care, emotional support, formulating and communicating prognosis, exploring burden/ benefit of various approaches of treatment.     Emelia Horton NP  Palliative Medicine  [facility] - [place] - Intensive Care

## 2022-10-14 NOTE — PT/OT/SLP RE-EVAL
Physical Therapy Re-evaluation    Patient Name:  Leonides Burns   MRN:  [id number]    Recommendations:     Discharge Recommendations:  rehabilitation facility   Discharge Equipment Recommendations:  (Per IPR)   Barriers to discharge:  Pt currently in ICU with extension of CVA.  Not functioning at Independent baseline and now requiring Total A x 2 for all functional mobility and unable to ambulate.  Pt could benefit from an aggressive Multidisciplinary approach(IPR) in order to help restore pt to highest functional level.    Assessment:     Leonides Burns is a 77 y.o. male admitted with a medical diagnosis of CVA (cerebral vascular accident).  He presents with the following impairments/functional limitations:  weakness, decreased upper extremity function, decreased ROM, impaired cardiopulmonary response to activity, impaired endurance, impaired balance, decreased lower extremity function, impaired coordination, decreased safety awareness, impaired fine motor, impaired self care skills, impaired cognition, pain, impaired functional mobility, decreased coordination, abnormal tone Extension of CVA.    Rehab Prognosis:  Good; patient would benefit from acute skilled PT services to address these deficits and reach maximum level of function.      Recent Surgery: * No surgery found *      Plan:     During this hospitalization, patient to be seen  (5-6x/wk) to address the above listed problems via gait training, therapeutic activities, therapeutic exercises, neuromuscular re-education, wheelchair management/training  Plan of Care Expires:  10/26/22  Plan of Care Reviewed with: patient    Subjective     Communicated with nsg prior to session.  Patient found  slid down in bed with B LE's hanging off bed   with  (ICU monitoring) upon PT entry to room, agreeable to evaluation.      Chief Complaint: pain with PROM R UE  Patient comments/goals: pt agreeable to therapy, some level of confusion or aphasia?   Pain/Comfort:  Pain Rating  1:  (Not rated)  Location 1:  (R UE with PROM)  Pain Addressed 1: Reposition, Distraction, Cessation of Activity    Patients cultural, spiritual, Religion conflicts given the current situation: no      Objective:     Patient found with:  (ICU monitoring)     General Precautions: Standard, fall, respiratory, seizure   Orthopedic Precautions:N/A   Braces: N/A  Respiratory Status: Nasal cannula, flow 5 L/min    Exams:  Facial droop Left  Difficulty scanning with eyes vs difficulty following commands?  Cognitive Exam:  Patient is oriented to Person, decreased ability to follow commands  Fine Motor Coordination:    -       Impaired   Rapid alt toe tap and RLE heel shin    Gross Motor Coordination:  Impaired 2/2 R UE/LE/trunk weakness and increased tone R LE  Postural Exam:  Patient presented with the following abnormalities:    -       Rounded shoulders  -       Forward head  Skin Integrity/Edema:      -       Skin integrity: Visible skin intact  -       Edema: None noted   RLE ROM: PROM difficult 2/2 increased tone, but WFL's  RLE Strength: 0/5  LLE ROM: WFL  LLE Strength: WFL  R UE now 1/5  Increased extensor tone in supine and increased flexor tone sitting at EOB    Functional Mobility:  Bed Mobility:     Rolling Left:  total assistance, of 2 persons, and with VC/TC for reaching for BR  Scooting: total assistance and of 2 persons and with VC/Tc for bridging with L LE to HOB in supine  Bridging: maximal assistance and with Vc/Tc to bridge with L LE  Supine to Sit: total assistance, of 2 persons, and with VC/TC for hand placement and body mechanics with HOB elevated  Sit to Supine: total assistance, of 2 persons, and with VC/TC for body mechanics and hand placement  Transfers:     Sit to Stand:  total assistance, of 2 persons no AD  Scoot transfer: Total A x 2 persons with VC/TC for hand placement and forward weight shift over YOVANA  Balance: Poor>Fair sitting at EOB, Posterior and Right leaning/falling  "initially    AM-PAC 6 CLICK MOBILITY  Total Score:8       Therapeutic Activities and Exercises:   Bed mobility training as above.  Dangle protocol:  Pt sat at EOB approx 15m total with total A initially and progressing to CGA/Min A with VC/TC for weight shifting.  Vitals WFL's sitting at EOB.  Pt required Mod A for B LE stabilization during static/dynamic activities progressing to CGA/Min A for trunk control during dynamic balance and WB activities for R UE/LE.  Pt required VC/TC for forward and Left weight shifting as well to correct for leaning and falling to Right.  Pt repositioned to "chair" position for feeding with B Pressure relief boots donned and B UE's elevated on pillows.     Patient left with bed in chair position with all lines intact, call button in reach, and nsg notified.    GOALS:   Multidisciplinary Problems       Physical Therapy Goals          Problem: Physical Therapy    Goal Priority Disciplines Outcome Goal Variances Interventions   Physical Therapy Goal     PT, PT/OT Ongoing, Not Progressing     Description: Goals to be met by: 10/14/22     Patient will increase functional independence with mobility by performin. Pt to be Min A with bed mobility.  2. Pt to transfer sit to stand with CGA  3. Pt to ambulate with HW and Min A x 10-15'  4. Pt to be Supervision with written HEP  5. Pt to transfer bed to chair with CGA  6. Pt to ascend/descend 4" curb with RW and Min A  7. Pt will propel W/C with L UE/LE and CGA approx 25'                       History:     Past Medical History:   Diagnosis Date    Disorder of kidney and ureter     Elevated PSA     Glaucoma     Hyperlipidemia     Hypertension     Psoriasis        Past Surgical History:   Procedure Laterality Date    FUNCTIONAL ENDOSCOPIC SINUS SURGERY (FESS) USING COMPUTER-ASSISTED NAVIGATION N/A 2019    Procedure: FESS, USING COMPUTER-ASSISTED NAVIGATION (ADD ON );  Surgeon: Mikael Serrano MD;  Location: [facility];  Service: " ENT;  Laterality: N/A;  (ADD ON )    NASAL SEPTOPLASTY N/A 2/28/2019    Procedure: SEPTOPLASTY, NOSE;  Surgeon: Mikael Serrano MD;  Location: [facility];  Service: ENT;  Laterality: N/A;       Time Tracking:     PT Received On: 10/14/22  PT Start Time: 1200     PT Stop Time: 1231  PT Total Time (min): 31 min     Billable Minutes: Re-eval 15 and Therapeutic Activity 15  Co-treat with OT      10/14/2022

## 2022-10-14 NOTE — PT/OT/SLP PROGRESS
Occupational Therapy   Treatment    Name: Leonides Burns  MRN: [id number]  Admitting Diagnosis:  CVA (cerebral vascular accident)       Recommendations:     Discharge Recommendations: rehabilitation facility  Discharge Equipment Recommendations:   (TBD)  Barriers to discharge:   (pt was MOD I and living alone PTA. now, acute CVA with impairments with all ADLs and all aspects of functional mobility. pt at high risk of falls, unplanned readmission, and morbidity if d/c home at this time.)    Assessment:     Leonides Burns is a 77 y.o. male with a medical diagnosis of CVA (cerebral vascular accident). Performance deficits affecting function are weakness, gait instability, decreased upper extremity function, decreased ROM, impaired cardiopulmonary response to activity, impaired balance, impaired endurance, decreased lower extremity function, impaired coordination, decreased safety awareness, impaired fine motor, impaired cognition, impaired self care skills, impaired functional mobility.     On 10/12/22, pt RUE (dominant) shoulder flexion strength 4-/5, elbow flexion 5/5, WFL  strength, and intact digits opposition and finger to nose. At that time, pt was able to complete grooming ADLs seated with SBA and completed ~8 ft with MAX A x2 with use of RW.     Today, pt with RUE: weak hand strength, 2-/5 internal rotation, 1/5 external rotation, 1/5 elbow ext, 2-/5 elbow flexion, 1/5 shoulder flexion, 2-/5 shoulder extension. Pt required total assist x2 for bed mobility.     Rehab Prognosis:  Good; patient would benefit from acute skilled OT services to address these deficits and reach maximum level of function.       Plan:     Patient to be seen  (5-6x/week) to address the above listed problems via self-care/home management, therapeutic activities, therapeutic exercises  Plan of Care Expires: 10/24/22  Plan of Care Reviewed with: patient    Subjective     Chief complaint: appeared sad   Patient/family comments/ goals:  agreeable to sit up      Pain/Comfort:  Pain Rating 1: 0/10    Objective:     Communicated with: nurseLars, prior to session. Patient found HOB elevated with blood pressure cuff, pulse ox (continuous), oxygen, telemetry, pressure relief boots, peripheral IV upon OT entry to room. (Pt's condom cath had fallen on off prior and noticed by therapy while beginning session- nurse notified).     General Precautions: Standard, fall, respiratory, seizure   Orthopedic Precautions:N/A   Braces: N/A  Respiratory Status: Nasal cannula, flow 5 L/min    V/S: 88 bpm, 93% on 5L, 146/80      Occupational Performance:     Bed Mobility:    Patient completed Scooting anteriorly with total assistance and 2 persons  Patient completed Supine to Sit with total assistance and 2 persons with HOB elevated  Patient completed Sit to Supine with total assistance and 2 persons   Patient completed Scooting laterally with total assistance and 2 persons     Activities of Daily Living:  Feeding:  upon arrival, pt attempted to eat lunch with spoon with rice spilled on chest     Grooming: total assist to clean off food from chest at bed level     Upper Body Dressing: total assistance to don back gown while seated EOB  Lower Body Dressing: total assistance to don B/L socks at bed level       [facility] 6 Click ADL: 11    Treatment & Education:  Pt re- educated on OT role/POC. No family present at time of session.   Safety during functional mobility; pressure relief boots provided to protect skin integrity   Multiple self-care tasks/functional mobility completed- assistance level noted above   Nurse notified CVA affected R dominant hand and pt will require assistance with all meals   Seated EOB, pt required tactile cueing and MIN A for placement of LUE at bedside to assist with static/dynamic sit balance. Pt with lateral lean to the R and posteriorly with MIN-MOD A for correction. Max verbal/tactile cueing for correction.   X10 elbow flex/ext PROM - pt able  "to say "up" and "down" with movements. Tactile cueing for attempts to promote active movement of RUE with movements. x5 shoulder flex/ext PROM   OT placed tray table in front of pt with RUE placed on hand towel. Pt with difficulty and inability to maintain sustained  with RUE pronated on towel.   X10 PROM shoulder flexion, scapular protraction, elbow extension; x10 AAROM shoulder extension, scapular retraction, elbow flexion  X10 AAROM internal rotation, PROM external rotation; frequent assist by OT for repositioning of  on towel   OT placed tray table to pt's R side to promote R lateral lean and weight bear to RLE (facilitated by PT) for functional reach supported on tray table in scaption with RUE positioned on towel x15.  As reps progressed, pt showed increased initiation for trunk flexion; positive feedback provided    All questions/concerns answered within OT scope of practice       Patient left with bed in chair position with B/L UE elevated on pillows and B/L pressure relief boots on with all lines intact, call button in reach, nurse, Lars, present, and all needs met/within reach. D/w SLP, who notified MD via secure chat.     GOALS:   Multidisciplinary Problems       Occupational Therapy Goals          Problem: Occupational Therapy    Goal Priority Disciplines Outcome Interventions   Occupational Therapy Goal     OT, PT/OT Ongoing, Progressing    Description: Goals to be met by: 10/24/22    Patient will increase functional independence with ADLs by performing:    UE Dressing with Modified [place].  LE Dressing with Modified [place].  Grooming while standing at sink with Modified [place].  Toileting from toilet with Modified [place] for hygiene and clothing management.   Supine to sit with Modified [place].  Step transfer with Modified [place]  Toilet transfer to toilet with Modified [place].  Upper extremity exercise program x15 reps per handout, with independence.   "                       Time Tracking:     OT Date of Treatment: 10/14/22  OT Start Time: 1159  OT Stop Time: 1230  OT Total Time (min): 31 min    Billable Minutes:Neuromuscular Re-education 31 (co-treat with PT)    Co- treatment performed due to patient's multiple deficits requiring two skilled therapists to appropriately and safely assess patient's strength and endurance while facilitating functional tasks in addition to accommodating for patient's activity tolerance      OT/CHANDNI: OT     CHANDNI Visit Number: 0    10/14/2022

## 2022-10-14 NOTE — PT/OT/SLP PROGRESS
Speech Language Pathology Treatment    Patient Name:  Leonides Burns   MRN:  [id number]  Admitting Diagnosis: CVA (cerebral vascular accident)    Recommendations:                 General Recommendations:  Dysphagia therapy and Cognitive-linguistic therapy  Diet recommendations:  Mechanical soft, Liquid Diet Level: Thin liquids - IDDSI Level 0   Aspiration Precautions: Alternating bites/sips, Assistance with meals, HOB to 90 degrees, Meds whole 1 at a time, and Small bites/sips   General Precautions: Standard, aspiration  Communication strategies:  provide increased time to answer    Subjective     Pt with rapid response yesterday 2/2 x2 seizures. Pt now in ICU and on a pureed diet. ST spoke with pt's nurse who stated pt with dcr alertness this AM, however, improved alertness midmorning. Upon ST entrance, the pt was seated upright in bed- fully alert and awake. The pt greeted ST and was agreeable to tx session to re-assess swallowing and cognition/language 2/2 recent seizures.     Patient goals: Pt did not state     Pain/Comfort:  Pain Rating 1: 0/10    Respiratory Status: Nasal cannula 5L/min     Objective:     Has the patient been evaluated by SLP for swallowing?   Yes  Keep patient NPO? No   Current Respiratory Status:        Swallowing: The pt required A to consume the following po trials 2/2 RUE weakness: one cup of water via straw, one cup of pudding, and x2 bite-sized pieces of ernesto cracker. The pt presents with R facial droop, which appears new from previous ST assessment. Facial droop impacted pt's labial protrusion and seal, however, with straw placed on L side the pt was able to siphon liquid w/o difficulty. No anterior spillage noted. Pt with mild prolonged mastication with solid consistencies, however, with increased time the pt was able to clear all boluses from the oral cavity. No overt s/s of aspiration noted across consistencies.     Cognition/Communication:   Expression-    Automatic Speech Tasks-  The pt counted to 20 and listed months of the year with 100% acc.    Confrontational Naming- The pt named 5/5 items presented with no A.      Divergent Naming- The pt was unable to name members of a given category despite max verbal cues/prompts.    Conversation- The pt presents with non-fluent speech with frequent moments of anomia.      Comprehension-     Following Directions- The pt followed 1 step commands with 50% acc, despite max verbal cues.     Orientation- It should be noted that the pt presents with mod-severe expressive deficits, which impact accuracy of orientation questions (unclear if pt disoriented or having difficulty expressing answers to orientation questions). The pt stated full name and month and date of , requiring increased time and verbal prompts to state year. Pt aware he is at a hospital, however, disoriented to name. Given verbal FO2 choices, pt unable to ID hospital name.     Recall- Pt unable to recall reason for admit or events that occurred yesterday (seizures with t/f to ICU). Given a delay, the pt was unable to indep recall name of hospital, however, when presented with a FO2 choices the pt properly ID name.       Assessment:     Leonides Burns is a 77 y.o. male with a dx of CVA (cerebral vascular accident) and recent seizures, who presents with increased expressive and receptive deficits, with pt now having mod-severe expressive aphasia and mod receptive aphasia. Pt with mild-mod oral dysphagia 2/2 new oral weakness. ST recs mechanical soft diet with thin liquids. ST will cont to follow.    Goals:   Multidisciplinary Problems       SLP Goals          Problem: SLP    Goal Priority Disciplines Outcome   SLP Goal    Low SLP Ongoing, Progressing   Description: STGS  1. Pt will perform multiple step commands regarding before and after with 90% acc. -Discont 10/14  2. Pt will perform divergent naming tasks with min assist- Discont 10/14  3. Pt will delayed recall tasks with min assist.  -Discont  4. Pt will follow 1-step commands with 80% acc, min A.  5. Pt will name objects with 80% acc, min A.   6. Pt will orient to place and date with min A.  7. Pt will tolerate mechanical soft diet with thin liquids w/o overt s/s of aspiration.                        Plan:     Patient to be seen:  5 x/week   Plan of Care expires:  10/20/22  Plan of Care reviewed with:  patient   SLP Follow-Up:  Yes       Discharge recommendations:  rehabilitation facility   Barriers to Discharge:  None    Time Tracking:     SLP Treatment Date:   10/14/22  Speech Start Time:  1035  Speech Stop Time:  1100     Speech Total Time (min):  25 min    Billable Minutes: Speech Therapy Individual 10 and Treatment Swallowing Dysfunction 15    10/14/2022

## 2022-10-14 NOTE — PLAN OF CARE
Problem: SLP  Goal: SLP Goal  Description: STGS  1. Pt will perform multiple step commands regarding before and after with 90% acc. -Discont 10/14  2. Pt will perform divergent naming tasks with min assist- Discont 10/14  3. Pt will delayed recall tasks with min assist. -Discont  4. Pt will follow 1-step commands with 80% acc, min A.  5. Pt will name objects with 80% acc, min A.   6. Pt will orient to place and date with min A.  7. Pt will tolerate mechanical soft diet with thin liquids w/o overt s/s of aspiration.   10/14/2022 1137 by Mercy aMthis CCC-SLP  Outcome: Ongoing, Progressing    ST re-assessed pt's swallowing fx. ST recs mechanical soft diet with thin liquids. Pt with increased expressive and receptive deficits since last tx session on 10/12.

## 2022-10-14 NOTE — PLAN OF CARE
Problem: Occupational Therapy  Goal: Occupational Therapy Goal  Description: Goals to be met by: 10/24/22    Patient will increase functional independence with ADLs by performing:    UE Dressing with Modified [place].  LE Dressing with Modified [place].  Grooming while standing at sink with Modified [place].  Toileting from toilet with Modified [place] for hygiene and clothing management.   Supine to sit with Modified [place].  Step transfer with Modified [place]  Toilet transfer to toilet with Modified [place].  Upper extremity exercise program x15 reps per handout, with independence.    Outcome: Ongoing, Progressing     Pt with change of status- SLP notified MD. OT notified nurse of changes.

## 2022-10-14 NOTE — PLAN OF CARE
M[facility] from [facility] notified SW that they are accepting patient and are ready to submit for auth. SW notified Mini that patient is now in ICU. Mini instructed SW to notify them when patient is medically ready to go to Rehab.

## 2022-10-14 NOTE — PLAN OF CARE
"  Problem: Physical Therapy  Goal: Physical Therapy Goal  Description: Goals to be met by: 10/14/22     Patient will increase functional independence with mobility by performin. Pt to be Min A with bed mobility.  2. Pt to transfer sit to stand with CGA  3. Pt to ambulate with HW and Min A x 10-15'  4. Pt to be Supervision with written HEP  5. Pt to transfer bed to chair with CGA  6. Pt to ascend/descend 4" curb with RW and Min A  7. Pt will propel W/C with L UE/LE and CGA approx 25'  Outcome: Ongoing, Not Progressing   Pt re-evaluated after transfer to ICU.  Continue with updated POC and goals following Extension of CVA.  IPR recommended  "

## 2022-10-14 NOTE — SUBJECTIVE & OBJECTIVE
Past Medical History:   Diagnosis Date    Disorder of kidney and ureter     Elevated PSA     Glaucoma     Hyperlipidemia     Hypertension     Psoriasis      Past Surgical History:   Procedure Laterality Date    FUNCTIONAL ENDOSCOPIC SINUS SURGERY (FESS) USING COMPUTER-ASSISTED NAVIGATION N/A 2/28/2019    Procedure: FESS, USING COMPUTER-ASSISTED NAVIGATION (ADD ON );  Surgeon: Mikael Serrano MD;  Location: [facility];  Service: ENT;  Laterality: N/A;  (ADD ON )    NASAL SEPTOPLASTY N/A 2/28/2019    Procedure: SEPTOPLASTY, NOSE;  Surgeon: Mikael Serrano MD;  Location: [facility];  Service: ENT;  Laterality: N/A;     Review of patient's allergies indicates:   Allergen Reactions    Latex, natural rubber Rash    Iodine and iodide containing products Rash     Medications:  Continuous Infusions:   sodium chloride 0.45% 100 mL/hr at 10/14/22 0600     Scheduled Meds:   amLODIPine  10 mg Oral Daily    aspirin  81 mg Oral Daily    atorvastatin  40 mg Oral QHS    clopidogreL  75 mg Oral Daily    hydrALAZINE  100 mg Oral Q8H    levetiracetam IV  1,000 mg Intravenous Q12H     PRN Meds:acetaminophen, dextrose 10%, dextrose 10%, glucagon (human recombinant), hydrALAZINE, insulin aspart U-100, lorazepam, melatonin, senna-docusate 8.6-50 mg, sodium chloride 0.9%    Family History       Problem Relation (Age of Onset)    Anemia Sister, Daughter    Cancer Brother    Diabetes Mother    KEVIN disease Sister    Hypertension Mother, Sister    No Known Problems Father, Son          Tobacco Use    Smoking status: Some Days     Packs/day: 0.50     Years: 40.00     Pack years: 20.00     Types: Cigarettes    Smokeless tobacco: Current   Substance and Sexual Activity    Alcohol use: Yes     Alcohol/week: 9.0 standard drinks     Types: 9 Cans of beer per week    Drug use: No    Sexual activity: Yes     Partners: Female       Review of Systems   Unable to perform ROS: Acuity of condition   Objective:     Vital Signs (Most Recent):  Temp:  97.9 °F (36.6 °C) (10/14/22 0315)  Pulse: 68 (10/14/22 0630)  Resp: (!) 23 (10/14/22 0630)  BP: 137/68 (10/14/22 0630)  SpO2: (!) 94 % (10/14/22 0630)   Vital Signs (24h Range):  Temp:  [97.9 °F (36.6 °C)-98.5 °F (36.9 °C)] 97.9 °F (36.6 °C)  Pulse:  [62-87] 68  Resp:  [17-46] 23  SpO2:  [92 %-98 %] 94 %  BP: (137-230)/() 137/68     Weight: 92.4 kg (203 lb 11.3 oz)  Body mass index is 30.97 kg/m².    Physical Exam  Vitals and nursing note reviewed.   Constitutional:       Comments: Assessment brief as rapid response team attending to pt    HENT:      Head: Normocephalic and atraumatic.      Nose: Nose normal.   Pulmonary:      Effort: Pulmonary effort is normal.   Neurological:      Mental Status: He is alert.      Comments: Pt is postictal phase, appears dazed and somnolent but alert     Significant Labs: All pertinent labs within the past 24 hours have been reviewed.  CBC:   Recent Labs   Lab 10/11/22  0336   WBC 6.08   HGB 14.7   HCT 44.8   MCV 81*        BMP:  Recent Labs   Lab 10/14/22  0427   GLU 93      K 4.2   *   CO2 18*   BUN 20   CREATININE 1.6*   CALCIUM 8.7   MG 2.1     LFT:  Lab Results   Component Value Date    AST 25 10/14/2022    ALKPHOS 68 10/14/2022    BILITOT 0.5 10/14/2022     Albumin:   Albumin   Date Value Ref Range Status   10/14/2022 2.8 (L) 3.5 - 5.2 g/dL Final     Protein:   Total Protein   Date Value Ref Range Status   10/14/2022 6.5 6.0 - 8.4 g/dL Final     Lactic acid:   No results found for: LACTATE    Significant Imaging: I have reviewed all pertinent imaging results/findings within the past 24 hours.

## 2022-10-14 NOTE — NURSING
[facility], [facility] [place]  Nurses Note -- 4 Eyes      10/14/2022       Skin assessed on: Q Shift      [x] No Pressure Injuries Present    []Prevention Measures Documented    [] Yes LDA  for Pressure Injury Previously documented     [] Yes New Pressure Injury Discovered   [] LDA for New Pressure Injury Added      Attending RN:  LAURO Yoder     Second RN:  LAURO Kaba

## 2022-10-14 NOTE — SUBJECTIVE & OBJECTIVE
Interval History:  No complaints. No further seizures     Review of Systems   Constitutional:  Positive for activity change. Negative for appetite change.   HENT:  Negative for congestion and dental problem.    Eyes:  Negative for discharge and itching.   Respiratory:  Negative for apnea and chest tightness.    Cardiovascular:  Negative for chest pain.   Gastrointestinal:  Negative for abdominal distention and abdominal pain.   Genitourinary:  Negative for difficulty urinating and dysuria.   Musculoskeletal:  Negative for arthralgias.   Allergic/Immunologic: Negative for environmental allergies.   Neurological:  Negative for dizziness and facial asymmetry.   Hematological:  Negative for adenopathy.   Psychiatric/Behavioral:  Negative for agitation and behavioral problems.    Objective:     Vital Signs (Most Recent):  Temp: 97.9 °F (36.6 °C) (10/14/22 0315)  Pulse: 68 (10/14/22 0630)  Resp: (!) 23 (10/14/22 0630)  BP: 137/68 (10/14/22 0630)  SpO2: (!) 94 % (10/14/22 0630)   Vital Signs (24h Range):  Temp:  [97.9 °F (36.6 °C)-98.5 °F (36.9 °C)] 97.9 °F (36.6 °C)  Pulse:  [62-87] 68  Resp:  [17-46] 23  SpO2:  [92 %-98 %] 94 %  BP: (137-230)/() 137/68     Weight: 92.4 kg (203 lb 11.3 oz)  Body mass index is 30.97 kg/m².    Intake/Output Summary (Last 24 hours) at 10/14/2022 0914  Last data filed at 10/14/2022 0600  Gross per 24 hour   Intake 1813.84 ml   Output 550 ml   Net 1263.84 ml      Physical Exam  Vitals and nursing note reviewed.   Constitutional:       General: He is not in acute distress.     Appearance: Normal appearance. He is obese. He is ill-appearing. He is not toxic-appearing or diaphoretic.   HENT:      Head: Normocephalic and atraumatic.      Mouth/Throat:      Pharynx: Oropharynx is clear.   Eyes:      Conjunctiva/sclera: Conjunctivae normal.   Cardiovascular:      Rate and Rhythm: Normal rate and regular rhythm.   Pulmonary:      Effort: Pulmonary effort is normal. No respiratory distress.       Breath sounds: No wheezing.   Abdominal:      General: Bowel sounds are normal. There is no distension.   Skin:     General: Skin is dry.   Neurological:      Mental Status: He is alert and oriented to person, place, and time.   Psychiatric:         Mood and Affect: Mood normal.         Behavior: Behavior normal.       Significant Labs: All pertinent labs within the past 24 hours have been reviewed.  CBC: No results for input(s): WBC, HGB, HCT, PLT in the last 48 hours.  CMP:   Recent Labs   Lab 10/13/22  1400 10/14/22  0427    138   K 4.6 4.2    112*   CO2 20* 18*   * 93   BUN 26* 20   CREATININE 2.0* 1.6*   CALCIUM 9.2 8.7   PROT 7.0 6.5   ALBUMIN 3.0* 2.8*   BILITOT 0.4 0.5   ALKPHOS 72 68   AST 24 25   ALT 16 17   ANIONGAP 10 8       Significant Imaging:

## 2022-10-14 NOTE — ASSESSMENT & PLAN NOTE
Patient presents with right leg weakness.  Started on ASA.  Statin increased to 40 mg daily.  Started on Plavix for 21 days.  PT/OT evaluated patient yesterday.  He was doing well and planned to be discharged with  today.  Therapy evaluated patient today and weakness apparently worse.  Patient dragging right leg and unable to ambulate like yesterday.  Cancel discharge.  Continue therapy and await further recommendations.  gradually started on BM med;s.  SNF vs. Rehab in the end    PT,OT recommending rehab,consulted SW.

## 2022-10-14 NOTE — NURSING
[facility], [facility]  Nurses Note -- 4 Eyes      10/13/2022       Skin assessed on: Transfer      [x] No Pressure Injuries Present    [x]Prevention Measures Documented    [] Yes LDA  for Pressure Injury Previously documented     [] Yes New Pressure Injury Discovered   [] LDA for New Pressure Injury Added      Attending RN:  Rachael Loco RN     Second RN:  Renuka Schulz LPN

## 2022-10-15 LAB
POCT GLUCOSE: 73 MG/DL (ref 70–110)
POCT GLUCOSE: 87 MG/DL (ref 70–110)

## 2022-10-15 PROCEDURE — 25000003 PHARM REV CODE 250: Performed by: HOSPITALIST

## 2022-10-15 PROCEDURE — 25000003 PHARM REV CODE 250: Performed by: STUDENT IN AN ORGANIZED HEALTH CARE EDUCATION/TRAINING PROGRAM

## 2022-10-15 PROCEDURE — 27000221 HC OXYGEN, UP TO 24 HOURS

## 2022-10-15 PROCEDURE — 20000000 HC ICU ROOM

## 2022-10-15 PROCEDURE — 63600175 PHARM REV CODE 636 W HCPCS: Performed by: INTERNAL MEDICINE

## 2022-10-15 PROCEDURE — 94761 N-INVAS EAR/PLS OXIMETRY MLT: CPT

## 2022-10-15 RX ADMIN — LEVETIRACETAM INJECTION 1000 MG: 10 INJECTION INTRAVENOUS at 09:10

## 2022-10-15 RX ADMIN — LORAZEPAM 2 MG: 2 INJECTION INTRAMUSCULAR at 09:10

## 2022-10-15 RX ADMIN — HYDRALAZINE HYDROCHLORIDE 100 MG: 25 TABLET, FILM COATED ORAL at 05:10

## 2022-10-15 RX ADMIN — ASPIRIN 81 MG: 81 TABLET, COATED ORAL at 09:10

## 2022-10-15 RX ADMIN — ATORVASTATIN CALCIUM 40 MG: 40 TABLET, FILM COATED ORAL at 09:10

## 2022-10-15 RX ADMIN — SODIUM CHLORIDE: 0.45 INJECTION, SOLUTION INTRAVENOUS at 01:10

## 2022-10-15 RX ADMIN — HYDRALAZINE HYDROCHLORIDE 100 MG: 25 TABLET, FILM COATED ORAL at 01:10

## 2022-10-15 RX ADMIN — AMLODIPINE BESYLATE 10 MG: 5 TABLET ORAL at 09:10

## 2022-10-15 RX ADMIN — CLOPIDOGREL BISULFATE 75 MG: 75 TABLET ORAL at 09:10

## 2022-10-15 RX ADMIN — SODIUM CHLORIDE: 0.45 INJECTION, SOLUTION INTRAVENOUS at 12:10

## 2022-10-15 RX ADMIN — HYDRALAZINE HYDROCHLORIDE 100 MG: 25 TABLET, FILM COATED ORAL at 09:10

## 2022-10-15 NOTE — SUBJECTIVE & OBJECTIVE
Interval History: No new issues.     Review of Systems   Constitutional:  Positive for activity change. Negative for appetite change.   HENT:  Negative for congestion and dental problem.    Eyes:  Negative for discharge and itching.   Respiratory:  Negative for apnea and chest tightness.    Cardiovascular:  Negative for chest pain.   Gastrointestinal:  Negative for abdominal distention and abdominal pain.   Genitourinary:  Negative for difficulty urinating and dysuria.   Musculoskeletal:  Negative for arthralgias.   Allergic/Immunologic: Negative for environmental allergies.   Neurological:  Negative for dizziness and facial asymmetry.   Hematological:  Negative for adenopathy.   Psychiatric/Behavioral:  Negative for agitation and behavioral problems.    Objective:     Vital Signs (Most Recent):  Temp: 97.7 °F (36.5 °C) (10/15/22 0701)  Pulse: 82 (10/15/22 0930)  Resp: (!) 30 (10/15/22 0930)  BP: (!) 159/67 (10/15/22 0900)  SpO2: 100 % (10/15/22 0930)   Vital Signs (24h Range):  Temp:  [97.7 °F (36.5 °C)-98.8 °F (37.1 °C)] 97.7 °F (36.5 °C)  Pulse:  [72-94] 82  Resp:  [18-36] 30  SpO2:  [92 %-100 %] 100 %  BP: (128-198)/() 159/67     Weight: 95.8 kg (211 lb 3.2 oz)  Body mass index is 32.11 kg/m².    Intake/Output Summary (Last 24 hours) at 10/15/2022 0957  Last data filed at 10/15/2022 0600  Gross per 24 hour   Intake 2466.93 ml   Output 2701 ml   Net -234.07 ml      Physical Exam  Vitals and nursing note reviewed.   Constitutional:       General: He is not in acute distress.     Appearance: Normal appearance. He is obese. He is ill-appearing. He is not toxic-appearing or diaphoretic.   HENT:      Head: Normocephalic and atraumatic.      Mouth/Throat:      Pharynx: Oropharynx is clear.   Eyes:      Conjunctiva/sclera: Conjunctivae normal.   Cardiovascular:      Rate and Rhythm: Normal rate and regular rhythm.   Pulmonary:      Effort: Pulmonary effort is normal. No respiratory distress.      Breath sounds: No  wheezing.   Abdominal:      General: Bowel sounds are normal. There is no distension.   Skin:     General: Skin is dry.   Neurological:      Mental Status: He is alert and oriented to person, place, and time.   Psychiatric:         Mood and Affect: Mood normal.         Behavior: Behavior normal.       Significant Labs: All pertinent labs within the past 24 hours have been reviewed.  BMP:   Recent Labs   Lab 10/14/22  0427   GLU 93      K 4.2   *   CO2 18*   BUN 20   CREATININE 1.6*   CALCIUM 8.7   MG 2.1     CBC: No results for input(s): WBC, HGB, HCT, PLT in the last 48 hours.    Significant Imaging:

## 2022-10-15 NOTE — NURSING
Nurses Note -- 4 Eyes      10/15/2022   6:20 PM      Skin assessed during: Daily Assessment      [x] No Pressure Injuries Present    [x]Prevention Measures Documented      [] Yes- Altered Skin Integrity Present or Discovered   [] LDA Added if Not in Epic (Describe Wound)   [] New Altered Skin Integrity was Present on Admit and Documented in LDA   [] Wound Image Taken    Wound Care Consulted? No    Attending Nurse:  Pilar Stratton RN     Second RN/Staff Member:

## 2022-10-15 NOTE — PLAN OF CARE
Problem: Adult Inpatient Plan of Care  Goal: Plan of Care Review  Outcome: Ongoing, Progressing  Goal: Patient-Specific Goal (Individualized)  Outcome: Ongoing, Progressing  Goal: Absence of Hospital-Acquired Illness or Injury  Outcome: Ongoing, Progressing  Goal: Optimal Comfort and Wellbeing  Outcome: Ongoing, Progressing     Problem: Cerebral Tissue Perfusion (Stroke, Ischemic/Transient Ischemic Attack)  Goal: Optimal Cerebral Tissue Perfusion  Outcome: Ongoing, Progressing     Problem: Cognitive Impairment (Stroke, Ischemic/Transient Ischemic Attack)  Goal: Optimal Cognitive Function  Outcome: Ongoing, Progressing     Problem: Communication Impairment (Stroke, Ischemic/Transient Ischemic Attack)  Goal: Improved Communication Skills  Outcome: Ongoing, Progressing     Problem: Functional Ability Impaired (Stroke, Ischemic/Transient Ischemic Attack)  Goal: Optimal Functional Ability  Outcome: Ongoing, Progressing     Problem: Respiratory Compromise (Stroke, Ischemic/Transient Ischemic Attack)  Goal: Effective Oxygenation and Ventilation  Outcome: Ongoing, Progressing     Problem: Sensorimotor Impairment (Stroke, Ischemic/Transient Ischemic Attack)  Goal: Improved Sensorimotor Function  Outcome: Ongoing, Progressing     Problem: Swallowing Impairment (Stroke, Ischemic/Transient Ischemic Attack)  Goal: Optimal Eating and Swallowing without Aspiration  Outcome: Ongoing, Progressing     Problem: Urinary Elimination Impaired (Stroke, Ischemic/Transient Ischemic Attack)  Goal: Effective Urinary Elimination  Outcome: Ongoing, Progressing     Problem: Fall Injury Risk  Goal: Absence of Fall and Fall-Related Injury  Outcome: Ongoing, Progressing     Problem: Infection  Goal: Absence of Infection Signs and Symptoms  Outcome: Ongoing, Progressing     Problem: Skin Injury Risk Increased  Goal: Skin Health and Integrity  Outcome: Ongoing, Progressing

## 2022-10-15 NOTE — NURSING
[facility] Intensive Care  ICU Shift Summary  Date: 10/15/2022      Prehospitalization: Home  Admit Date / LOS : 10/9/2022/ 4 days    Diagnosis: CVA (cerebral vascular accident)    Consults:        Active: Neuro       Needed: Neuro, OT, and PT     Code Status: Full Code   Advanced Directive: <no information>    LDA:  Lines/Drains/Airways       Drain  Duration             Male External Urinary Catheter 10/13/22 2000 1 day              Peripheral Intravenous Line  Duration                  Peripheral IV - Single Lumen 10/13/22 1500 20 G Left;Posterior Hand 2 days                  Central Lines/Site/Justification:Patient Does Not Have Central Line  Urinary Cath/Order/Justification:Patient Does Not Have Urinary Catheter    Vasopressors/Infusions:    sodium chloride 0.45% 100 mL/hr at 10/15/22 1207          GOALS: Volume/ Hemodynamic: SBP >                     RASS: 0  alert and calm    Pain Management: none       Pain Controlled: not applicable     Rhythm: NSR    Respiratory Device: Nasal Cannula    Oxygen Concentration (%):  [28] 28             Most Recent SBT/ SAT: N/A       MOVE Screen: PT Consult  ICU Liberation: no    VTE Prophylaxis: Mechanical  Mobility: Bedrest  Stress Ulcer Prophylaxis: Yes    Isolation: No active isolations    Dietary:   Current Diet Order   Procedures    Diet renal    Diet Dysphagia Mechanical Soft (IDDSI Level 5) [facility]; Renal, Cardiac (Low Na/Chol)     Order Specific Question:   Indicate patient location for additional diet options:     Answer[facility]   [facility]     Order Specific Question:   Additional Diet Options:     Answer:   Renal     Order Specific Question:   Additional Diet Options:     Answer:   Cardiac (Low Na/Chol)      Tolerance: not applicable  Advancement:     I & O (24h):    Intake/Output Summary (Last 24 hours) at 10/15/2022 1821  Last data filed at 10/15/2022 1703  Gross per 24 hour   Intake 1474.26 ml   Output 2396 ml   Net -921.74 ml        Restraints:  No    Significant Dates:  Post Op Date: N/A  Rescue Date: N/A  Imaging/ Diagnostics: N/A    Noteworthy Labs:  none    COVID Test: (--)  CBC/Anemia Labs: Coags:    Recent Labs   Lab 10/10/22  0408 10/11/22  0336   WBC 6.33 6.08   HGB 14.5 14.7   HCT 45.6 44.8    331   MCV 81* 81*   RDW 19.6* 19.5*    Recent Labs   Lab 10/09/22  1248 10/10/22  0407   INR 0.9 1.0   APTT  --  29.9        Chemistries:   Recent Labs   Lab 10/10/22  0407 10/11/22  0336 10/13/22  1400 10/14/22  0427      < > 140 138   K 4.6   < > 4.6 4.2      < > 110 112*   CO2 23   < > 20* 18*   BUN 22   < > 26* 20   CREATININE 1.8*   < > 2.0* 1.6*   CALCIUM 9.3   < > 9.2 8.7   PROT 6.8   < > 7.0 6.5   BILITOT 0.5   < > 0.4 0.5   ALKPHOS 63   < > 72 68   ALT 14   < > 16 17   AST 33   < > 24 25   MG 2.2  --  2.1 2.1   PHOS 3.9  --   --   --     < > = values in this interval not displayed.        Cardiac Enzymes: Ejection Fractions:    No results for input(s): CPK, CPKMB, MB, TROPONINI in the last 72 hours. EF   Date Value Ref Range Status   10/10/2022 60 % Final        POCT Glucose: HbA1c:    Recent Labs   Lab 10/14/22  2353 10/15/22  0611 10/15/22  1719   POCTGLUCOSE 89 73 87    Hemoglobin A1C   Date Value Ref Range Status   10/10/2022 5.6 4.0 - 5.6 % Final     Comment:     ADA Screening Guidelines:  5.7-6.4%  Consistent with prediabetes  >or=6.5%  Consistent with diabetes    High levels of fetal hemoglobin interfere with the HbA1C  assay. Heterozygous hemoglobin variants (HbS, HgC, etc)do  not significantly interfere with this assay.   However, presence of multiple variants may affect accuracy.             ICU LOS 2d 2h  Level of Care: Critical Care    Chart Check: 12 HR Done  Shift Summary/Plan for the shift: none

## 2022-10-15 NOTE — NURSING
[facility] Intensive Care  ICU Shift Summary  Date: 10/15/2022      Prehospitalization: Home  Admit Date / LOS : 10/9/2022/ 4 days    Diagnosis: CVA (cerebral vascular accident)    Consults:        Active: Neuro, OT, PT, Pulm CC, ST, and SW       Needed: N/A     Code Status: Full Code   Advanced Directive: <no information>    LDA:  Lines/Drains/Airways       Drain  Duration             Male External Urinary Catheter 10/13/22 2000 1 day              Peripheral Intravenous Line  Duration                  Peripheral IV - Single Lumen 10/13/22 1500 20 G Left;Posterior Hand 1 day         Peripheral IV - Single Lumen 10/13/22 1545 20 G Posterior;Right Hand 1 day                  Central Lines/Site/Justification:Patient Does Not Have Central Line  Urinary Cath/Order/Justification:Patient Does Not Have Urinary Catheter    Vasopressors/Infusions:    sodium chloride 0.45% 100 mL/hr at 10/15/22 0500          GOALS: Volume/ Hemodynamic: SBP <                     RASS: +1 anxious, apprehensive but not aggressive    Pain Management: none       Pain Controlled: not applicable     Rhythm: NSR    Respiratory Device: Nasal Cannula    Oxygen Concentration (%):  [28] 28             Most Recent SBT/ SAT: N/A       MOVE Screen: FAIL  ICU Liberation: no    VTE Prophylaxis: Mechanical  Mobility: Bedrest  Stress Ulcer Prophylaxis: No    Isolation: No active isolations    Dietary:   Current Diet Order   Procedures    Diet renal    Diet Dysphagia Mechanical Soft (IDDSI Level 5) [facility]; Renal, Cardiac (Low Na/Chol)     Order Specific Question:   Indicate patient location for additional diet options:     Answer:   [facility]     Order Specific Question:   Additional Diet Options:     Answer:   Renal     Order Specific Question:   Additional Diet Options:     Answer:   Cardiac (Low Na/Chol)      Tolerance: no  Advancement: no    I & O (24h):    Intake/Output Summary (Last 24 hours) at 10/15/2022 0578  Last data filed at  10/15/2022 0500  Gross per 24 hour   Intake 2466.93 ml   Output 3451 ml   Net -984.07 ml        Restraints: No    Significant Dates:  Post Op Date: N/A  Rescue Date: N/A    Imaging/ Diagnostics: N/A    Noteworthy Labs:  none    COVID Test: (--)  CBC/Anemia Labs: Coags:    Recent Labs   Lab 10/10/22  0408 10/11/22  0336   WBC 6.33 6.08   HGB 14.5 14.7   HCT 45.6 44.8    331   MCV 81* 81*   RDW 19.6* 19.5*    Recent Labs   Lab 10/09/22  1248 10/10/22  0407   INR 0.9 1.0   APTT  --  29.9        Chemistries:   Recent Labs   Lab 10/10/22  0407 10/11/22  0336 10/13/22  1400 10/14/22  0427      < > 140 138   K 4.6   < > 4.6 4.2      < > 110 112*   CO2 23   < > 20* 18*   BUN 22   < > 26* 20   CREATININE 1.8*   < > 2.0* 1.6*   CALCIUM 9.3   < > 9.2 8.7   PROT 6.8   < > 7.0 6.5   BILITOT 0.5   < > 0.4 0.5   ALKPHOS 63   < > 72 68   ALT 14   < > 16 17   AST 33   < > 24 25   MG 2.2  --  2.1 2.1   PHOS 3.9  --   --   --     < > = values in this interval not displayed.        Cardiac Enzymes: Ejection Fractions:    No results for input(s): CPK, CPKMB, MB, TROPONINI in the last 72 hours. EF   Date Value Ref Range Status   10/10/2022 60 % Final        POCT Glucose: HbA1c:    Recent Labs   Lab 10/14/22  1327 10/14/22  1814 10/14/22  2353   POCTGLUCOSE 100 91 89    Hemoglobin A1C   Date Value Ref Range Status   10/10/2022 5.6 4.0 - 5.6 % Final     Comment:     ADA Screening Guidelines:  5.7-6.4%  Consistent with prediabetes  >or=6.5%  Consistent with diabetes    High levels of fetal hemoglobin interfere with the HbA1C  assay. Heterozygous hemoglobin variants (HbS, HgC, etc)do  not significantly interfere with this assay.   However, presence of multiple variants may affect accuracy.             ICU LOS 1d 14h  Level of Care: Critical Care    Chart Check: 12 HR Done  Shift Summary/Plan for the shift: none

## 2022-10-15 NOTE — ASSESSMENT & PLAN NOTE
2 seizure on 10/13. Moved to ICU. Neuro consulted. On IV keppra. None since     Will monitor in ICU today. If no further- will move out of ICU on 10/16

## 2022-10-15 NOTE — ASSESSMENT & PLAN NOTE
Patient presents with right leg weakness.  Started on ASA.  Statin increased to 40 mg daily.  Started on Plavix for 21 days.  PT/OT evaluated patient yesterday.  He was doing well and planned to be discharged with  today.  Therapy evaluated patient today and weakness apparently worse.  Patient dragging right leg and unable to ambulate like yesterday.  Cancel discharge.  Continue therapy and await further recommendations.  gradually started on BM med;s.  SNF vs. Rehab in the end    PT,OT recommending rehab,consulted SW.  Patient with R side neglect and unable to lift R arm. CT head 10/14 no acute changes to previous

## 2022-10-15 NOTE — NURSING
Pt tolerating liquids without problems apparent frustration and requires a lot of encouragement. HOB remains up B/P remains high until meds adm. Had huge BM and UOP 1400mls. No temp O2 sats at 93% without O2.able to control body movements pretty well takes meds and H2O with Rt hand.

## 2022-10-15 NOTE — PROGRESS NOTES
[facility] Medicine  Progress Note    Patient Name: Leonides Burns  MRN: [id number]  Patient Class: IP- Inpatient   Admission Date: 10/9/2022  Length of Stay: 4 days  Attending Physician: Nain Oneill MD  Primary Care Provider: Suzette Akhtar MD        Subjective:     Principal Problem:CVA (cerebral vascular accident)        HPI:  Leonides Burns 77 y.o. male with CKD, HTN, tobacco abuse presents to the hospital with a chief complaint of right leg weakness.  He reports 2 days of right lower extremity weakness and discoordination and double vision.  States the symptoms have been constant without aggravating factors.  He sometimes feels that improved with lying flat.  He reports he previously had stop aspirin due to severe and recurrent epistaxis.  He denies fevers chest pain shortness breast nausea vomiting abdominal pain leg swelling syncope numbness weakness of an arm incontinence weakness of his left leg.    In the ED, creatinine 2.1 CT head without acute abnormality, chest x-ray without focal consolidation afebrile without leukocytosis COVID negative LDL 80.6 TSH within normal limits.      Overview/Hospital Course:  77M with pmh of  CKD, HTN, tobacco abuse presents to the hospital with a chief complaint of right leg weakness. MRI with Small acute punctate left parasagittal frontal lobe. PT/OT/SLP consulted. Echo and carotids unremarkable. Neurology consulted.  Patient continued on ASA.  His Statin increased to 40 mg daily and he will be started on Plavix for 21 days.  Started gradually on BP med;s  PT,OT recommending rehab,consulted .  Patient had 2 seizures on 10/13 and was sent to the ICU.  Started on IV Keppra. Repeat CT head did not show any changes.              Interval History: No new issues.     Review of Systems   Constitutional:  Positive for activity change. Negative for appetite change.   HENT:  Negative for congestion and dental problem.    Eyes:   Negative for discharge and itching.   Respiratory:  Negative for apnea and chest tightness.    Cardiovascular:  Negative for chest pain.   Gastrointestinal:  Negative for abdominal distention and abdominal pain.   Genitourinary:  Negative for difficulty urinating and dysuria.   Musculoskeletal:  Negative for arthralgias.   Allergic/Immunologic: Negative for environmental allergies.   Neurological:  Negative for dizziness and facial asymmetry.   Hematological:  Negative for adenopathy.   Psychiatric/Behavioral:  Negative for agitation and behavioral problems.    Objective:     Vital Signs (Most Recent):  Temp: 97.7 °F (36.5 °C) (10/15/22 0701)  Pulse: 82 (10/15/22 0930)  Resp: (!) 30 (10/15/22 0930)  BP: (!) 159/67 (10/15/22 0900)  SpO2: 100 % (10/15/22 0930)   Vital Signs (24h Range):  Temp:  [97.7 °F (36.5 °C)-98.8 °F (37.1 °C)] 97.7 °F (36.5 °C)  Pulse:  [72-94] 82  Resp:  [18-36] 30  SpO2:  [92 %-100 %] 100 %  BP: (128-198)/() 159/67     Weight: 95.8 kg (211 lb 3.2 oz)  Body mass index is 32.11 kg/m².    Intake/Output Summary (Last 24 hours) at 10/15/2022 0957  Last data filed at 10/15/2022 0600  Gross per 24 hour   Intake 2466.93 ml   Output 2701 ml   Net -234.07 ml      Physical Exam  Vitals and nursing note reviewed.   Constitutional:       General: He is not in acute distress.     Appearance: Normal appearance. He is obese. He is ill-appearing. He is not toxic-appearing or diaphoretic.   HENT:      Head: Normocephalic and atraumatic.      Mouth/Throat:      Pharynx: Oropharynx is clear.   Eyes:      Conjunctiva/sclera: Conjunctivae normal.   Cardiovascular:      Rate and Rhythm: Normal rate and regular rhythm.   Pulmonary:      Effort: Pulmonary effort is normal. No respiratory distress.      Breath sounds: No wheezing.   Abdominal:      General: Bowel sounds are normal. There is no distension.   Skin:     General: Skin is dry.   Neurological:      Mental Status: He is alert and oriented to person,  place, and time.   Psychiatric:         Mood and Affect: Mood normal.         Behavior: Behavior normal.       Significant Labs: All pertinent labs within the past 24 hours have been reviewed.  BMP:   Recent Labs   Lab 10/14/22  0427   GLU 93      K 4.2   *   CO2 18*   BUN 20   CREATININE 1.6*   CALCIUM 8.7   MG 2.1     CBC: No results for input(s): WBC, HGB, HCT, PLT in the last 48 hours.    Significant Imaging:       Assessment/Plan:      * CVA (cerebral vascular accident)  Patient presents with right leg weakness.  Started on ASA.  Statin increased to 40 mg daily.  Started on Plavix for 21 days.  PT/OT evaluated patient yesterday.  He was doing well and planned to be discharged with  today.  Therapy evaluated patient today and weakness apparently worse.  Patient dragging right leg and unable to ambulate like yesterday.  Cancel discharge.  Continue therapy and await further recommendations.  gradually started on BM med;s.  SNF vs. Rehab in the end    PT,OT recommending rehab,consulted SW.  Patient with R side neglect and unable to lift R arm. CT head 10/14 no acute changes to previous     Seizure  2 seizure on 10/13. Moved to ICU. Neuro consulted. On IV keppra. None since     Will monitor in ICU today. If no further- will move out of ICU on 10/16      Obesity (BMI 30.0-34.9)  Will recommend diet and lifestyle modifications outpatient    Tobacco dependence  Smokes rare cigarettes per day. Greater than 3 minutes spent counseling patient on dangers of continued tobacco abuse. Will provide tobacco cessation education prior to discharge    Chronic kidney disease, stage III (moderate)  Crt on admission of 2.1. baseline of 1.7 to 2.1  Renal dose medications avoid nephrotoxic drugs monitor intake and output  -improved and at baseline      Hypertension  Was Holding home amlodipine and lisinopril for permissive HTN,  gradually started on BM med;s.      VTE Risk Mitigation (From admission, onward)          Ordered     IP VTE HIGH RISK PATIENT  Once         10/09/22 2026     Place sequential compression device  Until discontinued         10/09/22 2026                Discharge Planning   DEVIKA: 10/11/2022     Code Status: Full Code   Is the patient medically ready for discharge?:     Reason for patient still in hospital (select all that apply): Patient unstable  Discharge Plan A: Rehab   Discharge Delays: (!) Post-Acute Set-up        Critical care time spent on the evaluation and treatment of severe organ dysfunction, review of pertinent labs and imaging studies, discussions with consulting providers and discussions with patient/family: 30  minutes.      Nain Cho MD  Department of Hospital Medicine   [facility] - Intensive Care

## 2022-10-16 LAB
BASOPHILS # BLD AUTO: 0.06 K/UL (ref 0–0.2)
BASOPHILS NFR BLD: 0.8 % (ref 0–1.9)
DIFFERENTIAL METHOD: ABNORMAL
EOSINOPHIL # BLD AUTO: 0.8 K/UL (ref 0–0.5)
EOSINOPHIL NFR BLD: 10.8 % (ref 0–8)
ERYTHROCYTE [DISTWIDTH] IN BLOOD BY AUTOMATED COUNT: 19.1 % (ref 11.5–14.5)
HCT VFR BLD AUTO: 44.8 % (ref 40–54)
HGB BLD-MCNC: 14.4 G/DL (ref 14–18)
IMM GRANULOCYTES # BLD AUTO: 0.02 K/UL (ref 0–0.04)
IMM GRANULOCYTES NFR BLD AUTO: 0.3 % (ref 0–0.5)
LYMPHOCYTES # BLD AUTO: 1.9 K/UL (ref 1–4.8)
LYMPHOCYTES NFR BLD: 24.5 % (ref 18–48)
MCH RBC QN AUTO: 25.8 PG (ref 27–31)
MCHC RBC AUTO-ENTMCNC: 32.1 G/DL (ref 32–36)
MCV RBC AUTO: 80 FL (ref 82–98)
MONOCYTES # BLD AUTO: 1.4 K/UL (ref 0.3–1)
MONOCYTES NFR BLD: 18.5 % (ref 4–15)
NEUTROPHILS # BLD AUTO: 3.5 K/UL (ref 1.8–7.7)
NEUTROPHILS NFR BLD: 45.1 % (ref 38–73)
NRBC BLD-RTO: 0 /100 WBC
PLATELET # BLD AUTO: 313 K/UL (ref 150–450)
PMV BLD AUTO: 10.4 FL (ref 9.2–12.9)
POCT GLUCOSE: 74 MG/DL (ref 70–110)
POCT GLUCOSE: 92 MG/DL (ref 70–110)
RBC # BLD AUTO: 5.58 M/UL (ref 4.6–6.2)
WBC # BLD AUTO: 7.68 K/UL (ref 3.9–12.7)

## 2022-10-16 PROCEDURE — A4216 STERILE WATER/SALINE, 10 ML: HCPCS | Performed by: PHYSICIAN ASSISTANT

## 2022-10-16 PROCEDURE — 97110 THERAPEUTIC EXERCISES: CPT | Mod: CQ

## 2022-10-16 PROCEDURE — 25000003 PHARM REV CODE 250: Performed by: STUDENT IN AN ORGANIZED HEALTH CARE EDUCATION/TRAINING PROGRAM

## 2022-10-16 PROCEDURE — 20000000 HC ICU ROOM

## 2022-10-16 PROCEDURE — 25000003 PHARM REV CODE 250: Performed by: INTERNAL MEDICINE

## 2022-10-16 PROCEDURE — 94761 N-INVAS EAR/PLS OXIMETRY MLT: CPT

## 2022-10-16 PROCEDURE — 97112 NEUROMUSCULAR REEDUCATION: CPT

## 2022-10-16 PROCEDURE — 36415 COLL VENOUS BLD VENIPUNCTURE: CPT | Performed by: INTERNAL MEDICINE

## 2022-10-16 PROCEDURE — 25000003 PHARM REV CODE 250: Performed by: HOSPITALIST

## 2022-10-16 PROCEDURE — 97530 THERAPEUTIC ACTIVITIES: CPT | Mod: CQ

## 2022-10-16 PROCEDURE — 85025 COMPLETE CBC W/AUTO DIFF WBC: CPT | Performed by: INTERNAL MEDICINE

## 2022-10-16 PROCEDURE — 63600175 PHARM REV CODE 636 W HCPCS: Performed by: INTERNAL MEDICINE

## 2022-10-16 PROCEDURE — 97530 THERAPEUTIC ACTIVITIES: CPT

## 2022-10-16 PROCEDURE — 25000003 PHARM REV CODE 250: Performed by: PHYSICIAN ASSISTANT

## 2022-10-16 PROCEDURE — 97112 NEUROMUSCULAR REEDUCATION: CPT | Mod: CQ

## 2022-10-16 PROCEDURE — 27000221 HC OXYGEN, UP TO 24 HOURS

## 2022-10-16 RX ORDER — ISOSORBIDE MONONITRATE 30 MG/1
30 TABLET, EXTENDED RELEASE ORAL DAILY
Status: DISCONTINUED | OUTPATIENT
Start: 2022-10-16 | End: 2022-10-17

## 2022-10-16 RX ORDER — ALBUTEROL SULFATE 2.5 MG/.5ML
2.5 SOLUTION RESPIRATORY (INHALATION) EVERY 4 HOURS PRN
Status: DISCONTINUED | OUTPATIENT
Start: 2022-10-16 | End: 2022-10-28 | Stop reason: HOSPADM

## 2022-10-16 RX ADMIN — HYDRALAZINE HYDROCHLORIDE 100 MG: 25 TABLET, FILM COATED ORAL at 01:10

## 2022-10-16 RX ADMIN — LEVETIRACETAM INJECTION 1000 MG: 10 INJECTION INTRAVENOUS at 09:10

## 2022-10-16 RX ADMIN — ATORVASTATIN CALCIUM 40 MG: 40 TABLET, FILM COATED ORAL at 09:10

## 2022-10-16 RX ADMIN — HYDRALAZINE HYDROCHLORIDE 100 MG: 25 TABLET, FILM COATED ORAL at 06:10

## 2022-10-16 RX ADMIN — AMLODIPINE BESYLATE 10 MG: 5 TABLET ORAL at 09:10

## 2022-10-16 RX ADMIN — CLOPIDOGREL BISULFATE 75 MG: 75 TABLET ORAL at 09:10

## 2022-10-16 RX ADMIN — ISOSORBIDE MONONITRATE 30 MG: 30 TABLET, EXTENDED RELEASE ORAL at 09:10

## 2022-10-16 RX ADMIN — ASPIRIN 81 MG: 81 TABLET, COATED ORAL at 09:10

## 2022-10-16 RX ADMIN — Medication 10 ML: at 06:10

## 2022-10-16 RX ADMIN — HYDRALAZINE HYDROCHLORIDE 100 MG: 25 TABLET, FILM COATED ORAL at 09:10

## 2022-10-16 NOTE — PT/OT/SLP PROGRESS
Physical Therapy Treatment    Patient Name:  Leonides Burns   MRN:  [id number]    Recommendations:     Discharge Recommendations:  rehabilitation facility   Discharge Equipment Recommendations:  (Per IPR)   Barriers to discharge:  Pt currently in ICU with extension of CVA.  Not functioning at Independent baseline and now requiring Total A x 2 for all functional mobility and unable to ambulate.  Pt could benefit from an aggressive Multidisciplinary approach(IPR) in order to help restore pt to highest functional level.    Assessment:     Leonides Burns is a 77 y.o. male admitted with a medical diagnosis of CVA (cerebral vascular accident).  He presents with the following impairments/functional limitations:  weakness, impaired joint extensibility, abnormal tone, decreased ROM, impaired muscle length, impaired endurance, impaired sensation, impaired self care skills, decreased upper extremity function, decreased lower extremity function, decreased safety awareness, impaired balance, impaired cardiopulmonary response to activity, decreased coordination.      Pt is very motivated and tolerated treatment good today. Displaying improvements with RUE strength/movement. Pt is able to move RLE, however, not on command. Pt tolerated sitting EOB for ~25 minutes. Initially pt required Max A to maintain balance, however, once RUE was supported on tray table, pt required CGA-SBA. Dynamic sitting balance for LE exercises and UE reaching pt required Max A. Mr. Burns was independent with community ambulation with use of SC and independent for ADLs. Pt now requires increased assistance for all bed mobility and activities. Pt is able to tolerate 3 hours of therapy and would benefit from a multidisciplinary approach to address pt's medical and functional deficits.     Rehab Prognosis: Good; patient would benefit from acute skilled PT services to address these deficits and reach maximum level of function.    Recent Surgery: * No surgery  found *      Plan:     During this hospitalization, patient to be seen  (5-6x/week) to address the identified rehab impairments via gait training, therapeutic activities, therapeutic exercises, neuromuscular re-education, wheelchair management/training and progress toward the following goals:    Plan of Care Expires:  10/26/22    Subjective     Chief Complaint: weakness  Patient/Family Comments/goals: Pt agreeable to participation.   Pain/Comfort:  Pain Rating 1: 0/10      Objective:     Communicated with pt's nurse, Pilar, prior to session.  Patient found HOB elevated with pressure boots, blood pressure cuff, peripheral IV, pulse ox (continuous), telemetry, restraints, Condom Catheter, oxygen upon PT entry to room.     General Precautions: Standard, fall, respiratory, seizure   Orthopedic Precautions:N/A   Braces: N/A  Respiratory Status: Nasal cannula, flow 2.5 L/min   - non-skid socks donned     Functional Mobility:  Bed Mobility:     Rolling Left:  total assistance  Rolling Right: maximal assistance  Scooting: total A of 2 persons to scoot posteriorly while sitting EOB, Max A of 2 persons to scoot to HOB in supine while in trendelenburg- pt assisted with LLE.  Supine to Sit: Max A of 2 persons for trunk and BLEs  Sit to Supine: Total A of 2 persons for trunk and BLEs   Balance: poor to fair sitting balance      AM-PAC 6 CLICK MOBILITY  Turning over in bed (including adjusting bedclothes, sheets and blankets)?: 2  Sitting down on and standing up from a chair with arms (e.g., wheelchair, bedside commode, etc.): 2  Moving from lying on back to sitting on the side of the bed?: 2  Moving to and from a bed to a chair (including a wheelchair)?: 1  Need to walk in hospital room?: 1  Climbing 3-5 steps with a railing?: 1  Basic Mobility Total Score: 9       Therapeutic Activities and Exercises:  PROM to RLE in all planes while sitting EOB (DF/PF to ankle, knee flexion/ER, hip flex/IR/ER)  Therex to LLE x10 reps AROM:  "Marching, LAQs, ankle pumps     Dynamic sitting:   - Pt leaning on R elbow and able to pushup to midline x2 reps requiring Mod-Max A   - Reaching across midline and outside YOVANA- pt required Max A for balance x8 reps of reaching with LUE and weight bearing throughout RUE/RLE    Pt educated on PTA role/POC.   Importance of PT session  Safety during bed mobility  White board updated   Multiple self-care tasks/functional mobility completed- assistance level noted above   All questions/concerns answered within scope of practice      Patient left  HOB elevated, with pressure boots, RUE elevated on pillow, B wrist restraints , and all needs met with all lines intact, call button in reach, and pt's nurse, Pilar notified..    GOALS:   Multidisciplinary Problems       Physical Therapy Goals          Problem: Physical Therapy    Goal Priority Disciplines Outcome Goal Variances Interventions   Physical Therapy Goal     PT, PT/OT Ongoing, Not Progressing     Description: Goals to be met by: 10/14/22     Patient will increase functional independence with mobility by performin. Pt to be Min A with bed mobility.  2. Pt to transfer sit to stand with CGA  3. Pt to ambulate with HW and Min A x 10-15'  4. Pt to be Supervision with written HEP  5. Pt to transfer bed to chair with CGA  6. Pt to ascend/descend 4" curb with RW and Min A  7. Pt will propel W/C with L UE/LE and CGA approx 25'                       Time Tracking:     PT Received On: 10/16/22  PT Start Time: 1034     PT Stop Time: 1112  PT Total Time (min): 38 min Cotx with OT  Co-treatment with FRAGA/ OT secondary to need for two skilled therapists for safety of clinicians and patient.    Billable Minutes: Therapeutic Activity 20 and Therapeutic Exercise 10 Neuromuscular Re-education 8     Treatment Type: Treatment  PT/PTA: PTA     PTA Visit Number: 1     10/16/2022  "

## 2022-10-16 NOTE — NURSING
Nurses Note -- 4 Eyes      10/16/2022   5:21 PM      Skin assessed during: Daily Assessment      [x] No Pressure Injuries Present    [x]Prevention Measures Documented      [] Yes- Altered Skin Integrity Present or Discovered   [] LDA Added if Not in Epic (Describe Wound)   [] New Altered Skin Integrity was Present on Admit and Documented in LDA   [] Wound Image Taken    Wound Care Consulted? No    Attending Nurse:  Pilar Stratton RN     Second RN/Staff Member:

## 2022-10-16 NOTE — PROGRESS NOTES
[facility] Intensive Care  ICU Shift Summary  Date: 10/16/2022      Prehospitalization: Home  Admit Date / LOS : 10/9/2022/ 5 days    Diagnosis: CVA (cerebral vascular accident)    Consults:        Active: Neuro, OT, PT, and ST       Needed: N/A     Code Status: Full Code   Advanced Directive: <no information>    LDA:  Lines/Drains/Airways       Drain  Duration             Male External Urinary Catheter 10/13/22 2000 2 days              Peripheral Intravenous Line  Duration                  Peripheral IV - Single Lumen 10/13/22 1500 20 G Left;Posterior Hand 3 days                  Central Lines/Site/Justification:Patient Does Not Have Central Line  Urinary Cath/Order/Justification:Critically Ill in the ICU and requiring intensive monitoring    Vasopressors/Infusions:        GOALS: Volume/ Hemodynamic: SBP >                     RASS: +1 anxious, apprehensive but not aggressive    Pain Management: PO       Pain Controlled: not applicable     Rhythm: NSR    Respiratory Device: Nasal Cannula    Oxygen Concentration (%):  [28] 28             Most Recent SBT/ SAT: N/A       MOVE Screen: PASS  ICU Liberation: no    VTE Prophylaxis: Mechanical  Mobility: Bedrest  Stress Ulcer Prophylaxis: Yes    Isolation: No active isolations    Dietary:   Current Diet Order   Procedures    Diet renal    Diet Dysphagia Mechanical Soft (IDDSI Level 5) [facility]; Renal, Cardiac (Low Na/Chol)     Order Specific Question:   Indicate patient location for additional diet options:     Answer:   [facility]     Order Specific Question:   Additional Diet Options:     Answer:   Renal     Order Specific Question:   Additional Diet Options:     Answer:   Cardiac (Low Na/Chol)      Tolerance: yes  Advancement: no    I & O (24h):    Intake/Output Summary (Last 24 hours) at 10/16/2022 1722  Last data filed at 10/16/2022 0600  Gross per 24 hour   Intake 2398.11 ml   Output 950 ml   Net 1448.11 ml        Restraints: Yes    Significant  Dates:  Post Op Date: N/A  Rescue Date: N/A  Imaging/ Diagnostics: N/A    Noteworthy Labs:  none    COVID Test: (--)  CBC/Anemia Labs: Coags:    Recent Labs   Lab 10/11/22  0336 10/16/22  0332   WBC 6.08 7.68   HGB 14.7 14.4   HCT 44.8 44.8    313   MCV 81* 80*   RDW 19.5* 19.1*    Recent Labs   Lab 10/10/22  0407   INR 1.0   APTT 29.9        Chemistries:   Recent Labs   Lab 10/10/22  0407 10/11/22  0336 10/13/22  1400 10/14/22  0427      < > 140 138   K 4.6   < > 4.6 4.2      < > 110 112*   CO2 23   < > 20* 18*   BUN 22   < > 26* 20   CREATININE 1.8*   < > 2.0* 1.6*   CALCIUM 9.3   < > 9.2 8.7   PROT 6.8   < > 7.0 6.5   BILITOT 0.5   < > 0.4 0.5   ALKPHOS 63   < > 72 68   ALT 14   < > 16 17   AST 33   < > 24 25   MG 2.2  --  2.1 2.1   PHOS 3.9  --   --   --     < > = values in this interval not displayed.        Cardiac Enzymes: Ejection Fractions:    No results for input(s): CPK, CPKMB, MB, TROPONINI in the last 72 hours. EF   Date Value Ref Range Status   10/10/2022 60 % Final        POCT Glucose: HbA1c:    Recent Labs   Lab 10/15/22  1115 10/15/22  1719 10/16/22  0640   POCTGLUCOSE 92 87 74    Hemoglobin A1C   Date Value Ref Range Status   10/10/2022 5.6 4.0 - 5.6 % Final     Comment:     ADA Screening Guidelines:  5.7-6.4%  Consistent with prediabetes  >or=6.5%  Consistent with diabetes    High levels of fetal hemoglobin interfere with the HbA1C  assay. Heterozygous hemoglobin variants (HbS, HgC, etc)do  not significantly interfere with this assay.   However, presence of multiple variants may affect accuracy.             ICU LOS 3d 1h  Level of Care: Critical Care    Chart Check: 12 HR Done  Shift Summary/Plan for the shift: AM

## 2022-10-16 NOTE — PT/OT/SLP PROGRESS
Occupational Therapy   Treatment    Name: Leonides Burns  MRN: [id number]  Admitting Diagnosis:  CVA (cerebral vascular accident)       Recommendations:     Discharge Recommendations: rehabilitation facility  Discharge Equipment Recommendations:   (ongoing assessment pending further progress)  Barriers to discharge:   (pt was MOD I and living alone PTA. now, acute CVA with impairments with all ADLs and all aspects of functional mobility. pt at high risk of falls, unplanned readmission, and morbidity if d/c home at this time)    Assessment:     Leonides Burns is a 77 y.o. male with a medical diagnosis of CVA (cerebral vascular accident). Performance deficits affecting function are weakness, gait instability, decreased upper extremity function, decreased ROM, impaired cardiopulmonary response to activity, impaired endurance, impaired balance, decreased lower extremity function, impaired coordination, decreased safety awareness, impaired fine motor, impaired cognition, impaired self care skills, impaired functional mobility.     Pt calm, cooperative, and motivated during therapy session. Pt with improved RUE strength compared to session on 10/14/22. Now, pt with improved RUE  strength (subjectively to OT compared to previous session), elbow flexion 2/5, elbow extension 2/5, shoulder flexion 2-/5, 2/5 internal rotation, 2-/5 external rotation. No numbness nor tingling reported to RUE.    Pt would be a great candidate for inpatient rehab at d/c in order to address deficits requiring multidisciplinary approach in order to maximize potential in recovery. Strong family by son.     Rehab Prognosis:  Good; patient would benefit from acute skilled OT services to address these deficits and reach maximum level of function.       Plan:     Patient to be seen  (5-6x/week) to address the above listed problems via self-care/home management, therapeutic activities, therapeutic exercises, neuromuscular re-education  Plan of Care  "Expires: 10/24/22  Plan of Care Reviewed with: patient    Subjective     Chief complaint: none reported   Patient/family comments/ goals: pt reported "the saints" when asked what he wants to watch on tv; smiling and working with therapists sitting EOB     Pain/Comfort:  Pain Rating 1: 0/10    Objective:     Communicated with: nursePilar, prior to session.  Patient found HOB elevated with blood pressure cuff, pulse ox (continuous), oxygen, telemetry, pressure relief boots, peripheral IV, restraints, Condom Catheter upon OT entry to room.    General Precautions: Standard, fall, respiratory, seizure   Orthopedic Precautions:N/A   Braces: N/A  Respiratory Status: Nasal cannula, flow 2.5 L/min     Occupational Performance:     Bed Mobility:    Patient completed Rolling/Turning to Left with  total assistance  Patient completed Rolling/Turning to Right with maximal assistance  Patient completed Scooting posteriorly with total assistance and 2 persons  Patient completed Supine to Sit with maximal assistance, 2 persons, and HOB elevated  Patient completed Sit to Supine with total assistance and 2 persons     Activities of Daily Living:  Lower Body Dressing: total assistance to don B/L socks      [facility] 6 Click ADL: 11    Treatment & Education:  Pt re-educated on OT role/POC.   Importance of EOB activity with therapy assistance.  Pt sat EOB for ~25 min during session   Seated EOB, pt initially needed MAX A for static sit d/t lateral lean to the R and posteriorly; however, once OT placed RUE on tray table, pt able to maintain static sit balance with SBA. Pt with posterior lean with BLE dynamic sit activities with RUE supported on table requiring cueing and MIN A for balance.   OT with assist for positioning of RUE on pt's side at EOB with LUE functional reach crossing midline to promote weight bear through RUE and RLE x8; pt required MAX A for dynamic sit balance and cueing to find target to reach for in various fields near " midline and on pt's R side   With RUE supported on tray table on pt's R side with hand placed on washcloth, pt completed x10 AAROM shoulder flexion, scapular protraction, elbow extension; x10 AROM shoulder extension, scapular retraction, elbow flexion; x10 AROM internal rotation, AAROM external rotation; increased time with each rep required for pt initiation. Pt concentrated to task   Safety during functional t/f and mobility   Self-care task/functional mobility completed- assistance level noted above   All questions/concerns answered within OT scope of practice       Patient left  HOB elevated with RUE elevated on 2 pillows and B/L pressure relief boots in place  with all lines intact, call button in reach, nurse, Kolleen, notified, and B/L soft wrist restraints in place; tv on, lights on, blind opened, curtain opened.     GOALS:   Multidisciplinary Problems       Occupational Therapy Goals          Problem: Occupational Therapy    Goal Priority Disciplines Outcome Interventions   Occupational Therapy Goal     OT, PT/OT Ongoing, Progressing    Description: Goals to be met by: 10/24/22    Patient will increase functional independence with ADLs by performing:    UE Dressing with Modified [place].  LE Dressing with Modified [place].  Grooming while standing at sink with Modified [place].  Toileting from toilet with Modified [place] for hygiene and clothing management.   Supine to sit with Modified [place].  Step transfer with Modified [place]  Toilet transfer to toilet with Modified [place].  Upper extremity exercise program x15 reps per handout, with independence.                         Time Tracking:     OT Date of Treatment: 10/16/22  OT Start Time: 1035  OT Stop Time: 1113  OT Total Time (min): 38 min    Billable Minutes:Therapeutic Activity 15 min  Neuromuscular Re-education 23 min  Total Time 38 min (co-treat with PTA)    Co- treatment performed due to patient's multiple  deficits requiring two skilled therapists to appropriately and safely assess patient's strength and endurance while facilitating functional tasks in addition to accommodating for patient's activity tolerance      OT/CHANDNI: OT     CHANDNI Visit Number: 0    10/16/2022

## 2022-10-16 NOTE — PLAN OF CARE
Problem: Occupational Therapy  Goal: Occupational Therapy Goal  Description: Goals to be met by: 10/24/22    Patient will increase functional independence with ADLs by performing:    UE Dressing with Modified [place].  LE Dressing with Modified [place].  Grooming while standing at sink with Modified [place].  Toileting from toilet with Modified [place] for hygiene and clothing management.   Supine to sit with Modified [place].  Step transfer with Modified [place]  Toilet transfer to toilet with Modified [place].  Upper extremity exercise program x15 reps per handout, with independence.    Outcome: Ongoing, Progressing     Pt calm, cooperative, and motivated during therapy session.

## 2022-10-16 NOTE — PLAN OF CARE
"  Problem: Physical Therapy  Goal: Physical Therapy Goal  Description: Goals to be met by: 10/14/22     Patient will increase functional independence with mobility by performin. Pt to be Min A with bed mobility.  2. Pt to transfer sit to stand with CGA  3. Pt to ambulate with HW and Min A x 10-15'  4. Pt to be Supervision with written HEP  5. Pt to transfer bed to chair with CGA  6. Pt to ascend/descend 4" curb with RW and Min A  7. Pt will propel W/C with L UE/LE and CGA approx 25'  Outcome: Ongoing, Progressing     Pt is very motivated and tolerated treatment good today. Displaying improvements with RUE strength/movement. Pt is able to move RLE, however, not on command. Pt tolerated sitting EOB for ~25 minutes. Initially pt required Max A to maintain balance, however, once RUE was supported on tray table, pt required CGA-SBA. Dynamic sitting balance for LE exercises and UE reaching pt required Max A. Mr. Burns was independent with community ambulation with use of SC and independent for ADLs. Pt now requires increased assistance for all bed mobility and activities. Pt is able to tolerate 3 hours of therapy and would benefit from a multidisciplinary approach to address pt's medical and functional deficits.   "

## 2022-10-16 NOTE — SUBJECTIVE & OBJECTIVE
Interval History: No new issues     Review of Systems   Constitutional:  Positive for activity change. Negative for appetite change.   HENT:  Negative for congestion and dental problem.    Eyes:  Negative for discharge and itching.   Respiratory:  Negative for apnea and chest tightness.    Cardiovascular:  Negative for chest pain.   Gastrointestinal:  Negative for abdominal distention and abdominal pain.   Genitourinary:  Negative for difficulty urinating and dysuria.   Musculoskeletal:  Negative for arthralgias.   Allergic/Immunologic: Negative for environmental allergies.   Neurological:  Negative for dizziness and facial asymmetry.   Hematological:  Negative for adenopathy.   Psychiatric/Behavioral:  Negative for agitation and behavioral problems.    Objective:     Vital Signs (Most Recent):  Temp: 98 °F (36.7 °C) (10/16/22 0700)  Pulse: 87 (10/16/22 0900)  Resp: (!) 28 (10/16/22 0900)  BP: (!) 188/70 (10/16/22 0900)  SpO2: 96 % (10/16/22 0900)   Vital Signs (24h Range):  Temp:  [98 °F (36.7 °C)-98.9 °F (37.2 °C)] 98 °F (36.7 °C)  Pulse:  [] 87  Resp:  [14-40] 28  SpO2:  [93 %-100 %] 96 %  BP: (124-206)/(61-92) 188/70     Weight: 96 kg (211 lb 10.3 oz)  Body mass index is 32.18 kg/m².    Intake/Output Summary (Last 24 hours) at 10/16/2022 0936  Last data filed at 10/16/2022 0600  Gross per 24 hour   Intake 2578.11 ml   Output 1945 ml   Net 633.11 ml      Physical Exam  Vitals and nursing note reviewed.   Constitutional:       General: He is not in acute distress.     Appearance: Normal appearance. He is obese. He is ill-appearing. He is not toxic-appearing or diaphoretic.   HENT:      Head: Normocephalic and atraumatic.      Mouth/Throat:      Pharynx: Oropharynx is clear.   Eyes:      Conjunctiva/sclera: Conjunctivae normal.   Cardiovascular:      Rate and Rhythm: Normal rate and regular rhythm.   Pulmonary:      Effort: Pulmonary effort is normal. No respiratory distress.      Breath sounds: No wheezing.    Abdominal:      General: Bowel sounds are normal. There is no distension.   Skin:     General: Skin is dry.   Neurological:      Mental Status: He is alert and oriented to person, place, and time.   Psychiatric:         Mood and Affect: Mood normal.         Behavior: Behavior normal.       Significant Labs: All pertinent labs within the past 24 hours have been reviewed.  BMP: No results for input(s): GLU, NA, K, CL, CO2, BUN, CREATININE, CALCIUM, MG in the last 48 hours.  CBC:   Recent Labs   Lab 10/16/22  0332   WBC 7.68   HGB 14.4   HCT 44.8          Significant Imaging:

## 2022-10-16 NOTE — PROGRESS NOTES
[facility]  Progress Note    Patient Name: Leonides Burns  MRN: [id number]  Patient Class: IP- Inpatient   Admission Date: 10/9/2022  Length of Stay: 5 days  Attending Physician: Nain Oneill MD  Primary Care Provider: Suzette Akhtar MD        Subjective:     Principal Problem:CVA (cerebral vascular accident)        HPI:  Leonides Burns 77 y.o. male with CKD, HTN, tobacco abuse presents to the hospital with a chief complaint of right leg weakness.  He reports 2 days of right lower extremity weakness and discoordination and double vision.  States the symptoms have been constant without aggravating factors.  He sometimes feels that improved with lying flat.  He reports he previously had stop aspirin due to severe and recurrent epistaxis.  He denies fevers chest pain shortness breast nausea vomiting abdominal pain leg swelling syncope numbness weakness of an arm incontinence weakness of his left leg.    In the ED, creatinine 2.1 CT head without acute abnormality, chest x-ray without focal consolidation afebrile without leukocytosis COVID negative LDL 80.6 TSH within normal limits.      Overview/Hospital Course:  77M with pmh of  CKD, HTN, tobacco abuse presents to the hospital with a chief complaint of right leg weakness. MRI with Small acute punctate left parasagittal frontal lobe. PT/OT/SLP consulted. Echo and carotids unremarkable. Neurology consulted.  Patient continued on ASA.  His Statin increased to 40 mg daily and he will be started on Plavix for 21 days.  Started gradually on BP med;s  PT,OT recommending rehab,consulted .  Patient had 2 seizures on 10/13 and was sent to the ICU.  Started on IV Keppra. Repeat CT head did not show any changes.  Neuro continued to follow             Interval History: No new issues     Review of Systems   Constitutional:  Positive for activity change. Negative for appetite change.   HENT:  Negative for congestion and dental  problem.    Eyes:  Negative for discharge and itching.   Respiratory:  Negative for apnea and chest tightness.    Cardiovascular:  Negative for chest pain.   Gastrointestinal:  Negative for abdominal distention and abdominal pain.   Genitourinary:  Negative for difficulty urinating and dysuria.   Musculoskeletal:  Negative for arthralgias.   Allergic/Immunologic: Negative for environmental allergies.   Neurological:  Negative for dizziness and facial asymmetry.   Hematological:  Negative for adenopathy.   Psychiatric/Behavioral:  Negative for agitation and behavioral problems.    Objective:     Vital Signs (Most Recent):  Temp: 98 °F (36.7 °C) (10/16/22 0700)  Pulse: 87 (10/16/22 0900)  Resp: (!) 28 (10/16/22 0900)  BP: (!) 188/70 (10/16/22 0900)  SpO2: 96 % (10/16/22 0900)   Vital Signs (24h Range):  Temp:  [98 °F (36.7 °C)-98.9 °F (37.2 °C)] 98 °F (36.7 °C)  Pulse:  [] 87  Resp:  [14-40] 28  SpO2:  [93 %-100 %] 96 %  BP: (124-206)/(61-92) 188/70     Weight: 96 kg (211 lb 10.3 oz)  Body mass index is 32.18 kg/m².    Intake/Output Summary (Last 24 hours) at 10/16/2022 0936  Last data filed at 10/16/2022 0600  Gross per 24 hour   Intake 2578.11 ml   Output 1945 ml   Net 633.11 ml      Physical Exam  Vitals and nursing note reviewed.   Constitutional:       General: He is not in acute distress.     Appearance: Normal appearance. He is obese. He is ill-appearing. He is not toxic-appearing or diaphoretic.   HENT:      Head: Normocephalic and atraumatic.      Mouth/Throat:      Pharynx: Oropharynx is clear.   Eyes:      Conjunctiva/sclera: Conjunctivae normal.   Cardiovascular:      Rate and Rhythm: Normal rate and regular rhythm.   Pulmonary:      Effort: Pulmonary effort is normal. No respiratory distress.      Breath sounds: No wheezing.   Abdominal:      General: Bowel sounds are normal. There is no distension.   Skin:     General: Skin is dry.   Neurological:      Mental Status: He is alert and oriented to  person, place, and time.   Psychiatric:         Mood and Affect: Mood normal.         Behavior: Behavior normal.       Significant Labs: All pertinent labs within the past 24 hours have been reviewed.  BMP: No results for input(s): GLU, NA, K, CL, CO2, BUN, CREATININE, CALCIUM, MG in the last 48 hours.  CBC:   Recent Labs   Lab 10/16/22  0332   WBC 7.68   HGB 14.4   HCT 44.8          Significant Imaging:       Assessment/Plan:      * CVA (cerebral vascular accident)  Patient presents with right leg weakness.  Started on ASA.  Statin increased to 40 mg daily.  Started on Plavix for 21 days.  PT/OT evaluated patient yesterday.  He was doing well and planned to be discharged with  today.  Therapy evaluated patient today and weakness apparently worse.  Patient dragging right leg and unable to ambulate like yesterday.  Cancel discharge.  Continue therapy and await further recommendations.  gradually started on BM med;s.  SNF vs. Rehab in the end    PT,OT recommending rehab,consulted SW.  Patient with R side neglect and unable to lift R arm. CT head 10/14 no acute changes to previous     Seizure  2 seizure on 10/13. Moved to ICU. Neuro consulted. On IV keppra. None since     Will monitor in ICU today. If no further- will move out of ICU on 10/16      Obesity (BMI 30.0-34.9)  Will recommend diet and lifestyle modifications outpatient    Tobacco dependence  Smokes rare cigarettes per day. Greater than 3 minutes spent counseling patient on dangers of continued tobacco abuse. Will provide tobacco cessation education prior to discharge    Chronic kidney disease, stage III (moderate)  Crt on admission of 2.1. baseline of 1.7 to 2.1  Renal dose medications avoid nephrotoxic drugs monitor intake and output  -improved and at baseline      Hypertension  Was Holding home amlodipine and lisinopril for permissive HTN,  gradually started on BM med;s.      VTE Risk Mitigation (From admission, onward)         Ordered     IP VTE  HIGH RISK PATIENT  Once         10/09/22 2026     Place sequential compression device  Until discontinued         10/09/22 2026                Discharge Planning   DEVIKA: 10/11/2022     Code Status: Full Code   Is the patient medically ready for discharge?:     Reason for patient still in hospital (select all that apply): Patient unstable  Discharge Plan A: Rehab   Discharge Delays: (!) Post-Acute Set-up        Critical care time spent on the evaluation and treatment of severe organ dysfunction, review of pertinent labs and imaging studies, discussions with consulting providers and discussions with patient/family: 30  minutes.      Nain Cho MD  Department of Hospital Medicine   [facility] - Intensive Care

## 2022-10-16 NOTE — NURSING
[facility] Intensive Care  ICU Shift Summary  Date: 10/16/2022      Prehospitalization: Home  Admit Date / LOS : 10/9/2022/ 5 days    Diagnosis: CVA (cerebral vascular accident)    Consults:        Active: Neuro, OT, PT, and Pulm CC       Needed: N/A     Code Status: Full Code   Advanced Directive: <no information>    LDA:  Lines/Drains/Airways       Drain  Duration             Male External Urinary Catheter 10/13/22 2000 2 days              Peripheral Intravenous Line  Duration                  Peripheral IV - Single Lumen 10/13/22 1500 20 G Left;Posterior Hand 2 days                  Central Lines/Site/Justification:Patient Does Not Have Central Line  Urinary Cath/Order/Justification:Critically Ill in the ICU and requiring intensive monitoring    Vasopressors/Infusions:    sodium chloride 0.45% 100 mL/hr at 10/16/22 0000          GOALS: Volume/ Hemodynamic: SBP <                     RASS: -1  awakes to voice (eye opening/contact) > 10 seconds    Pain Management: none       Pain Controlled: no     Rhythm: NSR    Respiratory Device: Nasal Cannula    Oxygen Concentration (%):  [28] 28             Most Recent SBT/ SAT: N/A       MOVE Screen: PASS  ICU Liberation: yes    VTE Prophylaxis: Mechanical  Mobility: Bedrest  Stress Ulcer Prophylaxis: No    Isolation: No active isolations    Dietary:   Current Diet Order   Procedures    Diet renal    Diet Dysphagia Mechanical Soft (IDDSI Level 5) [facility]; Renal, Cardiac (Low Na/Chol)     Order Specific Question:   Indicate patient location for additional diet options:     [facility]     Order Specific Question:   Additional Diet Options:     Answer:   Renal     Order Specific Question:   Additional Diet Options:     Answer:   Cardiac (Low Na/Chol)      Tolerance: yes  Advancement: no    I & O (24h):    Intake/Output Summary (Last 24 hours) at 10/16/2022 0716  Last data filed at 10/16/2022 0600  Gross per 24 hour   Intake 2578.11 ml   Output 1945 ml    Net 633.11 ml        Restraints: Yes    Significant Dates:  Post Op Date: N/A  Rescue Date: N/A  Imaging/ Diagnostics: N/A    Noteworthy Labs:  none    COVID Test: (--)  CBC/Anemia Labs: Coags:    Recent Labs   Lab 10/11/22  0336 10/16/22  0332   WBC 6.08 7.68   HGB 14.7 14.4   HCT 44.8 44.8    313   MCV 81* 80*   RDW 19.5* 19.1*    Recent Labs   Lab 10/09/22  1248 10/10/22  0407   INR 0.9 1.0   APTT  --  29.9        Chemistries:   Recent Labs   Lab 10/10/22  0407 10/11/22  0336 10/13/22  1400 10/14/22  0427      < > 140 138   K 4.6   < > 4.6 4.2      < > 110 112*   CO2 23   < > 20* 18*   BUN 22   < > 26* 20   CREATININE 1.8*   < > 2.0* 1.6*   CALCIUM 9.3   < > 9.2 8.7   PROT 6.8   < > 7.0 6.5   BILITOT 0.5   < > 0.4 0.5   ALKPHOS 63   < > 72 68   ALT 14   < > 16 17   AST 33   < > 24 25   MG 2.2  --  2.1 2.1   PHOS 3.9  --   --   --     < > = values in this interval not displayed.        Cardiac Enzymes: Ejection Fractions:    No results for input(s): CPK, CPKMB, MB, TROPONINI in the last 72 hours. EF   Date Value Ref Range Status   10/10/2022 60 % Final        POCT Glucose: HbA1c:    Recent Labs   Lab 10/15/22  1115 10/15/22  1719 10/16/22  0640   POCTGLUCOSE 92 87 74    Hemoglobin A1C   Date Value Ref Range Status   10/10/2022 5.6 4.0 - 5.6 % Final     Comment:     ADA Screening Guidelines:  5.7-6.4%  Consistent with prediabetes  >or=6.5%  Consistent with diabetes    High levels of fetal hemoglobin interfere with the HbA1C  assay. Heterozygous hemoglobin variants (HbS, HgC, etc)do  not significantly interfere with this assay.   However, presence of multiple variants may affect accuracy.             ICU LOS 2d 15h  Level of Care: OK to Transfer    Chart Check: 12 HR Done  Shift Summary/Plan for the shift: none

## 2022-10-17 LAB
ANION GAP SERPL CALC-SCNC: 11 MMOL/L (ref 8–16)
BUN SERPL-MCNC: 18 MG/DL (ref 8–23)
CALCIUM SERPL-MCNC: 8.6 MG/DL (ref 8.7–10.5)
CHLORIDE SERPL-SCNC: 111 MMOL/L (ref 95–110)
CO2 SERPL-SCNC: 17 MMOL/L (ref 23–29)
CREAT SERPL-MCNC: 1.6 MG/DL (ref 0.5–1.4)
EST. GFR  (NO RACE VARIABLE): 44 ML/MIN/1.73 M^2
GLUCOSE SERPL-MCNC: 91 MG/DL (ref 70–110)
POCT GLUCOSE: 85 MG/DL (ref 70–110)
POTASSIUM SERPL-SCNC: 4.2 MMOL/L (ref 3.5–5.1)
SODIUM SERPL-SCNC: 139 MMOL/L (ref 136–145)

## 2022-10-17 PROCEDURE — 25000003 PHARM REV CODE 250: Performed by: HOSPITALIST

## 2022-10-17 PROCEDURE — 99232 PR SUBSEQUENT HOSPITAL CARE,LEVL II: ICD-10-PCS | Mod: ,,, | Performed by: PSYCHIATRY & NEUROLOGY

## 2022-10-17 PROCEDURE — 94761 N-INVAS EAR/PLS OXIMETRY MLT: CPT

## 2022-10-17 PROCEDURE — 92507 TX SP LANG VOICE COMM INDIV: CPT

## 2022-10-17 PROCEDURE — 25000003 PHARM REV CODE 250: Performed by: INTERNAL MEDICINE

## 2022-10-17 PROCEDURE — 63600175 PHARM REV CODE 636 W HCPCS: Performed by: HOSPITALIST

## 2022-10-17 PROCEDURE — 97112 NEUROMUSCULAR REEDUCATION: CPT

## 2022-10-17 PROCEDURE — 25000003 PHARM REV CODE 250: Performed by: STUDENT IN AN ORGANIZED HEALTH CARE EDUCATION/TRAINING PROGRAM

## 2022-10-17 PROCEDURE — 36415 COLL VENOUS BLD VENIPUNCTURE: CPT | Performed by: INTERNAL MEDICINE

## 2022-10-17 PROCEDURE — 97530 THERAPEUTIC ACTIVITIES: CPT

## 2022-10-17 PROCEDURE — 80048 BASIC METABOLIC PNL TOTAL CA: CPT | Performed by: INTERNAL MEDICINE

## 2022-10-17 PROCEDURE — 63600175 PHARM REV CODE 636 W HCPCS: Performed by: INTERNAL MEDICINE

## 2022-10-17 PROCEDURE — 92526 ORAL FUNCTION THERAPY: CPT

## 2022-10-17 PROCEDURE — 20000000 HC ICU ROOM

## 2022-10-17 PROCEDURE — 99232 SBSQ HOSP IP/OBS MODERATE 35: CPT | Mod: ,,, | Performed by: PSYCHIATRY & NEUROLOGY

## 2022-10-17 PROCEDURE — 27000221 HC OXYGEN, UP TO 24 HOURS

## 2022-10-17 PROCEDURE — 97110 THERAPEUTIC EXERCISES: CPT

## 2022-10-17 PROCEDURE — 51798 US URINE CAPACITY MEASURE: CPT

## 2022-10-17 RX ORDER — ISOSORBIDE MONONITRATE 30 MG/1
60 TABLET, EXTENDED RELEASE ORAL DAILY
Status: DISCONTINUED | OUTPATIENT
Start: 2022-10-18 | End: 2022-10-18

## 2022-10-17 RX ADMIN — HYDRALAZINE HYDROCHLORIDE 10 MG: 20 INJECTION INTRAMUSCULAR; INTRAVENOUS at 11:10

## 2022-10-17 RX ADMIN — ATORVASTATIN CALCIUM 40 MG: 40 TABLET, FILM COATED ORAL at 09:10

## 2022-10-17 RX ADMIN — HYDRALAZINE HYDROCHLORIDE 10 MG: 20 INJECTION INTRAMUSCULAR; INTRAVENOUS at 06:10

## 2022-10-17 RX ADMIN — HYDRALAZINE HYDROCHLORIDE 100 MG: 25 TABLET, FILM COATED ORAL at 09:10

## 2022-10-17 RX ADMIN — ASPIRIN 81 MG: 81 TABLET, COATED ORAL at 08:10

## 2022-10-17 RX ADMIN — HYDRALAZINE HYDROCHLORIDE 100 MG: 25 TABLET, FILM COATED ORAL at 01:10

## 2022-10-17 RX ADMIN — LEVETIRACETAM INJECTION 1000 MG: 10 INJECTION INTRAVENOUS at 08:10

## 2022-10-17 RX ADMIN — LEVETIRACETAM INJECTION 1000 MG: 10 INJECTION INTRAVENOUS at 09:10

## 2022-10-17 RX ADMIN — CLOPIDOGREL BISULFATE 75 MG: 75 TABLET ORAL at 08:10

## 2022-10-17 RX ADMIN — AMLODIPINE BESYLATE 10 MG: 5 TABLET ORAL at 08:10

## 2022-10-17 RX ADMIN — ISOSORBIDE MONONITRATE 30 MG: 30 TABLET, EXTENDED RELEASE ORAL at 08:10

## 2022-10-17 RX ADMIN — HYDRALAZINE HYDROCHLORIDE 100 MG: 25 TABLET, FILM COATED ORAL at 05:10

## 2022-10-17 NOTE — PLAN OF CARE
"  Problem: Physical Therapy  Goal: Physical Therapy Goal  Description: Goals to be met by: 10/14/22     Patient will increase functional independence with mobility by performin. Pt to be Min A with bed mobility.  2. Pt to transfer sit to stand with CGA  3. Pt to ambulate with HW and Min A x 10-15'  4. Pt to be Supervision with written HEP  5. Pt to transfer bed to chair with CGA  6. Pt to ascend/descend 4" curb with RW and Min A  7. Pt will propel W/C with L UE/LE and CGA approx 25'  Outcome: Ongoing, Progressing   Pt with R facial droop, decreased cognition, Increased flexor tone in R LE.  Continue with POC, IPR recommended   "

## 2022-10-17 NOTE — PLAN OF CARE
Problem: Occupational Therapy  Goal: Occupational Therapy Goal  Description: Goals to be met by: 10/24/22    Patient will increase functional independence with ADLs by performing:    UE Dressing with Modified Nance.  LE Dressing with Modified Nance.  Grooming while standing at sink with Modified Nance.  Toileting from toilet with Modified Nance for hygiene and clothing management.   Supine to sit with Modified Nance.  Step transfer with Modified Nance  Toilet transfer to toilet with Modified Nance.  Upper extremity exercise program x15 reps per handout, with independence.    Outcome: Ongoing, Progressing

## 2022-10-17 NOTE — PROGRESS NOTES
[facility] Medicine  Progress Note    Patient Name: Leonides Burns  MRN: [id number]  Patient Class: IP- Inpatient   Admission Date: 10/9/2022  Length of Stay: 6 days  Attending Physician: Nain Oneill MD  Primary Care Provider: Suzette Akhtar MD        Subjective:     Principal Problem:CVA (cerebral vascular accident)        HPI:  Leonides Burns 77 y.o. male with CKD, HTN, tobacco abuse presents to the hospital with a chief complaint of right leg weakness.  He reports 2 days of right lower extremity weakness and discoordination and double vision.  States the symptoms have been constant without aggravating factors.  He sometimes feels that improved with lying flat.  He reports he previously had stop aspirin due to severe and recurrent epistaxis.  He denies fevers chest pain shortness breast nausea vomiting abdominal pain leg swelling syncope numbness weakness of an arm incontinence weakness of his left leg.    In the ED, creatinine 2.1 CT head without acute abnormality, chest x-ray without focal consolidation afebrile without leukocytosis COVID negative LDL 80.6 TSH within normal limits.      Overview/Hospital Course:  77M with pmh of  CKD, HTN, tobacco abuse presents to the hospital with a chief complaint of right leg weakness. MRI with Small acute punctate left parasagittal frontal lobe. PT/OT/SLP consulted. Echo and carotids unremarkable. Neurology consulted.  Patient continued on ASA.  His Statin increased to 40 mg daily and he will be started on Plavix for 21 days.  Started gradually on BP med;s  PT,OT recommending rehab,consulted .  Patient had 2 seizures on 10/13 and was sent to the ICU.  Started on IV Keppra. Repeat CT head did not show any changes.  Neuro continued to follow   Patient made NPO on 10/17 for continued aspiration with meals.  Patient remained in ICU due to a lot of nursing care and borderline stability.          Interval History: No new issues      Review of Systems   Constitutional:  Positive for activity change. Negative for appetite change.   HENT:  Negative for congestion and dental problem.    Eyes:  Negative for discharge and itching.   Respiratory:  Negative for apnea and chest tightness.    Cardiovascular:  Negative for chest pain.   Gastrointestinal:  Negative for abdominal distention and abdominal pain.   Genitourinary:  Negative for difficulty urinating and dysuria.   Musculoskeletal:  Negative for arthralgias.   Allergic/Immunologic: Negative for environmental allergies.   Neurological:  Negative for dizziness and facial asymmetry.   Hematological:  Negative for adenopathy.   Psychiatric/Behavioral:  Negative for agitation and behavioral problems.    Objective:     Vital Signs (Most Recent):  Temp: 98.2 °F (36.8 °C) (10/17/22 0800)  Pulse: 94 (10/17/22 0847)  Resp: (!) 31 (10/17/22 0847)  BP: (!) 171/90 (10/17/22 0800)  SpO2: 100 % (10/17/22 0847)   Vital Signs (24h Range):  Temp:  [98.1 °F (36.7 °C)-98.5 °F (36.9 °C)] 98.2 °F (36.8 °C)  Pulse:  [] 94  Resp:  [28-76] 31  SpO2:  [94 %-100 %] 100 %  BP: (114-202)/(58-98) 171/90     Weight: 96 kg (211 lb 10.3 oz)  Body mass index is 32.18 kg/m².    Intake/Output Summary (Last 24 hours) at 10/17/2022 0920  Last data filed at 10/16/2022 1834  Gross per 24 hour   Intake --   Output 800 ml   Net -800 ml      Physical Exam  Vitals and nursing note reviewed.   Constitutional:       General: He is not in acute distress.     Appearance: Normal appearance. He is obese. He is ill-appearing. He is not toxic-appearing or diaphoretic.   HENT:      Head: Normocephalic and atraumatic.      Mouth/Throat:      Pharynx: Oropharynx is clear.   Eyes:      Conjunctiva/sclera: Conjunctivae normal.   Cardiovascular:      Rate and Rhythm: Normal rate and regular rhythm.   Pulmonary:      Effort: Pulmonary effort is normal. No respiratory distress.      Breath sounds: No wheezing.   Abdominal:      General: Bowel  sounds are normal. There is no distension.   Skin:     General: Skin is dry.   Neurological:      Mental Status: He is alert and oriented to person, place, and time.   Psychiatric:         Mood and Affect: Mood normal.         Behavior: Behavior normal.       Significant Labs: All pertinent labs within the past 24 hours have been reviewed.  BMP:   Recent Labs   Lab 10/17/22  0301   GLU 91      K 4.2   *   CO2 17*   BUN 18   CREATININE 1.6*   CALCIUM 8.6*     CBC:   Recent Labs   Lab 10/16/22  0332   WBC 7.68   HGB 14.4   HCT 44.8          Significant Imaging:       Assessment/Plan:      * CVA (cerebral vascular accident)  Patient presents with right leg weakness.  Started on ASA.  Statin increased to 40 mg daily.  Started on Plavix for 21 days.  PT/OT evaluated patient yesterday.  He was doing well and planned to be discharged with  today.  Therapy evaluated patient today and weakness apparently worse.  Patient dragging right leg and unable to ambulate like yesterday.  Cancel discharge.  Continue therapy and await further recommendations.  gradually started on BM med;s.  SNF vs. Rehab in the end    PT,OT recommending rehab,consulted SW.  Patient with R side neglect and unable to lift R arm. CT head 10/14 no acute changes to previous     Seizure  2 seizure on 10/13. Moved to ICU. Neuro consulted. On IV keppra. None since     Will monitor in ICU today. If no further- will move out of ICU on 10/16      Obesity (BMI 30.0-34.9)  Will recommend diet and lifestyle modifications outpatient    Tobacco dependence  Smokes rare cigarettes per day. Greater than 3 minutes spent counseling patient on dangers of continued tobacco abuse. Will provide tobacco cessation education prior to discharge    Chronic kidney disease, stage III (moderate)  Crt on admission of 2.1. baseline of 1.7 to 2.1  Renal dose medications avoid nephrotoxic drugs monitor intake and output  -improved and at  baseline      Hypertension  Was Holding home amlodipine and lisinopril for permissive HTN,  gradually started on BM med;s.      VTE Risk Mitigation (From admission, onward)         Ordered     IP VTE HIGH RISK PATIENT  Once         10/09/22 2026     Place sequential compression device  Until discontinued         10/09/22 2026                Discharge Planning   DEVIKA: 10/11/2022     Code Status: Full Code   Is the patient medically ready for discharge?:     Reason for patient still in hospital (select all that apply): Patient unstable  Discharge Plan A: Rehab   Discharge Delays: (!) Post-Acute Set-up        Critical care time spent on the evaluation and treatment of severe organ dysfunction, review of pertinent labs and imaging studies, discussions with consulting providers and discussions with patient/family: 25  minutes.      Nain Cho MD  Department of Hospital Medicine   [facility] - Intensive Care

## 2022-10-17 NOTE — PT/OT/SLP PROGRESS
Physical Therapy Treatment    Patient Name:  Leonides Burns   MRN:  [id number]    Recommendations:     Discharge Recommendations:  rehabilitation facility   Discharge Equipment Recommendations:  (Ongoing assessment pending pt progress)   Barriers to discharge:  Pt is not functioning at baseline and currently requires Total A x 2 persons for all functional mobility and is unable to ambulate.  Pt could benefit from an aggressive Multidisciplinary approach(IPR) in order to help restore pt to highest functional level.   Pt is motivated and shows good treatmetn tolerance     Assessment:     Leonides Burns is a 77 y.o. male admitted with a medical diagnosis of CVA (cerebral vascular accident).  He presents with the following impairments/functional limitations:  weakness, gait instability, decreased upper extremity function, decreased ROM, impaired cardiopulmonary response to activity, impaired endurance, impaired balance, decreased lower extremity function, impaired coordination, decreased safety awareness, impaired fine motor, impaired self care skills, impaired cognition, impaired functional mobility, decreased coordination .    Rehab Prognosis: Good; patient would benefit from acute skilled PT services to address these deficits and reach maximum level of function.    Recent Surgery: * No surgery found *      Plan:     During this hospitalization, patient to be seen  (5-6x/week) to address the identified rehab impairments via gait training, therapeutic activities, therapeutic exercises, neuromuscular re-education, wheelchair management/training and progress toward the following goals:    Plan of Care Expires:  10/26/22    Subjective     Chief Complaint: No c/o, receptive and expressive deficits  Patient/Family Comments/goals: Pt agreeable to treatment, lethargic  Pain/Comfort:  Pain Rating 1: 0/10      Objective:     Communicated with nsg prior to session.  Patient found HOB elevated with peripheral IV, pressure relief  boots (ICU monitoring) R Hip/knee flexion/ER 2/2 increased tone upon PT entry to room.     General Precautions: Standard, fall, respiratory, seizure   Orthopedic Precautions:N/A   Braces: N/A  Respiratory Status: Nasal cannula, flow 2 L/min     Functional Mobility:  Bed Mobility:     Rolling Left:  total assistance, of 2 persons, and with Vc/TC for reaching for BR  Rolling Right: total assistance, of 2 persons, and with Vc/TC for reaching for BR  Scooting: maximal assistance and of 2 persons bridging with L LE to HOB in supine  Bridging: minimum assistance to stabilize L LE only  Supine to Sit: total assistance, of 2 persons, and with Vc/TC for hand placement and body mechanics with HOB elevated  Sit to Supine: total assistance, of 2 persons, and with Vc/TC for lucas placement and body mechanics    Transfers:     Sit to Stand:  maximal assistance, of 2 persons, and with Vc/TC for forward weight shift over YOVANA  with no AD x 5 trials  Scoot transfer:  Max A x 2 persons with Vc/TC for forward weight shift over YOVANA along EOB in sitting  Gait: unable  Balance: FAir- sit, poor stand      AM-PAC 6 CLICK MOBILITY  Turning over in bed (including adjusting bedclothes, sheets and blankets)?: 2  Sitting down on and standing up from a chair with arms (e.g., wheelchair, bedside commode, etc.): 2  Moving from lying on back to sitting on the side of the bed?: 2  Moving to and from a bed to a chair (including a wheelchair)?: 2  Need to walk in hospital room?: 1  Climbing 3-5 steps with a railing?: 1  Basic Mobility Total Score: 10       Therapeutic Activities and Exercises:   Bed mobility training as above.  Dangle protocol:  Pt sat at EOB with Max<>CGA approx 20m with VC/TC for forward and Left weight shift to correct for posterior and Right lean.  Pt received static B/L soleus stretch during Mod A for LE stabilization to correct for posterior lean.  Pt Received R LE PROM x 10 reps and HSS/HCS in sitting at EOB 3 x 30 sec.  Pt stood  "at EOB x 5 trials as above.  Leaning posteriorly and unable to come up into full extension.  Pt received B HCS 3x 30 sec    Patient left  HOB elevated with wedge in place and pressure relief boots donned with pillow between knees  with all lines intact, call button in reach, bed alarm on, nsg notified, and Speech therapist present..    GOALS:   Multidisciplinary Problems       Physical Therapy Goals          Problem: Physical Therapy    Goal Priority Disciplines Outcome Goal Variances Interventions   Physical Therapy Goal     PT, PT/OT Ongoing, Progressing     Description: Goals to be met by: 10/14/22     Patient will increase functional independence with mobility by performin. Pt to be Min A with bed mobility.  2. Pt to transfer sit to stand with CGA  3. Pt to ambulate with HW and Min A x 10-15'  4. Pt to be Supervision with written HEP  5. Pt to transfer bed to chair with CGA  6. Pt to ascend/descend 4" curb with RW and Min A  7. Pt will propel W/C with L UE/LE and CGA approx 25'                       Time Tracking:     PT Received On: 10/17/22  PT Start Time: 1034     PT Stop Time: 1114  PT Total Time (min): 40 min     Billable Minutes: Therapeutic Activity 30 and Therapeutic Exercise 10 Co-treat with OT    Treatment Type: Treatment  PT/PTA: PT     PTA Visit Number: 0     10/17/2022  "

## 2022-10-17 NOTE — PLAN OF CARE
10/17/22 1310   Discharge Reassessment   Assessment Type Discharge Planning Reassessment   Did the patient's condition or plan change since previous assessment? No   Discharge Plan discussed with:   (discussed in rounds this am)   Discharge Plan A Rehab   Discharge Plan B Home Health   DME Needed Upon Discharge  none   Discharge Barriers Identified None   Why the patient remains in the hospital Requires continued medical care

## 2022-10-17 NOTE — SUBJECTIVE & OBJECTIVE
Interval History: No new issues     Review of Systems   Constitutional:  Positive for activity change. Negative for appetite change.   HENT:  Negative for congestion and dental problem.    Eyes:  Negative for discharge and itching.   Respiratory:  Negative for apnea and chest tightness.    Cardiovascular:  Negative for chest pain.   Gastrointestinal:  Negative for abdominal distention and abdominal pain.   Genitourinary:  Negative for difficulty urinating and dysuria.   Musculoskeletal:  Negative for arthralgias.   Allergic/Immunologic: Negative for environmental allergies.   Neurological:  Negative for dizziness and facial asymmetry.   Hematological:  Negative for adenopathy.   Psychiatric/Behavioral:  Negative for agitation and behavioral problems.    Objective:     Vital Signs (Most Recent):  Temp: 98.2 °F (36.8 °C) (10/17/22 0800)  Pulse: 94 (10/17/22 0847)  Resp: (!) 31 (10/17/22 0847)  BP: (!) 171/90 (10/17/22 0800)  SpO2: 100 % (10/17/22 0847)   Vital Signs (24h Range):  Temp:  [98.1 °F (36.7 °C)-98.5 °F (36.9 °C)] 98.2 °F (36.8 °C)  Pulse:  [] 94  Resp:  [28-76] 31  SpO2:  [94 %-100 %] 100 %  BP: (114-202)/(58-98) 171/90     Weight: 96 kg (211 lb 10.3 oz)  Body mass index is 32.18 kg/m².    Intake/Output Summary (Last 24 hours) at 10/17/2022 0920  Last data filed at 10/16/2022 1834  Gross per 24 hour   Intake --   Output 800 ml   Net -800 ml      Physical Exam  Vitals and nursing note reviewed.   Constitutional:       General: He is not in acute distress.     Appearance: Normal appearance. He is obese. He is ill-appearing. He is not toxic-appearing or diaphoretic.   HENT:      Head: Normocephalic and atraumatic.      Mouth/Throat:      Pharynx: Oropharynx is clear.   Eyes:      Conjunctiva/sclera: Conjunctivae normal.   Cardiovascular:      Rate and Rhythm: Normal rate and regular rhythm.   Pulmonary:      Effort: Pulmonary effort is normal. No respiratory distress.      Breath sounds: No wheezing.    Abdominal:      General: Bowel sounds are normal. There is no distension.   Skin:     General: Skin is dry.   Neurological:      Mental Status: He is alert and oriented to person, place, and time.   Psychiatric:         Mood and Affect: Mood normal.         Behavior: Behavior normal.       Significant Labs: All pertinent labs within the past 24 hours have been reviewed.  BMP:   Recent Labs   Lab 10/17/22  0301   GLU 91      K 4.2   *   CO2 17*   BUN 18   CREATININE 1.6*   CALCIUM 8.6*     CBC:   Recent Labs   Lab 10/16/22  0332   WBC 7.68   HGB 14.4   HCT 44.8          Significant Imaging:

## 2022-10-17 NOTE — ASSESSMENT & PLAN NOTE
Cr on admission of 2.1. Baseline ranges 1.7 to 2.1  Renal dose medications avoid nephrotoxic drugs monitor intake and output  -improved and at baseline

## 2022-10-17 NOTE — PROGRESS NOTES
[facility] - Intensive Care  Neurology  Progress Note    Patient Name: Leonides Burns  MRN: [id number]  Admission Date: 10/9/2022  Hospital Length of Stay: 6 days  Code Status: Full Code   Attending Provider: Nain Oneill MD  Primary Care Physician: Suzette Akhtar MD   Principal Problem:CVA (cerebral vascular accident)    Subjective:     Interval History: 76 y/o male with CKD, HTN, tobacco abuse presents to the hospital with a chief complaint of right leg weakness.  He reports 2 days of right lower extremity weakness and discoordination and double vision.  States the symptoms have been constant without aggravating factors.  He sometimes feels that improved with lying flat.  He reports he previously had stop aspirin due to severe and recurrent epistaxis.  He denies fevers chest pain shortness breast nausea vomiting abdominal pain leg swelling syncope numbness weakness of an arm incontinence weakness of his left leg.    -10/17/22: No new issues. Pt was transferred to ICU a few days back after a possible seizure.    Current Neurological Medications:     Current Facility-Administered Medications   Medication Dose Route Frequency Provider Last Rate Last Admin    acetaminophen tablet 500 mg  500 mg Oral Q6H PRN Louie Farley PA-C        albuterol sulfate nebulizer solution 2.5 mg  2.5 mg Nebulization Q4H PRN Nain Oneill MD        amLODIPine tablet 10 mg  10 mg Oral Daily Gerard Solano MD   10 mg at 10/17/22 0820    aspirin EC tablet 81 mg  81 mg Oral Daily Srinivasa Andujar III, MD   81 mg at 10/17/22 0820    atorvastatin tablet 40 mg  40 mg Oral QHS Srinivasa Andujar III, MD   40 mg at 10/16/22 2113    clopidogreL tablet 75 mg  75 mg Oral Daily Juventino Turner MD   75 mg at 10/17/22 0820    dextrose 10% bolus 125 mL  12.5 g Intravenous PRN Gerard Solano MD        dextrose 10% bolus 250 mL  25 g Intravenous PRN Gerard Solano MD        glucagon (human recombinant)  injection 1 mg  1 mg Intramuscular PRN Gerard Solano MD        hydrALAZINE injection 10 mg  10 mg Intravenous Q4H PRN Gerard Solano MD   10 mg at 10/17/22 0611    hydrALAZINE tablet 100 mg  100 mg Oral Q8H Gerard Solano MD   100 mg at 10/17/22 1330    insulin aspart U-100 pen 0-5 Units  0-5 Units Subcutaneous Q6H PRN Gerard Solano MD        [START ON 10/18/2022] isosorbide mononitrate 24 hr tablet 60 mg  60 mg Oral Daily Nain Oneill MD        levETIRAcetam in NaCl (iso-os) IVPB 1,000 mg  1,000 mg Intravenous Q12H Nain Oneill MD   Stopped at 10/17/22 0901    LORazepam injection 2 mg  2 mg Intravenous Q2H PRN Nain Oneill MD   2 mg at 10/15/22 2135    melatonin tablet 6 mg  6 mg Oral Nightly PRN Louie Farley PA-C        senna-docusate 8.6-50 mg per tablet 1 tablet  1 tablet Oral Daily PRN Louie Farley PA-C        sodium chloride 0.9% flush 10 mL  10 mL Intravenous PRN Louie Farley PA-C   10 mL at 10/16/22 0639       Review of Systems  Constitutional:  Negative for fever.   HENT:  Negative for trouble swallowing.    Eyes:  Negative for photophobia.   Respiratory:  Negative for shortness of breath.    Cardiovascular:  Negative for chest pain.   Gastrointestinal:  Negative for abdominal pain.   Genitourinary:  Negative for flank pain.   Musculoskeletal:  Negative for back pain.   Neurological:  Negative for speech difficulty.   Psychiatric/Behavioral:  Negative for confusion.      Objective:     Vital Signs (Most Recent):  Temp: 98.2 °F (36.8 °C) (10/17/22 1200)  Pulse: 94 (10/17/22 1200)  Resp: (!) 26 (10/17/22 1200)  BP: (!) 159/93 (10/17/22 1200)  SpO2: 100 % (10/17/22 1200)   Vital Signs (24h Range):  Temp:  [98.1 °F (36.7 °C)-98.3 °F (36.8 °C)] 98.2 °F (36.8 °C)  Pulse:  [] 94  Resp:  [26-38] 26  SpO2:  [94 %-100 %] 100 %  BP: (119-202)/(58-98) 159/93     Weight: 96 kg (211 lb 10.3 oz)  Body mass index is 32.18 kg/m².    Physical  Exam  Constitutional:       General: He is not in acute distress.  HENT:      Head: Normocephalic.   Eyes:      General:         Right eye: No discharge.         Left eye: No discharge.   Cardiovascular:      Rate and Rhythm: Normal rate.   Pulmonary:      Effort: No respiratory distress.   Abdominal:      Palpations: Abdomen is soft.   Musculoskeletal:         General: No swelling.      Cervical back: Neck supple.   Skin:     Findings: No rash.   Neurological:      Mental Status: He is oriented to person, place, and time.   Psychiatric:         Speech: Speech normal.         NEUROLOGICAL EXAMINATION:      MENTAL STATUS   Oriented to person, place, and time.   Speech: speech is normal   Level of consciousness: alert     CRANIAL NERVES      CN III, IV, VI   Right pupil: Size: 2 mm. Shape: regular.   Left pupil: Size: 2 mm. Shape: regular.   Nystagmus: none   Conjugate gaze: present     CN V   Right facial sensation deficit: none  Left facial sensation deficit: none     CN VII   Right facial weakness: central  Left facial weakness: none     CN XII   Tongue deviation: none     MOTOR EXAM      Strength   Strength 5/5 except as noted.        RLE drift before 5 seconds.      SENSORY EXAM   Right arm light touch: normal  Left arm light touch: normal  Right leg light touch: normal       Significant Labs: CBC: No results for input(s): WBC, HGB, HCT, PLT in the last 48 hours.  CMP: No results for input(s): GLU, NA, K, CL, CO2, BUN, CREATININE, CALCIUM, MG, PROT, ALBUMIN, BILITOT, ALKPHOS, AST, ALT, ANIONGAP, EGFRNONAA in the last 48 hours.    Significant Imaging: I have reviewed all pertinent imaging results/findings within the past 24 hours.    Assessment and Plan:     78 y/o male with acute stroke     Stroke: small stroke on paramedian left frontal lobe. PAM territory. US of carotids with no significant stenosis. No LAE on echo.  ASA 81 mg daily and clopidogrel 75 mg daily x 21 days is recommended. Noted Hx of  epistaxis.  Statin.  PT/OT.  OK for BP control.  Seizure: no other events  Levetiracetam 100 mg BID.    Active Diagnoses:    Diagnosis Date Noted POA    PRINCIPAL PROBLEM:  CVA (cerebral vascular accident) [I63.9] 10/10/2022 Yes    Seizure [R56.9] 10/14/2022 No    Obesity (BMI 30.0-34.9) [E66.9] 07/09/2018 Yes    Tobacco dependence [F17.200] 02/17/2016 Yes     Chronic    Chronic kidney disease, stage III (moderate) [N18.30] 06/02/2015 Yes     Chronic    Hypertension [I10] 02/02/2015 Yes     Chronic    Hyperlipidemia [E78.5] 02/02/2015 Yes     Chronic      Problems Resolved During this Admission:    Diagnosis Date Noted Date Resolved POA    Right leg weakness [R29.898] 10/09/2022 10/11/2022 Yes       VTE Risk Mitigation (From admission, onward)           Ordered     IP VTE HIGH RISK PATIENT  Once         10/09/22 2026     Place sequential compression device  Until discontinued         10/09/22 2026                    Ahmet Lara MD  Neurology  [facility] - Intensive Care

## 2022-10-17 NOTE — PT/OT/SLP PROGRESS
Occupational Therapy   Treatment    Name: Leonides Burns  MRN: [id number]  Admitting Diagnosis:  CVA (cerebral vascular accident)       Recommendations:     Discharge Recommendations: rehabilitation facility  Discharge Equipment Recommendations:   (ongoing assessment pending further progress)  Barriers to discharge:   (pt was MOD I and living alone PTA. now, acute CVA with impairments with all ADLs and all aspects of functional mobility. pt at high risk of falls, unplanned readmission, and morbidity if d/c home at this time)    Assessment:     Leonides Burns is a 77 y.o. male with a medical diagnosis of CVA (cerebral vascular accident). Performance deficits affecting function are weakness, impaired cognition, impaired endurance, decreased ROM, impaired coordination, decreased upper extremity function, impaired functional mobility, decreased lower extremity function, impaired self care skills, impaired fine motor, decreased safety awareness, gait instability, impaired balance, impaired cardiopulmonary response to activity.     Rehab Prognosis:  Good; patient would benefit from acute skilled OT services to address these deficits and reach maximum level of function.       Plan:     Patient to be seen  (5-6x/week) to address the above listed problems via self-care/home management, therapeutic exercises, therapeutic activities  Plan of Care Expires: 10/24/22  Plan of Care Reviewed with: patient    Subjective     Chief complaint: N/A  Patient/family comments/ goals: ready to sit up     Pain/Comfort:  Pain Rating 1: 0/10    Objective:     Communicated with: nurseJeanne, prior to session.  Patient found HOB elevated with blood pressure cuff, bed alarm, pulse ox (continuous), telemetry, oxygen, peripheral IV, pressure relief boots upon OT entry to room.    General Precautions: Standard, fall, respiratory, seizure   Orthopedic Precautions:N/A   Braces: N/A  Respiratory Status: Nasal cannula, flow 2 L/min     Occupational  "Performance:     Bed Mobility:    Patient completed Rolling/Turning to Right with minimum assistance and with side rail  Patient completed Scooting anteriorly with total assistance and 2 persons  Patient completed Supine to Sit with total assistance and 2 persons with HOB elevated  Patient completed Sit to Supine with total assistance and 2 persons   Patient completed Scooting in supine to total assistance and 2 persons   Patient completed Scooting laterally with total assistance and 2 persons     Functional Mobility/Transfers:  Patient completed Sit <> Stand Transfer with total assistance and of 2 persons  with  BUE supported : 5 trials     Activities of Daily Living:  Lower Body Dressing: total assistance to don B/L socks       [facility] 6 Click ADL: 11    Treatment & Education:  Pt re-educated on OT role/POC.   Importance of EOB activity with therapy assistance.  Safety during functional t/f and mobility   Seated EOB, pt with posterior and R lateral lean with MAX A for correction. OT placed RUE on tray table on the R with total A for RUE placement on the table. Pt then improved with CGA-MIN A for static sit balance.   RUE on tray table: X10 AAROM shoulder flexion, scapular protraction, elbow extension; x10 AAROM shoulder extension, scapular retraction, elbow flexion, X10 AAROM internal rotation, PROM external rotation  Pt unable to verbalize "in" vs "out" while completing the movement, but was able to show initiation for appropriate direction with delay noted. Pt able to verbalize "in" vs "out" at rest in the bed with SLP.   Weight bear with assist by OT of RUE placed at pt's side with functional reach crossing midline with LUE for comb. Pt with minimal trunk rotation and cervical rotation noted. Pt able to grasp the object with prompting x8   Static stretch to shoulder flexion 2x ~30 sec. During stretch, OT prompted pt to complete AROM digits flex/ext x10. Pt required max prompting of short cue to complete exercises " "with increased time required and delay noted. AAROM/PROM elbow flex/ext with shoulder flexion stretch at ~90*; static stretch shoulder horizontal abduction 2x~30 sec; with all exercises, frequent prompting for cervical rotation to visually attend to R hand.   HOB elevated, pt able to complete LUE AROM x10 shoulder flex/ext, elbow flex/ext. Pt unable to count reps along with movement, but at rep 9, OT said "stop exercise after #10" and pt able to complete this  Self-care tasks/functional mobility completed- assistance level noted above   All questions/concerns answered within OT scope of practice       Patient left  HOB elevated R sidelying with RUE elevated on pillow and B/L pressure relief boots on  with all lines intact, call button in reach, nurses, Jeanne and Ashely, notified, and SLP about to enter . Nurse, Ashely, notified and told nurseJeanne, that pt needed his bed alarm turned on     GOALS:   Multidisciplinary Problems       Occupational Therapy Goals          Problem: Occupational Therapy    Goal Priority Disciplines Outcome Interventions   Occupational Therapy Goal     OT, PT/OT Ongoing, Progressing    Description: Goals to be met by: 10/24/22    Patient will increase functional independence with ADLs by performing:    UE Dressing with Modified Edwards.  LE Dressing with Modified Edwards.  Grooming while standing at sink with Modified Edwards.  Toileting from toilet with Modified Edwards for hygiene and clothing management.   Supine to sit with Modified Edwards.  Step transfer with Modified Edwards  Toilet transfer to toilet with Modified Edwards.  Upper extremity exercise program x15 reps per handout, with independence.                         Time Tracking:     OT Date of Treatment: 10/17/22  OT Start Time: 1036  OT Stop Time: 1114  OT Total Time (min): 38 min    Billable Minutes:Therapeutic Activity 15 min  Neuromuscular Re-education 23 min  Total Time 38 min (co-treat " with PT)    Co- treatment performed due to patient's multiple deficits requiring two skilled therapists to appropriately and safely assess patient's strength and endurance while facilitating functional tasks in addition to accommodating for patient's activity tolerance      OT/CHANDNI: CLAY TARIQ Visit Number: 0    10/17/2022

## 2022-10-17 NOTE — PLAN OF CARE
Puree with thin. Cognitive linguistic skills continue to fluctuate, however persist anomia new from initial eval with perseveration on previous target.

## 2022-10-17 NOTE — ASSESSMENT & PLAN NOTE
On admit, held home amlodipine and lisinopril for permissive HTN  BP is now elevated- resumed amlodipine  Added hydralazine, imdur, coreg  BP is much better controlled now

## 2022-10-17 NOTE — ASSESSMENT & PLAN NOTE
Smokes rare cigarettes per day. Greater than 3 minutes spent counseling patient on dangers of continued tobacco abuse.

## 2022-10-17 NOTE — PT/OT/SLP EVAL
"Speech Language Pathology Evaluation  Cognitive/Bedside Swallow    Patient Name:  Leonides Burns   MRN:  [id number]  Admitting Diagnosis: CVA (cerebral vascular accident)    Recommendations:                  General Recommendations:  Dysphagia therapy and Cognitive-linguistic therapy  Diet recommendations:  Mechanical soft, Thin liquids - IDDSI Level 0   Aspiration Precautions: 1 bite/sip at a time   General Precautions: Standard, aspiration, crush large meds, feed only when alert  Communication strategies:  provide increased time to answer    History:     Past Medical History:   Diagnosis Date    Disorder of kidney and ureter     Elevated PSA     Glaucoma     Hyperlipidemia     Hypertension     Psoriasis        Past Surgical History:   Procedure Laterality Date    FUNCTIONAL ENDOSCOPIC SINUS SURGERY (FESS) USING COMPUTER-ASSISTED NAVIGATION N/A 2/28/2019    Procedure: FESS, USING COMPUTER-ASSISTED NAVIGATION (ADD ON );  Surgeon: Mikael Serrano MD;  Location: [facility];  Service: ENT;  Laterality: N/A;  (ADD ON )    NASAL SEPTOPLASTY N/A 2/28/2019    Procedure: SEPTOPLASTY, NOSE;  Surgeon: Mikael Serrano MD;  Location: [facility] OR;  Service: ENT;  Laterality: N/A;        HOSPITAL COURSE  10/10 initial eval: "functional swallow and mild cognitive linguistic deficits slightly below baseline for which he would benefit from ST post d/c."    10/11 - "worsening moderate cognitive linguistic deficits. "    10/12- "moderate cognitive linguistic deficits improved from yesterday however deficits below baseline persist."    10/13 not seen; Sz trasfer to ICU    10/14 increased expressive and receptive deficits, with pt now having mod-severe expressive aphasia and mod receptive aphasia. Pt with mild-mod oral dysphagia 2/2 new oral weakness. ST recs mechanical soft diet with thin liquids. ST will cont to follow.      Subjective   Nursing reporting coughing and choking with solids and liquids during breakfast, Pt was " made NPO for this re-eval. Worsening performance with PT/OT per report.   Patient goals: continue po    Pain/Comfort:  Pain Rating 1: 0/10    Respiratory Status: Nasal cannula, flow 2 L/min    Objective:     Cognitive Status:    Pt required min assist to maintain attention to task.   Variable response time.  Aware of approximately half of errors.      Receptive Language:   Comprehension:   Readily answered simple personal y/n questions with 100% acc  Pt answered comparative y/n question with 100% acc after delay  Pt followed one step commands with min assist in the form of multiple repetitions    Pragmatics:    inconsistent eye contact (closing eyes)    Expressive Language:  Verbal:    Pt performed confrontational naming task with 80% acc. Variable delay, erros c/b perseveration on previous target, for confrontational naming taks Pt was gorssly awwared of errors and attempting to self correct.  Pt performed responsive naming task with ceferino. 66% acc. Able to correct errors given verbal F:2  Pt was able to perform automatic speech acts.  Pt was able to answer simple personal questions when target was single word response  Pt was unable to perform open sentence completion.       Motor Speech:  WFL    Voice:   WFL    Visual-Spatial:  Left Neglect.      Oral Musculature Evaluation  Oral Musculature: right weakness  Dentition: present and adequate  Secretion Management: adequate  Mucosal Quality: good  Mandibular Strength and Mobility: WFL  Oral Labial Strength and Mobility: WFL  Lingual Strength and Mobility: WFL  Velar Elevation: WFL  Buccal Strength and Mobility: WFL    Bedside Swallow Eval:   Consistencies Assessed:  Thin liquids ~4oz multiple trials via straw  Puree ~4oz      Oral Phase:   Pt was able to siphon from straw which is improved from nursing report this am.   Mildly delayed A-P transport.     Pharyngeal Phase:   no overt clinical signs/symptoms of aspiration    Compensatory Strategies  None    Treatment:  please note silent aspiration cannot be r/o at bedside.     Assessment:     Leonides Burns is a 77 y.o. male with dx of CVA he presents with mod-severe cognitive linguistic deficits and oral dysphagia which are significantly worse than initial ST eval and have fluctuated during hospital stay.     Goals:   Multidisciplinary Problems       SLP Goals          Problem: SLP    Goal Priority Disciplines Outcome   SLP Goal    Low SLP Ongoing, Progressing   Description: STGS  1. Pt will perform multiple step commands regarding before and after with 90% acc. -Discont 10/14  2. Pt will perform divergent naming tasks with min assist- Discont 10/14 UPDATE: Pt will name word given 3 descriptors with 80% acc.   3. Pt will delayed recall tasks with min assist. -Discont  4. Pt will follow 1-step commands with 80% acc, min A.   5. Pt will name objects with 80% acc, min A.   6. Pt will orient to place and date with min A.  7. Pt will tolerate mechanical soft diet with thin liquids w/o overt s/s of aspiration. (Discontinued 10/17/22)                       Plan:     Patient to be seen:  5 x/week   Plan of Care expires:  10/20/22  Plan of Care reviewed with:  patient   SLP Follow-Up:  Yes       Discharge recommendations:  Discharge Facility/Level of Care Needs: rehabilitation facility   Barriers to Discharge:  None    Time Tracking:     SLP Treatment Date:   10/17/22  Speech Start Time:  1105  Speech Stop Time:  1135     Speech Total Time (min):  30 min    Billable Minutes: Speech Therapy Individual 15 and Treatment Swallowing Dysfunction 15    10/17/2022

## 2022-10-17 NOTE — ASSESSMENT & PLAN NOTE
Patient presented with right leg weakness and noted to have acute stroke  - asa 81 daily, statin increased to 40 mg daily. Started on Plavix for 21 days.  - PT, OT, Speech evaluated  - plan inpatient rehab at discharge-- he is medically stable for discharge to inpatient rehab when arranged

## 2022-10-18 PROCEDURE — 99233 PR SUBSEQUENT HOSPITAL CARE,LEVL III: ICD-10-PCS | Mod: ,,, | Performed by: REGISTERED NURSE

## 2022-10-18 PROCEDURE — 63600175 PHARM REV CODE 636 W HCPCS: Performed by: INTERNAL MEDICINE

## 2022-10-18 PROCEDURE — 25000003 PHARM REV CODE 250: Performed by: HOSPITALIST

## 2022-10-18 PROCEDURE — 97530 THERAPEUTIC ACTIVITIES: CPT

## 2022-10-18 PROCEDURE — 25000003 PHARM REV CODE 250: Performed by: STUDENT IN AN ORGANIZED HEALTH CARE EDUCATION/TRAINING PROGRAM

## 2022-10-18 PROCEDURE — 99232 PR SUBSEQUENT HOSPITAL CARE,LEVL II: ICD-10-PCS | Mod: ,,, | Performed by: PSYCHIATRY & NEUROLOGY

## 2022-10-18 PROCEDURE — 97112 NEUROMUSCULAR REEDUCATION: CPT

## 2022-10-18 PROCEDURE — 21400001 HC TELEMETRY ROOM

## 2022-10-18 PROCEDURE — 63600175 PHARM REV CODE 636 W HCPCS: Performed by: HOSPITALIST

## 2022-10-18 PROCEDURE — 99233 SBSQ HOSP IP/OBS HIGH 50: CPT | Mod: ,,, | Performed by: REGISTERED NURSE

## 2022-10-18 PROCEDURE — 97110 THERAPEUTIC EXERCISES: CPT

## 2022-10-18 PROCEDURE — 25000003 PHARM REV CODE 250: Performed by: INTERNAL MEDICINE

## 2022-10-18 PROCEDURE — 99232 SBSQ HOSP IP/OBS MODERATE 35: CPT | Mod: ,,, | Performed by: PSYCHIATRY & NEUROLOGY

## 2022-10-18 RX ORDER — LISINOPRIL 40 MG/1
40 TABLET ORAL DAILY
Qty: 90 TABLET | Refills: 3 | Status: ON HOLD
Start: 2022-10-19 | End: 2022-11-14

## 2022-10-18 RX ORDER — CARVEDILOL 25 MG/1
25 TABLET ORAL 2 TIMES DAILY
Qty: 60 TABLET | Refills: 11 | Status: ON HOLD
Start: 2022-10-18 | End: 2022-11-14

## 2022-10-18 RX ORDER — AMLODIPINE BESYLATE 10 MG/1
10 TABLET ORAL DAILY
Status: ON HOLD
Start: 2022-10-18 | End: 2022-11-14

## 2022-10-18 RX ORDER — MUPIROCIN 20 MG/G
OINTMENT TOPICAL 2 TIMES DAILY
Status: DISPENSED | OUTPATIENT
Start: 2022-10-18 | End: 2022-10-23

## 2022-10-18 RX ORDER — HYDRALAZINE HYDROCHLORIDE 25 MG/1
25 TABLET, FILM COATED ORAL EVERY 8 HOURS PRN
Status: DISCONTINUED | OUTPATIENT
Start: 2022-10-18 | End: 2022-10-24

## 2022-10-18 RX ORDER — AMOXICILLIN 250 MG
1 CAPSULE ORAL DAILY PRN
Status: ON HOLD
Start: 2022-10-18 | End: 2022-11-14

## 2022-10-18 RX ORDER — LEVETIRACETAM 500 MG/1
1000 TABLET ORAL 2 TIMES DAILY
Status: DISCONTINUED | OUTPATIENT
Start: 2022-10-18 | End: 2022-10-22

## 2022-10-18 RX ORDER — ENOXAPARIN SODIUM 100 MG/ML
40 INJECTION SUBCUTANEOUS EVERY 24 HOURS
Status: DISCONTINUED | OUTPATIENT
Start: 2022-10-18 | End: 2022-10-28 | Stop reason: HOSPADM

## 2022-10-18 RX ORDER — LISINOPRIL 20 MG/1
40 TABLET ORAL DAILY
Status: DISCONTINUED | OUTPATIENT
Start: 2022-10-19 | End: 2022-10-28 | Stop reason: HOSPADM

## 2022-10-18 RX ORDER — LEVETIRACETAM 1000 MG/1
1000 TABLET ORAL 2 TIMES DAILY
Qty: 60 TABLET | Refills: 11 | Status: ON HOLD
Start: 2022-10-18 | End: 2022-11-14

## 2022-10-18 RX ORDER — NAPROXEN SODIUM 220 MG/1
81 TABLET, FILM COATED ORAL DAILY
Refills: 0 | Status: ON HOLD
Start: 2022-10-18 | End: 2022-11-14

## 2022-10-18 RX ORDER — CARVEDILOL 12.5 MG/1
25 TABLET ORAL 2 TIMES DAILY
Status: DISCONTINUED | OUTPATIENT
Start: 2022-10-18 | End: 2022-10-28 | Stop reason: HOSPADM

## 2022-10-18 RX ORDER — ISOSORBIDE MONONITRATE 60 MG/1
60 TABLET, EXTENDED RELEASE ORAL DAILY
Qty: 30 TABLET | Refills: 11
Start: 2022-10-19 | End: 2022-10-18 | Stop reason: HOSPADM

## 2022-10-18 RX ORDER — HYDRALAZINE HYDROCHLORIDE 100 MG/1
100 TABLET, FILM COATED ORAL EVERY 8 HOURS
Qty: 90 TABLET | Refills: 11
Start: 2022-10-18 | End: 2022-10-18 | Stop reason: HOSPADM

## 2022-10-18 RX ORDER — INSULIN ASPART 100 [IU]/ML
0-5 INJECTION, SOLUTION INTRAVENOUS; SUBCUTANEOUS
Status: DISCONTINUED | OUTPATIENT
Start: 2022-10-18 | End: 2022-10-28 | Stop reason: HOSPADM

## 2022-10-18 RX ADMIN — ATORVASTATIN CALCIUM 40 MG: 40 TABLET, FILM COATED ORAL at 09:10

## 2022-10-18 RX ADMIN — LEVETIRACETAM INJECTION 1000 MG: 10 INJECTION INTRAVENOUS at 09:10

## 2022-10-18 RX ADMIN — CLOPIDOGREL BISULFATE 75 MG: 75 TABLET ORAL at 08:10

## 2022-10-18 RX ADMIN — AMLODIPINE BESYLATE 10 MG: 5 TABLET ORAL at 08:10

## 2022-10-18 RX ADMIN — LEVETIRACETAM 1000 MG: 500 TABLET, FILM COATED ORAL at 09:10

## 2022-10-18 RX ADMIN — MUPIROCIN: 20 OINTMENT TOPICAL at 09:10

## 2022-10-18 RX ADMIN — ASPIRIN 81 MG: 81 TABLET, COATED ORAL at 08:10

## 2022-10-18 RX ADMIN — CARVEDILOL 25 MG: 12.5 TABLET, FILM COATED ORAL at 09:10

## 2022-10-18 RX ADMIN — ENOXAPARIN SODIUM 40 MG: 100 INJECTION SUBCUTANEOUS at 05:10

## 2022-10-18 RX ADMIN — ISOSORBIDE MONONITRATE 60 MG: 30 TABLET, EXTENDED RELEASE ORAL at 08:10

## 2022-10-18 NOTE — PLAN OF CARE
Problem: Adult Inpatient Plan of Care  Goal: Absence of Hospital-Acquired Illness or Injury  Outcome: Ongoing, Progressing  Goal: Readiness for Transition of Care  Outcome: Ongoing, Progressing     Problem: Cerebral Tissue Perfusion (Stroke, Ischemic/Transient Ischemic Attack)  Goal: Optimal Cerebral Tissue Perfusion  Outcome: Ongoing, Progressing     Problem: Respiratory Compromise (Stroke, Ischemic/Transient Ischemic Attack)  Goal: Effective Oxygenation and Ventilation  Outcome: Ongoing, Progressing     Problem: Sensorimotor Impairment (Stroke, Ischemic/Transient Ischemic Attack)  Goal: Improved Sensorimotor Function  Outcome: Ongoing, Progressing     Problem: Fall Injury Risk  Goal: Absence of Fall and Fall-Related Injury  Outcome: Ongoing, Progressing     Problem: Infection  Goal: Absence of Infection Signs and Symptoms  Outcome: Ongoing, Progressing     Problem: Skin Injury Risk Increased  Goal: Skin Health and Integrity  Outcome: Ongoing, Progressing

## 2022-10-18 NOTE — PROGRESS NOTES
[facility] Medicine  Progress Note    Patient Name: Leonides Burns  MRN: [id number]  Patient Class: IP- Inpatient   Admission Date: 10/9/2022  Length of Stay: 7 days  Attending Physician: Celia Cabral MD  Primary Care Provider: Suzette Akhtar MD        Subjective:     Principal Problem:CVA (cerebral vascular accident)        HPI:  Leonides Burns 77 y.o. male with CKD, HTN, tobacco abuse presents to the hospital with a chief complaint of right leg weakness.  He reports 2 days of right lower extremity weakness and discoordination and double vision.  States the symptoms have been constant without aggravating factors.  He sometimes feels that improved with lying flat.  He reports he previously had stop aspirin due to severe and recurrent epistaxis.  He denies fevers chest pain shortness breast nausea vomiting abdominal pain leg swelling syncope numbness weakness of an arm incontinence weakness of his left leg.    In the ED, creatinine 2.1 CT head without acute abnormality, chest x-ray without focal consolidation afebrile without leukocytosis COVID negative LDL 80.6 TSH within normal limits.      Overview/Hospital Course:  Mr Leonides Burns is a 77 y.o. man admitted with R leg weakness secondary to acute punctate left parasagittal frontal lobe. PT/OT/SLP consulted. Echo and carotids unremarkable. Neurology consulted.  Patient continued on ASA, added plavix.  His Statin increased to 40 mg daily. Resumed BP medications. Plan was inpatient rehab, but he then had seizure activity on 10/13, prompting step up to ICU. Started keppra. He had difficulty with aspiration events. Speech therapy following; now safe for mechanical soft diet and thin liquids. He has been working with PT, OT. He is now medically stable for discharge to inpatient rehab when arranged.       Interval History: No complaints. He slept well overnight, ate well, and worked with PT, OT today. He still has R sided  weakness.     Review of Systems   Constitutional:  Negative for chills and fever.   Respiratory:  Negative for cough and shortness of breath.    Cardiovascular:  Negative for chest pain, palpitations and leg swelling.   Gastrointestinal:  Negative for abdominal pain, constipation, diarrhea, nausea and vomiting.   Genitourinary:  Negative for difficulty urinating.   Musculoskeletal:  Negative for arthralgias and myalgias.   Neurological:  Positive for facial asymmetry, speech difficulty and weakness. Negative for dizziness, seizures, numbness and headaches.   Psychiatric/Behavioral:  Negative for confusion.    Objective:     Vital Signs (Most Recent):  Temp: 98.5 °F (36.9 °C) (10/18/22 0400)  Pulse: 86 (10/18/22 1000)  Resp: (!) 26 (10/18/22 1000)  BP: (!) 112/55 (10/18/22 1000)  SpO2: 96 % (10/18/22 1000)   Vital Signs (24h Range):  Temp:  [97.8 °F (36.6 °C)-98.6 °F (37 °C)] 98.5 °F (36.9 °C)  Pulse:  [78-99] 86  Resp:  [20-38] 26  SpO2:  [93 %-100 %] 96 %  BP: (112-209)/() 112/55     Weight: 96 kg (211 lb 10.3 oz)  Body mass index is 32.18 kg/m².    Intake/Output Summary (Last 24 hours) at 10/18/2022 1103  Last data filed at 10/18/2022 0936  Gross per 24 hour   Intake 450 ml   Output --   Net 450 ml      Physical Exam  Vitals and nursing note reviewed.   Constitutional:       General: He is not in acute distress.     Appearance: He is obese. He is ill-appearing. He is not toxic-appearing.   HENT:      Head: Normocephalic and atraumatic.      Nose: Nose normal.      Mouth/Throat:      Mouth: Mucous membranes are moist.   Eyes:      General: No scleral icterus.     Extraocular Movements: Extraocular movements intact.      Conjunctiva/sclera: Conjunctivae normal.      Pupils: Pupils are equal, round, and reactive to light.   Cardiovascular:      Rate and Rhythm: Normal rate and regular rhythm.      Pulses: Normal pulses.      Heart sounds: Normal heart sounds. No murmur heard.    No gallop.   Pulmonary:       Effort: Pulmonary effort is normal. No respiratory distress.      Breath sounds: Normal breath sounds. No wheezing, rhonchi or rales.      Comments: Room air  Abdominal:      General: Bowel sounds are normal. There is no distension.      Palpations: Abdomen is soft.      Tenderness: There is no abdominal tenderness. There is no guarding.   Musculoskeletal:      Right lower leg: No edema.      Left lower leg: No edema.   Skin:     General: Skin is warm and dry.   Neurological:      Mental Status: He is alert and oriented to person, place, and time.      Cranial Nerves: Cranial nerve deficit (R facial droop) present.      Sensory: No sensory deficit.      Motor: Weakness (RUE and RLE weakness) present.       Significant Labs: All pertinent labs within the past 24 hours have been reviewed.    Significant Imaging: I have reviewed all pertinent imaging results/findings within the past 24 hours.      Assessment/Plan:      * CVA (cerebral vascular accident)  Patient presented with right leg weakness and noted to have acute stroke  - asa 81 daily, statin increased to 40 mg daily. Started on Plavix for 21 days.  - PT, OT, Speech evaluated  - plan inpatient rehab at discharge-- he is medically stable for discharge to inpatient rehab when arranged       Seizure  2 seizures occurred on 10/13 prompting step up to ICU  - Neurology following  - on keppra 1g BID  - no further seizure activity noted     Obesity (BMI 30.0-34.9)  Body mass index is 32.18 kg/m².  Will recommend diet and lifestyle modifications outpatient    Tobacco dependence  Smokes rare cigarettes per day. Greater than 3 minutes spent counseling patient on dangers of continued tobacco abuse.    Stage 3a chronic kidney disease  Cr on admission of 2.1. Baseline ranges 1.7 to 2.1  Renal dose medications avoid nephrotoxic drugs monitor intake and output  -improved and at baseline      Hyperlipidemia  Continue statin      Hypertension  On admit, held home amlodipine and  lisinopril for permissive HTN  BP is now elevated- resumed amlodipine  Added hydralazine, imdur, coreg  BP is much better controlled now        VTE Risk Mitigation (From admission, onward)         Ordered     IP VTE HIGH RISK PATIENT  Once         10/09/22 2026     Place sequential compression device  Until discontinued         10/09/22 2026                Discharge Planning   DEVIKA: 10/11/2022     Code Status: Full Code   Is the patient medically ready for discharge?:     Reason for patient still in hospital (select all that apply): Pending disposition  Discharge Plan A: Rehab   Discharge Delays: (!) Post-Acute Set-up        Celia Cabral MD  Department of Hospital Medicine   [facility] - [place] - Intensive Care

## 2022-10-18 NOTE — PROGRESS NOTES
"[facility] - Intensive Care  Palliative Medicine  Progress Note    Patient Name: Leonides Burns  MRN: [id number]  Admission Date: 10/9/2022  Hospital Length of Stay: 7 days  Code Status: Full Code   Attending Provider: Celia Cabral MD  Consulting Provider: Emelia Horton NP  Primary Care Physician: Suzette Akhtar MD  Principal Problem:CVA (cerebral vascular accident)    Patient information was obtained from patient and primary team.      Assessment/Plan:   Advance Care Planning    Palliative Encounter   Date: 10/18/2022  - interval chart reviewed in detail; discussed pt during ICU MDT rounds; pt medically ready for transfer to [facility] per primary, Dr. Cabral, CM/SW coordinating   - met with  Pt at bedside, still having difficulties with aphasia which is causing frustration; emotional support provided  - attempted to briefly discuss if he and his son and discussed living arrangement plans for after inpatient rehab as pt previously lived alone; also attempted to discuss my recommendation for home palliative and philosophy of care for once rehab is completed due to pt's age and co-morbidies; not confident pt has good insight to discussion and asked pt if details are confusing for him since the seizures and he shook his head "yes", also shook head "yes" when I asked if he would prefer that I discuss with son, Zander Blackmon    - attempted to call pt's son to discuss post rehab plans and recommendations; no answer will attempt again   - requested that CM/SW communicate need for palliative referral in transfer process     Palliative Encounter   Date: 10/14/2022  - interval chart reviewed in detail; discussed pt during ICU MDT rounds   - met with pt and son at ICU bedside, immediately noticed right sided facial droop which I didn't not recall when briefly assessing pt between first and second seizure episode yesterday; pt and son both confirm that was not present at that time, son does not recall " it being present yesterday evening either; increased right sided weakness compared to prior to seizure activity, pt unable to move right foot or leg now (see objective below)   - pt now having difficulty with aphasia and memory which is new; had difficulty recalling children's names; confirms that this is difficulty both with finding the words and remembering; but was eventually able to recall their names on his own; having a lot of frustration with this, attempted to hide tears during discussion today during moments of aphasia   - He confirms that he wishes for children to have shared decision making at this time if he were to lack capacity; this was with son, Leonides Vu, at bedside and during above mentioned episode of difficulty with children's names so unclear if that affected desire to appoint one specific child as MPOA, will attempt to assess further; Pt has 4 adult children Leonides Blackmon, Santi, Felix, and Alonso; Jaun Blackmon Is only child who lives locally   - pt also confirms with a nod yes desire for resuscitation and would agree to intubation if needed but did not discuss further in detail at this time   - during visit provided medical update to pt's other son Santi by phone per Zander Vu's request and pt's permission   - planned for continued GOC/ACP discussion following further evaluation and recommendations by neurology, speech therapy, and PT/OT.    - emotional support provided; allowed time for questions/concerns, all addressed     Palliative Consult   Date: 10/13/2022  - witnessed pt having active seizure and assisted in calling of rapid response  - provided emotional support to pt's son, Leonides Blackmon,  who was at bedside at time of seizure; introduction to palliative medicine team and role in current care and admission discussed pt's baseline with son and learned more about pt outside of hospital admission while comforting son in family waiting area, while rapid response team assessed pt and planned  "for transfer to CT; prior to admission pt lives independently at his own home and able to ambulate and perform ADLs without difficulty; son helps when needed; pt otherwise typically "healthy for age" per son  - pt discussed and consult received by Dr. Gee (primary during this time); brief assessment of pt while in post ictal state, pt was stable, alert, but somnolent; pt later experienced another episode of seizure activity requiring additional rapid response, and planned transfer to ICU  - later again met with pt's sonZander Jr., to discusses GOC/ACP if seizures continue or if pt were to decline overnight; due to new seizure activity and pt's previous independence this is of shock to son and present family; they advocate for continued full code at this time and would agree to intubation if needed to protect airway if continued seizure activity  - planned for continued GOC/ACP discussion once more information available to son and pt, and neurology assesses pt    - emotional support provided   - Allowed time for questions/concerns; all addressed; expressed availability of myself/palliative team for additional questions/concerns     CVA (cerebral vascular accident)  - presented with right leg weakness; no other major deficits; son reported mental status/cognition at pre-admit baseline prior to seizure activity   - was participating in OT/PT with planned discharge pending but pt exhibited increased weakness with therapy so plan switched to rehab prior to seizure activity on 10/13  - planned transfer to [facility] inpatient rehab     Seizure   - 2 seizures pm 10/13; transferred to ICU following second seizure, no continued seizures   - neurology consulted/following   - CT head 10/13 - without acute findings per report   - son reports only knowing of one epidode of seizure activty 5-6 years ago prior to discharge from a hospital stay, to son's knowledge pt did not require seizure medication following that " "event    Hypertension  Chronic kidney disease, stage III (moderate)  Tobacco dependence  - histories noted; management per primary     Thank you for your consult. I will follow-up with patient. Please contact us if you have any additional questions.    Subjective:     HPI:   From H&P: "Leonides Burns 77 y.o. male with CKD, HTN, tobacco abuse presents to the hospital with a chief complaint of right leg weakness.  He reports 2 days of right lower extremity weakness and discoordination and double vision.  States the symptoms have been constant without aggravating factors.  He sometimes feels that improved with lying flat.  He reports he previously had stop aspirin due to severe and recurrent epistaxis.  He denies fevers chest pain shortness breast nausea vomiting abdominal pain leg swelling syncope numbness weakness of an arm incontinence weakness of his left leg.     In the ED, creatinine 2.1 CT head without acute abnormality, chest x-ray without focal consolidation afebrile without leukocytosis COVID negative LDL 80.6 TSH within normal limits."     Palliative medicine consulted for advance care planning and continuity of care; Met with pt at bedside; for details of visit, see advance care planning section of plan.         Hospital Course:  No notes on file    Past Medical History:   Diagnosis Date    Disorder of kidney and ureter     Elevated PSA     Glaucoma     Hyperlipidemia     Hypertension     Psoriasis      Past Surgical History:   Procedure Laterality Date    FUNCTIONAL ENDOSCOPIC SINUS SURGERY (FESS) USING COMPUTER-ASSISTED NAVIGATION N/A 2/28/2019    Procedure: FESS, USING COMPUTER-ASSISTED NAVIGATION (ADD ON );  Surgeon: Mikael Serrano MD;  Location: [facility] OR;  Service: ENT;  Laterality: N/A;  (ADD ON )    NASAL SEPTOPLASTY N/A 2/28/2019    Procedure: SEPTOPLASTY, NOSE;  Surgeon: Mikael Serrano MD;  Location: [facility] OR;  Service: ENT;  Laterality: N/A;     Review of patient's allergies " indicates:   Allergen Reactions    Latex, natural rubber Rash    Iodine and iodide containing products Rash     Medications:  Continuous Infusions:      Scheduled Meds:   amLODIPine  10 mg Oral Daily    aspirin  81 mg Oral Daily    atorvastatin  40 mg Oral QHS    carvediloL  25 mg Oral BID    clopidogreL  75 mg Oral Daily    hydrALAZINE  100 mg Oral Q8H    isosorbide mononitrate  60 mg Oral Daily    levetiracetam IV  1,000 mg Intravenous Q12H    mupirocin   Nasal BID     PRN Meds:acetaminophen, albuterol sulfate, dextrose 10%, dextrose 10%, glucagon (human recombinant), hydrALAZINE, insulin aspart U-100, melatonin, senna-docusate 8.6-50 mg, sodium chloride 0.9%    Family History       Problem Relation (Age of Onset)    Anemia Sister, Daughter    Cancer Brother    Diabetes Mother    KEVIN disease Sister    Hypertension Mother, Sister    No Known Problems Father, Son          Tobacco Use    Smoking status: Some Days     Packs/day: 0.50     Years: 40.00     Pack years: 20.00     Types: Cigarettes    Smokeless tobacco: Current   Substance and Sexual Activity    Alcohol use: Yes     Alcohol/week: 9.0 standard drinks     Types: 9 Cans of beer per week    Drug use: No    Sexual activity: Yes     Partners: Female       Review of Systems   Constitutional:  Positive for fatigue.   HENT: Negative.          Right facial droop not present prior to seizure activity yesterday    Respiratory:  Negative for shortness of breath.    Cardiovascular:  Negative for chest pain.   Gastrointestinal: Negative.    Musculoskeletal:         Unable to move right leg/foot   Neurological:  Positive for speech difficulty and weakness. Negative for dizziness, tremors and headaches.   Psychiatric/Behavioral:          Frustrated with aphasia and memory difficulties    Objective:     Vital Signs (Most Recent):  Temp: 98.5 °F (36.9 °C) (10/18/22 0400)  Pulse: 97 (10/18/22 0900)  Resp: (!) 32 (10/18/22 0900)  BP: (!) 190/82 (10/18/22 0900)  SpO2: 95 %  (10/18/22 0900)   Vital Signs (24h Range):  Temp:  [97.8 °F (36.6 °C)-98.6 °F (37 °C)] 98.5 °F (36.9 °C)  Pulse:  [78-99] 97  Resp:  [20-38] 32  SpO2:  [93 %-100 %] 95 %  BP: (141-209)/() 190/82     Weight: 96 kg (211 lb 10.3 oz)  Body mass index is 32.18 kg/m².    Physical Exam  Vitals and nursing note reviewed.   Constitutional:       General: He is not in acute distress.  HENT:      Head:      Comments: Right lower face droop     Nose: Nose normal.      Mouth/Throat:      Comments: Right lip/mouth droop  Pulmonary:      Effort: Pulmonary effort is normal.      Comments: O2 via NC  Musculoskeletal:      Comments: Unable to move right leg/foot, RUE weakness    Neurological:      Mental Status: He is alert and oriented to person, place, and time.      Cranial Nerves: Cranial nerve deficit: unable to move right leg/foot; Hand  L>R.      Motor: Weakness present.      Comments: Aphasia    Psychiatric:      Comments: Appears very frustrated/down during moments of aphasia     Significant Labs: All pertinent labs within the past 24 hours have been reviewed.  CBC:   Recent Labs   Lab 10/16/22  0332   WBC 7.68   HGB 14.4   HCT 44.8   MCV 80*          BMP:  No results for input(s): GLU, NA, K, CL, CO2, BUN, CREATININE, CALCIUM, MG in the last 24 hours.    LFT:  Lab Results   Component Value Date    AST 25 10/14/2022    ALKPHOS 68 10/14/2022    BILITOT 0.5 10/14/2022     Albumin:   Albumin   Date Value Ref Range Status   10/14/2022 2.8 (L) 3.5 - 5.2 g/dL Final     Protein:   Total Protein   Date Value Ref Range Status   10/14/2022 6.5 6.0 - 8.4 g/dL Final     Lactic acid:   No results found for: LACTATE    Significant Imaging: I have reviewed all pertinent imaging results/findings within the past 24 hours.      Total visit time: 55 minutes    > 50% of  35 min visit spent in chart review, face to face discussion of symptom assessment, coordination of care with other specialists, and discharge planning.    20  min ACP time spent: goals of care, emotional support, formulating and communicating prognosis, exploring burden/ benefit of various approaches of treatment.     Emelia Horton NP  Palliative Medicine  [facility] - Intensive Care

## 2022-10-18 NOTE — PT/OT/SLP PROGRESS
Occupational Therapy   Treatment    Name: Leonides Burns  MRN: [id number]  Admitting Diagnosis:  CVA (cerebral vascular accident)       Recommendations:     Discharge Recommendations: rehabilitation facility  Discharge Equipment Recommendations:   (TBD)  Barriers to discharge:   (pt was MOD I and living alone PTA. now, acute CVA with impairments with all ADLs and all aspects of functional mobility. pt at high risk of falls, unplanned readmission, and morbidity if d/c home at this time)    Assessment:     Leonides Burns is a 77 y.o. male with a medical diagnosis of CVA (cerebral vascular accident). Performance deficits affecting function are weakness, impaired endurance, impaired cognition, decreased ROM, impaired coordination, decreased upper extremity function, impaired fine motor, impaired self care skills, decreased lower extremity function, impaired functional mobility, gait instability, decreased safety awareness, impaired balance.     Pt making progress towards goals. Pt able to count aloud reps of exercises to #10 with minimal prompting and 2 errors for 2 trials. Pt completed x10 AAROM-AROM shoulder flexion, scapular protraction, elbow extension > shoulder extension, scapular retraction, elbow flexion on supported table.      Pt is very motivated and will make a great candidate for inpatient rehab at d/c in order to increase safety and independence with ADLs and all aspects of functional mobility. Pt will greatly benefit from the intense multidisciplinary approach that only IPR can provide    Rehab Prognosis:  Good; patient would benefit from acute skilled OT services to address these deficits and reach maximum level of function.       Plan:     Patient to be seen  (5-6x/week) to address the above listed problems via self-care/home management, therapeutic activities, therapeutic exercises, neuromuscular re-education  Plan of Care Expires: 10/24/22  Plan of Care Reviewed with: patient    Subjective     Chief  "complaint: when asked how breakfast was, pt said "lowsy"   Patient/family comments/ goals: "you're something else" stated while doing exercises sitting EOB; gave two thumbs up at end of session     Pain/Comfort:  Pain Rating 1: 0/10    Objective:     Communicated with: nursing prior to session.  Patient found HOB elevated with blood pressure cuff, pulse ox (continuous), telemetry, peripheral IV upon OT entry to room.    General Precautions: Standard, fall, seizure   Orthopedic Precautions:N/A   Braces: N/A  Respiratory Status: Room air     Occupational Performance:     Bed Mobility:    Patient completed Scooting posteriorly 2x with total assistance and 2 persons  Patient completed Scooting anteriorly with minimal assistance   Patient completed Bridging in the bed with moderate assistance for BLE positioning   Patient completed Supine to Sit with total assistance, 2 persons, with side rail, and HOB elevated  Patient completed Sit to Supine with total assistance and 2 persons     Functional Mobility/Transfers:  Patient completed Sit <> Stand Transfer with maximal assistance and of 2 persons  with  BUE supported x3 trials    Scoot transfer at EOB with total assistance x2     Activities of Daily Living:  Grooming: MIN A for balance while pt used comb to brush his hair using LUE in unsupported sit     Lower Body Dressing: total assistance for sock  Upper Body Dressing: maximal assistance with gown while seated EOB unsupported       [facility] 6 Click ADL: 11    Treatment & Education:  Pt re-educated on OT role/POC.   Importance of EOB activity with therapy assistance.  Pt sat EOB for ~35 min. Initially with R lateral and posterior lean but able to correct with MIN- MOD A and frequent cueing. Tactile cueing often as a target to promote trunk flexion to midline.   While completing dynamic activities with BLE, OT positioned on pt's R side with tactile cueing with assist for weight bear with safe positioning of RUE for functional " use.   X5 functional reach with LUE crossing midline promoting weight bear through RUE/RLE and visual attention and MIN A for sit balance. Pt then completed x5 with LUE to the L side to promote lateral lean to the L.   Gentle static stretch to RUE scaption, shoulder flexion, and shoulder flexion while pt sat EOB.   With stretch in scaption, OT had pt squeeze object x10 and count reps along. Pt with delayed initiation and increased time required. 2 errors with counting. Verbal cueing for visual attention to RUE during task.   With RUE supported on tray table on pt's R side, pt completed x10 shoulder flexion, scapular protraction, elbow extension > shoulder extension, scapular retraction, elbow flexion on supported table. Pt initially required AAROM for first 3 reps then advanced to AROM with prolonged time required, but pt showed good attention to UE/task   Bridging x10 in supine with moderate assistance for BLE with assist by OT for weight through RUE; prompting and pt able to count reps aloud to #10 with 2 errors   Safety during functional t/f and mobility   Multiple self-care tasks/functional mobility completed- assistance level noted above; cueing for awareness of R side with all aspects of functional mobility throughout session.   All questions/concerns answered within OT scope of practice       Patient left  HOB elevated with RUE elevated on pillow, pillow on lateral aspect of RLE, and B/L pressure relief boots in place  with all lines intact, call button in reach, nurse notified, and all needs met/within reach; lights on, tv on, curtain opened.     GOALS:   Multidisciplinary Problems       Occupational Therapy Goals          Problem: Occupational Therapy    Goal Priority Disciplines Outcome Interventions   Occupational Therapy Goal     OT, PT/OT Ongoing, Progressing    Description: Goals to be met by: 10/24/22    Patient will increase functional independence with ADLs by performing:    UE Dressing with  [place].  LE Dressing with [place].  Grooming while standing at sink with [place].  Toileting from toilet with [place] for hygiene and clothing management.   Supine to sit with [place].  Step transfer with [place]  Toilet transfer to toilet with [place].  Upper extremity exercise program x15 reps per handout, with independence.                         Time Tracking:     OT Date of Treatment: 10/18/22  OT Start Time: 0935  OT Stop Time: 1021  OT Total Time (min): 46 min    Billable Minutes:Therapeutic Activity 16 min  Neuromuscular Re-education 30 min  Total Time 46 min (co-treat with PT)    Co- treatment performed due to patient's multiple deficits requiring two skilled therapists to appropriately and safely assess patient's strength and endurance while facilitating functional tasks in addition to accommodating for patient's activity tolerance      OT/CHANDNI: OT     CHANDNI Visit Number: 0    10/18/2022

## 2022-10-18 NOTE — PT/OT/SLP PROGRESS
Physical Therapy Treatment    Patient Name:  Leonides Burns   MRN:  [id number]    Recommendations:     Discharge Recommendations:  rehabilitation facility   Discharge Equipment Recommendations:  (Ongoing assessmetn pending pt progress)   Barriers to discharge:  None from PT standpoint, IPR recommended as patient is not functioning at Independent baseline and could benefit from an aggressive multidisciplinary approach to help restore his functional [place].  Pt is motivated and demonstrates good endurance to participate    Assessment:     Leonides Burns is a 77 y.o. male admitted with a medical diagnosis of CVA (cerebral vascular accident).  He presents with the following impairments/functional limitations:  weakness, gait instability, decreased upper extremity function, decreased ROM, impaired endurance, impaired balance, decreased lower extremity function, impaired coordination, decreased safety awareness, impaired self care skills, impaired cognition, pain, impaired functional mobility, decreased coordination, abnormal tone .    Rehab Prognosis: Good; patient would benefit from acute skilled PT services to address these deficits and reach maximum level of function.    Recent Surgery: * No surgery found *      Plan:     During this hospitalization, patient to be seen  (5-6x/wk) to address the identified rehab impairments via gait training, therapeutic activities, therapeutic exercises, wheelchair management/training, neuromuscular re-education and progress toward the following goals:    Plan of Care Expires:  10/26/22    Subjective     Chief Complaint: pain, fatigue  Patient/Family Comments/goals: Pt agreeable to treatment  Pain/Comfort:  Pain Rating 1: 0/10  Location - Orientation 1:  (facial grimacing and whincing with PROM R LE 2/2 Increased flexor tone)  Pain Addressed 1: Reposition, Distraction, Cessation of Activity, Nurse notified  Pain Rating Post-Intervention 1:  (Not rated)      Objective:  "    Communicated with nsg prior to session.  Patient found HOB elevated with telemetry (ICU monitoring) upon PT entry to room.     General Precautions: Standard, fall, seizure   Orthopedic Precautions:N/A   Braces: N/A  Respiratory Status: Room air     Functional Mobility:  Bed Mobility:     Rolling Left:  total assistance, of 2 persons, and with VC/TC for lorolling and body mechanics  Scooting: stand by assistance to scoot L hip forward in sitting to EOB and Min A and VC/TC for weight shift to shift R Hip.  Total A x 2 persons to scoot backward on bed x 2 trials.  Max A x 2 persons with VC/TC for bridging, using L UE on HB in supine to HOB   Bridging: moderate assistance and for B LE stabilization in supine x 10 reps, 12 reps Total during session.  Able to clear buttocks from bed  Supine to Sit: total assistance, of 2 persons, and with VC/TC for body mechanics  Sit to Supine: total assistance, of 2 persons, and with VC/TC fo rhadn placment and body mechanics and "hooking" R LE with Left.  Transfers:     Sit to Stand:  total assistance, of 2 persons, and with VC/TC for forward weight shift over YOVANA  and L hand placement on BR with  BR and UE support  Scoot transfer:  max A x 2 persons with Vc/TC for forward eight shift over YOVANA in sitting along EOB  Gait: Unable  Balance: Poor>Fair sitting at EOB, poor stand      AM-PAC 6 CLICK MOBILITY  Turning over in bed (including adjusting bedclothes, sheets and blankets)?: 2  Sitting down on and standing up from a chair with arms (e.g., wheelchair, bedside commode, etc.): 2  Moving from lying on back to sitting on the side of the bed?: 2  Moving to and from a bed to a chair (including a wheelchair)?: 2  Need to walk in hospital room?: 1  Climbing 3-5 steps with a railing?: 1  Basic Mobility Total Score: 10       Treatment & Education:  Pt received R DFlx/Knee ext/Hip IR stretch 3x30 sec 2/2 Increased R LE flexor tone.  Pt performed Bridging x 10 reps in supine with Mod A for B " "LE stabilization.  Pt received B LTR stretch x 3 reps.  Bed mobility and  transfer training as above.  Pt sat at EOB and  received B Static soleus stretch with Mod A for B LE WB in sitting at EOB with VC/TC for forward weight shift over YOVANA approx 20m, then patient able to maintain static sit balance with CGA and VC/TC for forward weight shift over YOVANA while working with OT.  Pt performed reaching exercises to Left to correct for Right lean sitting at EOB and Right to facilitate WB through R LE/UE sitting at EOB.  Pt tolerated minimal perturbations from front back and Left.  Pt received R HCS/HSS and Hip IR stretch 3x30 sec while sitting at EOB.  Pt repositioned to HOB with HOB elevated, R LE and back with pillows to offload sacrum and to maintain R LE neutral and R UE elevated.    Patient left  as above  with all lines intact, call button in reach, nsg notified, and presure relief boots applied ..    GOALS:   Multidisciplinary Problems       Physical Therapy Goals          Problem: Physical Therapy    Goal Priority Disciplines Outcome Goal Variances Interventions   Physical Therapy Goal     PT, PT/OT Ongoing, Progressing     Description: Goals to be met by: 10/14/22     Patient will increase functional independence with mobility by performin. Pt to be Min A with bed mobility.  2. Pt to transfer sit to stand with CGA  3. Pt to ambulate with HW and Min A x 10-15'  4. Pt to be Supervision with written HEP  5. Pt to transfer bed to chair with CGA  6. Pt to ascend/descend 4" curb with RW and Min A  7. Pt will propel W/C with L UE/LE and CGA approx 25'                       Time Tracking:     PT Received On: 10/18/22  PT Start Time: 934     PT Stop Time:   PT Total Time (min): 46 min     Billable Minutes: Therapeutic Activity 31 and Therapeutic Exercise 15    Treatment Type: Treatment  PT/PTA: PT     PTA Visit Number: 0     10/18/2022  "

## 2022-10-18 NOTE — PROGRESS NOTES
Orders received for transfer to rehab.  Orders sent a call to Mini at [facility].  Call also placed to Mini who stated MD will continue to review.

## 2022-10-18 NOTE — SUBJECTIVE & OBJECTIVE
Past Medical History:   Diagnosis Date    Disorder of kidney and ureter     Elevated PSA     Glaucoma     Hyperlipidemia     Hypertension     Psoriasis      Past Surgical History:   Procedure Laterality Date    FUNCTIONAL ENDOSCOPIC SINUS SURGERY (FESS) USING COMPUTER-ASSISTED NAVIGATION N/A 2/28/2019    Procedure: FESS, USING COMPUTER-ASSISTED NAVIGATION (ADD ON );  Surgeon: Mikael Serrano MD;  Location: [facility];  Service: ENT;  Laterality: N/A;  (ADD ON )    NASAL SEPTOPLASTY N/A 2/28/2019    Procedure: SEPTOPLASTY, NOSE;  Surgeon: Mikael Serrano MD;  Location: [facility];  Service: ENT;  Laterality: N/A;     Review of patient's allergies indicates:   Allergen Reactions    Latex, natural rubber Rash    Iodine and iodide containing products Rash     Medications:  Continuous Infusions:      Scheduled Meds:   amLODIPine  10 mg Oral Daily    aspirin  81 mg Oral Daily    atorvastatin  40 mg Oral QHS    carvediloL  25 mg Oral BID    clopidogreL  75 mg Oral Daily    hydrALAZINE  100 mg Oral Q8H    isosorbide mononitrate  60 mg Oral Daily    levetiracetam IV  1,000 mg Intravenous Q12H    mupirocin   Nasal BID     PRN Meds:acetaminophen, albuterol sulfate, dextrose 10%, dextrose 10%, glucagon (human recombinant), hydrALAZINE, insulin aspart U-100, melatonin, senna-docusate 8.6-50 mg, sodium chloride 0.9%    Family History       Problem Relation (Age of Onset)    Anemia Sister, Daughter    Cancer Brother    Diabetes Mother    KEVIN disease Sister    Hypertension Mother, Sister    No Known Problems Father, Son          Tobacco Use    Smoking status: Some Days     Packs/day: 0.50     Years: 40.00     Pack years: 20.00     Types: Cigarettes    Smokeless tobacco: Current   Substance and Sexual Activity    Alcohol use: Yes     Alcohol/week: 9.0 standard drinks     Types: 9 Cans of beer per week    Drug use: No    Sexual activity: Yes     Partners: Female       Review of Systems   Constitutional:  Positive for  fatigue.   HENT: Negative.          Right facial droop not present prior to seizure activity yesterday    Respiratory:  Negative for shortness of breath.    Cardiovascular:  Negative for chest pain.   Gastrointestinal: Negative.    Musculoskeletal:         Unable to move right leg/foot   Neurological:  Positive for speech difficulty and weakness. Negative for dizziness, tremors and headaches.   Psychiatric/Behavioral:          Frustrated with aphasia and memory difficulties    Objective:     Vital Signs (Most Recent):  Temp: 98.5 °F (36.9 °C) (10/18/22 0400)  Pulse: 97 (10/18/22 0900)  Resp: (!) 32 (10/18/22 0900)  BP: (!) 190/82 (10/18/22 0900)  SpO2: 95 % (10/18/22 0900)   Vital Signs (24h Range):  Temp:  [97.8 °F (36.6 °C)-98.6 °F (37 °C)] 98.5 °F (36.9 °C)  Pulse:  [78-99] 97  Resp:  [20-38] 32  SpO2:  [93 %-100 %] 95 %  BP: (141-209)/() 190/82     Weight: 96 kg (211 lb 10.3 oz)  Body mass index is 32.18 kg/m².    Physical Exam  Vitals and nursing note reviewed.   Constitutional:       General: He is not in acute distress.  HENT:      Head:      Comments: Right lower face droop     Nose: Nose normal.      Mouth/Throat:      Comments: Right lip/mouth droop  Pulmonary:      Effort: Pulmonary effort is normal.      Comments: O2 via NC  Musculoskeletal:      Comments: Unable to move right leg/foot, RUE weakness    Neurological:      Mental Status: He is alert and oriented to person, place, and time.      Cranial Nerves: Cranial nerve deficit: unable to move right leg/foot; Hand  L>R.      Motor: Weakness present.      Comments: Aphasia    Psychiatric:      Comments: Appears very frustrated/down during moments of aphasia     Significant Labs: All pertinent labs within the past 24 hours have been reviewed.  CBC:   Recent Labs   Lab 10/16/22  0332   WBC 7.68   HGB 14.4   HCT 44.8   MCV 80*          BMP:  No results for input(s): GLU, NA, K, CL, CO2, BUN, CREATININE, CALCIUM, MG in the last 24  hours.    LFT:  Lab Results   Component Value Date    AST 25 10/14/2022    ALKPHOS 68 10/14/2022    BILITOT 0.5 10/14/2022     Albumin:   Albumin   Date Value Ref Range Status   10/14/2022 2.8 (L) 3.5 - 5.2 g/dL Final     Protein:   Total Protein   Date Value Ref Range Status   10/14/2022 6.5 6.0 - 8.4 g/dL Final     Lactic acid:   No results found for: LACTATE    Significant Imaging: I have reviewed all pertinent imaging results/findings within the past 24 hours.

## 2022-10-18 NOTE — PLAN OF CARE
[facility]    FACILITY TRANSFER ORDERS      Patient Name: Leonides Burns  YOB: 1945    PCP: Suzette Akhtar MD   PCP Address: [address] [facility]  PCP Phone Number: [phone]  PCP Fax: [phone]    Encounter Date: 10/18/2022    Admit to: Inpatient Rehab     Vital Signs:  Routine    Diagnoses:   Active Hospital Problems    Diagnosis  POA    *CVA (cerebral vascular accident) [I63.9]  Yes    Seizure [R56.9]  No    Obesity (BMI 30.0-34.9) [E66.9]  Yes    Tobacco dependence [F17.200]  Yes     Chronic    Stage 3a chronic kidney disease [N18.31]  Yes    Hypertension [I10]  Yes     Chronic    Hyperlipidemia [E78.5]  Yes     Chronic      Resolved Hospital Problems    Diagnosis Date Resolved POA    Right leg weakness [R29.898] 10/11/2022 Yes       Allergies:  Review of patient's allergies indicates:   Allergen Reactions    Latex, natural rubber Rash    Iodine and iodide containing products Rash       Diet:  Mechanical soft diet with thin liquids. Crush large meds. Renal and cardiac restrictions     Activities: Activity as tolerated    Goals of Care Treatment Preferences:  Code Status: Full Code      Nursing: Aspiration and fall precautions       CONSULTS:    Physical Therapy to evaluate and treat 5x/week  Occupational Therapy to evaluate and treat 5x/week  Speech Therapy to evaluate and treat for Language, Swallowing, and Cognition 5x/week    MISCELLANEOUS CARE:  Routine Skin for Bedridden Patients: Apply moisture barrier cream to all skin folds and wet areas in perineal area daily and after baths and all bowel movements.    WOUND CARE ORDERS  None    Medications: Review discharge medications with patient and family and provide education.      Current Discharge Medication List        START taking these medications    Details   carvediloL (COREG) 25 MG tablet Take 1 tablet (25 mg total) by mouth 2 (two) times daily.  Qty: 60 tablet,  Refills: 11    Comments: .      clopidogreL (PLAVIX) 75 mg tablet Take 1 tablet (75 mg total) by mouth once daily. for 21 days  Qty: 21 tablet, Refills: 0      levETIRAcetam (KEPPRA) 1000 MG tablet Take 1 tablet (1,000 mg total) by mouth 2 (two) times daily.  Qty: 60 tablet, Refills: 11      senna-docusate 8.6-50 mg (PERICOLACE) 8.6-50 mg per tablet Take 1 tablet by mouth daily as needed for Constipation.           CONTINUE these medications which have CHANGED    Details   amLODIPine (NORVASC) 10 MG tablet Take 1 tablet (10 mg total) by mouth once daily.    Comments: .  Associated Diagnoses: Essential hypertension      aspirin 81 MG Chew Take 1 tablet (81 mg total) by mouth once daily.  Refills: 0      atorvastatin (LIPITOR) 40 MG tablet Take 1 tablet (40 mg total) by mouth every evening.  Qty: 90 tablet, Refills: 3      lisinopriL (PRINIVIL,ZESTRIL) 40 MG tablet Take 1 tablet (40 mg total) by mouth once daily.  Qty: 90 tablet, Refills: 3    Comments: .           CONTINUE these medications which have NOT CHANGED    Details   allopurinol (ZYLOPRIM) 100 MG tablet TAKE 2 TABLETS BY MOUTH EVERY DAY  Qty: 180 tablet, Refills: 0    Associated Diagnoses: Chronic gout without tophus, unspecified cause, unspecified site      bimatoprost (LUMIGAN) 0.01 % Drop Place 1 drop into both eyes every evening.           STOP taking these medications       ketoconazole (NIZORAL) 2 % cream Comments:   Reason for Stopping:                  Immunizations Administered as of 10/18/2022       Name Date Dose VIS Date Route Exp Date    COVID-19, MRNA, LN-S, PF (Pfizer) (Purple Cap) 2/6/2021 10:15 AM 0.3 mL 12/12/2020 Intramuscular 5/31/2021    Site: Right deltoid     Given By: Keli Salinas     : [employer]     Lot: [id number]     COVID-19, MRNA, LN-S, PF (Pfizer) (Purple Cap) 1/16/2021 12:38 PM 0.3 mL 12/12/2020 Intramuscular 5/31/2021    Site: Left deltoid     Given By: Azam Prieto MA     : [employer]     Lot:  [id number]                 _________________________________  Celia Cabral MD  10/18/2022

## 2022-10-18 NOTE — SUBJECTIVE & OBJECTIVE
Interval History: No complaints. He slept well overnight, ate well, and worked with PT, OT today. He still has R sided weakness.     Review of Systems   Constitutional:  Negative for chills and fever.   Respiratory:  Negative for cough and shortness of breath.    Cardiovascular:  Negative for chest pain, palpitations and leg swelling.   Gastrointestinal:  Negative for abdominal pain, constipation, diarrhea, nausea and vomiting.   Genitourinary:  Negative for difficulty urinating.   Musculoskeletal:  Negative for arthralgias and myalgias.   Neurological:  Positive for facial asymmetry, speech difficulty and weakness. Negative for dizziness, seizures, numbness and headaches.   Psychiatric/Behavioral:  Negative for confusion.    Objective:     Vital Signs (Most Recent):  Temp: 98.5 °F (36.9 °C) (10/18/22 0400)  Pulse: 86 (10/18/22 1000)  Resp: (!) 26 (10/18/22 1000)  BP: (!) 112/55 (10/18/22 1000)  SpO2: 96 % (10/18/22 1000)   Vital Signs (24h Range):  Temp:  [97.8 °F (36.6 °C)-98.6 °F (37 °C)] 98.5 °F (36.9 °C)  Pulse:  [78-99] 86  Resp:  [20-38] 26  SpO2:  [93 %-100 %] 96 %  BP: (112-209)/() 112/55     Weight: 96 kg (211 lb 10.3 oz)  Body mass index is 32.18 kg/m².    Intake/Output Summary (Last 24 hours) at 10/18/2022 1103  Last data filed at 10/18/2022 0936  Gross per 24 hour   Intake 450 ml   Output --   Net 450 ml      Physical Exam  Vitals and nursing note reviewed.   Constitutional:       General: He is not in acute distress.     Appearance: He is obese. He is ill-appearing. He is not toxic-appearing.   HENT:      Head: Normocephalic and atraumatic.      Nose: Nose normal.      Mouth/Throat:      Mouth: Mucous membranes are moist.   Eyes:      General: No scleral icterus.     Extraocular Movements: Extraocular movements intact.      Conjunctiva/sclera: Conjunctivae normal.      Pupils: Pupils are equal, round, and reactive to light.   Cardiovascular:      Rate and Rhythm: Normal rate and regular rhythm.       Pulses: Normal pulses.      Heart sounds: Normal heart sounds. No murmur heard.    No gallop.   Pulmonary:      Effort: Pulmonary effort is normal. No respiratory distress.      Breath sounds: Normal breath sounds. No wheezing, rhonchi or rales.      Comments: Room air  Abdominal:      General: Bowel sounds are normal. There is no distension.      Palpations: Abdomen is soft.      Tenderness: There is no abdominal tenderness. There is no guarding.   Musculoskeletal:      Right lower leg: No edema.      Left lower leg: No edema.   Skin:     General: Skin is warm and dry.   Neurological:      Mental Status: He is alert and oriented to person, place, and time.      Cranial Nerves: Cranial nerve deficit (R facial droop) present.      Sensory: No sensory deficit.      Motor: Weakness (RUE and RLE weakness) present.       Significant Labs: All pertinent labs within the past 24 hours have been reviewed.    Significant Imaging: I have reviewed all pertinent imaging results/findings within the past 24 hours.

## 2022-10-18 NOTE — PLAN OF CARE
"  Problem: Physical Therapy  Goal: Physical Therapy Goal  Description: Goals to be met by: 10/14/22     Patient will increase functional independence with mobility by performin. Pt to be Min A with bed mobility.  2. Pt to transfer sit to stand with CGA  3. Pt to ambulate with HW and Min A x 10-15'  4. Pt to be Supervision with written HEP  5. Pt to transfer bed to chair with CGA  6. Pt to ascend/descend 4" curb with RW and Min A  7. Pt will propel W/C with L UE/LE and CGA approx 25'  Outcome: Ongoing, Progressing   Pt progressing, IPR recommended.  "

## 2022-10-18 NOTE — PLAN OF CARE
Problem: Occupational Therapy  Goal: Occupational Therapy Goal  Description: Goals to be met by: 10/24/22    Patient will increase functional independence with ADLs by performing:    UE Dressing with Modified [place].  LE Dressing with Modified [place].  Grooming while standing at sink with Modified [place].  Toileting from toilet with Modified [place] for hygiene and clothing management.   Supine to sit with Modified [place].  Step transfer with Modified [place]  Toilet transfer to toilet with Modified [place].  Upper extremity exercise program x15 reps per handout, with independence.    Outcome: Ongoing, Progressing     Pt making progress towards goals. Pt able to count aloud reps of exercises to #10 with minimal prompting and 2 errors for 2 trials. Pt completed x10 AAROM-AROM shoulder flexion, scapular protraction, elbow extension > shoulder extension, scapular retraction, elbow flexion on supported table.     Pt is very motivated and will make a great candidate for inpatient rehab at d/ in order to increase safety and independence with ADLs and all aspects of functional mobility. Pt will greatly benefit from the intense multidisciplinary approach that only IPR can provide.

## 2022-10-18 NOTE — NURSING
[facility]ensive [facility]  ICU Shift Summary  Date: 10/18/2022      Prehospitalization: Home  Admit Date / LOS : 10/9/2022/ 7 days    Diagnosis: CVA (cerebral vascular accident)    Consults:        Active: Neuro, OT, Palliative, PT, and ST       Needed: N/A     Code Status: Full Code   Advanced Directive: <no information>    LDA:  Lines/Drains/Airways       Peripheral Intravenous Line  Duration                  Peripheral IV - Single Lumen 10/13/22 1500 20 G Left;Posterior Hand 4 days         Peripheral IV - Single Lumen 10/17/22 0110 20 G Anterior;Right Forearm 1 day                  Central Lines/Site/Justification:Patient Does Not Have Central Line  Urinary Cath/Order/Justification:Patient Does Not Have Urinary Catheter    Vasopressors/Infusions:        GOALS: Volume/ Hemodynamic: SBP <                     RASS: -1  awakes to voice (eye opening/contact) > 10 seconds    Pain Management: none       Pain Controlled: not applicable     Rhythm: NSR    Respiratory Device: Room Air                  Most Recent SBT/ SAT: N/A       MOVE Screen: FAIL  ICU Liberation: yes    VTE Prophylaxis: Mechanical  Mobility: Bedrest  Stress Ulcer Prophylaxis: No    Isolation: No active isolations    Dietary:   Current Diet Order   Procedures    Diet renal    Diet Dysphagia Pureed (IDDSI Level 4) [facility]; Renal     Order Specific Question:   Indicate patient location for additional diet options:     Answer:   [facility]     Order Specific Question:   Additional Diet Options:     Answer:   Renal      Tolerance: yes  Advancement: yes    I & O (24h):    Intake/Output Summary (Last 24 hours) at 10/18/2022 0537  Last data filed at 10/17/2022 2137  Gross per 24 hour   Intake 400 ml   Output --   Net 400 ml        Restraints: No    Significant Dates:  Post Op Date: N/A  Rescue Date: N/A  Imaging/ Diagnostics: N/A    Noteworthy Labs:  none    COVID Test: (--)  CBC/Anemia Labs: Coags:    Recent Labs   Lab 10/16/22  0332   WBC  7.68   HGB 14.4   HCT 44.8      MCV 80*   RDW 19.1*    No results for input(s): PT, INR, APTT in the last 168 hours.     Chemistries:   Recent Labs   Lab 10/13/22  1400 10/14/22  0427 10/17/22  0301    138 139   K 4.6 4.2 4.2    112* 111*   CO2 20* 18* 17*   BUN 26* 20 18   CREATININE 2.0* 1.6* 1.6*   CALCIUM 9.2 8.7 8.6*   PROT 7.0 6.5  --    BILITOT 0.4 0.5  --    ALKPHOS 72 68  --    ALT 16 17  --    AST 24 25  --    MG 2.1 2.1  --         Cardiac Enzymes: Ejection Fractions:    No results for input(s): CPK, CPKMB, MB, TROPONINI in the last 72 hours. EF   Date Value Ref Range Status   10/10/2022 60 % Final        POCT Glucose: HbA1c:    Recent Labs   Lab 10/15/22  1719 10/16/22  0640 10/17/22  2322   POCTGLUCOSE 87 74 85    Hemoglobin A1C   Date Value Ref Range Status   10/10/2022 5.6 4.0 - 5.6 % Final     Comment:     ADA Screening Guidelines:  5.7-6.4%  Consistent with prediabetes  >or=6.5%  Consistent with diabetes    High levels of fetal hemoglobin interfere with the HbA1C  assay. Heterozygous hemoglobin variants (HbS, HgC, etc)do  not significantly interfere with this assay.   However, presence of multiple variants may affect accuracy.             ICU LOS 4d 14h  Level of Care: OK to Transfer    Chart Check: 12 HR Done  Shift Summary/Plan for the shift: none

## 2022-10-18 NOTE — ASSESSMENT & PLAN NOTE
On admit, held home amlodipine and lisinopril for permissive HTN  BP is now elevated- resumed amlodipine and lisinopril  Added coreg  BP is much better controlled now

## 2022-10-18 NOTE — PROVIDER TRANSFER
Mr Leonides Burns is a 77 y.o. man with R weakness due to acute stroke. After stroke he had seizure activity. He is now improved and stable for discharge to inpatient rehab. Plan of care is done. Awaiting insurance authorization.     To do  - discharge to inpatient rehab. Plan of care done.     Celia Cabral MD  10/18/2022  11:09 AM

## 2022-10-18 NOTE — PROGRESS NOTES
[facility] - Intensive Care  Neurology  Progress Note    Patient Name: Leonides Burns  MRN: [id number]  Admission Date: 10/9/2022  Hospital Length of Stay: 7 days  Code Status: Full Code   Attending Provider: Celia Cabral MD  Primary Care Physician: Suzette Akhtar MD   Principal Problem:CVA (cerebral vascular accident)    Subjective:     Interval History: 78 y/o male with CKD, HTN, tobacco abuse presents to the hospital with a chief complaint of right leg weakness.  He reports 2 days of right lower extremity weakness and discoordination and double vision.  States the symptoms have been constant without aggravating factors.  He sometimes feels that improved with lying flat.  He reports he previously had stop aspirin due to severe and recurrent epistaxis.  He denies fevers chest pain shortness breast nausea vomiting abdominal pain leg swelling syncope numbness weakness of an arm incontinence weakness of his left leg.     -10/17/22: No new issues. Pt was transferred to ICU a few days back after a possible seizure.    -10/18/22: No seizures reported.    Current Neurological Medications:     Current Facility-Administered Medications   Medication Dose Route Frequency Provider Last Rate Last Admin    acetaminophen tablet 500 mg  500 mg Oral Q6H PRN Louie Farley PA-C        albuterol sulfate nebulizer solution 2.5 mg  2.5 mg Nebulization Q4H PRN Nain Oneill MD        amLODIPine tablet 10 mg  10 mg Oral Daily Gerard Solano MD   10 mg at 10/18/22 0812    aspirin EC tablet 81 mg  81 mg Oral Daily Srinivasa Andujar III, MD   81 mg at 10/18/22 0813    atorvastatin tablet 40 mg  40 mg Oral QHS Srinivasa Andujar III, MD   40 mg at 10/17/22 2138    carvediloL tablet 25 mg  25 mg Oral BID Celia Cabral MD   25 mg at 10/18/22 0922    clopidogreL tablet 75 mg  75 mg Oral Daily Juventino Turner MD   75 mg at 10/18/22 0813    dextrose 10% bolus 125 mL  12.5 g Intravenous PRN Gerard Solano,  MD        dextrose 10% bolus 250 mL  25 g Intravenous PRN Gerard Solano MD        enoxaparin injection 40 mg  40 mg Subcutaneous Daily Celia Cabral MD        glucagon (human recombinant) injection 1 mg  1 mg Intramuscular PRN Gerard Solano MD        hydrALAZINE tablet 25 mg  25 mg Oral Q8H PRN Celia Cabral MD        insulin aspart U-100 pen 0-5 Units  0-5 Units Subcutaneous QID (AC + HS) PRN Celia Cabral MD        levETIRAcetam tablet 1,000 mg  1,000 mg Oral BID Celia Cabral MD        [START ON 10/19/2022] lisinopriL tablet 40 mg  40 mg Oral Daily Celia Cabral MD        melatonin tablet 6 mg  6 mg Oral Nightly PRN Louie Farley PA-C        mupirocin 2 % ointment   Nasal BID Celia Cabral MD   Given at 10/18/22 0922    senna-docusate 8.6-50 mg per tablet 1 tablet  1 tablet Oral Daily PRN Louie Farley PA-C        sodium chloride 0.9% flush 10 mL  10 mL Intravenous PRN Louie Farley PA-C   10 mL at 10/16/22 0639       Review of Systems  Constitutional:  Negative for fever.   HENT:  Negative for trouble swallowing.    Eyes:  Negative for photophobia.   Respiratory:  Negative for shortness of breath.    Cardiovascular:  Negative for chest pain.   Gastrointestinal:  Negative for abdominal pain.   Genitourinary:  Negative for flank pain.   Musculoskeletal:  Negative for back pain.   Neurological:  Negative for speech difficulty.   Psychiatric/Behavioral:  Negative for confusion.      Objective:     Vital Signs (Most Recent):  Temp: 98.5 °F (36.9 °C) (10/18/22 1200)  Pulse: 65 (10/18/22 1500)  Resp: (!) 36 (10/18/22 1500)  BP: (!) 160/86 (10/18/22 1500)  SpO2: (!) 94 % (10/18/22 1500)   Vital Signs (24h Range):  Temp:  [97.8 °F (36.6 °C)-98.6 °F (37 °C)] 98.5 °F (36.9 °C)  Pulse:  [65-99] 65  Resp:  [20-38] 36  SpO2:  [93 %-100 %] 94 %  BP: (108-209)/() 160/86     Weight: 96 kg (211 lb 10.3 oz)  Body mass index is 32.18 kg/m².    Physical Exam  Constitutional:        General: He is not in acute distress.  HENT:      Head: Normocephalic.   Eyes:      General:         Right eye: No discharge.         Left eye: No discharge.   Cardiovascular:      Rate and Rhythm: Normal rate.   Pulmonary:      Effort: No respiratory distress.   Abdominal:      Palpations: Abdomen is soft.   Musculoskeletal:         General: No swelling.      Cervical back: Neck supple.   Skin:     Findings: No rash.   Neurological:      Mental Status: He is oriented to person, place, and time.   Psychiatric:         Speech: Speech normal.         NEUROLOGICAL EXAMINATION:      MENTAL STATUS   Oriented to person, place, and time.   Speech: speech is normal   Level of consciousness: alert     CRANIAL NERVES      CN III, IV, VI   Right pupil: Size: 2 mm. Shape: regular.   Left pupil: Size: 2 mm. Shape: regular.   Nystagmus: none   Conjugate gaze: present     CN V   Right facial sensation deficit: none  Left facial sensation deficit: none     CN VII   Right facial weakness: central  Left facial weakness: none     CN XII   Tongue deviation: none     MOTOR EXAM      Strength   Strength 5/5 except as noted.        RLE drift before 5 seconds.      SENSORY EXAM   Right arm light touch: normal  Left arm light touch: normal  Right leg light touch: normal          Significant Labs: CBC: No results for input(s): WBC, HGB, HCT, PLT in the last 48 hours.  CMP: No results for input(s): GLU, NA, K, CL, CO2, BUN, CREATININE, CALCIUM, MG, PROT, ALBUMIN, BILITOT, ALKPHOS, AST, ALT, ANIONGAP, EGFRNONAA in the last 48 hours.    Significant Imaging: I have reviewed all pertinent imaging results/findings within the past 24 hours.    Assessment and Plan:     76 y/o male with acute stroke     Stroke: small stroke on paramedian left frontal lobe. PAM territory. US of carotids with no significant stenosis. No LAE on echo.  ASA 81 mg daily and clopidogrel 75 mg daily x 21 days is recommended. Noted Hx of epistaxis.  Statin.  PT/OT. Inpatient  rehab.  OK for BP control.  Seizure: no other events  Levetiracetam 100 mg BID.    Active Diagnoses:    Diagnosis Date Noted POA    PRINCIPAL PROBLEM:  CVA (cerebral vascular accident) [I63.9] 10/10/2022 Yes    Seizure [R56.9] 10/14/2022 No    Obesity (BMI 30.0-34.9) [E66.9] 07/09/2018 Yes    Tobacco dependence [F17.200] 02/17/2016 Yes     Chronic    Stage 3a chronic kidney disease [N18.31] 06/02/2015 Yes    Hypertension [I10] 02/02/2015 Yes     Chronic    Hyperlipidemia [E78.5] 02/02/2015 Yes     Chronic      Problems Resolved During this Admission:    Diagnosis Date Noted Date Resolved POA    Right leg weakness [R29.898] 10/09/2022 10/11/2022 Yes       VTE Risk Mitigation (From admission, onward)           Ordered     enoxaparin injection 40 mg  Daily         10/18/22 1107     IP VTE HIGH RISK PATIENT  Once         10/09/22 2026     Place sequential compression device  Until discontinued         10/09/22 2026                    Ahmet Lara MD  Neurology  [facility] - Intensive Care

## 2022-10-19 PROCEDURE — 97110 THERAPEUTIC EXERCISES: CPT

## 2022-10-19 PROCEDURE — 21400001 HC TELEMETRY ROOM

## 2022-10-19 PROCEDURE — 25000003 PHARM REV CODE 250: Performed by: HOSPITALIST

## 2022-10-19 PROCEDURE — 99497 PR ADVNCD CARE PLAN 30 MIN: ICD-10-PCS | Mod: ,,, | Performed by: REGISTERED NURSE

## 2022-10-19 PROCEDURE — 25000003 PHARM REV CODE 250: Performed by: STUDENT IN AN ORGANIZED HEALTH CARE EDUCATION/TRAINING PROGRAM

## 2022-10-19 PROCEDURE — 97535 SELF CARE MNGMENT TRAINING: CPT

## 2022-10-19 PROCEDURE — 92507 TX SP LANG VOICE COMM INDIV: CPT

## 2022-10-19 PROCEDURE — 63600175 PHARM REV CODE 636 W HCPCS: Performed by: HOSPITALIST

## 2022-10-19 PROCEDURE — 99497 ADVNCD CARE PLAN 30 MIN: CPT | Mod: ,,, | Performed by: REGISTERED NURSE

## 2022-10-19 RX ADMIN — ASPIRIN 81 MG: 81 TABLET, COATED ORAL at 09:10

## 2022-10-19 RX ADMIN — LISINOPRIL 40 MG: 20 TABLET ORAL at 09:10

## 2022-10-19 RX ADMIN — ATORVASTATIN CALCIUM 40 MG: 40 TABLET, FILM COATED ORAL at 09:10

## 2022-10-19 RX ADMIN — LEVETIRACETAM 1000 MG: 500 TABLET, FILM COATED ORAL at 09:10

## 2022-10-19 RX ADMIN — CARVEDILOL 25 MG: 12.5 TABLET, FILM COATED ORAL at 09:10

## 2022-10-19 RX ADMIN — CLOPIDOGREL BISULFATE 75 MG: 75 TABLET ORAL at 09:10

## 2022-10-19 RX ADMIN — ENOXAPARIN SODIUM 40 MG: 100 INJECTION SUBCUTANEOUS at 04:10

## 2022-10-19 RX ADMIN — MUPIROCIN: 20 OINTMENT TOPICAL at 09:10

## 2022-10-19 RX ADMIN — AMLODIPINE BESYLATE 10 MG: 5 TABLET ORAL at 09:10

## 2022-10-19 NOTE — ACP (ADVANCE CARE PLANNING)
Advance Care Planning     Date: 10/19/2022  Spoke with pt's son, Leonides Vu, to discuss recommendation for continued palliative involvement during care at [facility] prior to discharge to help facilitate home palliative care for pt upon discharge from rehab.  Philosophy of care discussed.  Pt's son is very interested in continued palliative care involvement in father's care.  Shared that I have asked CM/SW to request palliative care consult as part of pt's transfer plan, and have discussed this is the appropriate process with [facility]'s palliative care DEANNE, Rosi Duran NP and confirmed palliative team is available to rehab pt's as well at facility.  Son  plans to also request this consult from medical team prior to discharge.         Emotional support provided, allowed time for questions/concerns, all addressed.    Zander Blackmon Expressed gratitude to MDT at facility for care of his father.  Thanked him for the opportunity to care for pt and his family as well.           18 min ACP time spent: goals of care, emotional support, formulating and communicating prognosis, exploring burden/ benefit of various approaches of treatment.

## 2022-10-19 NOTE — PROGRESS NOTES
[facility] - Telemetry  Adult Nutrition   Progress Note (Initial Assessment)     SUMMARY     Recommendations/Interventions:    1) Continue IDDSI Lvl 5 Renal, Cardiac Diet;   -Consider liberalizing to IDDSI Lvl 5 Regular Diet to encourage PO intake  2) Monitor pt I/O   -BM and % intake of meals  3) Monitor nutrition related labs    Goals:   1) Pt to meet >75% EEN and EPN via PO intake.    Nutrition Goal Status: new  Communication of RD Recs:  (POC)    Malnutrition Assessment:  Nutrition Problem  Inadequate Oral Intake    Related to (etiology):   CVA, Decreased appetite, Dysphagia    Signs and Symptoms (as evidenced by):   >75% PO intake    Interventions/Recommendations (treatment strategy):  Texture modified diet  Collaboration of care with other providers    Nutrition Diagnosis Status:   New      Reason for Assessment    Reason For Assessment: length of stay  Diagnosis:  (Cerebral Vascular Accident)  Relevant Medical History:   Patient Active Problem List   Diagnosis    Hypertension    Hyperlipidemia    Glaucoma    Alcohol abuse    Elevated PSA    Arthralgia    Renal insufficiency    Erectile dysfunction    Stage 3a chronic kidney disease    Proteinuria    Uncomplicated alcohol dependence    Syncope    Tobacco dependence    Chronic gout without tophus    Allergic rhinitis    Abnormal ECG    Eczema    Memory impairment    Obesity (BMI 30.0-34.9)    Colon cancer screening    Epistaxis    CVA (cerebral vascular accident)    Seizure     General Information Comments:   10/18: Pt admitted for CVA with AMS. SLP prescribed IDDSI Lvl 5 diet. Observed pt during lunch time, pt did not finish meal. Pt planning d/c to rehab facility soon. No BM in 4 days. PO % intake <75%. Liberalizing diet may encourage PO intake, will continue to monitor.    Nutrition Risk Screen    Nutrition Risk Screen: dysphagia or difficulty swallowing    Nutrition/Diet History    Food Allergies: NKFA  Factors Affecting Nutritional Intake: impaired  "cognitive status/motor control, chewing difficulties/inability to chew food, decreased appetite    Anthropometrics    Temp: 97.8 °F (36.6 °C)  Height Method: Stated  Height: 5' 8" (172.7 cm)  Height (inches): 68 in  Weight Method: Bed Scale  Weight: 96 kg (211 lb 10.3 oz)  Weight (lb): 211.64 lb  Ideal Body Weight (IBW), Male: 154 lb  % Ideal Body Weight, Male (lb): 137.14 %  BMI (Calculated): 32.2       Weight History:  Wt Readings from Last 10 Encounters:   10/16/22 96 kg (211 lb 10.3 oz)   10/09/22 92.5 kg (204 lb)   10/09/22 92.5 kg (203 lb 14.8 oz)   02/28/19 83.5 kg (184 lb 1.4 oz)   02/28/19 81.6 kg (180 lb)   02/22/19 86.2 kg (190 lb)   07/09/18 87.1 kg (192 lb 0.3 oz)   07/09/18 87.1 kg (192 lb 0.3 oz)   01/08/18 80.1 kg (176 lb 9.4 oz)   09/07/17 84.8 kg (187 lb)     Lab/Procedures/Meds: Pertinent Labs Reviewed  CBC:  No results for input(s): WBC, HGB, HCT, PLT in the last 24 hours.  CMP:  No results for input(s): GLUCOSE, CALCIUM, ALBUMIN, PROT, NA, K, CO2, CL, BUN, CREATININE, ALKPHOS, ALT, AST, BILITOT in the last 24 hours.      Medications:Pertinent Medications Reviewed  Scheduled Meds:   amLODIPine  10 mg Oral Daily    aspirin  81 mg Oral Daily    atorvastatin  40 mg Oral QHS    carvediloL  25 mg Oral BID    clopidogreL  75 mg Oral Daily    enoxaparin  40 mg Subcutaneous Daily    levETIRAcetam  1,000 mg Oral BID    lisinopriL  40 mg Oral Daily    mupirocin   Nasal BID     Continuous Infusions:  PRN Meds:.acetaminophen, albuterol sulfate, dextrose 10%, dextrose 10%, glucagon (human recombinant), hydrALAZINE, insulin aspart U-100, melatonin, senna-docusate 8.6-50 mg, sodium chloride 0.9%    Estimated/Assessed Needs    Weight Used For Calorie Calculations: 96 kg (211 lb 10.3 oz)  Energy Calorie Requirements (kcal): 8797-4335  Energy Need Method: Kcal/kg  Protein Requirements: 87g  Weight Used For Protein Calculations: 96 kg (211 lb 10.3 oz)  Fluid Requirements (mL): 1mL/senia  Estimated Fluid Requirement " Method:  (Or per MD)  CHO Requirements: 240g    Nutrition Prescription Ordered    Current Diet Order: IDDSI 5; Cardiac, Renal; Thin  Nutrition Order Comments: 2000 kcal    Evaluation of Received Nutrient/Fluid Intake    I/O: +0.6L  Energy Calories Required: not meeting needs  Protein Required: not meeting needs  Fluid Required: meeting needs  Comments: LBM 10/15  % Intake of Estimated Energy Needs: 50 - 75 %  % Meal Intake: 50 - 75 %  Intake/Output - Last 3 Shifts         10/17 0700  10/18 0659 10/18 0700  10/19 0659 10/19 0700  10/20 0659    P.O. 200 300 140    IV Piggyback 200 200     Total Intake(mL/kg) 400 (4.2) 500 (5.2) 140 (1.5)    Urine (mL/kg/hr)       Other       Total Output       Net +400 +500 +140           Urine Occurrence 3 x 4 x 1 x    Stool Occurrence 0 x  0 x          Nutrition Risk    Level of Risk/Frequency of Follow-up: moderate (1-2x per week)     Monitor and Evaluation    Food and Nutrient Intake: food and beverage intake, energy intake  Food and Nutrient Adminstration: diet order  Physical Activity and Function: factors affecting access to physical activity, nutrition-related ADLs and IADLs  Anthropometric Measurements: weight change  Biochemical Data, Medical Tests and Procedures: electrolyte and renal panel, lipid profile  Nutrition-Focused Physical Findings: overall appearance     Nutrition Follow-Up    RD Follow-up?: Yes  GAGE Roger, Dietetic Intern

## 2022-10-19 NOTE — PLAN OF CARE
Recommendations/Interventions:    1) Continue IDDSI Lvl 5 Renal, Cardiac Diet;   -Consider liberalizing to IDDSI Lvl 5 Regular Diet to encourage PO intake  2) Monitor pt I/O   -BM and % intake of meals  3) Monitor nutrition related labs    Goals:   1) Pt to meet >75% EEN and EPN via PO intake.    Nutrition Goal Status: new  Communication of RD Recs:  (POC)

## 2022-10-19 NOTE — PLAN OF CARE
LACY spoke with Mini ([email or web address]) re: IPR. Mini stated patient's IPR referral is being reviewed today by MD, and she will contact SW back with decision.     LACY received call from Mini ([email or web address]) informing SW that patient medically accepted, and she is submitting for insurance authorization from [email or web address]. Mini stated she will contact LACY once receiving insurance authorization and that patient will need a rapid covid test before coming to hospital.    LACY spoke with Mini ([email or web address]) for follow up with insurance authorization. Mini stated she still has not receive insurance authorization.

## 2022-10-19 NOTE — ASSESSMENT & PLAN NOTE
2 seizures occurred on 10/13 prompting step up to ICU  - Neurology following  - on keppra 1g BID  - no further seizure activity noted

## 2022-10-19 NOTE — PROGRESS NOTES
[facility] Medicine  Progress Note    Patient Name: Leonides Burns  MRN: [id number]  Patient Class: IP- Inpatient   Admission Date: 10/9/2022  Length of Stay: 8 days  Attending Physician: Juventino Turner MD  Primary Care Provider: Suzette Akhtar MD        Subjective:     Principal Problem:CVA (cerebral vascular accident)        HPI:  Leonides Burns 77 y.o. male with CKD, HTN, tobacco abuse presents to the hospital with a chief complaint of right leg weakness.  He reports 2 days of right lower extremity weakness and discoordination and double vision.  States the symptoms have been constant without aggravating factors.  He sometimes feels that improved with lying flat.  He reports he previously had stop aspirin due to severe and recurrent epistaxis.  He denies fevers chest pain shortness breast nausea vomiting abdominal pain leg swelling syncope numbness weakness of an arm incontinence weakness of his left leg.    In the ED, creatinine 2.1 CT head without acute abnormality, chest x-ray without focal consolidation afebrile without leukocytosis COVID negative LDL 80.6 TSH within normal limits.      Overview/Hospital Course:  Mr Leonides Burns is a 77 y.o. man admitted with R leg weakness secondary to acute punctate left parasagittal frontal lobe. PT/OT/SLP consulted. Echo and carotids unremarkable. Neurology consulted.  Patient continued on ASA, added plavix.  His Statin increased to 40 mg daily. Resumed BP medications. Plan was inpatient rehab, but he then had seizure activity on 10/13, prompting step up to ICU. Started keppra. He had difficulty with aspiration events. Speech therapy following; now safe for mechanical soft diet and thin liquids. He has been working with PT, OT. He is now medically stable for discharge to inpatient rehab when arranged.       Interval History: no new complaints.    Review of Systems   HENT:  Negative for ear discharge and ear pain.    Eyes:  Negative  for discharge and itching.   Endocrine: Negative for cold intolerance and heat intolerance.   Neurological:  Negative for seizures and syncope.   Objective:     Vital Signs (Most Recent):  Temp: 97.8 °F (36.6 °C) (10/19/22 1119)  Pulse: 62 (10/19/22 1119)  Resp: 18 (10/19/22 1119)  BP: 129/71 (10/19/22 1119)  SpO2: (!) 94 % (10/19/22 1119)   Vital Signs (24h Range):  Temp:  [97.7 °F (36.5 °C)-98.5 °F (36.9 °C)] 97.8 °F (36.6 °C)  Pulse:  [62-73] 62  Resp:  [16-36] 18  SpO2:  [93 %-96 %] 94 %  BP: (129-171)/(65-86) 129/71     Weight: 96 kg (211 lb 10.3 oz)  Body mass index is 32.18 kg/m².    Intake/Output Summary (Last 24 hours) at 10/19/2022 1147  Last data filed at 10/19/2022 0848  Gross per 24 hour   Intake 490 ml   Output --   Net 490 ml      Physical Exam  Vitals and nursing note reviewed.   Constitutional:       General: He is not in acute distress.     Appearance: He is obese. He is ill-appearing. He is not toxic-appearing.   HENT:      Head: Normocephalic and atraumatic.      Nose: Nose normal.      Mouth/Throat:      Mouth: Mucous membranes are moist.   Eyes:      General: No scleral icterus.     Extraocular Movements: Extraocular movements intact.      Conjunctiva/sclera: Conjunctivae normal.      Pupils: Pupils are equal, round, and reactive to light.   Cardiovascular:      Rate and Rhythm: Normal rate and regular rhythm.      Pulses: Normal pulses.      Heart sounds: Normal heart sounds. No murmur heard.    No gallop.   Pulmonary:      Effort: Pulmonary effort is normal. No respiratory distress.      Breath sounds: Normal breath sounds. No wheezing, rhonchi or rales.      Comments: Room air  Abdominal:      General: Bowel sounds are normal. There is no distension.      Palpations: Abdomen is soft.      Tenderness: There is no abdominal tenderness. There is no guarding.   Musculoskeletal:      Right lower leg: No edema.      Left lower leg: No edema.   Skin:     General: Skin is warm and dry.    Neurological:      Mental Status: He is alert and oriented to person, place, and time.      Cranial Nerves: Cranial nerve deficit (R facial droop) present.      Sensory: No sensory deficit.      Motor: Weakness (RUE and RLE weakness) present.       Significant Labs: All pertinent labs within the past 24 hours have been reviewed.  BMP: No results for input(s): GLU, NA, K, CL, CO2, BUN, CREATININE, CALCIUM, MG in the last 48 hours.  CBC: No results for input(s): WBC, HGB, HCT, PLT in the last 48 hours.    Significant Imaging: I have reviewed all pertinent imaging results/findings within the past 24 hours.      Assessment/Plan:      * CVA (cerebral vascular accident)  Patient presented with right leg weakness and noted to have acute stroke  - asa 81 daily, statin increased to 40 mg daily. Started on Plavix for 21 days.  - PT, OT, Speech evaluated  - plan inpatient rehab at discharge-- he is medically stable for discharge to inpatient rehab when arranged       Seizure  2 seizures occurred on 10/13 prompting step up to ICU  - Neurology following  - on keppra 1g BID  - no further seizure activity noted     Obesity (BMI 30.0-34.9)  Body mass index is 32.18 kg/m².  Will recommend diet and lifestyle modifications outpatient    Tobacco dependence  Smokes rare cigarettes per day. Greater than 3 minutes spent counseling patient on dangers of continued tobacco abuse.    Stage 3a chronic kidney disease  Cr on admission of 2.1. Baseline ranges 1.7 to 2.1  Renal dose medications avoid nephrotoxic drugs monitor intake and output  -improved and at baseline      Hyperlipidemia  Continue statin      Hypertension  On admit, held home amlodipine and lisinopril for permissive HTN  BP is now elevated- resumed amlodipine and lisinopril  Added coreg  BP is much better controlled now        VTE Risk Mitigation (From admission, onward)         Ordered     enoxaparin injection 40 mg  Daily         10/18/22 1107     IP VTE HIGH RISK PATIENT   Once         10/09/22 2026     Place sequential compression device  Until discontinued         10/09/22 2026                Discharge Planning   DEVIKA: 10/11/2022     Code Status: Full Code   Is the patient medically ready for discharge?:     Reason for patient still in hospital (select all that apply): Pending disposition  Discharge Plan A: Rehab   Discharge Delays: (!) Post-Acute Set-up              Juventino Turner MD  Department of [facility] Medicine   [facility]

## 2022-10-19 NOTE — PLAN OF CARE
10/19/22 1312   Medicare Message   Important Message from Medicare regarding Discharge Appeal Rights Given to patient/caregiver;Explained to patient/caregiver   Date IMM was signed 10/19/22   Time IMM was signed 1225     Patient was not able to sign IMM. SW provided copy of IMM to patient at bedside with patient.

## 2022-10-19 NOTE — PT/OT/SLP PROGRESS
Physical Therapy      Patient Name:  Leonides Burns   MRN:  [id number]    Patient not seen in AM secondary to  (11:57, OT leaving pt's room.  OT states that pt is wheezing and Nurse Young assessing pt for possible breathing treatment.). Will follow-up as time allows.

## 2022-10-19 NOTE — NURSING
Pt transferred to room 309 via transport and nurse.  Pt is alert, some aphasia noted, but did answer a few yes and no questions clearly.  Denies any pain or discomforts.  By knodding his head.  HOB elevated.  Siderails up x 2.  Call light within reach.

## 2022-10-19 NOTE — PT/OT/SLP PROGRESS
"Occupational Therapy   Treatment    Name: Leonides Burns  MRN: [id number]  Admitting Diagnosis:  CVA (cerebral vascular accident)       Recommendations:     Discharge Recommendations: rehabilitation facility  Discharge Equipment Recommendations:   (TBD)  Barriers to discharge:   (pt was MOD I and living alone PTA. now, acute CVA with impairments with all ADLs and all aspects of functional mobility. pt at high risk of falls, unplanned readmission, and morbidity if d/c home at this time)    Assessment:     Leonides Burns is a 77 y.o. male with a medical diagnosis of CVA (cerebral vascular accident). Performance deficits affecting function are weakness, impaired endurance, gait instability, decreased upper extremity function, decreased ROM, impaired balance, decreased lower extremity function, impaired coordination, decreased safety awareness, impaired fine motor, impaired self care skills, impaired cognition, impaired functional mobility.     Rehab Prognosis:  Good; patient would benefit from acute skilled OT services to address these deficits and reach maximum level of function.       Plan:     Patient to be seen  (5-6x/week) to address the above listed problems via self-care/home management, therapeutic activities, therapeutic exercises, neuromuscular re-education  Plan of Care Expires: 10/24/22  Plan of Care Reviewed with: patient    Subjective     Chief complaint: pain with RLE stretch/donning sock   Patient/family comments/ goals: "will you be at [employer]?"; difficulty answering questions by nutritionist      Pain/Comfort:  Pain Rating 1: 0/10    Objective:     Communicated with: nurseYoung, prior to session.  Patient found HOB elevated with bed alarm, peripheral IV, telemetry upon OT entry to room. CNA just finished assisting pt with a bed bath.     General Precautions: Standard, fall, seizure   Orthopedic Precautions:N/A   Braces: N/A  Respiratory Status: Room air    At rest HOB elevated, pt appeared with more " wheezing noted today. Spo2 96% on room air. OT called nurseYoung, to bedside who examined pt and checking to see if pt can have a breathing tx.      Occupational Performance:     Bed Mobility:    Patient completed Scooting with maximal assistance and 2 persons with use of pt LUE/LLE    Activities of Daily Living:  Feeding:  set-up with tray and use of L hand with bed in chair position to eat lunch and drink from cup; OT muted tv and closed the door to reduce distractions and requested supervision by CNA for safety. Total A by OT to open small packets of seasoning and butter    Lower Body Dressing: total assistance to don B/L socks       [facility] 6 Click ADL: 13    Treatment & Education:  Pt re-educated on OT role/POC  Self-care tasks/functional mobility completed- assistance level noted above   Edu nutritionist on simple questions with increased time to answer 2* expressive and receptive aphasia   With bed in chair position, RUE gentle static stretch shoulder flexion while pt completed x10 digits flex/ext full ROM with min prompting for increased range. Pt showed improved visual attention to RUE with movement; static stretch to RUE shoulder horizontal abduction       Patient left  bed in chair position eating lunch with CNA at bedside  and B/L pressure relief boots on with all lines intact, call button in reach, bed alarm on, nurse, Young, notified, and all needs met/within reach.     GOALS:   Multidisciplinary Problems       Occupational Therapy Goals          Problem: Occupational Therapy    Goal Priority Disciplines Outcome Interventions   Occupational Therapy Goal     OT, PT/OT Ongoing, Progressing    Description: Goals to be met by: 10/24/22    Patient will increase functional independence with ADLs by performing:    UE Dressing with Modified [place].  LE Dressing with Modified [place].  Grooming while standing at sink with Modified [place].  Toileting from toilet with Modified [place] for  hygiene and clothing management.   Supine to sit with Modified [place].  Step transfer with Modified [place]  Toilet transfer to toilet with Modified [place].  Upper extremity exercise program x15 reps per handout, with independence.                         Time Tracking:     OT Date of Treatment: 10/19/22  OT Start Time: 1134  OT Stop Time: 1157  OT Total Time (min): 23 min    Billable Minutes:Self Care/Home Management 15 min  Therapeutic Exercise 8 min  Total Time 23 min     OT/CHANDNI: OT     CHANDNI Visit Number: 0    10/19/2022

## 2022-10-19 NOTE — SUBJECTIVE & OBJECTIVE
Interval History: no new complaints.    Review of Systems   HENT:  Negative for ear discharge and ear pain.    Eyes:  Negative for discharge and itching.   Endocrine: Negative for cold intolerance and heat intolerance.   Neurological:  Negative for seizures and syncope.   Objective:     Vital Signs (Most Recent):  Temp: 97.8 °F (36.6 °C) (10/19/22 1119)  Pulse: 62 (10/19/22 1119)  Resp: 18 (10/19/22 1119)  BP: 129/71 (10/19/22 1119)  SpO2: (!) 94 % (10/19/22 1119)   Vital Signs (24h Range):  Temp:  [97.7 °F (36.5 °C)-98.5 °F (36.9 °C)] 97.8 °F (36.6 °C)  Pulse:  [62-73] 62  Resp:  [16-36] 18  SpO2:  [93 %-96 %] 94 %  BP: (129-171)/(65-86) 129/71     Weight: 96 kg (211 lb 10.3 oz)  Body mass index is 32.18 kg/m².    Intake/Output Summary (Last 24 hours) at 10/19/2022 1147  Last data filed at 10/19/2022 0848  Gross per 24 hour   Intake 490 ml   Output --   Net 490 ml      Physical Exam  Vitals and nursing note reviewed.   Constitutional:       General: He is not in acute distress.     Appearance: He is obese. He is ill-appearing. He is not toxic-appearing.   HENT:      Head: Normocephalic and atraumatic.      Nose: Nose normal.      Mouth/Throat:      Mouth: Mucous membranes are moist.   Eyes:      General: No scleral icterus.     Extraocular Movements: Extraocular movements intact.      Conjunctiva/sclera: Conjunctivae normal.      Pupils: Pupils are equal, round, and reactive to light.   Cardiovascular:      Rate and Rhythm: Normal rate and regular rhythm.      Pulses: Normal pulses.      Heart sounds: Normal heart sounds. No murmur heard.    No gallop.   Pulmonary:      Effort: Pulmonary effort is normal. No respiratory distress.      Breath sounds: Normal breath sounds. No wheezing, rhonchi or rales.      Comments: Room air  Abdominal:      General: Bowel sounds are normal. There is no distension.      Palpations: Abdomen is soft.      Tenderness: There is no abdominal tenderness. There is no guarding.    Musculoskeletal:      Right lower leg: No edema.      Left lower leg: No edema.   Skin:     General: Skin is warm and dry.   Neurological:      Mental Status: He is alert and oriented to person, place, and time.      Cranial Nerves: Cranial nerve deficit (R facial droop) present.      Sensory: No sensory deficit.      Motor: Weakness (RUE and RLE weakness) present.       Significant Labs: All pertinent labs within the past 24 hours have been reviewed.  BMP: No results for input(s): GLU, NA, K, CL, CO2, BUN, CREATININE, CALCIUM, MG in the last 48 hours.  CBC: No results for input(s): WBC, HGB, HCT, PLT in the last 48 hours.    Significant Imaging: I have reviewed all pertinent imaging results/findings within the past 24 hours.

## 2022-10-19 NOTE — PLAN OF CARE
Problem: Occupational Therapy  Goal: Occupational Therapy Goal  Description: Goals to be met by: 10/24/22    Patient will increase functional independence with ADLs by performing:    UE Dressing with Modified [place].  LE Dressing with Modified [place].  Grooming while standing at sink with Modified [place].  Toileting from toilet with Modified [place] for hygiene and clothing management.   Supine to sit with Modified [place].  Step transfer with Modified [place]  Toilet transfer to toilet with Modified [place].  Upper extremity exercise program x15 reps per handout, with independence.    Outcome: Ongoing, Progressing

## 2022-10-19 NOTE — NURSING
Report given to Nurse Katharine pt to be transported by bed to room 309. V/S stable . No Seizures, Ra sats 94-95 %.

## 2022-10-20 LAB — SARS-COV-2 RDRP RESP QL NAA+PROBE: NEGATIVE

## 2022-10-20 PROCEDURE — 25000003 PHARM REV CODE 250: Performed by: HOSPITALIST

## 2022-10-20 PROCEDURE — 97530 THERAPEUTIC ACTIVITIES: CPT

## 2022-10-20 PROCEDURE — 97110 THERAPEUTIC EXERCISES: CPT

## 2022-10-20 PROCEDURE — 63600175 PHARM REV CODE 636 W HCPCS: Performed by: HOSPITALIST

## 2022-10-20 PROCEDURE — 25000003 PHARM REV CODE 250: Performed by: STUDENT IN AN ORGANIZED HEALTH CARE EDUCATION/TRAINING PROGRAM

## 2022-10-20 PROCEDURE — 25000242 PHARM REV CODE 250 ALT 637 W/ HCPCS: Performed by: INTERNAL MEDICINE

## 2022-10-20 PROCEDURE — 97535 SELF CARE MNGMENT TRAINING: CPT

## 2022-10-20 PROCEDURE — 97112 NEUROMUSCULAR REEDUCATION: CPT

## 2022-10-20 PROCEDURE — 94760 N-INVAS EAR/PLS OXIMETRY 1: CPT

## 2022-10-20 PROCEDURE — 21400001 HC TELEMETRY ROOM

## 2022-10-20 PROCEDURE — 99497 ADVNCD CARE PLAN 30 MIN: CPT | Mod: ,,, | Performed by: REGISTERED NURSE

## 2022-10-20 PROCEDURE — 92507 TX SP LANG VOICE COMM INDIV: CPT

## 2022-10-20 PROCEDURE — 99497 PR ADVNCD CARE PLAN 30 MIN: ICD-10-PCS | Mod: ,,, | Performed by: REGISTERED NURSE

## 2022-10-20 PROCEDURE — 94640 AIRWAY INHALATION TREATMENT: CPT

## 2022-10-20 PROCEDURE — U0002 COVID-19 LAB TEST NON-CDC: HCPCS | Performed by: HOSPITALIST

## 2022-10-20 PROCEDURE — 99900035 HC TECH TIME PER 15 MIN (STAT)

## 2022-10-20 RX ADMIN — ASPIRIN 81 MG: 81 TABLET, COATED ORAL at 09:10

## 2022-10-20 RX ADMIN — CARVEDILOL 25 MG: 12.5 TABLET, FILM COATED ORAL at 09:10

## 2022-10-20 RX ADMIN — MUPIROCIN: 20 OINTMENT TOPICAL at 09:10

## 2022-10-20 RX ADMIN — LEVETIRACETAM 1000 MG: 500 TABLET, FILM COATED ORAL at 09:10

## 2022-10-20 RX ADMIN — ALBUTEROL SULFATE 2.5 MG: 2.5 SOLUTION RESPIRATORY (INHALATION) at 08:10

## 2022-10-20 RX ADMIN — CLOPIDOGREL BISULFATE 75 MG: 75 TABLET ORAL at 09:10

## 2022-10-20 RX ADMIN — ATORVASTATIN CALCIUM 40 MG: 40 TABLET, FILM COATED ORAL at 09:10

## 2022-10-20 RX ADMIN — AMLODIPINE BESYLATE 10 MG: 5 TABLET ORAL at 09:10

## 2022-10-20 RX ADMIN — LISINOPRIL 40 MG: 20 TABLET ORAL at 09:10

## 2022-10-20 RX ADMIN — ENOXAPARIN SODIUM 40 MG: 100 INJECTION SUBCUTANEOUS at 04:10

## 2022-10-20 NOTE — PT/OT/SLP PROGRESS
Physical Therapy Treatment    Patient Name:  Leonides Burns   MRN:  [id number]    Recommendations:     Discharge Recommendations:  rehabilitation facility   Discharge Equipment Recommendations:  (Ongoing assessment pending pt progress)   Barriers to discharge:  Pt is not functioning at Independent baseline and currently requires Max A/Total A x 2 persons for all functional mobility.  Pt is unable to ambulate and has poor balance sitting at EOB.  Pt currently wheezing and with labored breathing/coughing    Assessment:     Leonides Burns is a 77 y.o. male admitted with a medical diagnosis of CVA (cerebral vascular accident).  He presents with the following impairments/functional limitations:  weakness, decreased upper extremity function, decreased ROM, impaired endurance, impaired balance, decreased lower extremity function, impaired coordination, decreased safety awareness, impaired self care skills, impaired cognition, impaired functional mobility, decreased coordination, abnormal tone .    Rehab Prognosis: Fair; patient would benefit from acute skilled PT services to address these deficits and reach maximum level of function.    Recent Surgery: * No surgery found *      Plan:     During this hospitalization, patient to be seen  (5-6x/wk) to address the identified rehab impairments via therapeutic activities, therapeutic exercises, neuromuscular re-education, wheelchair management/training, gait training and progress toward the following goals:    Plan of Care Expires:  10/26/22    Subjective     Chief Complaint: Pt relatively non-verbal, shakes his head no when asked if wants to continue therapy today   Patient/Family Comments/goals: unable to state, shakes head no  Pain/Comfort:  Pain Rating 1: 0/10      Objective:     Communicated with nsg prior to session.  Patient found  slid down in bed with L LE OOB and R LE in Hip/knee flexion and ER  with bed alarm, peripheral IV, telemetry upon PT entry to room.     General  Precautions: Standard, fall, seizure   Orthopedic Precautions:N/A   Braces: N/A  Respiratory Status: Room air     Functional Mobility:  Bed Mobility:     Rolling Right: total assistance, of 2 persons, and with Vc/TC for reaching for BR and assisting with L LE  Scooting: SBA to scoot L hip forward, Max A to scoot R hip forward  Supine to Sit: total assistance, of 2 persons, and with Vc/Tc for body mechanics  Sit to Supine: total assistance, of 2 persons, and with Vc/TC for body mechanics  Transfers:  Scoot transfer:  Max A x 2 persons with Vc/TC for forward weight shift over YOVANA sitting at EOB along EOB to HOB  Balance: Poor>Fair>poor sitting at EOB, falling backward and to leaning to right      AM-PAC 6 CLICK MOBILITY  Turning over in bed (including adjusting bedclothes, sheets and blankets)?: 2  Sitting down on and standing up from a chair with arms (e.g., wheelchair, bedside commode, etc.): 2  Moving from lying on back to sitting on the side of the bed?: 2  Moving to and from a bed to a chair (including a wheelchair)?: 2  Need to walk in hospital room?: 1  Climbing 3-5 steps with a railing?: 1  Basic Mobility Total Score: 10       Treatment & Education:  Pt received R Hip/knee/ankle ext/Dflx and IR stretch 3x30 sec.  Pt with increased flexor tone.  Bed mobility and transfer training as above.  Pt sat at EOB approx 20m with B LE support and B UE support with Max A<>Min A and Max VC/TC for forward weight shift over YOVANA.  Pt performed reaching activities across body with L UE for increased WB R UE/LE with min A and Max VC/TC for weight shifting.  Pt repositioned to HOB with pillow on R side and R UE elevated with Presure relief boots donned.  Able to reposition R LE in neutral position, but does not stay 2/2 spasticity    Patient left  as above  with all lines intact, call button in reach, bed alarm on, and nsg notified..    GOALS:   Multidisciplinary Problems       Physical Therapy Goals          Problem: Physical  "Therapy    Goal Priority Disciplines Outcome Goal Variances Interventions   Physical Therapy Goal     PT, PT/OT Ongoing, Not Progressing     Description: Goals to be met by: 10/14/22     Patient will increase functional independence with mobility by performin. Pt to be Min A with bed mobility.  2. Pt to transfer sit to stand with CGA  3. Pt to ambulate with HW and Min A x 10-15'  4. Pt to be Supervision with written HEP  5. Pt to transfer bed to chair with CGA  6. Pt to ascend/descend 4" curb with RW and Min A  7. Pt will propel W/C with L UE/LE and CGA approx 25'                       Time Tracking:     PT Received On: 10/20/22  PT Start Time: 1148     PT Stop Time: 1219  PT Total Time (min): 31 min     Billable Minutes: Therapeutic Exercise 8 and Neuromuscular Re-education 15  Co-treat with OT    Treatment Type: Treatment  PT/PTA: PT     PTA Visit Number: 0     10/20/2022  "

## 2022-10-20 NOTE — PT/OT/SLP PROGRESS
"Speech Language Pathology Treatment    Patient Name:  Leonides Burns   MRN:  [id number]  Admitting Diagnosis: CVA (cerebral vascular accident)    Recommendations:                 General Recommendations:  Dysphagia therapy and Speech/language therapy  Diet recommendations:  Mechanical soft, Liquid Diet Level: Thin liquids - IDDSI Level 0   Aspiration Precautions: Alternating bites/sips, HOB to 90 degrees, Meds crushed in puree, and Small bites/sips   General Precautions: Standard, aspiration  Communication strategies:  provide increased time to answer and provide yes/no choices during moments of anomia    Subjective     Pt was seated upright in bed with breakfast tray across tray table upon ST entrance. The pt acknowledged ST by looking in ST's direction and smiling, however, pt did not verbally greet ST. ST noted pt with wheezing and SOB, which greatly impacted pt's ability to verbalize, as well as frequent moments of coughing. ST notified the pt's nurse, Young, in regards to wheezing and coughing. Pt's nurse contacted RT for breathing treatment to be completed this AM. Pt's SpO2 with room air was 99%, despite notable increased WOB and wheezing.     Patient goals: Pt did not state     Pain/Comfort:  Pain Rating 1: 0/10    Respiratory Status: Room air    Objective:     Has the patient been evaluated by SLP for swallowing?   Yes  Keep patient NPO? No   Current Respiratory Status:        Orientation- The pt stated his full name, although the pt required increased time for verbalizations 2/2 SOB and increased WOB. Pt partially oriented to place, stating he was at a hospital, however, unaware of name. Pt disoriented to date, stating it was "June" and unaware of year.     Expression- Pt with limited verbal output 2/2 SOB this tx session. Pt with dcr volume noted during small instances of expression, which could be 2/2 SOB.     Comprehension- Pt was able to answer simple yes/no questions via head nod with ~80% acc. Pt " followed 1-step commands with ~75% acc, and required repetition of directions.     Assessment:     Leonides Burns is a 77 y.o. male with a dx of CVA (cerebral vascular accident) and worsening linguistic abilities since initial eval (possible extension of CVA?). Pt with mod expressive and receptive deficits. ST recs cont ST services in the acute setting, and at next level of care.     Goals:   Multidisciplinary Problems       SLP Goals          Problem: SLP    Goal Priority Disciplines Outcome   SLP Goal    Low SLP Ongoing, Progressing   Description: STGS  1. Pt will perform multiple step commands regarding before and after with 90% acc. -Discont 10/14  2. Pt will perform divergent naming tasks with min assist- Discont 10/14 UPDATE: Pt will name word given 3 descriptors with 80% acc.   3. Pt will delayed recall tasks with min assist. -Discont  4. Pt will follow 1-step commands with 80% acc, min A.   5. Pt will name objects with 80% acc, min A.   6. Pt will orient to place and date with min A.  7. Pt will tolerate mechanical soft diet with thin liquids w/o overt s/s of aspiration. (Discontinued 10/17/22)                       Plan:     Patient to be seen:  5 x/week   Plan of Care expires:  10/28/22  Plan of Care reviewed with:  patient   SLP Follow-Up:  Yes       Discharge recommendations:  rehabilitation facility   Barriers to Discharge:  None    Time Tracking:     SLP Treatment Date:   10/20/22  Speech Start Time:  0804  Speech Stop Time:  0821     Speech Total Time (min):  17 min    Billable Minutes: Speech Therapy Individual 17    10/20/2022

## 2022-10-20 NOTE — ASSESSMENT & PLAN NOTE
2 seizures occurred on 10/13 prompting step up to ICU  Neurology following  on keppra 1g BID  no further seizure activity noted

## 2022-10-20 NOTE — PLAN OF CARE
"  Problem: Physical Therapy  Goal: Physical Therapy Goal  Description: Goals to be met by: 10/14/22     Patient will increase functional independence with mobility by performin. Pt to be Min A with bed mobility.  2. Pt to transfer sit to stand with CGA  3. Pt to ambulate with HW and Min A x 10-15'  4. Pt to be Supervision with written HEP  5. Pt to transfer bed to chair with CGA  6. Pt to ascend/descend 4" curb with RW and Min A  7. Pt will propel W/C with L UE/LE and CGA approx 25'  Outcome: Ongoing, Not Progressing   Pt limited 2/2 decreased ability to follow commands, wheezing, labored breathing and coughing.  Continue with POC as able.  "

## 2022-10-20 NOTE — PT/OT/SLP PROGRESS
Occupational Therapy   Treatment    Name: Leonides Burns  MRN: [id number]  Admitting Diagnosis:  CVA (cerebral vascular accident)       Recommendations:     Discharge Recommendations: rehabilitation facility  Discharge Equipment Recommendations:   (TBD per IPR.)  Barriers to discharge:   (pt was MOD I and living alone PTA. now, acute CVA with impairments with all ADLs and all aspects of functional mobility. pt at high risk of falls, unplanned readmission, and morbidity if d/c home at this time)    Assessment:     Leonides Burns is a 77 y.o. male with a medical diagnosis of CVA (cerebral vascular accident).  Performance deficits affecting function are weakness, impaired endurance, impaired self care skills, impaired functional mobility, gait instability, impaired balance, decreased upper extremity function, decreased lower extremity function, decreased coordination, decreased ROM, impaired coordination, impaired fine motor, edema.     Pt appeared fatigued and w/ labored breathing throughout tx session despite SPO2 WDL at 97% on RA; pt required total A x 2 persons for transitioning to EOB to initiate self-feeding and reaching activities. Pt w/ onset of significant coughing while drinking liquids, requiring cueing for clearing throat and taking smaller/slower sips. Pt required max cueing/coaxing for full participation this date. Pt will continue to benefit from skilled acute OT services to maximize functional capacity for safe performance w/ ADLs and functional mobility.     Rehab Prognosis:  Good; patient would benefit from acute skilled OT services to address these deficits and reach maximum level of function.       Plan:     Patient to be seen 6 x/week, 5 x/week to address the above listed problems via self-care/home management, therapeutic activities, therapeutic exercises, neuromuscular re-education  Plan of Care Expires: 10/24/22  Plan of Care Reviewed with: patient    Subjective     Pain/Comfort:  Pain Rating 1:  0/10  Pain Rating Post-Intervention 1: 0/10    Objective:     Communicated with: Nurse prior to session.  Patient found HOB elevated with bed alarm, peripheral IV, telemetry upon OT entry to room.    General Precautions: Standard, fall, seizure   Orthopedic Precautions:N/A   Braces: N/A  Respiratory Status: Room air     Occupational Performance:     Bed Mobility:    Patient completed Rolling/Turning to Right with total assistance  Patient completed Scooting hips to EOB w/ max A x 2 and with total assistance and 2 persons for scooting laterally along bed   Patient completed Supine to Sit with total assistance and 2 persons and with HOB elevated   Patient completed Sit to Supine with total assistance and 2 persons     Functional Mobility/Transfers:  Not tested this date; pt appeared fatigued, SOB and w/ labored breathing    Activities of Daily Living:  Feeding: pt found in awkward position in bed, feeding self w/ LUE; pt agreed to sit EOB for sipping lemonade at EOB, unsupported; pt was prompted to scan and reach across midline using LUE for 4 attempts to retrieve cup of lemonade; pt was able to manage cup well w/ RUE resting on table for support and involvement (OT bearing weight through RUE)  Upper Body Dressing: maximal assistance for donning gown while sitting EOB unsupported; pt require max cueing for using LUE to lift and manage RUE for assisting w/ tas       [facility] 6 Click ADL: 13    Treatment & Education:  -Pt re-educated on role of OT and POC.   -Pt educated on importance of EOB activity w/ staff.   -Pt was able to tolerate sitting EOB ~20 min with min-mod A for maintaining upright sitting balance as he was occasionally noted w/ posterior, R lateral lean, especially when incorporating dynamic tasks. Pt was able to self-correct and return to midline w/ cueing.   -Pt required mod A for leaning forward to retrieve cup using LUE from PT's hand (at R side); pt performed for 4/4 attempts.   -While pt performed  functional reaching task facilitated by PT, OT supported RUE on table and provided weight bearing to increase proprioceptive input and neuromuscular feedback; pt required mod A for leaning anteriorly towards R for tapping PT's shoulder.   -Pt was prompted by OT to use LUE for holding R hand to facilitate elbow flexion; pt w/ delayed initiation, requiring assist from OT for performing elbow flexion for 1 set x 10 reps.   -Pt was provided w/ rolled towel which was place in RUE for initiating hand squeezes/finger flexion; pt still w/ delayed initiation, requiring passive assist for performing finger flexion.   -Safety during functional t/f and mobility   -Multiple self-care tasks/functional mobility completed- assistance level noted above; cueing for awareness of R side with all aspects of functional mobility throughout session.   -All questions/concerns answered within OT scope of practice      Patient left  HOB elevated with RUE elevated on pillow and B/L pressure relief boots in place  with all lines intact, call button in reach, nurse notified, and all needs met/within reach.      GOALS:   Multidisciplinary Problems       Occupational Therapy Goals          Problem: Occupational Therapy    Goal Priority Disciplines Outcome Interventions   Occupational Therapy Goal     OT, PT/OT Ongoing, Progressing    Description: Goals to be met by: 10/24/22    Patient will increase functional independence with ADLs by performing:    UE Dressing with Modified [place].  LE Dressing with Modified [place].  Grooming while standing at sink with Modified [place].  Toileting from toilet with Modified [place] for hygiene and clothing management.   Supine to sit with Modified [place].  Step transfer with Modified [place]  Toilet transfer to toilet with Modified [place].  Upper extremity exercise program x15 reps per handout, with independence.                         Time Tracking:     OT Date of  Treatment: 10/20/22  OT Start Time: 1148  OT Stop Time: 1220  OT Total Time (min): 32 min    Billable Minutes:Self Care/Home Management 16  Therapeutic Activity 16  Total Time 32 (co-tx w/ PT)     OT/CHANDNI: OT     CHANDNI Visit Number: 0    10/20/2022

## 2022-10-20 NOTE — CONSULTS
Thank you for your consult to [facility]. We have reviewed the patient chart. This patient does meet criteria for [facility] service at this time. Will assume care on 10/20/22 at 6AM

## 2022-10-20 NOTE — PLAN OF CARE
Problem: SLP  Goal: SLP Goal  Description: STGS  1. Pt will perform multiple step commands regarding before and after with 90% acc. -Discont 10/14  2. Pt will perform divergent naming tasks with min assist- Discont 10/14 UPDATE: Pt will name word given 3 descriptors with 80% acc.   3. Pt will delayed recall tasks with min assist. -Discont  4. Pt will follow 1-step commands with 80% acc, min A.   5. Pt will name objects with 80% acc, min A.   6. Pt will orient to place and date with min A.  7. Pt will tolerate mechanical soft diet with thin liquids w/o overt s/s of aspiration. (Discontinued 10/17/22)  Outcome: Ongoing, Progressing      poor balance

## 2022-10-20 NOTE — PT/OT/SLP PROGRESS
Speech Language Pathology Treatment    Patient Name:  Leonides Burns   MRN:  [id number]  Admitting Diagnosis: CVA (cerebral vascular accident)    Recommendations:                 General Recommendations:  Speech/language therapy and Cognitive-linguistic therapy  Diet recommendations:  Mechanical soft, Liquid Diet Level: Thin liquids - IDDSI Level 0   Aspiration Precautions: HOB to 90 degrees   General Precautions: Standard, aspiration  Communication strategies:  provide increased time to answer and communication device    Subjective     Patient seen at bedside. ST repositioned patient.   Patient goals: to communicate better.      Pain/Comfort:  Pain Rating 1: 0/10  Pain Rating Post-Intervention 1: 0/10  Pain Rating 2: 0/10  Pain Rating Post-Intervention 2: 0/10    Respiratory Status: Room air    Objective:     Has the patient been evaluated by SLP for swallowing?   Yes  Keep patient NPO? No   Current Respiratory Status:        ST facilitated expressive language tasks and receptive language tasks by asking patient 'wh' questions. Patient able to answer 'wh' questions with mod cuing and redirection 50% acc given binary choices to improve orientation. Patient demonstrated response latency ~ 30 seconds. Patient answered YNQ 70% acc. Patient named family members given moderate cuing and redirection for retrieval of names 60% acc. Patient presents with 60% speech intelligibility at word level currently. Nursing educated on communicating with patient utilizing binary choices technique.     Assessment:     Leonides Burns is a 77 y.o. male with an SLP diagnosis of Aphasia, Dysphagia, Dysarthria, and Cognitive-Linguistic Impairment.  He presents with mod/severe cognitive/linguistic deficits. See above objective for further details of current deficits.    Goals:   Multidisciplinary Problems       SLP Goals          Problem: SLP    Goal Priority Disciplines Outcome   SLP Goal    Low SLP Ongoing, Progressing   Description: STGS  1.  Pt will perform multiple step commands regarding before and after with 90% acc. -Discont 10/14  2. Pt will perform divergent naming tasks with min assist- Discont 10/14 UPDATE: Pt will name word given 3 descriptors with 80% acc.   3. Pt will delayed recall tasks with min assist. -Discont  4. Pt will follow 1-step commands with 80% acc, min A.   5. Pt will name objects with 80% acc, min A.   6. Pt will orient to place and date with min A.  7. Pt will tolerate mechanical soft diet with thin liquids w/o overt s/s of aspiration. (Discontinued 10/17/22)                       Plan:     Patient to be seen:  3 x/week   Plan of Care expires:  10/28/22  Plan of Care reviewed with:  patient   SLP Follow-Up:  Yes       Discharge recommendations:  rehabilitation facility   Barriers to Discharge:  Level of Skilled Assistance Needed mod    Time Tracking:     SLP Treatment Date:   10/19/22  Speech Start Time:  1840  Speech Stop Time:  1905     Speech Total Time (min):  25 min    Billable Minutes: Speech Therapy Individual 25    10/19/2022

## 2022-10-20 NOTE — ASSESSMENT & PLAN NOTE
On admit, held home amlodipine and lisinopril for permissive HTN  BP is now elevated- resumed amlodipine and lisinopril  Added coreg  BP is much better controlled now  monitor vitals q4h  SBP goal of <160 in hospital

## 2022-10-20 NOTE — ASSESSMENT & PLAN NOTE
Cr on admission of 2.1. Baseline ranges 1.7 to 2.1  Renal dose medications avoid nephrotoxic drugs monitor intake and output  improved and at baseline  Continue to monitor renal function with daily labs    Monitor events that may lead to decreased renal perfusion (hypovolemia, hypotension, sepsis).   Monitor urine output to assure that no obstruction precipitates worsening in GFR.

## 2022-10-20 NOTE — PROGRESS NOTES
[facility] Medicine  Telemedicine Progress Note    Patient Name: Leonides Burns  MRN: [id number]  Patient Class: IP- Inpatient   Admission Date: 10/9/2022  Length of Stay: 9 days  Attending Physician: Laverne Landeros MD  Primary Care Provider: Suzette Akhtar MD          Subjective:     Principal Problem:CVA (cerebral vascular accident)        HPI:  Leonides Burns 77 y.o. male with CKD, HTN, tobacco abuse presents to the hospital with a chief complaint of right leg weakness.  He reports 2 days of right lower extremity weakness and discoordination and double vision.  States the symptoms have been constant without aggravating factors.  He sometimes feels that improved with lying flat.  He reports he previously had stop aspirin due to severe and recurrent epistaxis.  He denies fevers chest pain shortness breast nausea vomiting abdominal pain leg swelling syncope numbness weakness of an arm incontinence weakness of his left leg.    In the ED, creatinine 2.1 CT head without acute abnormality, chest x-ray without focal consolidation afebrile without leukocytosis COVID negative LDL 80.6 TSH within normal limits.      Overview/Hospital Course:  Mr Leonides Burns is a 77 y.o. man admitted with R leg weakness secondary to acute punctate left parasagittal frontal lobe. PT/OT/SLP consulted. Echo and carotids unremarkable. Neurology consulted.  Patient continued on ASA, added plavix.  His Statin increased to 40 mg daily. Resumed BP medications. Plan was inpatient rehab, but he then had seizure activity on 10/13, prompting step up to ICU. Started keppra. He had difficulty with aspiration events. Speech therapy following; now safe for mechanical soft diet and thin liquids. He has been working with PT, OT. He is now medically stable for discharge to inpatient rehab when arranged.       Interval History:  Patient noted to be afebrile and hemodynamically stable.  No acute events overnight, no  new complaints this morning.  Doing well.  Pending placement.    Review of Systems   Constitutional:  Positive for activity change. Negative for fever.   Respiratory:  Negative for cough and shortness of breath.    Cardiovascular:  Negative for chest pain.   Gastrointestinal:  Negative for abdominal pain.   Musculoskeletal:  Positive for arthralgias.   Neurological:  Negative for dizziness and headaches.   Psychiatric/Behavioral:  Negative for confusion.    All other systems reviewed and are negative.  Objective:     Vital Signs (Most Recent):  Temp: 98.5 °F (36.9 °C) (10/20/22 1112)  Pulse: 63 (10/20/22 1112)  Resp: 20 (10/20/22 1112)  BP: (!) 172/88 (reported to harmony) (10/20/22 1112)  SpO2: 98 % (10/20/22 1112)   Vital Signs (24h Range):  Temp:  [97.6 °F (36.4 °C)-98.5 °F (36.9 °C)] 98.5 °F (36.9 °C)  Pulse:  [51-67] 63  Resp:  [18-20] 20  SpO2:  [95 %-98 %] 98 %  BP: (116-179)/(63-88) 172/88     Weight: 96 kg (211 lb 10.3 oz)  Body mass index is 32.18 kg/m².    Intake/Output Summary (Last 24 hours) at 10/20/2022 1702  Last data filed at 10/20/2022 0614  Gross per 24 hour   Intake 360 ml   Output 200 ml   Net 160 ml      Physical Exam  Vitals and nursing note reviewed.   Constitutional:       Appearance: Normal appearance.   HENT:      Head: Normocephalic and atraumatic.   Eyes:      Extraocular Movements: Extraocular movements intact.      Pupils: Pupils are equal, round, and reactive to light.   Cardiovascular:      Rate and Rhythm: Normal rate and regular rhythm.   Pulmonary:      Effort: Pulmonary effort is normal. No respiratory distress.   Abdominal:      General: Abdomen is flat. There is no distension.   Musculoskeletal:         General: Normal range of motion.      Cervical back: Normal range of motion.      Comments: Weakness (RUE and RLE weakness)   Neurological:      General: No focal deficit present.      Mental Status: He is alert and oriented to person, place, and time.   Psychiatric:         Mood  and Affect: Mood normal.         Behavior: Behavior normal.       Significant Labs: All pertinent labs within the past 24 hours have been reviewed.  CBC: No results for input(s): WBC, HGB, HCT, PLT in the last 48 hours.  CMP: No results for input(s): NA, K, CL, CO2, GLU, BUN, CREATININE, CALCIUM, PROT, ALBUMIN, BILITOT, ALKPHOS, AST, ALT, ANIONGAP, EGFRNONAA in the last 48 hours.    Invalid input(s): [place]    Significant Imaging: I have reviewed all pertinent imaging results/findings within the past 24 hours.      Assessment/Plan:      * CVA (cerebral vascular accident)  Patient presented with right leg weakness and noted to have acute stroke  asa 81 daily, statin increased to 40 mg daily. Started on Plavix for 21 days.  PT, OT, Speech evaluated  plan inpatient rehab at discharge-- he is medically stable for discharge to inpatient rehab when arranged       Seizure  2 seizures occurred on 10/13 prompting step up to ICU  Neurology following  on keppra 1g BID  no further seizure activity noted     Obesity (BMI 30.0-34.9)  Body mass index is 32.18 kg/m².  Will recommend diet and lifestyle modifications outpatient    Tobacco dependence  Smokes rare cigarettes per day. Greater than 3 minutes spent counseling patient on dangers of continued tobacco abuse.    Stage 3a chronic kidney disease  Cr on admission of 2.1. Baseline ranges 1.7 to 2.1  Renal dose medications avoid nephrotoxic drugs monitor intake and output  improved and at baseline  Continue to monitor renal function with daily labs    Monitor events that may lead to decreased renal perfusion (hypovolemia, hypotension, sepsis).   Monitor urine output to assure that no obstruction precipitates worsening in GFR.        Hyperlipidemia  Continue statin      Hypertension  On admit, held home amlodipine and lisinopril for permissive HTN  BP is now elevated- resumed amlodipine and lisinopril  Added coreg  BP is much better controlled now  monitor vitals q4h  SBP goal  of <160 in hospital      VTE Risk Mitigation (From admission, onward)         Ordered     enoxaparin injection 40 mg  Daily         10/18/22 1107     IP VTE HIGH RISK PATIENT  Once         10/09/22 2026     Place sequential compression device  Until discontinued         10/09/22 2026                      I have completed this tele-visit without the assistance of a telepresenter.    The attending portion of this evaluation, treatment, and documentation was performed per Laverne Landeros MD via Telemedicine AudioVisual using the secure [employer] software platform with 2 way audio/video. The provider was located off-site and the patient is located in the hospital. The aforementioned video software was utilized to document the relevant history and physical exam    Laverne Landeros MD  Department of Hospital Medicine   [facility]

## 2022-10-20 NOTE — ASSESSMENT & PLAN NOTE
Patient presented with right leg weakness and noted to have acute stroke  asa 81 daily, statin increased to 40 mg daily. Started on Plavix for 21 days.  PT, OT, Speech evaluated  plan inpatient rehab at discharge-- he is medically stable for discharge to inpatient rehab when arranged

## 2022-10-20 NOTE — ACP (ADVANCE CARE PLANNING)
Advance Care Planning     Date: 10/20/2022  Following our conversation yesterday, pt's son Roxane Blackmon, called and left message to request that I call his brother, Santi, to share discussion.  Spoke with Zander Blackmon And confirmed that I had attempted to reach Santi by phone, and left a message for his returned call.  Later was able to speak with Santi, discussed pending [facility], provided medical update and answered questions.  Also discussed recommendation for palliative consult while at [facility] and home palliative care services (see palliative note 10/20/2022)     Reviewed palliative and hospice care and philosophy of care for both.  Explained current recommendation for home palliative to assist in continued GOC/ACP planning with patient (if able to better understand/communicate for himself following rehab and for continued family goals.  Santi seems to have good insight into pt's condition and potential trajectory if symptoms were to worsen.  He is agreeable to continued palliative care at rehab and at home.      Emotional support provided, allowed time for questions and concerns which were all addressed.     Visited pt to assess for ability to participate in GOC discussion, but was not able to do so de to continued aphasia.  Emotional support provided and updated on conversations with sons.      I spent a total of 30 minutes engaging the family in this advance care planning discussion.

## 2022-10-20 NOTE — SUBJECTIVE & OBJECTIVE
Interval History:  Patient noted to be afebrile and hemodynamically stable.  No acute events overnight, no new complaints this morning.  Doing well.  Pending placement.    Review of Systems   Constitutional:  Positive for activity change. Negative for fever.   Respiratory:  Negative for cough and shortness of breath.    Cardiovascular:  Negative for chest pain.   Gastrointestinal:  Negative for abdominal pain.   Musculoskeletal:  Positive for arthralgias.   Neurological:  Negative for dizziness and headaches.   Psychiatric/Behavioral:  Negative for confusion.    All other systems reviewed and are negative.  Objective:     Vital Signs (Most Recent):  Temp: 98.5 °F (36.9 °C) (10/20/22 1112)  Pulse: 63 (10/20/22 1112)  Resp: 20 (10/20/22 1112)  BP: (!) 172/88 (reported to harmony) (10/20/22 1112)  SpO2: 98 % (10/20/22 1112)   Vital Signs (24h Range):  Temp:  [97.6 °F (36.4 °C)-98.5 °F (36.9 °C)] 98.5 °F (36.9 °C)  Pulse:  [51-67] 63  Resp:  [18-20] 20  SpO2:  [95 %-98 %] 98 %  BP: (116-179)/(63-88) 172/88     Weight: 96 kg (211 lb 10.3 oz)  Body mass index is 32.18 kg/m².    Intake/Output Summary (Last 24 hours) at 10/20/2022 1702  Last data filed at 10/20/2022 0614  Gross per 24 hour   Intake 360 ml   Output 200 ml   Net 160 ml      Physical Exam  Vitals and nursing note reviewed.   Constitutional:       Appearance: Normal appearance.   HENT:      Head: Normocephalic and atraumatic.   Eyes:      Extraocular Movements: Extraocular movements intact.      Pupils: Pupils are equal, round, and reactive to light.   Cardiovascular:      Rate and Rhythm: Normal rate and regular rhythm.   Pulmonary:      Effort: Pulmonary effort is normal. No respiratory distress.   Abdominal:      General: Abdomen is flat. There is no distension.   Musculoskeletal:         General: Normal range of motion.      Cervical back: Normal range of motion.      Comments: Weakness (RUE and RLE weakness)   Neurological:      General: No focal deficit  present.      Mental Status: He is alert and oriented to person, place, and time.   Psychiatric:         Mood and Affect: Mood normal.         Behavior: Behavior normal.       Significant Labs: All pertinent labs within the past 24 hours have been reviewed.  CBC: No results for input(s): WBC, HGB, HCT, PLT in the last 48 hours.  CMP: No results for input(s): NA, K, CL, CO2, GLU, BUN, CREATININE, CALCIUM, PROT, ALBUMIN, BILITOT, ALKPHOS, AST, ALT, ANIONGAP, EGFRNONAA in the last 48 hours.    Invalid input(s): [place]    Significant Imaging: I have reviewed all pertinent imaging results/findings within the past 24 hours.

## 2022-10-20 NOTE — PLAN OF CARE
Problem: Occupational Therapy  Goal: Occupational Therapy Goal  Description: Goals to be met by: 10/24/22    Patient will increase functional independence with ADLs by performing:    UE Dressing with Modified [place].  LE Dressing with Modified [place].  Grooming while standing at sink with Modified [place].  Toileting from toilet with Modified [place] for hygiene and clothing management.   Supine to sit with Modified [place].  Step transfer with Modified [place]  Toilet transfer to toilet with Modified [place].  Upper extremity exercise program x15 reps per handout, with independence.    Outcome: Ongoing, Progressing

## 2022-10-20 NOTE — PLAN OF CARE
LACY left voice message for [employer] ([employer]) re: insurance authorization.    LACY received call from [employer] ([employer]) that still pending insurance authorization.

## 2022-10-21 PROCEDURE — 92507 TX SP LANG VOICE COMM INDIV: CPT

## 2022-10-21 PROCEDURE — 25000003 PHARM REV CODE 250: Performed by: HOSPITALIST

## 2022-10-21 PROCEDURE — 97535 SELF CARE MNGMENT TRAINING: CPT

## 2022-10-21 PROCEDURE — 21400001 HC TELEMETRY ROOM

## 2022-10-21 PROCEDURE — 25000242 PHARM REV CODE 250 ALT 637 W/ HCPCS: Performed by: HOSPITALIST

## 2022-10-21 PROCEDURE — 97112 NEUROMUSCULAR REEDUCATION: CPT

## 2022-10-21 PROCEDURE — 94640 AIRWAY INHALATION TREATMENT: CPT

## 2022-10-21 PROCEDURE — 97530 THERAPEUTIC ACTIVITIES: CPT

## 2022-10-21 PROCEDURE — 63600175 PHARM REV CODE 636 W HCPCS: Performed by: HOSPITALIST

## 2022-10-21 PROCEDURE — 94761 N-INVAS EAR/PLS OXIMETRY MLT: CPT

## 2022-10-21 RX ADMIN — CLOPIDOGREL BISULFATE 75 MG: 75 TABLET ORAL at 09:10

## 2022-10-21 RX ADMIN — CARVEDILOL 25 MG: 12.5 TABLET, FILM COATED ORAL at 09:10

## 2022-10-21 RX ADMIN — LISINOPRIL 40 MG: 20 TABLET ORAL at 09:10

## 2022-10-21 RX ADMIN — LEVETIRACETAM 1000 MG: 500 TABLET, FILM COATED ORAL at 09:10

## 2022-10-21 RX ADMIN — ATORVASTATIN CALCIUM 40 MG: 40 TABLET, FILM COATED ORAL at 09:10

## 2022-10-21 RX ADMIN — AMLODIPINE BESYLATE 10 MG: 5 TABLET ORAL at 09:10

## 2022-10-21 RX ADMIN — ALBUTEROL SULFATE 2.5 MG: 2.5 SOLUTION RESPIRATORY (INHALATION) at 02:10

## 2022-10-21 RX ADMIN — ASPIRIN 81 MG: 81 TABLET, COATED ORAL at 09:10

## 2022-10-21 RX ADMIN — ENOXAPARIN SODIUM 40 MG: 100 INJECTION SUBCUTANEOUS at 04:10

## 2022-10-21 RX ADMIN — MUPIROCIN: 20 OINTMENT TOPICAL at 09:10

## 2022-10-21 NOTE — PLAN OF CARE
"  Problem: Physical Therapy  Goal: Physical Therapy Goal  Description: Goals to be met by: 10/14/22     Patient will increase functional independence with mobility by performin. Pt to be Min A with bed mobility.  2. Pt to transfer sit to stand with CGA  3. Pt to ambulate with HW and Min A x 10-15'  4. Pt to be Supervision with written HEP  5. Pt to transfer bed to chair with CGA  6. Pt to ascend/descend 4" curb with RW and Min A  7. Pt will propel W/C with L UE/LE and CGA approx 25'  Outcome: Ongoing, Progressing   Pt with wheezing today, RLE more rigid.  "

## 2022-10-21 NOTE — PLAN OF CARE
[facility]. Placed call to Mini with [facility], left detailed voice message. Awaiting ins auth.     09:58am Spoke with Mini with [facility], stated ins requested additional information, Mini sent requested documents, awaiting auth.TN to continue to follow up     11:00am Received call from Mini with [facility], stated patient auth denied for rehab, physician notified.     12:50pm Spoke with patient's son Santi regarding discharge planning. Informed pt's son of denial of inpatient rehab based on did not meet medically necessity. Pt's son requested additional reason for denial, TN provided [facility] contact number. TN offered options for discharge for snf vs. Home health. Pt's son stated patient was living home alone prior and he nor his brother would be capable of caring for pt at home. Pt's son stated he feels the patient would benefit most from IPR vs. SNF. TN explained snf placement will be started and referrals will be sent for review. Pt's son accepting for TN to email a list for snf as a plan b if IPR is not approved. Emaile sent to [email or web address].     1:40pm TN received call from [facility] stated pt's son had requested an expedited appeal for IPR. TN called appeal and grievances to submit appeal. Per Emelia with [employer],([phone]) appeal to be reviewed and physician will be contacted for any additional questions arise or if peer to peer is necessary, physician notified.Denied auth #[id number] and case ID #[id number],     Received call from patient's son Leonides Blackmon TN explained conversation that was had with his brother, Christiano/. Explained IPR denial and level of care for IPR vs SNF. Leonides was accepting to continue to find snf placement as a plan B if IPR is not approved. He will prefer a facility on the [facility] if possible.     Plan A IPR pending expedited appeal  Plan B SNF referrals sent via care port.    4:00pm Received call from Adrianne, stated  patient medically accepted to [facility]. TN to follow up with family for preference.      10/21/22 0844   Discharge Reassessment   Assessment Type Discharge Planning Reassessment   Did the patient's condition or plan change since previous assessment? No   Discharge Plan A Rehab   Discharge Plan B Home Health   DME Needed Upon Discharge  none   Discharge Barriers Identified None   Why the patient remains in the hospital Requires continued medical care   Post-Acute Status   Post-Acute Authorization Placement   Post-Acute Placement Status Pending payor review/awaiting authorization (if required)   Home Health Status Discharge Plan Changed   Discharge Delays (!) Post-Acute Set-up

## 2022-10-21 NOTE — SUBJECTIVE & OBJECTIVE
Interval History:  This morning pt noted to be awake but disoriented. Unable to answer questions or follow commands. In no acute distress. Pt has been aphasic and not following commands. Per nursing staff, pt has intermittent episodes of lucidity where he can answer some questions but otherwise becomes aphasic. Pt is not appropriate for [facility]. Will transfer back to in person hospital medicine.     Review of Systems   Constitutional:  Positive for activity change. Negative for fever.   Respiratory:  Negative for cough and shortness of breath.    Cardiovascular:  Negative for chest pain.   Gastrointestinal:  Negative for abdominal pain.   Musculoskeletal:  Positive for arthralgias.   Neurological:  Negative for dizziness and headaches.   Psychiatric/Behavioral:  Negative for confusion.    All other systems reviewed and are negative.  Objective:     Vital Signs (Most Recent):  Temp: 98 °F (36.7 °C) (10/21/22 0735)  Pulse: 66 (10/21/22 0735)  Resp: 18 (10/21/22 0735)  BP: 133/72 (10/21/22 0735)  SpO2: 98 % (10/21/22 0735)   Vital Signs (24h Range):  Temp:  [96.6 °F (35.9 °C)-98.5 °F (36.9 °C)] 98 °F (36.7 °C)  Pulse:  [62-66] 66  Resp:  [18-20] 18  SpO2:  [94 %-98 %] 98 %  BP: (133-172)/(65-88) 133/72     Weight: 96 kg (211 lb 10.3 oz)  Body mass index is 32.18 kg/m².    Intake/Output Summary (Last 24 hours) at 10/21/2022 0848  Last data filed at 10/21/2022 0650  Gross per 24 hour   Intake 600 ml   Output --   Net 600 ml        Physical Exam  Vitals and nursing note reviewed.   Constitutional:       Appearance: Normal appearance.   HENT:      Head: Normocephalic and atraumatic.   Eyes:      Extraocular Movements: Extraocular movements intact.      Pupils: Pupils are equal, round, and reactive to light.   Cardiovascular:      Rate and Rhythm: Normal rate and regular rhythm.   Pulmonary:      Effort: Pulmonary effort is normal. No respiratory distress.   Abdominal:      General: Abdomen is flat. There is no distension.    Musculoskeletal:         General: Normal range of motion.      Cervical back: Normal range of motion.      Comments: Weakness (RUE and RLE weakness)   Neurological:      General: No focal deficit present.      Mental Status: He is alert and oriented to person, place, and time.   Psychiatric:         Mood and Affect: Mood normal.         Behavior: Behavior normal.       Significant Labs: All pertinent labs within the past 24 hours have been reviewed.  CBC: No results for input(s): WBC, HGB, HCT, PLT in the last 48 hours.  CMP: No results for input(s): NA, K, CL, CO2, GLU, BUN, CREATININE, CALCIUM, PROT, ALBUMIN, BILITOT, ALKPHOS, AST, ALT, ANIONGAP, EGFRNONAA in the last 48 hours.    Invalid input(s): [place]    Significant Imaging: I have reviewed all pertinent imaging results/findings within the past 24 hours.

## 2022-10-21 NOTE — PLAN OF CARE
Problem: SLP  Goal: SLP Goal  Description: STGS  1. Pt will perform multiple step commands regarding before and after with 90% acc. -Discont 10/14  2. Pt will perform divergent naming tasks with min assist- Discont 10/14 UPDATE: Pt will name word given 3 descriptors with 80% acc.   3. Pt will delayed recall tasks with min assist. -Discont  4. Pt will follow 1-step commands with 80% acc, min A.   5. Pt will name objects with 80% acc, min A.   6. Pt will orient to place and date with min A.  7. Pt will tolerate mechanical soft diet with thin liquids w/o overt s/s of aspiration. (Discontinued 10/17/22)  Outcome: Ongoing, Progressing     Slow, steady progress

## 2022-10-21 NOTE — PT/OT/SLP PROGRESS
Occupational Therapy   Treatment    Name: Leonides Burns  MRN: [id number]  Admitting Diagnosis:  CVA (cerebral vascular accident)       Recommendations:     Discharge Recommendations: rehabilitation facility  Discharge Equipment Recommendations:   (TBD at post acute treatment level.)  Barriers to discharge:   (Max a UB, maxa total assist LB management/ADL deficits. Patient was (I) ADLs, mod (I) mobility and residing alone prior to this vascuar event. RUE jaren plegia, RLE hemiparesis,)    Assessment:     Leonides Burns is a 77 y.o. male with a medical diagnosis of CVA (cerebral vascular accident), and presents with RUE hemiplegia, RLE hemiparesis. Patient requiring extensive bed level nursing care at present, however attempting fullest cooperation during OT session this date. Performance deficits affecting function are weakness, visual deficits, abnormal tone, impaired endurance, decreased ROM, impaired coordination, decreased lower extremity function, decreased upper extremity function, impaired balance, impaired functional mobility, impaired self care skills, impaired sensation.     Rehab Prognosis:  Good; patient would benefit from acute skilled OT services to address these deficits and reach maximum level of function.       Plan:     Patient to be seen 5 x/week, 6 x/week to address the above listed problems via self-care/home management, therapeutic activities, therapeutic groups, neuromuscular re-education  Plan of Care Expires: 10/28/22  Plan of Care Reviewed with: patient    Subjective     Pain/Comfort:  Pain Rating 1: 0/10    Objective:     Communicated with: RN prior to session.  Patient found supine with bed alarm, telemetry, peripheral IV upon OT entry to room.    General Precautions: Standard, fall, aspiration, aphasia, seizure   Orthopedic Precautions:N/A   Braces: N/A  Respiratory Status: Room air     Occupational Performance:     Bed Mobility:    Patient completed Rolling/Turning to Left with  maximal  assistance  Patient completed Bridging with maximal assistance  Patient engaged in Supine to Sit with moderate assistance utilizing bed rail and LUE. Patient able to sustain unsupported long sitting with spontaneous movement of BLEs R>L  for volitional base of support needs w/o VC. Patient unable to sustain EOB w/ BLEs over mattress edge however safely with staff assist of 1.     Functional Mobility/Transfers: Max Patient fatigue 2* at least 2 total assist bed level nursing interventions preceding 3rd OT visit trial this date. Patient subjectively politely resisting fullest EOB w/ OT. OT/PT pm co treat unable to be coordinated following multiple am trials. Fullest OOB deemed unsafe with staff assist of 1.     OT Rx focus this date (final visit) on ADL, neuro re-edu and intro SROM, and visual skin inspection skills dev.     Activities of Daily Living:  Feeding:  defer to ST at this time. LUE gross motor WFL for utensil management, however  Patient nonverbally indicating RUE dominant. Compensatory coordination training underway.   Grooming: maximal assistance facail cleans  Upper Body Dressing: maximal assistance gown  Lower Body Dressing: total assistance non skid socks and adult brief management  Toileting: total assistance bowel and baldder       [facility] 6 Click ADL: 13    Treatment & Education:  Re-Education provided to Patient, re: role of OT, and OT Treatment rationales / protocols. Patient nonverbally indicating/  understanding. Patient behaviorally agreeable to this therapy session. Lights on / shade open.  SC:   *Basic self-care tasks and rudimentary functional mobility undertaken -- assist levels noted above.  General oral fluids management supported, with bed positioned on mid/High fowlers recommended/instructed to Patient and support staff  Functional mobility training: Bed mobility, functional pressure relief intro, follow up recommended.   Neuro re-edu: Intro visual scan, visual inspection of affected UE,  mid line crossing, scan, target, retrieval of bedside ADL items, follow up recommended.   Pt performed dynamic sitting balance/ proprioceptive and body scheme redevelopment activities to increase ADL independence.  and ADL completed as noted above.   TE:   *Patient engaged in [facility] for acute level there ex program for: PROM  2x 10 reps  to RUE / A/AAROM  LUE 10 reps, no obs pain RUE except R shoulder up to 110 degrees flexion, subjective groan, ROM halted there with very gentle hold at end range. ROM as above all remaining joints/all pain free planes in sit as indicated for recovery of stamina, proper respiration in functional tasks, and to enhance joint capsule nourishment and ease of circulation while in current recovery setting    Supportive Orienting interventions provided: All needs (ADL/leisure occupational) within reach, blinds opened, tv on low stress channel selection, lights on, and door left open.      Patient left HOB elevated Mid/High [place] with all lines intact, call button in hand, bed alarm on, and RN notified    GOALS:   Multidisciplinary Problems       Occupational Therapy Goals          Problem: Occupational Therapy    Goal Priority Disciplines Outcome Interventions   Occupational Therapy Goal     OT, PT/OT Ongoing, Progressing    Description: Goals to be met by: 10/24/22    Patient will increase functional independence with ADLs by performing:    UE Dressing with Modified [place].  LE Dressing with Modified [place].  Grooming while standing at sink with Modified [place].  Toileting from toilet with Modified [place] for hygiene and clothing management.   Supine to sit with Modified [place].  Step transfer with Modified [place]  Toilet transfer to toilet with Modified [place].  Upper extremity exercise program x15 reps per handout, with independence.                         Time Tracking:     OT Date of Treatment: 10/21/22  OT Start Time: 1143  OT Stop Time:  1155  OT Total Time (min): 12 min  OT Start Time: 0112  OT Stop Time: 0136  OT Total Time (min): 24 min    Billable Minutes:Self Care/Home Management 12  Therapeutic Exercise 9  Neuromuscular Re-education 15    10/21/2022

## 2022-10-21 NOTE — PROGRESS NOTES
[facility] Medicine  Telemedicine Progress Note    Patient Name: Leonides Burns  MRN: [id number]  Patient Class: IP- Inpatient   Admission Date: 10/9/2022  Length of Stay: 10 days  Attending Physician: Laverne Landeros MD  Primary Care Provider: Suzette Akhtar MD          Subjective:     Principal Problem:CVA (cerebral vascular accident)        HPI:  Leonides Burns 77 y.o. male with CKD, HTN, tobacco abuse presents to the hospital with a chief complaint of right leg weakness.  He reports 2 days of right lower extremity weakness and discoordination and double vision.  States the symptoms have been constant without aggravating factors.  He sometimes feels that improved with lying flat.  He reports he previously had stop aspirin due to severe and recurrent epistaxis.  He denies fevers chest pain shortness breast nausea vomiting abdominal pain leg swelling syncope numbness weakness of an arm incontinence weakness of his left leg.    In the ED, creatinine 2.1 CT head without acute abnormality, chest x-ray without focal consolidation afebrile without leukocytosis COVID negative LDL 80.6 TSH within normal limits.      Overview/Hospital Course:  Mr Leonides Burns is a 77 y.o. man admitted with R leg weakness secondary to acute punctate left parasagittal frontal lobe. PT/OT/SLP consulted. Echo and carotids unremarkable. Neurology consulted.  Patient continued on ASA, added plavix.  His Statin increased to 40 mg daily. Resumed BP medications. Plan was inpatient rehab, but he then had seizure activity on 10/13, prompting step up to ICU. Started keppra. He had difficulty with aspiration events. Speech therapy following; now safe for mechanical soft diet and thin liquids. He has been working with PT, OT. He is now medically stable for discharge to inpatient rehab when arranged.       Interval History:  This morning pt noted to be awake but disoriented. Unable to answer questions or follow  commands. In no acute distress. Pt has been aphasic and not following commands. Per nursing staff, pt has intermittent episodes of lucidity where he can answer some questions but otherwise becomes aphasic. Pt is not appropriate for [facility]. Will transfer back to in person hospital medicine.     Review of Systems   Constitutional:  Positive for activity change. Negative for fever.   Respiratory:  Negative for cough and shortness of breath.    Cardiovascular:  Negative for chest pain.   Gastrointestinal:  Negative for abdominal pain.   Musculoskeletal:  Positive for arthralgias.   Neurological:  Negative for dizziness and headaches.   Psychiatric/Behavioral:  Negative for confusion.    All other systems reviewed and are negative.  Objective:     Vital Signs (Most Recent):  Temp: 98 °F (36.7 °C) (10/21/22 0735)  Pulse: 66 (10/21/22 0735)  Resp: 18 (10/21/22 0735)  BP: 133/72 (10/21/22 0735)  SpO2: 98 % (10/21/22 0735)   Vital Signs (24h Range):  Temp:  [96.6 °F (35.9 °C)-98.5 °F (36.9 °C)] 98 °F (36.7 °C)  Pulse:  [62-66] 66  Resp:  [18-20] 18  SpO2:  [94 %-98 %] 98 %  BP: (133-172)/(65-88) 133/72     Weight: 96 kg (211 lb 10.3 oz)  Body mass index is 32.18 kg/m².    Intake/Output Summary (Last 24 hours) at 10/21/2022 0848  Last data filed at 10/21/2022 0650  Gross per 24 hour   Intake 600 ml   Output --   Net 600 ml        Physical Exam  Vitals and nursing note reviewed.   Constitutional:       Appearance: Normal appearance.   HENT:      Head: Normocephalic and atraumatic.   Eyes:      Extraocular Movements: Extraocular movements intact.      Pupils: Pupils are equal, round, and reactive to light.   Cardiovascular:      Rate and Rhythm: Normal rate and regular rhythm.   Pulmonary:      Effort: Pulmonary effort is normal. No respiratory distress.   Abdominal:      General: Abdomen is flat. There is no distension.   Musculoskeletal:         General: Normal range of motion.      Cervical back: Normal range of motion.       Comments: Weakness (RUE and RLE weakness)   Neurological:      General: No focal deficit present.      Mental Status: He is alert and oriented to person, place, and time.   Psychiatric:         Mood and Affect: Mood normal.         Behavior: Behavior normal.       Significant Labs: All pertinent labs within the past 24 hours have been reviewed.  CBC: No results for input(s): WBC, HGB, HCT, PLT in the last 48 hours.  CMP: No results for input(s): NA, K, CL, CO2, GLU, BUN, CREATININE, CALCIUM, PROT, ALBUMIN, BILITOT, ALKPHOS, AST, ALT, ANIONGAP, EGFRNONAA in the last 48 hours.    Invalid input(s): [place]    Significant Imaging: I have reviewed all pertinent imaging results/findings within the past 24 hours.      Assessment/Plan:      * CVA (cerebral vascular accident)  Patient presented with right leg weakness and noted to have acute stroke  asa 81 daily, statin increased to 40 mg daily. Started on Plavix for 21 days.  PT, OT, Speech evaluated  plan inpatient rehab at discharge-- he is medically stable for discharge to inpatient rehab when arranged   intermittent episodes of lucidity and aphasia confusion.       Seizure  2 seizures occurred on 10/13 prompting step up to ICU  Neurology following  on keppra 1g BID  no further seizure activity noted     Obesity (BMI 30.0-34.9)  Body mass index is 32.18 kg/m².  Will recommend diet and lifestyle modifications outpatient    Tobacco dependence  Smokes rare cigarettes per day. Greater than 3 minutes spent counseling patient on dangers of continued tobacco abuse.    Stage 3a chronic kidney disease  Cr on admission of 2.1. Baseline ranges 1.7 to 2.1  Renal dose medications avoid nephrotoxic drugs monitor intake and output  improved and at baseline  Continue to monitor renal function with daily labs    Monitor events that may lead to decreased renal perfusion (hypovolemia, hypotension, sepsis).   Monitor urine output to assure that no obstruction precipitates worsening in  GFR.        Hyperlipidemia  Continue statin      Hypertension  On admit, held home amlodipine and lisinopril for permissive HTN  BP is now elevated- resumed amlodipine and lisinopril  Added coreg  BP is much better controlled now  monitor vitals q4h  SBP goal of <160 in hospital      VTE Risk Mitigation (From admission, onward)         Ordered     enoxaparin injection 40 mg  Daily         10/18/22 1107     IP VTE HIGH RISK PATIENT  Once         10/09/22 2026     Place sequential compression device  Until discontinued         10/09/22 2026                      I have completed this tele-visit without the assistance of a telepresenter.    The attending portion of this evaluation, treatment, and documentation was performed per Laverne Landeros MD via Telemedicine [employer] using the secure [employer] software platform with 2 way audio/video. The provider was located off-site and the patient is located in the hospital. The aforementioned video software was utilized to document the relevant history and physical exam    Laverne Landeros MD  Department of Hospital Medicine   [facility]

## 2022-10-21 NOTE — PT/OT/SLP PROGRESS
Occupational Therapy      Patient Name:  Leonides Burns   MRN:  [id number]    Patient not seen OOB today am x3 trails secondary to:nursing care x2, ST Rx x1. OT will follow-up pm this date.  ADDENDUM: pm follow x1 successful for neuro re -edu and SROM  self monitoring RUE. Please see note for fullest. OT/OT co treat also planned stand trials later this date.   Addendum: OT /PT unable to coordinate joint pm follow up.   10/21/2022

## 2022-10-21 NOTE — PT/OT/SLP PROGRESS
"Speech Language Pathology Treatment    Patient Name:  Leonides Burns   MRN:  [id number]  Admitting Diagnosis: CVA (cerebral vascular accident)    Recommendations:                 General Recommendations:  Dysphagia therapy and Speech/language therapy  Diet recommendations:  Mechanical soft, Liquid Diet Level: Thin liquids - IDDSI Level 0   Aspiration Precautions: 1 bite/sip at a time, Alternating bites/sips, Assistance with meals, HOB to 90 degrees, Meds crushed in puree, and Small bites/sips   General Precautions: Standard, fall, seizure  Communication strategies:  provide increased time to answer and provide yes/no choices during moments of anomia    Subjective   Pt awake and alert upon SLP arrival. Pt noted not to respond verbally to SLP greeting. Pt required max cues to state name and . Speech remains mostly unintelligible.    Patient goals: unable to state at this time     Pain/Comfort:  Pain Rating 1: 0/10    Respiratory Status: Room air    Objective:     Has the patient been evaluated by SLP for swallowing?   Yes  Keep patient NPO? No   Current Respiratory Status:        Orientation- The pt required max verbal cues to state his full name. Pt unable to verbally respond to , however, pt did nod head yes when SLP asked pt if his  was 43. Pt unable to state he was at a hospital when asked. SLP provided binary choice to pt asking if he was at home or hospital. Pt indicated "no" to both choices via head shake . Pt unable to state month or year when asked.     Expression- Pt with limited verbal output this session. Pt able to count to 5 given model. Pt was able to verbally ID bed, phone, TV, and pen when SLP pointed to objects in pt's room. Pt was unable to verbalize function of named object. Pt was unable to functionally state wants/needs. Speech intelligibility remains poor in known and unknown contexts.     Comprehension- Pt was able to answer simple yes/no questions via head nod with ~70% acc. Pt " followed 1-step commands with ~70% acc, and required repetition of directions.        Assessment:     Leonides Burns is a 77 y.o. male with a of CVA (cerebral vascular accident) and worsening linguistic abilities since initial eval (possible extension of CVA?). Pt with mod expressive and receptive deficits. ST recs cont ST services in the acute setting, and at next level of care    Goals:   Multidisciplinary Problems       SLP Goals          Problem: SLP    Goal Priority Disciplines Outcome   SLP Goal    Low SLP Ongoing, Progressing   Description: STGS  1. Pt will perform multiple step commands regarding before and after with 90% acc. -Discont 10/14  2. Pt will perform divergent naming tasks with min assist- Discont 10/14 UPDATE: Pt will name word given 3 descriptors with 80% acc.   3. Pt will delayed recall tasks with min assist. -Discont  4. Pt will follow 1-step commands with 80% acc, min A.   5. Pt will name objects with 80% acc, min A.   6. Pt will orient to place and date with min A.  7. Pt will tolerate mechanical soft diet with thin liquids w/o overt s/s of aspiration. (Discontinued 10/17/22)                       Plan:     Patient to be seen:  5 x/week   Plan of Care expires:  10/28/22  Plan of Care reviewed with:  patient   SLP Follow-Up:  Yes       Discharge recommendations:  rehabilitation facility   Barriers to Discharge:  None    Time Tracking:     SLP Treatment Date:   10/21/22  Speech Start Time:  1157  Speech Stop Time:  1222     Speech Total Time (min):  25 min    Billable Minutes: Speech Therapy Individual 17 min and Self Care/Home Management Training 8 min    10/21/2022

## 2022-10-21 NOTE — ASSESSMENT & PLAN NOTE
Patient presented with right leg weakness and noted to have acute stroke  asa 81 daily, statin increased to 40 mg daily. Started on Plavix for 21 days.  PT, OT, Speech evaluated  plan inpatient rehab at discharge-- he is medically stable for discharge to inpatient rehab when arranged   intermittent episodes of lucidity and aphasia confusion.

## 2022-10-21 NOTE — PT/OT/SLP PROGRESS
Physical Therapy Treatment    Patient Name:  Leonides Burns   MRN:  [id number]    Recommendations:     Discharge Recommendations:  rehabilitation facility   Discharge Equipment Recommendations:  (Ongoing assessment pending pt progress)       Assessment:     Leonides Burns is a 77 y.o. male admitted with a medical diagnosis of CVA (cerebral vascular accident).  He presents with the following impairments/functional limitations:  weakness, impaired functional mobility, gait instability, decreased lower extremity function .    Rehab Prognosis: Fair; patient would benefit from acute skilled PT services to address these deficits and reach maximum level of function.    Recent Surgery: * No surgery found *      Plan:     During this hospitalization, patient to be seen  (5-6x/wk) to address the identified rehab impairments via therapeutic activities, therapeutic exercises, neuromuscular re-education, wheelchair management/training, gait training and progress toward the following goals:    Plan of Care Expires:  10/26/22    Subjective     Chief Complaint: None; pt appears uncomfortable in bed; squirming around.  Patient/Family Comments/goals: To go to rehab      Objective:     Communicated with nurseYoung prior to session.  Patient found HOB elevated with  (nothing) upon PT entry to room.     General Precautions: Standard, fall   Orthopedic Precautions:N/A   Braces: N/A  Respiratory Status: Room air     Functional Mobility:  Bed Mobility:     Scooting: total assistance and of 2 persons; pt repositioned to [facility] and into middle of bed.      AM-PAC 6 CLICK MOBILITY  Turning over in bed (including adjusting bedclothes, sheets and blankets)?: 2  Sitting down on and standing up from a chair with arms (e.g., wheelchair, bedside commode, etc.): 1  Moving from lying on back to sitting on the side of the bed?: 2  Moving to and from a bed to a chair (including a wheelchair)?: 2  Need to walk in hospital room?: 1  Climbing 3-5 steps with a  "railing?: 1  Basic Mobility Total Score: 9       Treatment & Education:  Pt received PROM/stretch to RLE, extreme wheezing noted therefore, contacted respiratory to provide breathing treatment.    Patient left HOB elevated with call button in reach and niece present..    GOALS:   Multidisciplinary Problems       Physical Therapy Goals          Problem: Physical Therapy    Goal Priority Disciplines Outcome Goal Variances Interventions   Physical Therapy Goal     PT, PT/OT Ongoing, Progressing     Description: Goals to be met by: 10/14/22     Patient will increase functional independence with mobility by performin. Pt to be Min A with bed mobility.  2. Pt to transfer sit to stand with CGA  3. Pt to ambulate with HW and Min A x 10-15'  4. Pt to be Supervision with written HEP  5. Pt to transfer bed to chair with CGA  6. Pt to ascend/descend 4" curb with RW and Min A  7. Pt will propel W/C with L UE/LE and CGA approx 25'                       Time Tracking:     PT Received On: 10/21/22  PT Start Time: 1348     PT Stop Time: 1404  PT Total Time (min): 16 min     Billable Minutes: Therapeutic Activity 16    Treatment Type: Treatment  PT/PTA: PT     PTA Visit Number: 0     10/21/2022  "

## 2022-10-21 NOTE — PLAN OF CARE
Problem: Occupational Therapy  Goal: Occupational Therapy Goal  Description: Goals to be met by: 10/24/22    Patient will increase functional independence with ADLs by performing:    UE Dressing with Modified [place].  LE Dressing with Modified [place].  Grooming while standing at sink with Modified [place].  Toileting from toilet with Modified [place] for hygiene and clothing management.   Supine to sit with Modified [place].  Step transfer with Modified [place]  Toilet transfer to toilet with Modified [place].  Upper extremity exercise program x15 reps per handout, with independence.    Outcome: Ongoing, Progressing

## 2022-10-22 LAB
ANION GAP SERPL CALC-SCNC: 10 MMOL/L (ref 8–16)
BASOPHILS # BLD AUTO: 0.06 K/UL (ref 0–0.2)
BASOPHILS NFR BLD: 1 % (ref 0–1.9)
BUN SERPL-MCNC: 26 MG/DL (ref 8–23)
CALCIUM SERPL-MCNC: 9.3 MG/DL (ref 8.7–10.5)
CHLORIDE SERPL-SCNC: 111 MMOL/L (ref 95–110)
CO2 SERPL-SCNC: 20 MMOL/L (ref 23–29)
CREAT SERPL-MCNC: 1.6 MG/DL (ref 0.5–1.4)
DIFFERENTIAL METHOD: ABNORMAL
EOSINOPHIL # BLD AUTO: 0.9 K/UL (ref 0–0.5)
EOSINOPHIL NFR BLD: 14.1 % (ref 0–8)
ERYTHROCYTE [DISTWIDTH] IN BLOOD BY AUTOMATED COUNT: 19.2 % (ref 11.5–14.5)
EST. GFR  (NO RACE VARIABLE): 44 ML/MIN/1.73 M^2
GLUCOSE SERPL-MCNC: 80 MG/DL (ref 70–110)
HCT VFR BLD AUTO: 48.2 % (ref 40–54)
HGB BLD-MCNC: 15.2 G/DL (ref 14–18)
IMM GRANULOCYTES # BLD AUTO: 0.03 K/UL (ref 0–0.04)
IMM GRANULOCYTES NFR BLD AUTO: 0.5 % (ref 0–0.5)
LYMPHOCYTES # BLD AUTO: 1.3 K/UL (ref 1–4.8)
LYMPHOCYTES NFR BLD: 20.8 % (ref 18–48)
MAGNESIUM SERPL-MCNC: 2.3 MG/DL (ref 1.6–2.6)
MCH RBC QN AUTO: 25.6 PG (ref 27–31)
MCHC RBC AUTO-ENTMCNC: 31.5 G/DL (ref 32–36)
MCV RBC AUTO: 81 FL (ref 82–98)
MONOCYTES # BLD AUTO: 1.2 K/UL (ref 0.3–1)
MONOCYTES NFR BLD: 20.4 % (ref 4–15)
NEUTROPHILS # BLD AUTO: 2.6 K/UL (ref 1.8–7.7)
NEUTROPHILS NFR BLD: 43.2 % (ref 38–73)
NRBC BLD-RTO: 0 /100 WBC
PHOSPHATE SERPL-MCNC: 3.5 MG/DL (ref 2.7–4.5)
PLATELET # BLD AUTO: 391 K/UL (ref 150–450)
PMV BLD AUTO: 10.6 FL (ref 9.2–12.9)
POCT GLUCOSE: 97 MG/DL (ref 70–110)
POTASSIUM SERPL-SCNC: 4.4 MMOL/L (ref 3.5–5.1)
RBC # BLD AUTO: 5.94 M/UL (ref 4.6–6.2)
SODIUM SERPL-SCNC: 141 MMOL/L (ref 136–145)
WBC # BLD AUTO: 6.02 K/UL (ref 3.9–12.7)

## 2022-10-22 PROCEDURE — 25000242 PHARM REV CODE 250 ALT 637 W/ HCPCS: Performed by: HOSPITALIST

## 2022-10-22 PROCEDURE — 94640 AIRWAY INHALATION TREATMENT: CPT

## 2022-10-22 PROCEDURE — 84100 ASSAY OF PHOSPHORUS: CPT | Performed by: HOSPITALIST

## 2022-10-22 PROCEDURE — 25000003 PHARM REV CODE 250: Performed by: HOSPITALIST

## 2022-10-22 PROCEDURE — 94761 N-INVAS EAR/PLS OXIMETRY MLT: CPT

## 2022-10-22 PROCEDURE — 99232 SBSQ HOSP IP/OBS MODERATE 35: CPT | Mod: ,,, | Performed by: PSYCHIATRY & NEUROLOGY

## 2022-10-22 PROCEDURE — 63600175 PHARM REV CODE 636 W HCPCS: Performed by: HOSPITALIST

## 2022-10-22 PROCEDURE — 85025 COMPLETE CBC W/AUTO DIFF WBC: CPT | Performed by: HOSPITALIST

## 2022-10-22 PROCEDURE — 99232 PR SUBSEQUENT HOSPITAL CARE,LEVL II: ICD-10-PCS | Mod: ,,, | Performed by: PSYCHIATRY & NEUROLOGY

## 2022-10-22 PROCEDURE — 36410 VNPNXR 3YR/> PHY/QHP DX/THER: CPT

## 2022-10-22 PROCEDURE — 63600175 PHARM REV CODE 636 W HCPCS: Performed by: NURSE PRACTITIONER

## 2022-10-22 PROCEDURE — 21400001 HC TELEMETRY ROOM

## 2022-10-22 PROCEDURE — 36415 COLL VENOUS BLD VENIPUNCTURE: CPT | Performed by: HOSPITALIST

## 2022-10-22 PROCEDURE — 80048 BASIC METABOLIC PNL TOTAL CA: CPT | Performed by: HOSPITALIST

## 2022-10-22 PROCEDURE — C1751 CATH, INF, PER/CENT/MIDLINE: HCPCS

## 2022-10-22 PROCEDURE — 83735 ASSAY OF MAGNESIUM: CPT | Performed by: HOSPITALIST

## 2022-10-22 RX ORDER — MORPHINE SULFATE 4 MG/ML
2 INJECTION, SOLUTION INTRAMUSCULAR; INTRAVENOUS ONCE
Status: COMPLETED | OUTPATIENT
Start: 2022-10-22 | End: 2022-10-22

## 2022-10-22 RX ORDER — IPRATROPIUM BROMIDE AND ALBUTEROL SULFATE 2.5; .5 MG/3ML; MG/3ML
3 SOLUTION RESPIRATORY (INHALATION)
Status: DISCONTINUED | OUTPATIENT
Start: 2022-10-22 | End: 2022-10-28 | Stop reason: HOSPADM

## 2022-10-22 RX ORDER — LEVETIRACETAM 10 MG/ML
1000 INJECTION INTRAVASCULAR EVERY 12 HOURS
Status: DISCONTINUED | OUTPATIENT
Start: 2022-10-22 | End: 2022-10-23

## 2022-10-22 RX ORDER — HYDRALAZINE HYDROCHLORIDE 20 MG/ML
10 INJECTION INTRAMUSCULAR; INTRAVENOUS EVERY 8 HOURS PRN
Status: DISCONTINUED | OUTPATIENT
Start: 2022-10-22 | End: 2022-10-28 | Stop reason: HOSPADM

## 2022-10-22 RX ADMIN — ASPIRIN 81 MG: 81 TABLET, COATED ORAL at 09:10

## 2022-10-22 RX ADMIN — CLOPIDOGREL BISULFATE 75 MG: 75 TABLET ORAL at 09:10

## 2022-10-22 RX ADMIN — LISINOPRIL 40 MG: 20 TABLET ORAL at 09:10

## 2022-10-22 RX ADMIN — LEVETIRACETAM 1000 MG: 500 TABLET, FILM COATED ORAL at 09:10

## 2022-10-22 RX ADMIN — IPRATROPIUM BROMIDE AND ALBUTEROL SULFATE 3 ML: 2.5; .5 SOLUTION RESPIRATORY (INHALATION) at 07:10

## 2022-10-22 RX ADMIN — AMLODIPINE BESYLATE 10 MG: 5 TABLET ORAL at 09:10

## 2022-10-22 RX ADMIN — ENOXAPARIN SODIUM 40 MG: 100 INJECTION SUBCUTANEOUS at 04:10

## 2022-10-22 RX ADMIN — HYDRALAZINE HYDROCHLORIDE 10 MG: 20 INJECTION INTRAMUSCULAR; INTRAVENOUS at 06:10

## 2022-10-22 RX ADMIN — CARVEDILOL 25 MG: 12.5 TABLET, FILM COATED ORAL at 09:10

## 2022-10-22 RX ADMIN — IPRATROPIUM BROMIDE AND ALBUTEROL SULFATE 3 ML: 2.5; .5 SOLUTION RESPIRATORY (INHALATION) at 09:10

## 2022-10-22 RX ADMIN — MORPHINE SULFATE 2 MG: 4 INJECTION INTRAVENOUS at 08:10

## 2022-10-22 RX ADMIN — MUPIROCIN: 20 OINTMENT TOPICAL at 08:10

## 2022-10-22 RX ADMIN — LEVETIRACETAM INJECTION 1000 MG: 10 INJECTION INTRAVENOUS at 08:10

## 2022-10-22 RX ADMIN — IPRATROPIUM BROMIDE AND ALBUTEROL SULFATE 3 ML: 2.5; .5 SOLUTION RESPIRATORY (INHALATION) at 03:10

## 2022-10-22 NOTE — NURSING
Pt failed Martinez today, doesn't appear to be able to follow simple commands like smiling, coughing. I called for Speech but they are not here. Family was unaware (NPO sign was on door) but fed pt 3 spoonfuls, Family reported pt was able to swallow but did not feel hungry. Pt reassessed, failed martinez. MD made edmondson as pt is getting seizure meds po.

## 2022-10-22 NOTE — PROGRESS NOTES
[facility]  Progress Note    Patient Name: Leonides Burns  MRN: [id number]  Patient Class: IP- Inpatient   Admission Date: 10/9/2022  Length of Stay: 11 days  Attending Physician: Juventino Turner MD  Primary Care Provider: Suzette Akhtar MD        Subjective:     Principal Problem:CVA (cerebral vascular accident)        HPI:  Leonides Burns 77 y.o. male with CKD, HTN, tobacco abuse presents to the hospital with a chief complaint of right leg weakness.  He reports 2 days of right lower extremity weakness and discoordination and double vision.  States the symptoms have been constant without aggravating factors.  He sometimes feels that improved with lying flat.  He reports he previously had stop aspirin due to severe and recurrent epistaxis.  He denies fevers chest pain shortness breast nausea vomiting abdominal pain leg swelling syncope numbness weakness of an arm incontinence weakness of his left leg.    In the ED, creatinine 2.1 CT head without acute abnormality, chest x-ray without focal consolidation afebrile without leukocytosis COVID negative LDL 80.6 TSH within normal limits.      Overview/Hospital Course:  Mr Leonides Burns is a 77 y.o. man admitted with R leg weakness secondary to acute punctate left parasagittal frontal lobe. PT/OT/SLP consulted. Echo and carotids unremarkable. Neurology consulted.  Patient continued on ASA, added plavix.  His Statin increased to 40 mg daily. Resumed BP medications. Plan was inpatient rehab, but he then had seizure activity on 10/13, prompting step up to ICU. Started keppra. He had difficulty with aspiration events. Speech therapy following; now safe for mechanical soft diet and thin liquids. He has been working with PT, OT. He is now medically stable for discharge to inpatient rehab when arranged.       Interval History: having worsening cough when eating this morning per nursing.    Review of Systems   HENT:  Negative for ear  discharge and ear pain.    Eyes:  Negative for discharge and itching.   Endocrine: Negative for cold intolerance and heat intolerance.   Neurological:  Negative for seizures and syncope.   Objective:     Vital Signs (Most Recent):  Temp: 98.3 °F (36.8 °C) (10/22/22 0716)  Pulse: 60 (10/22/22 0950)  Resp: 18 (10/22/22 0950)  BP: 117/65 (10/22/22 0716)  SpO2: 95 % (10/22/22 0950)   Vital Signs (24h Range):  Temp:  [97.6 °F (36.4 °C)-98.5 °F (36.9 °C)] 98.3 °F (36.8 °C)  Pulse:  [58-68] 60  Resp:  [18-22] 18  SpO2:  [92 %-98 %] 95 %  BP: (117-174)/(65-82) 117/65     Weight: 96 kg (211 lb 10.3 oz)  Body mass index is 32.18 kg/m².    Intake/Output Summary (Last 24 hours) at 10/22/2022 1007  Last data filed at 10/22/2022 0900  Gross per 24 hour   Intake 360 ml   Output --   Net 360 ml        Physical Exam  Vitals and nursing note reviewed.   Constitutional:       General: He is not in acute distress.     Appearance: He is obese. He is ill-appearing. He is not toxic-appearing.   HENT:      Head: Normocephalic and atraumatic.      Nose: Nose normal.      Mouth/Throat:      Mouth: Mucous membranes are moist.   Eyes:      General: No scleral icterus.     Extraocular Movements: Extraocular movements intact.      Conjunctiva/sclera: Conjunctivae normal.      Pupils: Pupils are equal, round, and reactive to light.   Cardiovascular:      Rate and Rhythm: Normal rate and regular rhythm.      Pulses: Normal pulses.      Heart sounds: Normal heart sounds. No murmur heard.    No gallop.   Pulmonary:      Effort: Pulmonary effort is normal. No respiratory distress.      Breath sounds: Normal breath sounds. No wheezing, rhonchi or rales.      Comments: Room air  Abdominal:      General: Bowel sounds are normal. There is no distension.      Palpations: Abdomen is soft.      Tenderness: There is no abdominal tenderness. There is no guarding.   Musculoskeletal:      Right lower leg: No edema.      Left lower leg: No edema.   Skin:      General: Skin is warm and dry.   Neurological:      Mental Status: He is alert and oriented to person, place, and time.      Cranial Nerves: Cranial nerve deficit (R facial droop) present.      Sensory: No sensory deficit.      Motor: Weakness (RUE and RLE weakness) present.   Psychiatric:         Speech: Speech is slurred.       Significant Labs: All pertinent labs within the past 24 hours have been reviewed.  BMP:   Recent Labs   Lab 10/22/22  0628   GLU 80      K 4.4   *   CO2 20*   BUN 26*   CREATININE 1.6*   CALCIUM 9.3   MG 2.3     CBC:   Recent Labs   Lab 10/22/22  0628   WBC 6.02   HGB 15.2   HCT 48.2          Significant Imaging: I have reviewed all pertinent imaging results/findings within the past 24 hours.      Assessment/Plan:      * CVA (cerebral vascular accident)  Patient presented with right leg weakness and noted to have acute stroke  asa 81 daily, statin increased to 40 mg daily. Started on Plavix for 21 days.  PT, OT, Speech evaluated  plan inpatient rehab at discharge-- he is medically stable for discharge to inpatient rehab when arranged   intermittent episodes of lucidity and aphasia confusion.   Insurance denied inpatient rehab.  Awaiting appeal.  Patient was previously home alone and independent.  Would greatly benefit from inpatient rehab.      Seizure  2 seizures occurred on 10/13 prompting step up to ICU  Neurology following  on keppra 1g BID  no further seizure activity noted     Obesity (BMI 30.0-34.9)  Body mass index is 32.18 kg/m².  Will recommend diet and lifestyle modifications outpatient    Tobacco dependence  Smokes rare cigarettes per day. Greater than 3 minutes spent counseling patient on dangers of continued tobacco abuse.    Stage 3a chronic kidney disease  Cr on admission of 2.1. Baseline ranges 1.7 to 2.1  Renal dose medications avoid nephrotoxic drugs monitor intake and output  improved and at baseline  Continue to monitor renal function with daily labs     Monitor events that may lead to decreased renal perfusion (hypovolemia, hypotension, sepsis).   Monitor urine output to assure that no obstruction precipitates worsening in GFR.        Hyperlipidemia  Continue statin      Hypertension  On admit, held home amlodipine and lisinopril for permissive HTN  BP is now elevated- resumed amlodipine and lisinopril  Added coreg  BP is much better controlled now  monitor vitals q4h  SBP goal of <160 in hospital      VTE Risk Mitigation (From admission, onward)         Ordered     enoxaparin injection 40 mg  Daily         10/18/22 1107     IP VTE HIGH RISK PATIENT  Once         10/09/22 2026     Place sequential compression device  Until discontinued         10/09/22 2026                Discharge Planning   DEVIKA: 10/21/2022     Code Status: Full Code   Is the patient medically ready for discharge?:     Reason for patient still in hospital (select all that apply): Pending disposition  Discharge Plan A: Rehab   Discharge Delays: (!) Post-Acute Set-up              Juventino Turner MD  Department of Hospital Medicine   [facility]

## 2022-10-22 NOTE — NURSING
Pt appeared to be aspirating after Pharmacist administered morning po medications. Pt was already in a 45 degree sitting position, he was suctioned at the bedside, VSS, BG97. Pt unable to cough on command, made NPO until speech re-evaluates, MD made aware.

## 2022-10-22 NOTE — NURSING
Nurse and Charge Nurse summoned to patient room. Patient was coughing uncontrollably, just received PO am meds, unlikely to be seizure activity. Patient was suctioned at bedside, was asked to cough, but patient could not perform command. MD will be notified of incident. /63, P 62, R 20. SPO2 95%.

## 2022-10-22 NOTE — SUBJECTIVE & OBJECTIVE
Interval History: having worsening cough when eating this morning per nursing.    Review of Systems   HENT:  Negative for ear discharge and ear pain.    Eyes:  Negative for discharge and itching.   Endocrine: Negative for cold intolerance and heat intolerance.   Neurological:  Negative for seizures and syncope.   Objective:     Vital Signs (Most Recent):  Temp: 98.3 °F (36.8 °C) (10/22/22 0716)  Pulse: 60 (10/22/22 0950)  Resp: 18 (10/22/22 0950)  BP: 117/65 (10/22/22 0716)  SpO2: 95 % (10/22/22 0950)   Vital Signs (24h Range):  Temp:  [97.6 °F (36.4 °C)-98.5 °F (36.9 °C)] 98.3 °F (36.8 °C)  Pulse:  [58-68] 60  Resp:  [18-22] 18  SpO2:  [92 %-98 %] 95 %  BP: (117-174)/(65-82) 117/65     Weight: 96 kg (211 lb 10.3 oz)  Body mass index is 32.18 kg/m².    Intake/Output Summary (Last 24 hours) at 10/22/2022 1007  Last data filed at 10/22/2022 0900  Gross per 24 hour   Intake 360 ml   Output --   Net 360 ml        Physical Exam  Vitals and nursing note reviewed.   Constitutional:       General: He is not in acute distress.     Appearance: He is obese. He is ill-appearing. He is not toxic-appearing.   HENT:      Head: Normocephalic and atraumatic.      Nose: Nose normal.      Mouth/Throat:      Mouth: Mucous membranes are moist.   Eyes:      General: No scleral icterus.     Extraocular Movements: Extraocular movements intact.      Conjunctiva/sclera: Conjunctivae normal.      Pupils: Pupils are equal, round, and reactive to light.   Cardiovascular:      Rate and Rhythm: Normal rate and regular rhythm.      Pulses: Normal pulses.      Heart sounds: Normal heart sounds. No murmur heard.    No gallop.   Pulmonary:      Effort: Pulmonary effort is normal. No respiratory distress.      Breath sounds: Normal breath sounds. No wheezing, rhonchi or rales.      Comments: Room air  Abdominal:      General: Bowel sounds are normal. There is no distension.      Palpations: Abdomen is soft.      Tenderness: There is no abdominal  tenderness. There is no guarding.   Musculoskeletal:      Right lower leg: No edema.      Left lower leg: No edema.   Skin:     General: Skin is warm and dry.   Neurological:      Mental Status: He is alert and oriented to person, place, and time.      Cranial Nerves: Cranial nerve deficit (R facial droop) present.      Sensory: No sensory deficit.      Motor: Weakness (RUE and RLE weakness) present.   Psychiatric:         Speech: Speech is slurred.       Significant Labs: All pertinent labs within the past 24 hours have been reviewed.  BMP:   Recent Labs   Lab 10/22/22  0628   GLU 80      K 4.4   *   CO2 20*   BUN 26*   CREATININE 1.6*   CALCIUM 9.3   MG 2.3     CBC:   Recent Labs   Lab 10/22/22  0628   WBC 6.02   HGB 15.2   HCT 48.2          Significant Imaging: I have reviewed all pertinent imaging results/findings within the past 24 hours.

## 2022-10-22 NOTE — PLAN OF CARE
Problem: Adjustment to Illness (Stroke, Ischemic/Transient Ischemic Attack)  Goal: Optimal Coping  Outcome: Ongoing, Progressing     Problem: Cognitive Impairment (Stroke, Ischemic/Transient Ischemic Attack)  Goal: Optimal Cognitive Function  Outcome: Ongoing, Progressing     Problem: Communication Impairment (Stroke, Ischemic/Transient Ischemic Attack)  Goal: Improved Communication Skills  Outcome: Ongoing, Progressing     Problem: Functional Ability Impaired (Stroke, Ischemic/Transient Ischemic Attack)  Goal: Optimal Functional Ability  Outcome: Ongoing, Progressing     Problem: Urinary Elimination Impaired (Stroke, Ischemic/Transient Ischemic Attack)  Goal: Effective Urinary Elimination  Outcome: Ongoing, Progressing     Problem: Fall Injury Risk  Goal: Absence of Fall and Fall-Related Injury  Outcome: Ongoing, Progressing     Problem: Skin Injury Risk Increased  Goal: Skin Health and Integrity  Outcome: Ongoing, Progressing     Problem: Coping Ineffective  Goal: Effective Coping  Outcome: Ongoing, Progressing

## 2022-10-22 NOTE — ASSESSMENT & PLAN NOTE
Patient presented with right leg weakness and noted to have acute stroke  asa 81 daily, statin increased to 40 mg daily. Started on Plavix for 21 days.  PT, OT, Speech evaluated  plan inpatient rehab at discharge-- he is medically stable for discharge to inpatient rehab when arranged   intermittent episodes of lucidity and aphasia confusion.   Insurance denied inpatient rehab.  Awaiting appeal.  Patient was previously home alone and independent.  Would greatly benefit from inpatient rehab.

## 2022-10-23 PROCEDURE — 25000003 PHARM REV CODE 250: Performed by: HOSPITALIST

## 2022-10-23 PROCEDURE — 97112 NEUROMUSCULAR REEDUCATION: CPT

## 2022-10-23 PROCEDURE — 94640 AIRWAY INHALATION TREATMENT: CPT

## 2022-10-23 PROCEDURE — 99232 PR SUBSEQUENT HOSPITAL CARE,LEVL II: ICD-10-PCS | Mod: ,,, | Performed by: PSYCHIATRY & NEUROLOGY

## 2022-10-23 PROCEDURE — 97530 THERAPEUTIC ACTIVITIES: CPT

## 2022-10-23 PROCEDURE — 25000242 PHARM REV CODE 250 ALT 637 W/ HCPCS: Performed by: HOSPITALIST

## 2022-10-23 PROCEDURE — 21400001 HC TELEMETRY ROOM

## 2022-10-23 PROCEDURE — 92610 EVALUATE SWALLOWING FUNCTION: CPT

## 2022-10-23 PROCEDURE — 63600175 PHARM REV CODE 636 W HCPCS: Performed by: HOSPITALIST

## 2022-10-23 PROCEDURE — 99232 SBSQ HOSP IP/OBS MODERATE 35: CPT | Mod: ,,, | Performed by: PSYCHIATRY & NEUROLOGY

## 2022-10-23 PROCEDURE — 94760 N-INVAS EAR/PLS OXIMETRY 1: CPT

## 2022-10-23 PROCEDURE — 97535 SELF CARE MNGMENT TRAINING: CPT

## 2022-10-23 RX ORDER — HYDRALAZINE HYDROCHLORIDE 20 MG/ML
10 INJECTION INTRAMUSCULAR; INTRAVENOUS ONCE
Status: COMPLETED | OUTPATIENT
Start: 2022-10-23 | End: 2022-10-23

## 2022-10-23 RX ORDER — LEVETIRACETAM 500 MG/1
1000 TABLET ORAL 2 TIMES DAILY
Status: DISCONTINUED | OUTPATIENT
Start: 2022-10-23 | End: 2022-10-28 | Stop reason: HOSPADM

## 2022-10-23 RX ADMIN — ASPIRIN 81 MG: 81 TABLET, COATED ORAL at 10:10

## 2022-10-23 RX ADMIN — CLOPIDOGREL BISULFATE 75 MG: 75 TABLET ORAL at 10:10

## 2022-10-23 RX ADMIN — IPRATROPIUM BROMIDE AND ALBUTEROL SULFATE 3 ML: 2.5; .5 SOLUTION RESPIRATORY (INHALATION) at 07:10

## 2022-10-23 RX ADMIN — IPRATROPIUM BROMIDE AND ALBUTEROL SULFATE 3 ML: 2.5; .5 SOLUTION RESPIRATORY (INHALATION) at 01:10

## 2022-10-23 RX ADMIN — ENOXAPARIN SODIUM 40 MG: 100 INJECTION SUBCUTANEOUS at 04:10

## 2022-10-23 RX ADMIN — HYDRALAZINE HYDROCHLORIDE 10 MG: 20 INJECTION INTRAMUSCULAR; INTRAVENOUS at 12:10

## 2022-10-23 RX ADMIN — LEVETIRACETAM INJECTION 1000 MG: 10 INJECTION INTRAVENOUS at 09:10

## 2022-10-23 RX ADMIN — HYDRALAZINE HYDROCHLORIDE 10 MG: 20 INJECTION INTRAMUSCULAR; INTRAVENOUS at 04:10

## 2022-10-23 RX ADMIN — CARVEDILOL 25 MG: 12.5 TABLET, FILM COATED ORAL at 09:10

## 2022-10-23 RX ADMIN — LEVETIRACETAM 1000 MG: 500 TABLET, FILM COATED ORAL at 09:10

## 2022-10-23 RX ADMIN — MELATONIN TAB 3 MG 6 MG: 3 TAB at 09:10

## 2022-10-23 RX ADMIN — ATORVASTATIN CALCIUM 40 MG: 40 TABLET, FILM COATED ORAL at 09:10

## 2022-10-23 RX ADMIN — LISINOPRIL 40 MG: 20 TABLET ORAL at 10:10

## 2022-10-23 RX ADMIN — AMLODIPINE BESYLATE 10 MG: 5 TABLET ORAL at 10:10

## 2022-10-23 NOTE — NURSING
Pt re-evaluated by speech. Pt able to swallow crushed pills with apple sauce. Sips of thickened water provided. No signs of aspiration at this time. BP high, morning meds just given. Will retake BP in about 1hr ans treat with PRN if needed.

## 2022-10-23 NOTE — NURSING
Pt consumed his am meds crushed in applesauce. Pt also given prn hydralazine for elevated BP. Pt's BP remains elevated. MD alerted. CUCA at this time.

## 2022-10-23 NOTE — PLAN OF CARE
Problem: Occupational Therapy  Goal: Occupational Therapy Goal  Description: Goals to be met by: 10/24/22    Patient will increase functional independence with ADLs by performing:    UE Dressing with Modified [place].  LE Dressing with Modified [place].  Grooming while standing at sink with Modified [place].  Toileting from toilet with Modified [place] for hygiene and clothing management.   Supine to sit with Modified [place].  Step transfer with Modified [place]  Toilet transfer to toilet with Modified [place].  Upper extremity exercise program x15 reps per handout, with independence.    Outcome: Ongoing, Progressing     Pt asleep upon arrival, but showed increased engagement with functional activities with prompting/cueing. RUE 1/5 globally today. Pt grimaced/ appearing in pain with attempt to provide gentle stretch to RLE with flexor tone so OT stopped. Pt unable to verbalize any words today. Pt made eye contact on command.

## 2022-10-23 NOTE — PLAN OF CARE
Problem: Adjustment to Illness (Stroke, Ischemic/Transient Ischemic Attack)  Goal: Optimal Coping  Outcome: Ongoing, Progressing     Problem: Cognitive Impairment (Stroke, Ischemic/Transient Ischemic Attack)  Goal: Optimal Cognitive Function  Outcome: Ongoing, Progressing     Problem: Communication Impairment (Stroke, Ischemic/Transient Ischemic Attack)  Goal: Improved Communication Skills  Outcome: Ongoing, Progressing     Problem: Swallowing Impairment (Stroke, Ischemic/Transient Ischemic Attack)  Goal: Optimal Eating and Swallowing without Aspiration  Outcome: Ongoing, Progressing     Problem: Fall Injury Risk  Goal: Absence of Fall and Fall-Related Injury  Outcome: Ongoing, Progressing     Problem: Coping Ineffective  Goal: Effective Coping  Outcome: Ongoing, Progressing     Problem: Hypertension Acute  Goal: Blood Pressure Within Desired Range  Outcome: Ongoing, Progressing

## 2022-10-23 NOTE — SUBJECTIVE & OBJECTIVE
Interval History: no new events overnight.    Review of Systems   HENT:  Negative for ear discharge and ear pain.    Eyes:  Negative for discharge and itching.   Endocrine: Negative for cold intolerance and heat intolerance.   Neurological:  Negative for seizures and syncope.   Objective:     Vital Signs (Most Recent):  Temp: 98.3 °F (36.8 °C) (10/23/22 1123)  Pulse: 74 (10/23/22 1309)  Resp: 16 (10/23/22 1309)  BP: (!) 189/85 (10/23/22 1123)  SpO2: 95 % (10/23/22 1309)   Vital Signs (24h Range):  Temp:  [97.8 °F (36.6 °C)-98.5 °F (36.9 °C)] 98.3 °F (36.8 °C)  Pulse:  [64-80] 74  Resp:  [16-22] 16  SpO2:  [92 %-98 %] 95 %  BP: (123-210)/(77-98) 189/85     Weight: 96.4 kg (212 lb 8.4 oz)  Body mass index is 32.31 kg/m².    Intake/Output Summary (Last 24 hours) at 10/23/2022 1423  Last data filed at 10/23/2022 1200  Gross per 24 hour   Intake 0 ml   Output --   Net 0 ml        Physical Exam  Vitals and nursing note reviewed.   Constitutional:       General: He is not in acute distress.     Appearance: He is obese. He is ill-appearing. He is not toxic-appearing.   HENT:      Head: Normocephalic and atraumatic.      Nose: Nose normal.      Mouth/Throat:      Mouth: Mucous membranes are moist.   Eyes:      General: No scleral icterus.     Extraocular Movements: Extraocular movements intact.      Conjunctiva/sclera: Conjunctivae normal.      Pupils: Pupils are equal, round, and reactive to light.   Cardiovascular:      Rate and Rhythm: Normal rate and regular rhythm.      Pulses: Normal pulses.      Heart sounds: Normal heart sounds. No murmur heard.    No gallop.   Pulmonary:      Effort: Pulmonary effort is normal. No respiratory distress.      Breath sounds: Normal breath sounds. No wheezing, rhonchi or rales.      Comments: Room air  Abdominal:      General: Bowel sounds are normal. There is no distension.      Palpations: Abdomen is soft.      Tenderness: There is no abdominal tenderness. There is no guarding.    Musculoskeletal:      Right lower leg: No edema.      Left lower leg: No edema.   Skin:     General: Skin is warm and dry.   Neurological:      Mental Status: He is alert and oriented to person, place, and time.      Cranial Nerves: Cranial nerve deficit (R facial droop) present.      Sensory: No sensory deficit.      Motor: Weakness (RUE and RLE weakness) present.   Psychiatric:         Speech: Speech is slurred.       Significant Labs: All pertinent labs within the past 24 hours have been reviewed.  BMP:   Recent Labs   Lab 10/22/22  0628   GLU 80      K 4.4   *   CO2 20*   BUN 26*   CREATININE 1.6*   CALCIUM 9.3   MG 2.3       CBC:   Recent Labs   Lab 10/22/22  0628   WBC 6.02   HGB 15.2   HCT 48.2            Significant Imaging: I have reviewed all pertinent imaging results/findings within the past 24 hours.

## 2022-10-23 NOTE — PLAN OF CARE
Problem: SLP  Goal: SLP Goal  Description: STGS  1. Pt will perform multiple step commands regarding before and after with 90% acc. -Discont 10/14  2. Pt will perform divergent naming tasks with min assist- Discont 10/14 UPDATE: Pt will name word given 3 descriptors with 80% acc.   3. Pt will delayed recall tasks with min assist. -Discont  4. Pt will follow 1-step commands with 80% acc, min A.   5. Pt will name objects with 80% acc, min A.   6. Pt will orient to place and date with min A.  7. Pt will tolerate mechanical soft diet with thin liquids w/o overt s/s of aspiration. (Discontinued 10/17/22)  8. Pt will tolerate pureed diet with nectar thick liquids w/o overt s/s of aspiration.  Outcome: Ongoing, Progressing     Re-assessed pt's swallow at bedside. Rec: pureed diet with nectar thick liquids & crushed meds.

## 2022-10-23 NOTE — PT/OT/SLP EVAL
Speech Language Pathology Re-Evaluation  Bedside Swallow    Patient Name:  Leonides Burns   MRN:  [id number]  Admitting Diagnosis: CVA (cerebral vascular accident)    Recommendations:                 General Recommendations:  Dysphagia therapy and Speech/language therapy  Diet recommendations:  Puree Diet - IDDSI Level 4, Mildly thick/Nectar thick liquids - IDDSI Level 2   Aspiration Precautions:    General Precautions: Standard, aspiration, pureed diet, nectar thick  Communication strategies:  provide increased time to answer, provide yes/no during moments of anomia    History:     Past Medical History:   Diagnosis Date    Disorder of kidney and ureter     Elevated PSA     Glaucoma     Hyperlipidemia     Hypertension     Psoriasis        Past Surgical History:   Procedure Laterality Date    FUNCTIONAL ENDOSCOPIC SINUS SURGERY (FESS) USING COMPUTER-ASSISTED NAVIGATION N/A 2/28/2019    Procedure: FESS, USING COMPUTER-ASSISTED NAVIGATION (ADD ON );  Surgeon: Mikael Serrano MD;  Location: [facility];  Service: ENT;  Laterality: N/A;  (ADD ON )    NASAL SEPTOPLASTY N/A 2/28/2019    Procedure: SEPTOPLASTY, NOSE;  Surgeon: Mikael Serrano MD;  Location: [facility];  Service: ENT;  Laterality: N/A;     Modified Barium Swallow: none on file    Chest X-Rays: 10/9/22: Heart size is not enlarged.  No pleural effusion.  Calcification present along the wall of the aorta.  No focal consolidation.    Prior diet: mechanical soft/TL during hospital stay until 10/22/22 when pt was made NPO 2/2 choking episode on meds    Subjective   Spoke with Pt's RN Steven who reported pt was made NPO on 10/22/22 2/2 choking episode while taking whole oral meds. Pt was minimally verbal during swallow re-eval and appeared extremely lethargic requiring verbal cues to remain awake.    Patient goals: unable to state at this time.     Pain/Comfort:  Pain Rating 1: 0/10    Respiratory Status: Room air    Objective:     Oral Musculature  Evaluation  Oral Musculature: general weakness, gross asymmetry present, right weakness, facial asymmetry present  Dentition: scattered dentition  Secretion Management: adequate  Mucosal Quality: coated tongue  Mandibular Strength and Mobility: WFL  Oral Labial Strength and Mobility: impaired retraction, impaired pursing, impaired coordination  Lingual Strength and Mobility: impaired strength, impaired left lateral movement  Velar Elevation: impaired  Buccal Strength and Mobility: impaired  Voice Prior to PO Intake: low volume; clear quality    Bedside Swallow Eval:   Consistencies Assessed:  Thin liquids via spoon x3 trials presented by SLP  [place] thick liquids via straw x5 trials  Puree x3 trials    Oral Phase:   Decreased coordination with pureed  Excess chewing  Oral residue  Slow oral transit time  Tongue pumping    Pharyngeal Phase:   coughing/choking with thin liquids  decreased hyolaryngeal excursion to palpation across trials  delayed swallow initiation across trials  multiple spontaneous swallows with pureed    Compensatory Strategies  None    Treatment: Rec: pureed with Nectar thick liquids & crushed meds only when awake/alert  Please note silent aspiration cannot be ruled out at bedside.    Education: Patient educated on aspiration precautions (1 bite/sip at a time, Alternating bites/sips, Assistance with meals and Assistance with thickening liquids, Double swallow with each bite/sip, Feed only when awake/alert, HOB to 90 degrees, Meds crushed in puree, Monitor for s/s of aspiration, Remain upright 30 minutes post meal, and Small bites/sips)    LAURO Harris & Dr. Turner were notified regarding diet recs.   Pt educated on thickening liquids to nectar consistency to help prevent aspiration. Pt will require reinforcement on education 2/2 decreased cognitive status at this time.    Handouts attached to pt's chart re: diet recs.      Assessment:     Leonides Burns is a 77 y.o. male with a dx of CVA he presents  with mod-severe cognitive linguistic deficits and oral dysphagia which are significantly worse than initial ST eval and have fluctuated during hospital stay.     Goals:   Multidisciplinary Problems       SLP Goals          Problem: SLP    Goal Priority Disciplines Outcome   SLP Goal    Low SLP Ongoing, Progressing   Description: STGS  1. Pt will perform multiple step commands regarding before and after with 90% acc. -Discont 10/14  2. Pt will perform divergent naming tasks with min assist- Discont 10/14 UPDATE: Pt will name word given 3 descriptors with 80% acc.   3. Pt will delayed recall tasks with min assist. -Discont  4. Pt will follow 1-step commands with 80% acc, min A.   5. Pt will name objects with 80% acc, min A.   6. Pt will orient to place and date with min A.  7. Pt will tolerate mechanical soft diet with thin liquids w/o overt s/s of aspiration. (Discontinued 10/17/22)  8. Pt will tolerate pureed diet with nectar thick liquids w/o overt s/s of aspiration.                       Plan:     Patient to be seen:  5 x/week   Plan of Care expires:  11/04/22  Plan of Care reviewed with:  patient   SLP Follow-Up:  Yes       Discharge recommendations:  rehabilitation facility   Barriers to Discharge:  None    Time Tracking:     SLP Treatment Date:   10/23/22  Speech Start Time:  0932  Speech Stop Time:  0957     Speech Total Time (min):  25 min    Billable Minutes: Re-eval 16 min and Self Care/Home Management Training 9 min    10/23/2022

## 2022-10-23 NOTE — PT/OT/SLP PROGRESS
Occupational Therapy   Treatment    Name: Leonides Burns  MRN: [id number]  Admitting Diagnosis:  CVA (cerebral vascular accident)       Recommendations:     Discharge Recommendations: rehabilitation facility  Discharge Equipment Recommendations:   (TBD)  Barriers to discharge:   (pt was MOD I and living alone PTA. now, acute CVA with impairments with all ADLs and all aspects of functional mobility. pt at high risk of falls, unplanned readmission, and morbidity if d/c home at this time)    Assessment:     Leonides Burns is a 77 y.o. male with a medical diagnosis of CVA (cerebral vascular accident). Performance deficits affecting function are weakness, decreased upper extremity function, decreased ROM, impaired endurance, impaired balance, impaired coordination, decreased lower extremity function, impaired joint extensibility, decreased safety awareness, impaired fine motor, pain, impaired cognition, impaired self care skills, impaired functional mobility, abnormal tone.     Pt asleep upon arrival, but showed increased engagement with functional activities with prompting/cueing. RUE 1/5 globally today. Pt grimaced/ appearing in pain with attempt to provide gentle stretch to RLE with flexor tone so OT stopped. Pt unable to verbalize any words today. Pt made eye contact on command    Rehab Prognosis:  Good; patient would benefit from acute skilled OT services to address these deficits and reach maximum level of function.       Plan:     Patient to be seen  (5-6x/week) to address the above listed problems via self-care/home management, neuromuscular re-education, therapeutic activities, therapeutic exercises  Plan of Care Expires: 10/28/22  Plan of Care Reviewed with: patient    Subjective     Chief complaint: unable to verbalize; didn't appear in distress   Patient/family comments/ goals: participatory with session     Pain/Comfort:  Pain Rating 1:  (grimaced in pain with attempt at gentle stretch to RLE)  Pain Addressed  "1: Cessation of Activity    Objective:     Communicated with: nurseSteven, prior to session.  Patient found HOB elevated with bed alarm (padding to B/L HOB side railings) upon OT entry to room.    General Precautions: Standard, airborne, nectar thick, seizure, pacemaker, fall, aphasia   Orthopedic Precautions:N/A   Braces: N/A  Respiratory Status: Room air     Occupational Performance:     [facility] 6 Click ADL: 11    Treatment & Education:  Pt re-educated on OT role/POC. Re-oriented to time/place  TV off and door closed to reduce distractions for session.   Gentle static stretch pre-and post- session 2x~1 min to R shoulder flexion to ~ partial range, 2x~1 min shoulder horizontal abduction   PROM with short cueing of "up" "down" then "in" "out" in attempts for active engagement with AAROM of movements. Cueing for pt to visually attend to RUE with movements  Increased time with tactile cueing for attempt to palpate RUE  strength, but 0/5 palpated today with multiple efforts.   PROM internal/external rotation with RUE supported. Pt able to complete less than partial range AROM of internal rotation 1x, but unable to complete again on command.  X30 grasp and release of ball with LUE with OT moving target from midline to L side to R side. Increased time with prompting for visual scanning. Pt able to increase AROM range as reps carried on.       Patient left  HOB elevated with RUE elevated on wedge/pillow and HOB >45*  with all lines intact, call button in reach, bed alarm on, nurseSteven, notified, and all needs within reach; door left open. Attempted to don L pressure relief boot, but pt attempting to take it off so OT removed. Unable to don R 2* flexor tone/visibly in pain with movement. TV on, blinds opened, door left open, lights on.      GOALS:   Multidisciplinary Problems       Occupational Therapy Goals          Problem: Occupational Therapy    Goal Priority Disciplines Outcome Interventions   Occupational " Therapy Goal     OT, PT/OT Ongoing, Progressing    Description: Goals to be met by: 10/24/22    Patient will increase functional independence with ADLs by performing:    UE Dressing with Modified [place].  LE Dressing with Modified [place].  Grooming while standing at sink with Modified [place].  Toileting from toilet with Modified [place] for hygiene and clothing management.   Supine to sit with Modified [place].  Step transfer with Modified [place]  Toilet transfer to toilet with Modified [place].  Upper extremity exercise program x15 reps per handout, with independence.                         Time Tracking:     OT Date of Treatment: 10/23/22  OT Start Time: 1701  OT Stop Time: 1725  OT Total Time (min): 24 min    Billable Minutes:Therapeutic Activity 12 min  Neuromuscular Re-education 12 min  Total Time 24 min    OT/CHANDNI: OT     CHANDNI Visit Number: 0    10/23/2022

## 2022-10-23 NOTE — PROGRESS NOTES
[facility] Medicine  Progress Note    Patient Name: Leonides Burns  MRN: [id number]  Patient Class: IP- Inpatient   Admission Date: 10/9/2022  Length of Stay: 12 days  Attending Physician: Juventino Turner MD  Primary Care Provider: Suzette Akhtar MD        Subjective:     Principal Problem:CVA (cerebral vascular accident)        HPI:  Leonides Burns 77 y.o. male with CKD, HTN, tobacco abuse presents to the hospital with a chief complaint of right leg weakness.  He reports 2 days of right lower extremity weakness and discoordination and double vision.  States the symptoms have been constant without aggravating factors.  He sometimes feels that improved with lying flat.  He reports he previously had stop aspirin due to severe and recurrent epistaxis.  He denies fevers chest pain shortness breast nausea vomiting abdominal pain leg swelling syncope numbness weakness of an arm incontinence weakness of his left leg.    In the ED, creatinine 2.1 CT head without acute abnormality, chest x-ray without focal consolidation afebrile without leukocytosis COVID negative LDL 80.6 TSH within normal limits.      Overview/Hospital Course:  Mr Leonides Burns is a 77 y.o. man admitted with R leg weakness secondary to acute punctate left parasagittal frontal lobe. PT/OT/SLP consulted. Echo and carotids unremarkable. Neurology consulted.  Patient continued on ASA, added plavix.  His Statin increased to 40 mg daily. Resumed BP medications. Plan was inpatient rehab, but he then had seizure activity on 10/13, prompting step up to ICU. Started keppra. He had difficulty with aspiration events. Speech therapy following; now safe for mechanical soft diet and thin liquids. He has been working with PT, OT. He is now medically stable for discharge to inpatient rehab when arranged.       Interval History: no new events overnight.    Review of Systems   HENT:  Negative for ear discharge and ear pain.    Eyes:   Negative for discharge and itching.   Endocrine: Negative for cold intolerance and heat intolerance.   Neurological:  Negative for seizures and syncope.   Objective:     Vital Signs (Most Recent):  Temp: 98.3 °F (36.8 °C) (10/23/22 1123)  Pulse: 74 (10/23/22 1309)  Resp: 16 (10/23/22 1309)  BP: (!) 189/85 (10/23/22 1123)  SpO2: 95 % (10/23/22 1309)   Vital Signs (24h Range):  Temp:  [97.8 °F (36.6 °C)-98.5 °F (36.9 °C)] 98.3 °F (36.8 °C)  Pulse:  [64-80] 74  Resp:  [16-22] 16  SpO2:  [92 %-98 %] 95 %  BP: (123-210)/(77-98) 189/85     Weight: 96.4 kg (212 lb 8.4 oz)  Body mass index is 32.31 kg/m².    Intake/Output Summary (Last 24 hours) at 10/23/2022 1423  Last data filed at 10/23/2022 1200  Gross per 24 hour   Intake 0 ml   Output --   Net 0 ml        Physical Exam  Vitals and nursing note reviewed.   Constitutional:       General: He is not in acute distress.     Appearance: He is obese. He is ill-appearing. He is not toxic-appearing.   HENT:      Head: Normocephalic and atraumatic.      Nose: Nose normal.      Mouth/Throat:      Mouth: Mucous membranes are moist.   Eyes:      General: No scleral icterus.     Extraocular Movements: Extraocular movements intact.      Conjunctiva/sclera: Conjunctivae normal.      Pupils: Pupils are equal, round, and reactive to light.   Cardiovascular:      Rate and Rhythm: Normal rate and regular rhythm.      Pulses: Normal pulses.      Heart sounds: Normal heart sounds. No murmur heard.    No gallop.   Pulmonary:      Effort: Pulmonary effort is normal. No respiratory distress.      Breath sounds: Normal breath sounds. No wheezing, rhonchi or rales.      Comments: Room air  Abdominal:      General: Bowel sounds are normal. There is no distension.      Palpations: Abdomen is soft.      Tenderness: There is no abdominal tenderness. There is no guarding.   Musculoskeletal:      Right lower leg: No edema.      Left lower leg: No edema.   Skin:     General: Skin is warm and dry.    Neurological:      Mental Status: He is alert and oriented to person, place, and time.      Cranial Nerves: Cranial nerve deficit (R facial droop) present.      Sensory: No sensory deficit.      Motor: Weakness (RUE and RLE weakness) present.   Psychiatric:         Speech: Speech is slurred.       Significant Labs: All pertinent labs within the past 24 hours have been reviewed.  BMP:   Recent Labs   Lab 10/22/22  0628   GLU 80      K 4.4   *   CO2 20*   BUN 26*   CREATININE 1.6*   CALCIUM 9.3   MG 2.3       CBC:   Recent Labs   Lab 10/22/22  0628   WBC 6.02   HGB 15.2   HCT 48.2            Significant Imaging: I have reviewed all pertinent imaging results/findings within the past 24 hours.      Assessment/Plan:      * CVA (cerebral vascular accident)  Patient presented with right leg weakness and noted to have acute stroke  asa 81 daily, statin increased to 40 mg daily. Started on Plavix for 21 days.  PT, OT, Speech evaluated  plan inpatient rehab at discharge-- he is medically stable for discharge to inpatient rehab when arranged   intermittent episodes of lucidity and aphasia confusion.   Insurance denied inpatient rehab.  Awaiting appeal.  Patient was previously home alone and independent.  Would greatly benefit from inpatient rehab.      Seizure  2 seizures occurred on 10/13 prompting step up to ICU  Neurology following  on keppra 1g BID  no further seizure activity noted     Obesity (BMI 30.0-34.9)  Body mass index is 32.18 kg/m².  Will recommend diet and lifestyle modifications outpatient    Tobacco dependence  Smokes rare cigarettes per day. Greater than 3 minutes spent counseling patient on dangers of continued tobacco abuse.    Stage 3a chronic kidney disease  Cr on admission of 2.1. Baseline ranges 1.7 to 2.1  Renal dose medications avoid nephrotoxic drugs monitor intake and output  improved and at baseline  Continue to monitor renal function with daily labs    Monitor events that may  lead to decreased renal perfusion (hypovolemia, hypotension, sepsis).   Monitor urine output to assure that no obstruction precipitates worsening in GFR.        Hyperlipidemia  Continue statin      Hypertension  On admit, held home amlodipine and lisinopril for permissive HTN  BP is now elevated- resumed amlodipine and lisinopril  Added coreg  BP is much better controlled now  monitor vitals q4h  SBP goal of <160 in hospital      VTE Risk Mitigation (From admission, onward)         Ordered     enoxaparin injection 40 mg  Daily         10/18/22 1107     IP VTE HIGH RISK PATIENT  Once         10/09/22 2026     Place sequential compression device  Until discontinued         10/09/22 2026                Discharge Planning   DEVIKA: 10/21/2022     Code Status: Full Code   Is the patient medically ready for discharge?:     Reason for patient still in hospital (select all that apply): Pending disposition  Discharge Plan A: Rehab   Discharge Delays: (!) Post-Acute Set-up              Juventino Turner MD  Department of Hospital Medicine   [facility] [place] [facility]

## 2022-10-23 NOTE — PROGRESS NOTES
[facility] - [place] - Telemetry  Neurology  Progress Note    Patient Name: Leonides Burns  MRN: [id number]  Admission Date: 10/9/2022  Hospital Length of Stay: 12 days  Code Status: Full Code   Attending Provider: Juventino Turner MD  Primary Care Physician: Suzette Akhtar MD   Principal Problem:CVA (cerebral vascular accident)    Subjective:     Interval History: 78 y/o male with CKD, HTN, tobacco abuse presents to the hospital with a chief complaint of right leg weakness.  He reports 2 days of right lower extremity weakness and discoordination and double vision.  States the symptoms have been constant without aggravating factors.  He sometimes feels that improved with lying flat.  He reports he previously had stop aspirin due to severe and recurrent epistaxis.  He denies fevers chest pain shortness breast nausea vomiting abdominal pain leg swelling syncope numbness weakness of an arm incontinence weakness of his left leg.     -10/17/22: No new issues. Pt was transferred to ICU a few days back after a possible seizure.     -10/18/22: No seizures reported.    -10/22/22: Pt with worsening speech    Current Neurological Medications:     Current Facility-Administered Medications   Medication Dose Route Frequency Provider Last Rate Last Admin    acetaminophen tablet 500 mg  500 mg Oral Q6H PRN Celia Cabral MD        albuterol sulfate nebulizer solution 2.5 mg  2.5 mg Nebulization Q4H PRN Celia Cabral MD   2.5 mg at 10/21/22 1426    albuterol-ipratropium 2.5 mg-0.5 mg/3 mL nebulizer solution 3 mL  3 mL Nebulization Q6H WAKE Juventino Turner MD   3 mL at 10/23/22 0705    amLODIPine tablet 10 mg  10 mg Oral Daily Celia Cabral MD   10 mg at 10/22/22 0906    aspirin EC tablet 81 mg  81 mg Oral Daily Celia Cabral MD   81 mg at 10/22/22 0907    atorvastatin tablet 40 mg  40 mg Oral QHS Celia Cabral MD   40 mg at 10/21/22 2130    carvediloL tablet 25 mg  25 mg Oral BID Celia Cabral MD   25 mg  at 10/22/22 0907    clopidogreL tablet 75 mg  75 mg Oral Daily Celia Cabral MD   75 mg at 10/22/22 0907    dextrose 10% bolus 125 mL  12.5 g Intravenous PRN Celia Cabral MD        dextrose 10% bolus 250 mL  25 g Intravenous PRN Celia Cabral MD        enoxaparin injection 40 mg  40 mg Subcutaneous Daily Celia Cabral MD   40 mg at 10/22/22 1636    glucagon (human recombinant) injection 1 mg  1 mg Intramuscular PRN Celia Cabral MD        hydrALAZINE injection 10 mg  10 mg Intravenous Q8H PRN Juventino Turner MD   10 mg at 10/22/22 1824    hydrALAZINE tablet 25 mg  25 mg Oral Q8H PRN Celia Cabral MD        insulin aspart U-100 pen 0-5 Units  0-5 Units Subcutaneous QID (AC + HS) PRN Celia Cabral MD        levETIRAcetam in NaCl (iso-os) IVPB 1,000 mg  1,000 mg Intravenous Q12H Juventino Turner MD   Stopped at 10/22/22 2056    lisinopriL tablet 40 mg  40 mg Oral Daily Celia Cabral MD   40 mg at 10/22/22 0907    melatonin tablet 6 mg  6 mg Oral Nightly PRN Celia Cabral MD        senna-docusate 8.6-50 mg per tablet 1 tablet  1 tablet Oral Daily PRN Celia Cabral MD        sodium chloride 0.9% flush 10 mL  10 mL Intravenous PRN Celia Cabral MD   10 mL at 10/16/22 0639       Review of Systems  Constitutional:  Negative for fever.   HENT:  Negative for trouble swallowing.    Eyes:  Negative for photophobia.   Respiratory:  Negative for shortness of breath.    Cardiovascular:  Negative for chest pain.   Gastrointestinal:  Negative for abdominal pain.   Genitourinary:  Negative for flank pain.   Musculoskeletal:  Negative for back pain.   Neurological:  Negative for speech difficulty.   Psychiatric/Behavioral:  Negative for confusion.      Objective:     Vital Signs (Most Recent):  Temp: 98.5 °F (36.9 °C) (10/23/22 0731)  Pulse: 73 (10/23/22 0731)  Resp: 18 (10/23/22 0731)  BP: 123/87 (10/23/22 0731)  SpO2: 96 % (10/23/22 0731) Vital Signs (24h Range):  Temp:  [97.8 °F (36.6  °C)-98.5 °F (36.9 °C)] 98.5 °F (36.9 °C)  Pulse:  [60-80] 73  Resp:  [16-22] 18  SpO2:  [92 %-98 %] 96 %  BP: (123-173)/(68-87) 123/87     Weight: 96.4 kg (212 lb 8.4 oz)  Body mass index is 32.31 kg/m².    Physical Exam  Constitutional:       General: He is not in acute distress.  HENT:      Head: Normocephalic.   Eyes:      General:         Right eye: No discharge.         Left eye: No discharge.   Cardiovascular:      Rate and Rhythm: Normal rate.   Pulmonary:      Effort: No respiratory distress.   Abdominal:      Palpations: Abdomen is soft.   Musculoskeletal:         General: No swelling.      Cervical back: Neck supple.   Skin:     Findings: No rash.   Neurological:      Mental Status: He is oriented to person, place, and time.   Psychiatric:         Speech: Speech normal.         NEUROLOGICAL EXAMINATION:      MENTAL STATUS   Oriented to person, place  Speech: speech is dysarhtric  Level of consciousness: alert     CRANIAL NERVES      CN III, IV, VI   Right pupil: Size: 2 mm. Shape: regular.   Left pupil: Size: 2 mm. Shape: regular.   Nystagmus: none   Conjugate gaze: present     CN V   Right facial sensation deficit: none  Left facial sensation deficit: none     CN VII   Right facial weakness: central  Left facial weakness: none     CN XII   Tongue deviation: none     MOTOR EXAM      Strength   Strength 5/5 except as noted.        RLE 3/5               Significant Labs: CBC:   Recent Labs   Lab 10/22/22  0628   WBC 6.02   HGB 15.2   HCT 48.2        CMP:   Recent Labs   Lab 10/22/22  0628   GLU 80      K 4.4   *   CO2 20*   BUN 26*   CREATININE 1.6*   CALCIUM 9.3   MG 2.3   ANIONGAP 10       Significant Imaging: I have reviewed all pertinent imaging results/findings within the past 24 hours.    Head CT  Impression:     When compared to prior studies there appears to be more prominent decreased attenuation within the superomedial left frontal lobe suggesting extension of the chronic infarct  with more recent superimposed acute ischemic injury.  MR imaging would be helpful for confirmation as clinically warranted.  No intracranial hemorrhage or mass effect        Electronically signed by: Biju Estrada  Date:                                            10/22/2022  Time:                                           10:49    Assessment and Plan:     76 y/o male with acute stroke     Stroke: small stroke on paramedian left frontal lobe. PAM territory. US of carotids with no significant stenosis. No LAE on echo. Worsening of symptoms likely due to evolution of stroke on PAM territory. See head CT scan from today.  ASA 81 mg daily and clopidogrel 75 mg daily x 21 days is recommended.  Statin.  PT/OT. Inpatient rehab.  OK for BP control.  Seizure: no other events  Levetiracetam 1000 mg BID.    Active Diagnoses:    Diagnosis Date Noted POA    PRINCIPAL PROBLEM:  CVA (cerebral vascular accident) [I63.9] 10/10/2022 Yes    Seizure [R56.9] 10/14/2022 No    Obesity (BMI 30.0-34.9) [E66.9] 07/09/2018 Yes    Tobacco dependence [F17.200] 02/17/2016 Yes     Chronic    Stage 3a chronic kidney disease [N18.31] 06/02/2015 Yes    Hypertension [I10] 02/02/2015 Yes     Chronic    Hyperlipidemia [E78.5] 02/02/2015 Yes     Chronic      Problems Resolved During this Admission:    Diagnosis Date Noted Date Resolved POA    Right leg weakness [R29.898] 10/09/2022 10/11/2022 Yes       VTE Risk Mitigation (From admission, onward)           Ordered     enoxaparin injection 40 mg  Daily         10/18/22 1107     IP VTE HIGH RISK PATIENT  Once         10/09/22 2026     Place sequential compression device  Until discontinued         10/09/22 2026                    Ahmet Lara MD  Neurology  [facility] - Telemetry

## 2022-10-23 NOTE — PLAN OF CARE
Problem: Adult Inpatient Plan of Care  Goal: Plan of Care Review  Outcome: Ongoing, Progressing  Goal: Patient-Specific Goal (Individualized)  Outcome: Ongoing, Progressing  Goal: Absence of Hospital-Acquired Illness or Injury  Outcome: Ongoing, Progressing  Goal: Optimal Comfort and Wellbeing  Outcome: Ongoing, Progressing  Goal: Readiness for Transition of Care  Outcome: Ongoing, Progressing     Problem: Adjustment to Illness (Stroke, Ischemic/Transient Ischemic Attack)  Goal: Optimal Coping  Outcome: Ongoing, Progressing     Problem: Cerebral Tissue Perfusion (Stroke, Ischemic/Transient Ischemic Attack)  Goal: Optimal Cerebral Tissue Perfusion  Outcome: Ongoing, Progressing     Problem: Communication Impairment (Stroke, Ischemic/Transient Ischemic Attack)  Goal: Improved Communication Skills  Outcome: Ongoing, Progressing

## 2022-10-23 NOTE — PROGRESS NOTES
[facility] [place]  Progress Note    Patient Name: Leonides Burns  MRN: [id number]  Admission Date: 10/9/2022  Hospital Length of Stay: 12 days  Code Status: Full Code   Attending Provider: Juventino Turner MD  Primary Care Physician: Suzette Akhtar MD   Principal Problem:CVA (cerebral vascular accident)    Subjective:     Interval History: 76 y/o male with CKD, HTN, tobacco abuse presents to the hospital with a chief complaint of right leg weakness.  He reports 2 days of right lower extremity weakness and discoordination and double vision.  States the symptoms have been constant without aggravating factors.  He sometimes feels that improved with lying flat.  He reports he previously had stop aspirin due to severe and recurrent epistaxis.  He denies fevers chest pain shortness breast nausea vomiting abdominal pain leg swelling syncope numbness weakness of an arm incontinence weakness of his left leg.     -10/17/22: No new issues. Pt was transferred to ICU a few days back after a possible seizure.     -10/18/22: No seizures reported.     -10/22/22: Pt with worsening speech.    -10/23/22: No new issues.    Current Neurological Medications:     Current Facility-Administered Medications   Medication Dose Route Frequency Provider Last Rate Last Admin    acetaminophen tablet 500 mg  500 mg Oral Q6H PRN Celia Cabral MD        albuterol sulfate nebulizer solution 2.5 mg  2.5 mg Nebulization Q4H PRN Celia Cabral MD   2.5 mg at 10/21/22 1426    albuterol-ipratropium 2.5 mg-0.5 mg/3 mL nebulizer solution 3 mL  3 mL Nebulization Q6H [place] Juventino Turner MD   3 mL at 10/23/22 0705    amLODIPine tablet 10 mg  10 mg Oral Daily Celia Cabral MD   10 mg at 10/22/22 0906    aspirin EC tablet 81 mg  81 mg Oral Daily Celia Cabral MD   81 mg at 10/22/22 0907    atorvastatin tablet 40 mg  40 mg Oral QHS Celia Cabral MD   40 mg at 10/21/22 2130    carvediloL tablet 25 mg  25 mg Oral  BID Celia Cabral MD   25 mg at 10/22/22 0907    clopidogreL tablet 75 mg  75 mg Oral Daily Celia Cabral MD   75 mg at 10/22/22 0907    dextrose 10% bolus 125 mL  12.5 g Intravenous PRN Celia Cabral MD        dextrose 10% bolus 250 mL  25 g Intravenous PRN Celia Cabral MD        enoxaparin injection 40 mg  40 mg Subcutaneous Daily Celia Cabral MD   40 mg at 10/22/22 1636    glucagon (human recombinant) injection 1 mg  1 mg Intramuscular PRN Celia Cabral MD        hydrALAZINE injection 10 mg  10 mg Intravenous Q8H PRN Juventino Turner MD   10 mg at 10/22/22 1824    hydrALAZINE tablet 25 mg  25 mg Oral Q8H PRN Celia Cabral MD        insulin aspart U-100 pen 0-5 Units  0-5 Units Subcutaneous QID (AC + HS) PRN Celia Cabral MD        levETIRAcetam in NaCl (iso-os) IVPB 1,000 mg  1,000 mg Intravenous Q12H Juventino Turner  mL/hr at 10/23/22 0919 1,000 mg at 10/23/22 0919    lisinopriL tablet 40 mg  40 mg Oral Daily Celia Cabral MD   40 mg at 10/22/22 0907    melatonin tablet 6 mg  6 mg Oral Nightly PRN Celia Cabral MD        senna-docusate 8.6-50 mg per tablet 1 tablet  1 tablet Oral Daily PRN Celia Cabral MD        sodium chloride 0.9% flush 10 mL  10 mL Intravenous PRN Celia Cabral MD   10 mL at 10/16/22 0639       Review of Systems  Constitutional:  Negative for fever.   HENT:  Negative for trouble swallowing.    Eyes:  Negative for photophobia.   Respiratory:  Negative for shortness of breath.    Cardiovascular:  Negative for chest pain.   Gastrointestinal:  Negative for abdominal pain.   Genitourinary:  Negative for flank pain.   Musculoskeletal:  Negative for back pain.   Neurological:  Negative for speech difficulty.   Psychiatric/Behavioral:  Negative for confusion.      Objective:     Vital Signs (Most Recent):  Temp: 98.5 °F (36.9 °C) (10/23/22 0731)  Pulse: 73 (10/23/22 0731)  Resp: 18 (10/23/22 0731)  BP: 123/87 (10/23/22 0731)  SpO2: 96 %  (10/23/22 0731) Vital Signs (24h Range):  Temp:  [97.8 °F (36.6 °C)-98.5 °F (36.9 °C)] 98.5 °F (36.9 °C)  Pulse:  [60-80] 73  Resp:  [16-22] 18  SpO2:  [92 %-98 %] 96 %  BP: (123-173)/(68-87) 123/87     Weight: 96.4 kg (212 lb 8.4 oz)  Body mass index is 32.31 kg/m².    Physical Exam  Constitutional:       General: He is not in acute distress.  HENT:      Head: Normocephalic.   Eyes:      General:         Right eye: No discharge.         Left eye: No discharge.   Cardiovascular:      Rate and Rhythm: Normal rate.   Pulmonary:      Effort: No respiratory distress.   Abdominal:      Palpations: Abdomen is soft.   Musculoskeletal:         General: No swelling.      Cervical back: Neck supple.   Skin:     Findings: No rash.   Neurological:      Mental Status: He is oriented to person, place, and time.   Psychiatric:         Speech: Speech normal.         NEUROLOGICAL EXAMINATION:      MENTAL STATUS   Oriented to person, place  Speech: speech is dysarhtric  Level of consciousness: alert     CRANIAL NERVES      CN III, IV, VI   Right pupil: Size: 2 mm. Shape: regular.   Left pupil: Size: 2 mm. Shape: regular.   Nystagmus: none   Conjugate gaze: present     CN V   Right facial sensation deficit: none  Left facial sensation deficit: none     CN VII   Right facial weakness: central  Left facial weakness: none     CN XII   Tongue deviation: none     MOTOR EXAM      Strength   Strength 5/5 except as noted.        RLE 3/5             Significant Labs: CBC:   Recent Labs   Lab 10/22/22  0628   WBC 6.02   HGB 15.2   HCT 48.2        CMP:   Recent Labs   Lab 10/22/22  0628   GLU 80      K 4.4   *   CO2 20*   BUN 26*   CREATININE 1.6*   CALCIUM 9.3   MG 2.3   ANIONGAP 10       Significant Imaging: I have reviewed all pertinent imaging results/findings within the past 24 hours.    Assessment and Plan:     76 y/o male with acute stroke     Stroke: stroke on paramedian left frontal lobe, PAM territory, thatt seem to  had evolved causing worsening of symptoms. US of carotids with no significant stenosis. No LAE on echo.   ASA 81 mg daily and clopidogrel 75 mg daily x 21 days is recommended.  Statin.  PT/OT. Inpatient rehab.  OK for BP control.  Seizure: reported seizure in hospital. No other events.  Levetiracetam 1000 mg BID. If somnolence it can be lowered to levetiracetam 500 mg BID    Active Diagnoses:    Diagnosis Date Noted POA    PRINCIPAL PROBLEM:  CVA (cerebral vascular accident) [I63.9] 10/10/2022 Yes    Seizure [R56.9] 10/14/2022 No    Obesity (BMI 30.0-34.9) [E66.9] 07/09/2018 Yes    Tobacco dependence [F17.200] 02/17/2016 Yes     Chronic    Stage 3a chronic kidney disease [N18.31] 06/02/2015 Yes    Hypertension [I10] 02/02/2015 Yes     Chronic    Hyperlipidemia [E78.5] 02/02/2015 Yes     Chronic      Problems Resolved During this Admission:    Diagnosis Date Noted Date Resolved POA    Right leg weakness [R29.898] 10/09/2022 10/11/2022 Yes       VTE Risk Mitigation (From admission, onward)           Ordered     enoxaparin injection 40 mg  Daily         10/18/22 1107     IP VTE HIGH RISK PATIENT  Once         10/09/22 2026     Place sequential compression device  Until discontinued         10/09/22 2026                    Ahmet Lara MD  Neurology  [facility] - [place] - Telemetry

## 2022-10-24 LAB — POCT GLUCOSE: 77 MG/DL (ref 70–110)

## 2022-10-24 PROCEDURE — 94761 N-INVAS EAR/PLS OXIMETRY MLT: CPT

## 2022-10-24 PROCEDURE — 97530 THERAPEUTIC ACTIVITIES: CPT

## 2022-10-24 PROCEDURE — 94640 AIRWAY INHALATION TREATMENT: CPT

## 2022-10-24 PROCEDURE — 25000003 PHARM REV CODE 250: Performed by: HOSPITALIST

## 2022-10-24 PROCEDURE — 92526 ORAL FUNCTION THERAPY: CPT

## 2022-10-24 PROCEDURE — 94760 N-INVAS EAR/PLS OXIMETRY 1: CPT

## 2022-10-24 PROCEDURE — 25000242 PHARM REV CODE 250 ALT 637 W/ HCPCS: Performed by: HOSPITALIST

## 2022-10-24 PROCEDURE — 21400001 HC TELEMETRY ROOM

## 2022-10-24 PROCEDURE — 92507 TX SP LANG VOICE COMM INDIV: CPT

## 2022-10-24 PROCEDURE — 63600175 PHARM REV CODE 636 W HCPCS: Performed by: HOSPITALIST

## 2022-10-24 PROCEDURE — 97112 NEUROMUSCULAR REEDUCATION: CPT

## 2022-10-24 PROCEDURE — 97110 THERAPEUTIC EXERCISES: CPT

## 2022-10-24 RX ORDER — HYDRALAZINE HYDROCHLORIDE 25 MG/1
50 TABLET, FILM COATED ORAL EVERY 12 HOURS
Status: DISCONTINUED | OUTPATIENT
Start: 2022-10-24 | End: 2022-10-27

## 2022-10-24 RX ADMIN — CARVEDILOL 25 MG: 12.5 TABLET, FILM COATED ORAL at 09:10

## 2022-10-24 RX ADMIN — HYDRALAZINE HYDROCHLORIDE 50 MG: 25 TABLET, FILM COATED ORAL at 09:10

## 2022-10-24 RX ADMIN — IPRATROPIUM BROMIDE AND ALBUTEROL SULFATE 3 ML: 2.5; .5 SOLUTION RESPIRATORY (INHALATION) at 07:10

## 2022-10-24 RX ADMIN — CLOPIDOGREL BISULFATE 75 MG: 75 TABLET ORAL at 08:10

## 2022-10-24 RX ADMIN — IPRATROPIUM BROMIDE AND ALBUTEROL SULFATE 3 ML: 2.5; .5 SOLUTION RESPIRATORY (INHALATION) at 08:10

## 2022-10-24 RX ADMIN — AMLODIPINE BESYLATE 10 MG: 5 TABLET ORAL at 08:10

## 2022-10-24 RX ADMIN — MELATONIN TAB 3 MG 6 MG: 3 TAB at 09:10

## 2022-10-24 RX ADMIN — LEVETIRACETAM 1000 MG: 500 TABLET, FILM COATED ORAL at 09:10

## 2022-10-24 RX ADMIN — IPRATROPIUM BROMIDE AND ALBUTEROL SULFATE 3 ML: 2.5; .5 SOLUTION RESPIRATORY (INHALATION) at 01:10

## 2022-10-24 RX ADMIN — LISINOPRIL 40 MG: 20 TABLET ORAL at 08:10

## 2022-10-24 RX ADMIN — LEVETIRACETAM 1000 MG: 500 TABLET, FILM COATED ORAL at 08:10

## 2022-10-24 RX ADMIN — ATORVASTATIN CALCIUM 40 MG: 40 TABLET, FILM COATED ORAL at 09:10

## 2022-10-24 RX ADMIN — ENOXAPARIN SODIUM 40 MG: 100 INJECTION SUBCUTANEOUS at 04:10

## 2022-10-24 RX ADMIN — ASPIRIN 81 MG: 81 TABLET, COATED ORAL at 08:10

## 2022-10-24 RX ADMIN — CARVEDILOL 25 MG: 12.5 TABLET, FILM COATED ORAL at 08:10

## 2022-10-24 NOTE — ASSESSMENT & PLAN NOTE
Patient presented with right leg weakness and noted to have acute stroke  asa 81 daily, statin increased to 40 mg daily. Started on Plavix for 21 days.  PT, OT, Speech evaluated  plan inpatient rehab at discharge-- he is medically stable for discharge to inpatient rehab when arranged   intermittent episodes of lucidity and aphasia confusion.  ST continues to work with patient and adjust diet consistency ad needed.  Insurance denied inpatient rehab.  Awaiting appeal.  Patient was previously home alone and independent.  Would greatly benefit from inpatient rehab.  SNF as plan B

## 2022-10-24 NOTE — PLAN OF CARE
SW receive call from patient's son Santi re: patient's IPR referral and SNF placement. SW explained insurance process to Santi (patient's son). Patient's son (Santi) explained that he spoke with a [facility] representative who did not see any PT/OT notes that recommend for patient to go to IPR. LACY explained pt patient's son that LACY faxed PT/OT/SPL notes to Shanelle Cotto) at [phone] and Larry has access to seeing all notes. LACY explained that SNF would be the next option for patient to receive therapy and provided patient's son Santi with the accepting SNF placements. Patient's son (Santi) stated he will get with his brother to discuss further. LACY asked for patient's son to provide name of choice tomorrow and explained that the next level of care would be HH services. LACY explained HH services to patient's son (Santi) who voiced understanding. SW will continue to follow up with patient's son re: SNF placement.

## 2022-10-24 NOTE — SUBJECTIVE & OBJECTIVE
Interval History: about the same.    Review of Systems   HENT:  Negative for ear discharge and ear pain.    Eyes:  Negative for discharge and itching.   Endocrine: Negative for cold intolerance and heat intolerance.   Neurological:  Negative for seizures and syncope.   Objective:     Vital Signs (Most Recent):  Temp: 97.6 °F (36.4 °C) (10/24/22 1125)  Pulse: (!) 59 (10/24/22 1125)  Resp: 18 (10/24/22 1125)  BP: 132/75 (10/24/22 1125)  SpO2: 97 % (10/24/22 1125)   Vital Signs (24h Range):  Temp:  [97.6 °F (36.4 °C)-98.3 °F (36.8 °C)] 97.6 °F (36.4 °C)  Pulse:  [59-81] 59  Resp:  [16-20] 18  SpO2:  [94 %-98 %] 97 %  BP: (130-198)/(62-99) 132/75     Weight: 96.4 kg (212 lb 8.4 oz)  Body mass index is 32.31 kg/m².    Intake/Output Summary (Last 24 hours) at 10/24/2022 1231  Last data filed at 10/24/2022 0947  Gross per 24 hour   Intake 240 ml   Output --   Net 240 ml        Physical Exam  Vitals and nursing note reviewed.   Constitutional:       General: He is not in acute distress.     Appearance: He is obese. He is ill-appearing. He is not toxic-appearing.   HENT:      Head: Normocephalic and atraumatic.      Nose: Nose normal.      Mouth/Throat:      Mouth: Mucous membranes are moist.   Eyes:      General: No scleral icterus.     Extraocular Movements: Extraocular movements intact.      Conjunctiva/sclera: Conjunctivae normal.      Pupils: Pupils are equal, round, and reactive to light.   Cardiovascular:      Rate and Rhythm: Normal rate and regular rhythm.      Pulses: Normal pulses.      Heart sounds: Normal heart sounds. No murmur heard.    No gallop.   Pulmonary:      Effort: Pulmonary effort is normal. No respiratory distress.      Breath sounds: Normal breath sounds. No wheezing, rhonchi or rales.      Comments: Room air  Abdominal:      General: Bowel sounds are normal. There is no distension.      Palpations: Abdomen is soft.      Tenderness: There is no abdominal tenderness. There is no guarding.    Musculoskeletal:      Right lower leg: No edema.      Left lower leg: No edema.   Skin:     General: Skin is warm and dry.   Neurological:      Mental Status: He is alert and oriented to person, place, and time.      Cranial Nerves: Cranial nerve deficit (R facial droop) present.      Sensory: No sensory deficit.      Motor: Weakness (RUE and RLE weakness) present.   Psychiatric:         Speech: Speech is slurred.       Significant Labs: All pertinent labs within the past 24 hours have been reviewed.  BMP: No results for input(s): GLU, NA, K, CL, CO2, BUN, CREATININE, CALCIUM, MG in the last 48 hours.    CBC: No results for input(s): WBC, HGB, HCT, PLT in the last 48 hours.      Significant Imaging: I have reviewed all pertinent imaging results/findings within the past 24 hours.

## 2022-10-24 NOTE — PROGRESS NOTES
[facility] Medicine  Progress Note    Patient Name: Leonides Burns  MRN: [id number]  Patient Class: IP- Inpatient   Admission Date: 10/9/2022  Length of Stay: 13 days  Attending Physician: Juventino Turner MD  Primary Care Provider: Suzette Akhtar MD        Subjective:     Principal Problem:CVA (cerebral vascular accident)        HPI:  Leonides Burns 77 y.o. male with CKD, HTN, tobacco abuse presents to the hospital with a chief complaint of right leg weakness.  He reports 2 days of right lower extremity weakness and discoordination and double vision.  States the symptoms have been constant without aggravating factors.  He sometimes feels that improved with lying flat.  He reports he previously had stop aspirin due to severe and recurrent epistaxis.  He denies fevers chest pain shortness breast nausea vomiting abdominal pain leg swelling syncope numbness weakness of an arm incontinence weakness of his left leg.    In the ED, creatinine 2.1 CT head without acute abnormality, chest x-ray without focal consolidation afebrile without leukocytosis COVID negative LDL 80.6 TSH within normal limits.      Overview/Hospital Course:  Mr Leonides Burns is a 77 y.o. man admitted with R leg weakness secondary to acute punctate left parasagittal frontal lobe. PT/OT/SLP consulted. Echo and carotids unremarkable. Neurology consulted.  Patient continued on ASA, added plavix.  His Statin increased to 40 mg daily. Resumed BP medications. Plan was inpatient rehab, but he then had seizure activity on 10/13, prompting step up to ICU. Started keppra. He had difficulty with aspiration events. Speech therapy following; now safe for mechanical soft diet and thin liquids. He has been working with PT, OT.   Continued with intermittent dysphagia and ST continues to work with patient and adjust diet consistency as tolerated.  He is now medically stable for discharge to inpatient rehab when arranged.  Insurance  denied inpatient rehab.  This has been appealed.  BP uncontrolled and started on oral Hydralazine.      Interval History: about the same.    Review of Systems   HENT:  Negative for ear discharge and ear pain.    Eyes:  Negative for discharge and itching.   Endocrine: Negative for cold intolerance and heat intolerance.   Neurological:  Negative for seizures and syncope.   Objective:     Vital Signs (Most Recent):  Temp: 97.6 °F (36.4 °C) (10/24/22 1125)  Pulse: (!) 59 (10/24/22 1125)  Resp: 18 (10/24/22 1125)  BP: 132/75 (10/24/22 1125)  SpO2: 97 % (10/24/22 1125)   Vital Signs (24h Range):  Temp:  [97.6 °F (36.4 °C)-98.3 °F (36.8 °C)] 97.6 °F (36.4 °C)  Pulse:  [59-81] 59  Resp:  [16-20] 18  SpO2:  [94 %-98 %] 97 %  BP: (130-198)/(62-99) 132/75     Weight: 96.4 kg (212 lb 8.4 oz)  Body mass index is 32.31 kg/m².    Intake/Output Summary (Last 24 hours) at 10/24/2022 1231  Last data filed at 10/24/2022 0947  Gross per 24 hour   Intake 240 ml   Output --   Net 240 ml        Physical Exam  Vitals and nursing note reviewed.   Constitutional:       General: He is not in acute distress.     Appearance: He is obese. He is ill-appearing. He is not toxic-appearing.   HENT:      Head: Normocephalic and atraumatic.      Nose: Nose normal.      Mouth/Throat:      Mouth: Mucous membranes are moist.   Eyes:      General: No scleral icterus.     Extraocular Movements: Extraocular movements intact.      Conjunctiva/sclera: Conjunctivae normal.      Pupils: Pupils are equal, round, and reactive to light.   Cardiovascular:      Rate and Rhythm: Normal rate and regular rhythm.      Pulses: Normal pulses.      Heart sounds: Normal heart sounds. No murmur heard.    No gallop.   Pulmonary:      Effort: Pulmonary effort is normal. No respiratory distress.      Breath sounds: Normal breath sounds. No wheezing, rhonchi or rales.      Comments: Room air  Abdominal:      General: Bowel sounds are normal. There is no distension.       Palpations: Abdomen is soft.      Tenderness: There is no abdominal tenderness. There is no guarding.   Musculoskeletal:      Right lower leg: No edema.      Left lower leg: No edema.   Skin:     General: Skin is warm and dry.   Neurological:      Mental Status: He is alert and oriented to person, place, and time.      Cranial Nerves: Cranial nerve deficit (R facial droop) present.      Sensory: No sensory deficit.      Motor: Weakness (RUE and RLE weakness) present.   Psychiatric:         Speech: Speech is slurred.       Significant Labs: All pertinent labs within the past 24 hours have been reviewed.  BMP: No results for input(s): GLU, NA, K, CL, CO2, BUN, CREATININE, CALCIUM, MG in the last 48 hours.    CBC: No results for input(s): WBC, HGB, HCT, PLT in the last 48 hours.      Significant Imaging: I have reviewed all pertinent imaging results/findings within the past 24 hours.      Assessment/Plan:      * CVA (cerebral vascular accident)  Patient presented with right leg weakness and noted to have acute stroke  asa 81 daily, statin increased to 40 mg daily. Started on Plavix for 21 days.  PT, OT, Speech evaluated  plan inpatient rehab at discharge-- he is medically stable for discharge to inpatient rehab when arranged   intermittent episodes of lucidity and aphasia confusion.  ST continues to work with patient and adjust diet consistency ad needed.  Insurance denied inpatient rehab.  Awaiting appeal.  Patient was previously home alone and independent.  Would greatly benefit from inpatient rehab.  SNF as plan B      Seizure  2 seizures occurred on 10/13 prompting step up to ICU  Neurology following  on keppra 1g BID  no further seizure activity noted     Obesity (BMI 30.0-34.9)  Body mass index is 32.31 kg/m².  Will recommend diet and lifestyle modifications outpatient    Tobacco dependence  Smokes rare cigarettes per day. Greater than 3 minutes spent counseling patient on dangers of continued tobacco  abuse.    Stage 3a chronic kidney disease  Continue to monitor.  Avoid nephrotoxic medications.        Hyperlipidemia  Continue statin      Hypertension  On admit, held home amlodipine and lisinopril for permissive HTN  BP is now elevated- resumed amlodipine and lisinopril  Added coreg  monitor vitals q4h  SBP goal of <160 in hospital  BP uncontrolled.  Started on Hydralazine.      VTE Risk Mitigation (From admission, onward)         Ordered     enoxaparin injection 40 mg  Daily         10/18/22 1107     IP VTE HIGH RISK PATIENT  Once         10/09/22 2026     Place sequential compression device  Until discontinued         10/09/22 2026                Discharge Planning   DEVIKA: 10/21/2022     Code Status: Full Code   Is the patient medically ready for discharge?:     Reason for patient still in hospital (select all that apply): Pending disposition  Discharge Plan A: Rehab   Discharge Delays: (!) Post-Acute Set-up              Juventino Turner MD  Department of Hospital Medicine   [facility] [place] [facility]

## 2022-10-24 NOTE — PT/OT/SLP PROGRESS
Physical Therapy Treatment    Patient Name:  Leonides Burns   MRN:  [id number]    Recommendations:     Discharge Recommendations:  rehabilitation facility   Discharge Equipment Recommendations:  (Ongoing assessment pending pt progress)     Assessment:     Leonides Burns is a 77 y.o. male admitted with a medical diagnosis of CVA (cerebral vascular accident).  He presents with the following impairments/functional limitations:  weakness, impaired endurance, impaired functional mobility, decreased lower extremity function, decreased upper extremity function .    Rehab Prognosis: Fair; patient would benefit from acute skilled PT services to address these deficits and reach maximum level of function.    Recent Surgery: * No surgery found *      Plan:     During this hospitalization, patient to be seen  (5-6x/wk) to address the identified rehab impairments via gait training, therapeutic activities, therapeutic exercises and progress toward the following goals:    Plan of Care Expires:  10/26/22    Subjective     Chief Complaint: None  Patient/Family Comments/goals: Pt agreeable to treat.  Pain/Comfort:  Pain Rating 1: 0/10      Objective:     Patient found right sidelying with telemetry upon PT entry to room.     General Precautions: Standard, fall   Orthopedic Precautions:N/A   Braces: N/A  Respiratory Status: Room air     Functional Mobility:  Bed Mobility:     Supine to Sit: maximal assistance and of 2 persons  Sit to Supine: maximal assistance and of 2 persons  Transfers:     Sit to Stand:  maximal assistance and of 2 persons with hand-held assist  Balance: Good static sitting; poor standing      AM-PAC 6 CLICK MOBILITY  Turning over in bed (including adjusting bedclothes, sheets and blankets)?: 2  Sitting down on and standing up from a chair with arms (e.g., wheelchair, bedside commode, etc.): 2  Moving from lying on back to sitting on the side of the bed?: 2  Moving to and from a bed to a chair (including a  "wheelchair)?: 2  Need to walk in hospital room?: 1  Climbing 3-5 steps with a railing?: 1  Basic Mobility Total Score: 10       Treatment & Education:  Performed static/dynamic sitting balance EOB.  Pt able to tolerate sitting unsupported.  Attempted standing x1 however, pt unable to come to full stance.    Patient left left sidelying with call button in reach, bed alarm on, and all needs in reach .    GOALS:   Multidisciplinary Problems       Physical Therapy Goals          Problem: Physical Therapy    Goal Priority Disciplines Outcome Goal Variances Interventions   Physical Therapy Goal     PT, PT/OT Ongoing, Progressing     Description: Goals to be met by: 10/14/22     Patient will increase functional independence with mobility by performin. Pt to be Min A with bed mobility.  2. Pt to transfer sit to stand with CGA  3. Pt to ambulate with HW and Min A x 10-15'  4. Pt to be Supervision with written HEP  5. Pt to transfer bed to chair with CGA  6. Pt to ascend/descend 4" curb with RW and Min A  7. Pt will propel W/C with L UE/LE and CGA approx 25'                       Time Tracking:     PT Received On: 10/24/22  PT Start Time: 1406     PT Stop Time: 1426  PT Total Time (min): 20 min     Billable Minutes: Therapeutic Activity 20 (Co-tx with CLAY Luna)    Treatment Type: Treatment  PT/PTA: PT     PTA Visit Number: 0     10/24/2022  "

## 2022-10-24 NOTE — PLAN OF CARE
LACY informed by MD that patient's appeal denied and will have to go to [facility]. MD request to start process for SNF. LACY stated SNF process started and will contact patient's son.     SW attempt to complete LOCET but due to high call value. SW will receive a call back.     LACY spoke with patient's son Leonides re: IPR denial, and SNF placement. LACY explained to patient's son that [employer] denied patient and patient's information will be sent to    [facility]. LACY explained to patient's son that patient SNF is the next level of care. Patient's son asked reason for IPR denial? SW explained that insurance does not have to provide reason for denying IPR. Patient's son (Leonides) stated he will have to talk with his brother before making final decision. LACY voiced understanding. LACY explained he has 4 accepting SNFs. Patient's son (Leonides) stated he has to talk with his brother and contact SW back. SW provide direct contact #.    SW received call back from patient's son Leonides stating he is still waiting for his brother to contact him. LACY voiced understanding and provided patient's son Leonides with the names of the accepting SNFs. LACY explained SNF placement to patient's son Leonides who voiced understanding of SNF placement. LACY also explained if patient's family does not choice Snf then home health would be the next level of care. Patient's son Leonides stated that his father needs more then  services. LACY voiced understanding. LACY explained that family would have to choice from the accepting SNFs. Patient's son stated he will speak with his brother and contact LACY back.     LACY completed Locet and faxed PASRR to [facility] for 142.

## 2022-10-24 NOTE — PLAN OF CARE
"  Problem: Physical Therapy  Goal: Physical Therapy Goal  Description: Goals to be met by: 10/14/22     Patient will increase functional independence with mobility by performin. Pt to be Min A with bed mobility.  2. Pt to transfer sit to stand with CGA  3. Pt to ambulate with HW and Min A x 10-15'  4. Pt to be Supervision with written HEP  5. Pt to transfer bed to chair with CGA  6. Pt to ascend/descend 4" curb with RW and Min A  7. Pt will propel W/C with L UE/LE and CGA approx 25'  Outcome: Ongoing, Progressing   Pt demonstrates good static sitting balance.  "

## 2022-10-24 NOTE — PLAN OF CARE
Improvement in tolerance of thin liquids as compared to yesterday's session, no improvement of severe cognitive linguistic deficits. Continue current diet for now (puree with nectar thick liquids), following closely will need ongoing assessment of swallow.

## 2022-10-24 NOTE — PT/OT/SLP PROGRESS
Speech Language Pathology Treatment    Patient Name:  Leonides Burns   MRN:  [id number]  Admitting Diagnosis: CVA (cerebral vascular accident)    Recommendations:                 General Recommendations:  Dysphagia therapy and Cognitive-linguistic therapy  Diet recommendations:  Puree Diet - IDDSI Level 4, Liquid Diet Level: Mildly thick/Nectar thick liquids - IDDSI Level 2   Aspiration Precautions: 1 bite/sip at a time   General Precautions: Standard, nectar thick  Communication strategies:  gestures and modeling     Subjective   Pt grossly remained non verbal and was distracted by internal stimuli throughout session which represents marked loss of cognitive linguistic skills as compared to initial eval and and even since recent session dated 10/21. Pt with variable lethargy noted throughout.   Patient goals: unable to state at this time nursing reports he does note like thickener.     Pain/Comfort:  Pain Rating 1: 0/10    Respiratory Status: Room air    Objective:     Has the patient been evaluated by SLP for swallowing?   Yes  Keep patient NPO? No     Swallowing:  Pt repositioned upright in bed. Prior to po trials of thin liquids via straw and puree, ST assisted Pt with oral care. Puree X3 revealed increased oral prep time with slow but adequate A-P transport with tongue thrust upon swallow. Pt was able to siphon from straw, intake of 4oz of thin liquids across multiple trails revealed persistent tongue thrust suspected swallow delay however no overt s/s of aspiration for today's session across consistencies.     Cognitive communication:  Pt performed on automatic speech act despite multiple prompts, and verbal cuing. He was unable to follow commands. He did not respond to simple personal y/n questions. He was unable to accurately demonstrate object uses. He was consistently distracted by internal stimuli and was lethargic requiring max cuing maintain alertness throughout session.    Assessment:     Leonides Burns  is a 77 y.o. male with dx of CVA, he presents with oropharyngeal of a yet to be determined severity negatively impacted by poor alertness and severe cognitive linguistic deficits which have drastically worsened since initial eval.     Goals:   Multidisciplinary Problems       SLP Goals          Problem: SLP    Goal Priority Disciplines Outcome   SLP Goal    Low SLP Ongoing, Progressing   Description: STGS  1. Pt will perform multiple step commands regarding before and after with 90% acc. -Discont 10/14  2. Pt will perform divergent naming tasks with min assist- Discont 10/14 UPDATE: Pt will name word given 3 descriptors with 80% acc.   3. Pt will delayed recall tasks with min assist. -Discont  4. Pt will follow 1-step commands with 80% acc, min A.   5. Pt will name objects with 80% acc, min A.   6. Pt will orient to place and date with min A.  7. Pt will tolerate mechanical soft diet with thin liquids w/o overt s/s of aspiration. (Discontinued 10/17/22)  8. Pt will tolerate pureed diet with nectar thick liquids w/o overt s/s of aspiration.                       Plan:     Patient to be seen:  5 x/week   Plan of Care expires:  11/04/22  Plan of Care reviewed with:  patient   SLP Follow-Up:  Yes       Discharge recommendations: SNF  Barriers to Discharge:  None    Time Tracking:     SLP Treatment Date:   10/24/22  Speech Start Time:  1027  Speech Stop Time:  1057     Speech Total Time (min):  30 min    Billable Minutes: Speech Therapy Individual 15 and Treatment Swallowing Dysfunction 15    10/24/2022

## 2022-10-24 NOTE — PT/OT/SLP PROGRESS
"Occupational Therapy   Treatment    Name: Leonides Burns  MRN: [id number]  Admitting Diagnosis:  CVA (cerebral vascular accident)       Recommendations:     Discharge Recommendations: rehabilitation facility  Discharge Equipment Recommendations:   (TBD)  Barriers to discharge:   (pt was MOD I and living alone PTA. now, acute CVA with impairments with all ADLs and all aspects of functional mobility. pt at high risk of falls, unplanned readmission, and morbidity if d/c home at this time)    Assessment:     Leonides Burns is a 77 y.o. male with a medical diagnosis of CVA (cerebral vascular accident). Performance deficits affecting function are weakness, decreased upper extremity function, decreased ROM, impaired endurance, impaired balance, decreased lower extremity function, impaired coordination, decreased safety awareness, visual deficits, impaired fine motor, pain, impaired cognition, impaired self care skills, impaired functional mobility, abnormal tone.     MAX A x2 for sit<>stand with BUE supported. Pt participatory with LUE functional reach for sock seated EOB with CGA for balance. Max cueing for visual scanning to R side    Rehab Prognosis:  Good; patient would benefit from acute skilled OT services to address these deficits and reach maximum level of function.       Plan:     Patient to be seen  (5-6x/week) to address the above listed problems via self-care/home management, neuromuscular re-education, therapeutic activities, therapeutic exercises  Plan of Care Expires: 10/28/22  Plan of Care Reviewed with: patient    Subjective     Chief complaint: grimaced in pain initially with stretching  Patient/family comments/ goals: after max prompting, pt able to say back "bye"     Pain/Comfort:  Pain Rating 1:  (grimaced in pain initially with RUE stretching)  Pain Addressed 1: Reposition, Distraction, Cessation of Activity    Objective:     Communicated with: nurseDominga, prior to session.  Patient found HOB " elevated R sidelying with bed alarm, peripheral IV, pressure relief boots, telemetry (padding to B/L upper side rails) upon OT entry to room.    General Precautions: Standard, fall, aphasia, nectar thick   Orthopedic Precautions:N/A   Braces: N/A  Respiratory Status: Room air     Occupational Performance:     Bed Mobility:    Patient completed Scooting anteriorly with maximal assistance and 2 persons  Patient completed Supine to Sit with total assistance, 2 persons, and HOB elevated  Patient completed Sit to Supine with total assistance and 2 persons   Patient completed Scooting in supine 3x with use of LUE on railing and LLE with minimal assistance   Patient completed Scooting laterally to HOB with MAX A x2 with BUE supported     Functional Mobility/Transfers:  Patient completed Sit <> Stand Transfer with maximal assistance and of 2 persons  with  BUE supported     Activities of Daily Living:  Lower Body Dressing: total assistance for socks  Grooming: pt sat EOB with SBA/CGA while OT washed pt's backside with wipe       AMPAC 6 Click ADL: 10    Treatment & Education:  Pt re-educated on OT role/POC.   Importance of EOB activity with therapy assistance.  Safety during functional mobility    Pt sat EOB for ~15 min with SBA for static sit balance; CGA-MIN A for dynamic sit balance   OT positioned on pt's R side for engagement/awareness of R visual field 2* impaired visual attention and scanning to R side. With max prompting, pt unable to complete cervical rotation to look at target in R visual field. OT then moved to midline then pt able to visualize target. X15 functional reach using LUE to grasp sock at midline, anteriorly to promote trunk flexion, then to pt's R side with MAX prompting and increased time for pt to scan and find target with errors.   Tactile cueing to RUE holding UE at midline to pt to visualize in attempts to palpate  strength. 0/5 palpated. PROM x10 digits   Gentle stretch to RUE in scaption  "with x15 elbow flex/ext PROM; gentle stretch x1 min to: scaption, shoulder flexion, then shoulder horizontal ab/dduction. PROM x20 external/internal rotation while OT stating "in" "out" then x20 PROM for shoulder flex/ext while saying "up" "down". Pt unable to verbalize these directional terms.   Self-care task/functional mobility completed- assistance level noted above   No family present; positive encouragement provided.       Patient left  HOB >45* L sidelying with B/L pressure relief boots in place  with all lines intact, call button in reach, bed alarm on, nurse, Dominga, notified, and all needs met/within reach; blinds opened, lights on, [place] tv show on, and door left open.     GOALS:   Multidisciplinary Problems       Occupational Therapy Goals          Problem: Occupational Therapy    Goal Priority Disciplines Outcome Interventions   Occupational Therapy Goal     OT, PT/OT Ongoing, Progressing    Description: Goals to be met by: 10/24/22    Patient will increase functional independence with ADLs by performing:    UE Dressing with Modified White.  LE Dressing with Modified White.  Grooming while standing at sink with Modified White.  Toileting from toilet with Modified White for hygiene and clothing management.   Supine to sit with Modified White.  Step transfer with Modified White  Toilet transfer to toilet with Modified White.  Upper extremity exercise program x15 reps per handout, with independence.                         Time Tracking:     OT Date of Treatment: 10/24/22  OT Start Time: 1406  OT Stop Time: 1431  OT Total Time (min): 25 min    Billable Minutes:Therapeutic Exercise 12 min  Neuromuscular Re-education 13 min  Total Time 25 min    OT/CHANDNI: OT     CHANDNI Visit Number: 0    10/24/2022  "

## 2022-10-24 NOTE — PLAN OF CARE
Problem: Adult Inpatient Plan of Care  Goal: Plan of Care Review  Outcome: Ongoing, Progressing  Goal: Patient-Specific Goal (Individualized)  Outcome: Ongoing, Progressing  Goal: Absence of Hospital-Acquired Illness or Injury  Outcome: Ongoing, Progressing  Goal: Optimal Comfort and Wellbeing  Outcome: Ongoing, Progressing  Goal: Readiness for Transition of Care  Outcome: Ongoing, Progressing     Problem: Adjustment to Illness (Stroke, Ischemic/Transient Ischemic Attack)  Goal: Optimal Coping  Outcome: Ongoing, Progressing     Problem: Bowel Elimination Impaired (Stroke, Ischemic/Transient Ischemic Attack)  Goal: Effective Bowel Elimination  Outcome: Ongoing, Progressing     Problem: Communication Impairment (Stroke, Ischemic/Transient Ischemic Attack)  Goal: Improved Communication Skills  Outcome: Ongoing, Progressing   No falls, safety maintained, turn q 2 for skin breakdown prevention, EOH green cushion boots in use for heel protection. Patient assisted for meal set-up and encouraged with eating, using L hand to eat now. Puree diet, no coughing or drooling or food-pocketing noted during meals. No seizure activity today. Incontinent of urine, no BM today.

## 2022-10-24 NOTE — PLAN OF CARE
Problem: Occupational Therapy  Goal: Occupational Therapy Goal  Description: Goals to be met by: 10/24/22    Patient will increase functional independence with ADLs by performing:    UE Dressing with Modified [place].  LE Dressing with Modified Spencer.  Grooming while standing at sink with Modified Spencer.  Toileting from toilet with Modified Spencer for hygiene and clothing management.   Supine to sit with Modified Spencer.  Step transfer with Modified Spencer  Toilet transfer to toilet with Modified Spencer.  Upper extremity exercise program x15 reps per handout, with independence.    Outcome: Ongoing, Progressing     MAX A x2 for sit<>stand with BUE supported. Pt participatory with LUE functional reach for sock seated EOB with CGA for balance. Max cueing for visual scanning to R side.

## 2022-10-24 NOTE — PLAN OF CARE
LACY receive chat from Alexandra Roberson) requesting for updated PT/OT notes to be faxed to [phone].   LACY faxed updated PT/OT notes to Shanelle Cotto) at [phone].

## 2022-10-24 NOTE — ASSESSMENT & PLAN NOTE
On admit, held home amlodipine and lisinopril for permissive HTN  BP is now elevated- resumed amlodipine and lisinopril  Added coreg  monitor vitals q4h  SBP goal of <160 in hospital  BP uncontrolled.  Started on Hydralazine.

## 2022-10-25 PROCEDURE — 30200315 PPD INTRADERMAL TEST REV CODE 302: Performed by: INTERNAL MEDICINE

## 2022-10-25 PROCEDURE — 97112 NEUROMUSCULAR REEDUCATION: CPT

## 2022-10-25 PROCEDURE — 25000003 PHARM REV CODE 250: Performed by: HOSPITALIST

## 2022-10-25 PROCEDURE — 97530 THERAPEUTIC ACTIVITIES: CPT

## 2022-10-25 PROCEDURE — 99232 PR SUBSEQUENT HOSPITAL CARE,LEVL II: ICD-10-PCS | Mod: ,,, | Performed by: PSYCHIATRY & NEUROLOGY

## 2022-10-25 PROCEDURE — 25000242 PHARM REV CODE 250 ALT 637 W/ HCPCS: Performed by: HOSPITALIST

## 2022-10-25 PROCEDURE — 21400001 HC TELEMETRY ROOM

## 2022-10-25 PROCEDURE — 92507 TX SP LANG VOICE COMM INDIV: CPT

## 2022-10-25 PROCEDURE — 63600175 PHARM REV CODE 636 W HCPCS: Performed by: HOSPITALIST

## 2022-10-25 PROCEDURE — 86580 TB INTRADERMAL TEST: CPT | Performed by: INTERNAL MEDICINE

## 2022-10-25 PROCEDURE — 99232 SBSQ HOSP IP/OBS MODERATE 35: CPT | Mod: ,,, | Performed by: PSYCHIATRY & NEUROLOGY

## 2022-10-25 PROCEDURE — 94760 N-INVAS EAR/PLS OXIMETRY 1: CPT

## 2022-10-25 PROCEDURE — 92526 ORAL FUNCTION THERAPY: CPT

## 2022-10-25 PROCEDURE — 94640 AIRWAY INHALATION TREATMENT: CPT

## 2022-10-25 RX ADMIN — AMLODIPINE BESYLATE 10 MG: 5 TABLET ORAL at 08:10

## 2022-10-25 RX ADMIN — CARVEDILOL 25 MG: 12.5 TABLET, FILM COATED ORAL at 08:10

## 2022-10-25 RX ADMIN — ENOXAPARIN SODIUM 40 MG: 100 INJECTION SUBCUTANEOUS at 04:10

## 2022-10-25 RX ADMIN — HYDRALAZINE HYDROCHLORIDE 50 MG: 25 TABLET, FILM COATED ORAL at 10:10

## 2022-10-25 RX ADMIN — IPRATROPIUM BROMIDE AND ALBUTEROL SULFATE 3 ML: 2.5; .5 SOLUTION RESPIRATORY (INHALATION) at 08:10

## 2022-10-25 RX ADMIN — IPRATROPIUM BROMIDE AND ALBUTEROL SULFATE 3 ML: 2.5; .5 SOLUTION RESPIRATORY (INHALATION) at 07:10

## 2022-10-25 RX ADMIN — LEVETIRACETAM 1000 MG: 500 TABLET, FILM COATED ORAL at 08:10

## 2022-10-25 RX ADMIN — CARVEDILOL 25 MG: 12.5 TABLET, FILM COATED ORAL at 10:10

## 2022-10-25 RX ADMIN — IPRATROPIUM BROMIDE AND ALBUTEROL SULFATE 3 ML: 2.5; .5 SOLUTION RESPIRATORY (INHALATION) at 02:10

## 2022-10-25 RX ADMIN — TUBERCULIN PURIFIED PROTEIN DERIVATIVE 5 UNITS: 5 INJECTION, SOLUTION INTRADERMAL at 12:10

## 2022-10-25 RX ADMIN — LISINOPRIL 40 MG: 20 TABLET ORAL at 08:10

## 2022-10-25 RX ADMIN — LEVETIRACETAM 1000 MG: 500 TABLET, FILM COATED ORAL at 10:10

## 2022-10-25 RX ADMIN — HYDRALAZINE HYDROCHLORIDE 50 MG: 25 TABLET, FILM COATED ORAL at 08:10

## 2022-10-25 RX ADMIN — ASPIRIN 81 MG: 81 TABLET, COATED ORAL at 08:10

## 2022-10-25 RX ADMIN — ATORVASTATIN CALCIUM 40 MG: 40 TABLET, FILM COATED ORAL at 10:10

## 2022-10-25 RX ADMIN — CLOPIDOGREL BISULFATE 75 MG: 75 TABLET ORAL at 08:10

## 2022-10-25 NOTE — PT/OT/SLP PROGRESS
Speech Language Pathology Treatment    Patient Name:  Leonides Burns   MRN:  [id number]  Admitting Diagnosis: CVA (cerebral vascular accident)    Recommendations:                 General Recommendations:  Modified barium swallow study (order obtained scheduled for tomorrow morning)  Diet recommendations:  Puree Diet - IDDSI Level 4, Liquid Diet Level: Mildly thick/Nectar thick liquids - IDDSI Level 2   Aspiration Precautions: 1 bite/sip at a time, Assistance with thickening liquids, Feed only when awake/alert, and Meds crushed in puree   General Precautions: Standard, nectar thick  Communication strategies:  yes/no questions only    Subjective   Pt initially with increased alertness and interactivity as compared to previous session however progressive fatigue noted with diminishing returns to tasks. Pt remains easily distracted by internal stimuli.   Patient goals: unable to state    Pain/Comfort:  Pain Rating 1: 0/10    Respiratory Status: Room air    Objective:     Has the patient been evaluated by SLP for swallowing?   Yes  Keep patient NPO? No     Cognitive Communication  Pt performed automatic speech acts with min assist in the form of multiple repetitions. Automatic speech was dysarthric approximately 65% intelligible in known context. Pt answered 80% of simple personal yes no question given multiple repetitions error were c/b lack of response. Pt was unable to perform confrontational naming tasks or one step motoric commands.     Swallowing  Thin liquids X3 via straw revealed suspected swallow delay tongue thrust and delayed coughing and choking. Pt was unable to follow commands to performed OMES after model. Session was discontinued secondary to increased lethargy. ST contact MD via secure chat and obtained order for modified barium swallow, to be performed tomorrow pending Pt's candidacy.)     Assessment:     Leonides Burns is a 77 y.o. male with dx of CVA he presents with oropharyngeal dysphagia of a yet to  be determined severity and severe cognitive linguistic deficits.     Goals:   Multidisciplinary Problems       SLP Goals          Problem: SLP    Goal Priority Disciplines Outcome   SLP Goal    Low SLP Ongoing, Progressing   Description: STGS  1. Pt will perform multiple step commands regarding before and after with 90% acc. -Discont 10/14  2. Pt will perform divergent naming tasks with min assist- Discont 10/14 UPDATE: Pt will name word given 3 descriptors with 80% acc.   3. Pt will delayed recall tasks with min assist. -Discont  4. Pt will follow 1-step commands with 80% acc, min A.   5. Pt will name objects with 80% acc, min A.   6. Pt will orient to place and date with min A.  7. Pt will tolerate mechanical soft diet with thin liquids w/o overt s/s of aspiration. (Discontinued 10/17/22)  8. Pt will tolerate pureed diet with nectar thick liquids w/o overt s/s of aspiration.  9. Pt will participate in modified barium swallow study                       Plan:     Patient to be seen:  5 x/week   Plan of Care expires:  11/04/22  Plan of Care reviewed with:  patient   SLP Follow-Up:  Yes       Discharge recommendations:  SNF  Barriers to Discharge:  None    Time Tracking:     SLP Treatment Date:   10/25/22  Speech Start Time:  1318  Speech Stop Time:  1341     Speech Total Time (min):  23 min    Billable Minutes: Treatment Swallowing Dysfunction 15 and Self Care/Home Management Training 8    10/25/2022

## 2022-10-25 NOTE — PLAN OF CARE
"  Problem: Physical Therapy  Goal: Physical Therapy Goal  Description: Goals to be met by: 10/14/22     Patient will increase functional independence with mobility by performin. Pt to be Min A with bed mobility.  2. Pt to transfer sit to stand with CGA  3. Pt to ambulate with HW and Min A x 10-15'  4. Pt to be Supervision with written HEP  5. Pt to transfer bed to chair with CGA  6. Pt to ascend/descend 4" curb with RW and Min A  7. Pt will propel W/C with L UE/LE and CGA approx 25'  Outcome: Ongoing, Progressing   Pt more alert today and able to follow instructions with minimal cues.  "

## 2022-10-25 NOTE — PLAN OF CARE
SW received 142 from [facility].   LACY left voice message for patient's son Santi requesting return call re: SNF choice decision.     SW received call from patient's son (Santi) re: SNF placement. Patient's son stated he did research on the accepting SNF placements, and he did not like the reviews online for [facility], [facility] [place]  and [facility]. Patient's son asked if there are more SNF choices? SW explained to patient's son (Santi) that Leonides (patient's other son) request for referral to be sent on the [facility]. Patient's son (Santi) asked how SW found SNF choices for patient? SW explained how patient's son could find in network SNFs for [employer], and patient's son reviewed in network [facility] SNF choice online. Patient's son Santi asks for SW to faxed patient SNF referral to [facility], [facility] and [facility]. Patient's son (Santi) stated he does not want patient to any where that has bad reviews. SW voiced understanding to patient's son. SW also explained to patient's son (Santi) the long patient is in the hospital insurance may denied SNF placement. Patient's son stated how so when there is an accepting SNF? SW explained to patient's son that just because a SNF accepts patient does not mean insurance will approve SNF placement being that patient is still in the hospital getting skilled services which is taking away from skilled days. Patient's son (Santi) stated he needs to review these other SNFs on the [facility] and asked how long will it take for additional choices to make decision? SW stated should have a decision today or by tomorrow morning. Patient reviewed additional SNF ([facility], [facility], [facility], [facility]). Patient stated [facility] and [facility] does not have many bad reviews. Patient's son asks for an update from SNFs.     LACY spoke with Francois ([facility]) notifying her of patient's SNF referral.   LACY  spoke with Bekah ([facility]) notifying her of patient's SNF referral.   LACY spoke with Andrew ([facility]) notifying her of patient's SNF referral.   LACY was not able to leave voice message for Renny ([facility]).   LACY spoke with Pat ([facility]) who informed SW that SNF is not in network with [facility].    LACY received call from Stefany ([place]) that she is sending patient's information to nurse for review and will contact LACY back shortly.

## 2022-10-25 NOTE — PROGRESS NOTES
[facility] - Telemetry  Neurology  Progress Note    Patient Name: Leonides Burns  MRN: [id number]  Admission Date: 10/9/2022  Hospital Length of Stay: 14 days  Code Status: Full Code   Attending Provider: Nain Oneill MD  Primary Care Physician: Suzette Akhtar MD   Principal Problem:CVA (cerebral vascular accident)    Subjective:     Interval History: 76 y/o male with CKD, HTN, tobacco abuse presents to the hospital with a chief complaint of right leg weakness.  He reports 2 days of right lower extremity weakness and discoordination and double vision.  States the symptoms have been constant without aggravating factors.  He sometimes feels that improved with lying flat.  He reports he previously had stop aspirin due to severe and recurrent epistaxis.  He denies fevers chest pain shortness breast nausea vomiting abdominal pain leg swelling syncope numbness weakness of an arm incontinence weakness of his left leg.     -10/17/22: No new issues. Pt was transferred to ICU a few days back after a possible seizure.     -10/18/22: No seizures reported.     -10/22/22: Pt with worsening speech.     -10/23/22: No new issues.    -10/25/22: Pt noted to be less verbally interactive and requiring more assistance in ADL's.    Current Neurological Medications:     Current Facility-Administered Medications   Medication Dose Route Frequency Provider Last Rate Last Admin    acetaminophen tablet 500 mg  500 mg Oral Q6H PRN Celia Cabral MD        albuterol sulfate nebulizer solution 2.5 mg  2.5 mg Nebulization Q4H PRN Celia Cabral MD   2.5 mg at 10/21/22 1426    albuterol-ipratropium 2.5 mg-0.5 mg/3 mL nebulizer solution 3 mL  3 mL Nebulization Q6H WAKE Juventino Turner MD   3 mL at 10/25/22 0810    amLODIPine tablet 10 mg  10 mg Oral Daily Celia Cabral MD   10 mg at 10/25/22 0851    aspirin EC tablet 81 mg  81 mg Oral Daily Celia Cabral MD   81 mg at 10/25/22 0852    atorvastatin tablet 40 mg  40  mg Oral QHS Celia Cabral MD   40 mg at 10/24/22 2123    carvediloL tablet 25 mg  25 mg Oral BID Celia Cabral MD   25 mg at 10/25/22 0851    clopidogreL tablet 75 mg  75 mg Oral Daily Celia Cabral MD   75 mg at 10/25/22 0852    dextrose 10% bolus 125 mL  12.5 g Intravenous PRN Celia Cabral MD        dextrose 10% bolus 250 mL  25 g Intravenous PRN Celia Cabral MD        enoxaparin injection 40 mg  40 mg Subcutaneous Daily Celia Cabral MD   40 mg at 10/24/22 1605    glucagon (human recombinant) injection 1 mg  1 mg Intramuscular PRN Celia Cabral MD        hydrALAZINE injection 10 mg  10 mg Intravenous Q8H PRN Juventino Turner MD   10 mg at 10/23/22 1211    hydrALAZINE tablet 50 mg  50 mg Oral Q12H Juventino Turner MD   50 mg at 10/25/22 0851    insulin aspart U-100 pen 0-5 Units  0-5 Units Subcutaneous QID (AC + HS) PRN Celia Cabral MD        levETIRAcetam tablet 1,000 mg  1,000 mg Oral BID Juventino Turner MD   1,000 mg at 10/25/22 0851    lisinopriL tablet 40 mg  40 mg Oral Daily Celia Cabral MD   40 mg at 10/25/22 0851    melatonin tablet 6 mg  6 mg Oral Nightly PRN Celia Cabral MD   6 mg at 10/24/22 2124    senna-docusate 8.6-50 mg per tablet 1 tablet  1 tablet Oral Daily PRN Celia Cabral MD        sodium chloride 0.9% flush 10 mL  10 mL Intravenous PRN Celia Cabral MD   10 mL at 10/16/22 0639    tuberculin injection 5 Units  5 Units Intradermal Once Nain Oneill MD           Review of Systems   Unable to perform ROS: Patient nonverbal   Objective:     Vital Signs (Most Recent):  Temp: 97.8 °F (36.6 °C) (10/25/22 0801)  Pulse: 65 (10/25/22 0810)  Resp: 16 (10/25/22 0810)  BP: (!) 159/80 (10/25/22 0801)  SpO2: 99 % (10/25/22 0810)   Vital Signs (24h Range):  Temp:  [96.6 °F (35.9 °C)-97.8 °F (36.6 °C)] 97.8 °F (36.6 °C)  Pulse:  [60-65] 65  Resp:  [16-19] 16  SpO2:  [93 %-99 %] 99 %  BP: (113-159)/(54-80) 159/80     Weight: 96.4 kg (212 lb 8.4  oz)  Body mass index is 32.31 kg/m².    Physical Exam  Constitutional:       General: He is not in acute distress.  HENT:      Head: Normocephalic.   Eyes:      General:         Right eye: No discharge.         Left eye: No discharge.   Cardiovascular:      Rate and Rhythm: Normal rate.   Pulmonary:      Effort: No respiratory distress.   Abdominal:      Palpations: Abdomen is soft.   Musculoskeletal:         General: No swelling.      Cervical back: Neck supple.   Skin:     Findings: No rash.   Neurological:      Mental Status: He is oriented to person, place, and time.   Psychiatric:         Speech: Speech normal.         NEUROLOGICAL EXAMINATION:      MENTAL STATUS   Nonverbal  Level of consciousness: alert     CRANIAL NERVES      CN III, IV, VI   Right pupil: Size: 2 mm. Shape: regular.   Left pupil: Size: 2 mm. Shape: regular.   Nystagmus: none   Conjugate gaze: present     CN V   Right facial sensation deficit: none  Left facial sensation deficit: none     CN VII   Right facial weakness: central  Left facial weakness: none     CN XII   Tongue deviation: none     MOTOR EXAM      Strength   Strength 5/5 except as noted.        RLE 2/5       Significant Labs: CBC: No results for input(s): WBC, HGB, HCT, PLT in the last 48 hours.  CMP: No results for input(s): GLU, NA, K, CL, CO2, BUN, CREATININE, CALCIUM, MG, PROT, ALBUMIN, BILITOT, ALKPHOS, AST, ALT, ANIONGAP, EGFRNONAA in the last 48 hours.    Significant Imaging: I have reviewed all pertinent imaging results/findings within the past 24 hours.    Assessment and Plan:     78 y/o male with acute stroke     Stroke: stroke on paramedian left frontal lobe, PAM territory, thatt seem to had evolved causing worsening of symptoms. US of carotids with no significant stenosis. No LAE on echo.   ASA 81 mg daily and clopidogrel 75 mg daily x 21 days is recommended.  Statin.  PT/OT. Inpatient rehab.  OK for BP control.  MRI brain today given worsening symptoms to see if new  areas of infacrtion.  Seizure: reported seizure in hospital. No other events but is nonverbal. Subclinical seizures? Stroke?  Levetiracetam 1000 mg BID.   EEG.    Active Diagnoses:    Diagnosis Date Noted POA    PRINCIPAL PROBLEM:  CVA (cerebral vascular accident) [I63.9] 10/10/2022 Yes    Seizure [R56.9] 10/14/2022 No    Obesity (BMI 30.0-34.9) [E66.9] 07/09/2018 Yes    Tobacco dependence [F17.200] 02/17/2016 Yes     Chronic    Stage 3a chronic kidney disease [N18.31] 06/02/2015 Yes    Hypertension [I10] 02/02/2015 Yes     Chronic    Hyperlipidemia [E78.5] 02/02/2015 Yes     Chronic      Problems Resolved During this Admission:    Diagnosis Date Noted Date Resolved POA    Right leg weakness [R29.898] 10/09/2022 10/11/2022 Yes       VTE Risk Mitigation (From admission, onward)           Ordered     enoxaparin injection 40 mg  Daily         10/18/22 1107     IP VTE HIGH RISK PATIENT  Once         10/09/22 2026     Place sequential compression device  Until discontinued         10/09/22 2026                    Ahmet Lara MD  Neurology  [facility] - Telemetry

## 2022-10-25 NOTE — PROGRESS NOTES
[facility] Medicine  Progress Note    Patient Name: Leonides Burns  MRN: [id number]  Patient Class: IP- Inpatient   Admission Date: 10/9/2022  Length of Stay: 14 days  Attending Physician: Nain Oneill MD  Primary Care Provider: Suzette Akhtar MD        Subjective:     Principal Problem:CVA (cerebral vascular accident)        HPI:  Leonides Burns 77 y.o. male with CKD, HTN, tobacco abuse presents to the hospital with a chief complaint of right leg weakness.  He reports 2 days of right lower extremity weakness and discoordination and double vision.  States the symptoms have been constant without aggravating factors.  He sometimes feels that improved with lying flat.  He reports he previously had stop aspirin due to severe and recurrent epistaxis.  He denies fevers chest pain shortness breast nausea vomiting abdominal pain leg swelling syncope numbness weakness of an arm incontinence weakness of his left leg.    In the ED, creatinine 2.1 CT head without acute abnormality, chest x-ray without focal consolidation afebrile without leukocytosis COVID negative LDL 80.6 TSH within normal limits.      Overview/Hospital Course:  Mr Leonides Burns is a 77 y.o. man admitted with R leg weakness secondary to acute punctate left parasagittal frontal lobe. PT/OT/SLP consulted. Echo and carotids unremarkable. Neurology consulted.  Patient continued on ASA, added plavix.  His Statin increased to 40 mg daily. Resumed BP medications. Plan was inpatient rehab, but he then had seizure activity on 10/13, prompting step up to ICU. Started keppra. He had difficulty with aspiration events. Speech therapy following; now safe for mechanical soft diet and thin liquids. He has been working with PT, OT.   Continued with intermittent dysphagia and ST continues to work with patient and adjust diet consistency as tolerated.  He is now medically stable for discharge to inpatient rehab when arranged.   Insurance denied inpatient rehab.  This has been appealed.  BP uncontrolled and started on oral Hydralazine. Now plan is for SNF      Interval History: No new issues     Review of Systems   Constitutional:  Positive for activity change. Negative for appetite change.   HENT:  Negative for congestion and dental problem.    Eyes:  Negative for discharge and itching.   Respiratory:  Negative for apnea and chest tightness.    Cardiovascular:  Negative for chest pain.   Gastrointestinal:  Negative for abdominal distention and abdominal pain.   Genitourinary:  Negative for difficulty urinating and dysuria.   Musculoskeletal:  Negative for arthralgias.   Allergic/Immunologic: Negative for environmental allergies.   Neurological:  Negative for dizziness and facial asymmetry.   Hematological:  Negative for adenopathy.   Psychiatric/Behavioral:  Negative for agitation and behavioral problems.    Objective:     Vital Signs (Most Recent):  Temp: 97.8 °F (36.6 °C) (10/25/22 0801)  Pulse: 65 (10/25/22 0810)  Resp: 16 (10/25/22 0810)  BP: (!) 159/80 (10/25/22 0801)  SpO2: 99 % (10/25/22 0810)   Vital Signs (24h Range):  Temp:  [96.6 °F (35.9 °C)-97.8 °F (36.6 °C)] 97.8 °F (36.6 °C)  Pulse:  [59-65] 65  Resp:  [16-19] 16  SpO2:  [93 %-99 %] 99 %  BP: (113-159)/(54-80) 159/80     Weight: 96.4 kg (212 lb 8.4 oz)  Body mass index is 32.31 kg/m².    Intake/Output Summary (Last 24 hours) at 10/25/2022 1049  Last data filed at 10/25/2022 0830  Gross per 24 hour   Intake 480 ml   Output --   Net 480 ml      Physical Exam  Vitals and nursing note reviewed.   Constitutional:       General: He is not in acute distress.     Appearance: Normal appearance. He is obese. He is ill-appearing. He is not toxic-appearing or diaphoretic.   HENT:      Head: Normocephalic and atraumatic.      Mouth/Throat:      Pharynx: Oropharynx is clear.   Eyes:      Conjunctiva/sclera: Conjunctivae normal.   Cardiovascular:      Rate and Rhythm: Normal rate and  regular rhythm.   Pulmonary:      Effort: Pulmonary effort is normal. No respiratory distress.      Breath sounds: No wheezing.   Abdominal:      General: Bowel sounds are normal. There is no distension.   Skin:     General: Skin is dry.   Neurological:      Mental Status: He is alert and oriented to person, place, and time.   Psychiatric:         Mood and Affect: Mood normal.         Behavior: Behavior normal.       Significant Labs: All pertinent labs within the past 24 hours have been reviewed.  BMP: No results for input(s): GLU, NA, K, CL, CO2, BUN, CREATININE, CALCIUM, MG in the last 48 hours.  CBC: No results for input(s): WBC, HGB, HCT, PLT in the last 48 hours.    Significant Imaging:       Assessment/Plan:      * CVA (cerebral vascular accident)  Patient presented with right leg weakness and noted to have acute stroke  asa 81 daily, statin increased to 40 mg daily. Started on Plavix for 21 days.  PT, OT, Speech evaluated  plan inpatient rehab at discharge-- he is medically stable for discharge to inpatient rehab when arranged   intermittent episodes of lucidity and aphasia confusion.  ST continues to work with patient and adjust diet consistency ad needed.  Insurance denied inpatient rehab.  Awaiting appeal.  Patient was previously home alone and independent.  Would greatly benefit from inpatient rehab.  SNF as plan B      Seizure  2 seizures occurred on 10/13 prompting step up to ICU  Neurology following  on keppra 1g BID  no further seizure activity noted     Obesity (BMI 30.0-34.9)  Body mass index is 32.31 kg/m².  Will recommend diet and lifestyle modifications outpatient    Tobacco dependence  Smokes rare cigarettes per day. Greater than 3 minutes spent counseling patient on dangers of continued tobacco abuse.    Stage 3a chronic kidney disease  Continue to monitor.  Avoid nephrotoxic medications.        Hyperlipidemia  Continue statin      Hypertension  On admit, held home amlodipine and lisinopril  for permissive HTN  BP is now elevated- resumed amlodipine and lisinopril  Added coreg  monitor vitals q4h  SBP goal of <160 in hospital  BP uncontrolled.  Started on Hydralazine.      VTE Risk Mitigation (From admission, onward)         Ordered     enoxaparin injection 40 mg  Daily         10/18/22 1107     IP VTE HIGH RISK PATIENT  Once         10/09/22 2026     Place sequential compression device  Until discontinued         10/09/22 2026                Discharge Planning   DEVIKA: 10/21/2022     Code Status: Full Code   Is the patient medically ready for discharge?:     Reason for patient still in hospital (select all that apply):  Discharge Plan A: Rehab   Discharge Delays: (!) Post-Acute Set-up      To SNF when arranged.       Nain Cho MD  Department of Hospital Medicine   [facility]

## 2022-10-25 NOTE — SUBJECTIVE & OBJECTIVE
Interval History: No new issues     Review of Systems   Constitutional:  Positive for activity change. Negative for appetite change.   HENT:  Negative for congestion and dental problem.    Eyes:  Negative for discharge and itching.   Respiratory:  Negative for apnea and chest tightness.    Cardiovascular:  Negative for chest pain.   Gastrointestinal:  Negative for abdominal distention and abdominal pain.   Genitourinary:  Negative for difficulty urinating and dysuria.   Musculoskeletal:  Negative for arthralgias.   Allergic/Immunologic: Negative for environmental allergies.   Neurological:  Negative for dizziness and facial asymmetry.   Hematological:  Negative for adenopathy.   Psychiatric/Behavioral:  Negative for agitation and behavioral problems.    Objective:     Vital Signs (Most Recent):  Temp: 97.8 °F (36.6 °C) (10/25/22 0801)  Pulse: 65 (10/25/22 0810)  Resp: 16 (10/25/22 0810)  BP: (!) 159/80 (10/25/22 0801)  SpO2: 99 % (10/25/22 0810)   Vital Signs (24h Range):  Temp:  [96.6 °F (35.9 °C)-97.8 °F (36.6 °C)] 97.8 °F (36.6 °C)  Pulse:  [59-65] 65  Resp:  [16-19] 16  SpO2:  [93 %-99 %] 99 %  BP: (113-159)/(54-80) 159/80     Weight: 96.4 kg (212 lb 8.4 oz)  Body mass index is 32.31 kg/m².    Intake/Output Summary (Last 24 hours) at 10/25/2022 1049  Last data filed at 10/25/2022 0830  Gross per 24 hour   Intake 480 ml   Output --   Net 480 ml      Physical Exam  Vitals and nursing note reviewed.   Constitutional:       General: He is not in acute distress.     Appearance: Normal appearance. He is obese. He is ill-appearing. He is not toxic-appearing or diaphoretic.   HENT:      Head: Normocephalic and atraumatic.      Mouth/Throat:      Pharynx: Oropharynx is clear.   Eyes:      Conjunctiva/sclera: Conjunctivae normal.   Cardiovascular:      Rate and Rhythm: Normal rate and regular rhythm.   Pulmonary:      Effort: Pulmonary effort is normal. No respiratory distress.      Breath sounds: No wheezing.    Abdominal:      General: Bowel sounds are normal. There is no distension.   Skin:     General: Skin is dry.   Neurological:      Mental Status: He is alert and oriented to person, place, and time.   Psychiatric:         Mood and Affect: Mood normal.         Behavior: Behavior normal.       Significant Labs: All pertinent labs within the past 24 hours have been reviewed.  BMP: No results for input(s): GLU, NA, K, CL, CO2, BUN, CREATININE, CALCIUM, MG in the last 48 hours.  CBC: No results for input(s): WBC, HGB, HCT, PLT in the last 48 hours.    Significant Imaging:

## 2022-10-25 NOTE — PT/OT/SLP PROGRESS
Occupational Therapy   Treatment    Name: Leonides Burns  MRN: [id number]  Admitting Diagnosis:  CVA (cerebral vascular accident)       Recommendations:     Discharge Recommendations: rehabilitation facility  Discharge Equipment Recommendations:   (TBD)  Barriers to discharge:   (pt was MOD I and living alone PTA. now, acute CVA with impairments with all ADLs and all aspects of functional mobility. pt at high risk of falls, unplanned readmission, and morbidity if d/c home at this time)    Assessment:     Leonides Burns is a 77 y.o. male with a medical diagnosis of CVA (cerebral vascular accident). Performance deficits affecting function are weakness, visual deficits, abnormal tone, decreased ROM, impaired cognition, impaired endurance, impaired coordination, decreased upper extremity function, impaired fine motor, impaired self care skills, decreased lower extremity function, impaired functional mobility, gait instability, decreased safety awareness, impaired balance.     Pt with improved initiation with repetitions for weight bearing activity seated EOB. Pt stood for ~1 min with total A x2 with increased forward flexed posture, R knee blocked, RUE supported    Rehab Prognosis:  Good; patient would benefit from acute skilled OT services to address these deficits and reach maximum level of function.       Plan:     Patient to be seen  (5-6x/week) to address the above listed problems via self-care/home management, therapeutic activities, therapeutic exercises, neuromuscular re-education  Plan of Care Expires: 10/28/22  Plan of Care Reviewed with: patient    Subjective     Chief complaint: pt appeared fatigued after session   Patient/family comments/ goals: agreeable to standing trials; appeared more engaged today vs yesterday     Pain/Comfort:  Pain Rating 1: 0/10    Objective:     Patient found HOB elevated with telemetry, bed alarm, peripheral IV upon OT entry to room.    General Precautions: Standard, aphasia, fall,  seizure   Orthopedic Precautions:N/A   Braces: N/A  Respiratory Status: Room air     Occupational Performance:     Bed Mobility:    Patient completed Rolling/Turning to Right with maximal assistance, 2 persons, and with side rail  Patient completed Scooting anteriorly with moderate assistance  Patient completed Supine to Sit with maximal assistance, 2 persons, with side rail, and HOB elevated  Patient completed Scooting laterally with total assistance and 2 persons  Patient completed Sit to Supine with maximal assistance and 2 persons     Functional Mobility/Transfers:  Patient completed Sit <> Stand Transfer with total assistance, of 2 persons, and bed elevated; first 2 trials unsuccessful  With PT seated in front of pt blocking pt's R knee and OT on pt's R side supporting RUE and pt using LUE on lateral L side rail, pt stood with total A x2 for ~1 min. Pt with increased trunk flexion and forward head.     Activities of Daily Living:  Grooming: maximal assistance and hand-over-hand assist to wash face with wipe then wash cloth with HOB elevated at end of session    Lower Body Dressing: total assistance to don/doff B/L socks      [facility] 6 Click ADL: 10    Treatment & Education:  Pt re-educated on OT role/POC.   Importance of EOB activity with therapy assistance.  Safety during functional t/f and mobility   Seated EOB, OT placed RUE on tray table in front of pt with R hand on towel. OT demo with PROM for internal rotation then pt able to demo back 1x. X10 PROM. X5 PROM shoulder flex/ext on supported table. Pt initiated use of LUE to grasp kleenex with LUE on table to replicate other kleenex without any prompting with CGA for balance. X5 trunk lateral lean to the R, cervical rotation to the R, and weight bear on R forearm with functional reach of LUE to siderail on pt's R side then push up to return to midline with MIN-MOD A. As reps continued, pt showed initiation for movement with LUE.   Multiple self-care  tasks/functional mobility completed- assistance level noted above   All questions/concerns answered within OT scope of practice       Patient left  HOB elevated >45* L sidelying with RUE elevated on pillow and B/L pressure relief boots in place  with all lines intact, call button in reach, bed alarm on, nurse, Dominga, notified, and all needs met/within reach; blinds opened, lights on, tv on, door left open     GOALS:   Multidisciplinary Problems       Occupational Therapy Goals          Problem: Occupational Therapy    Goal Priority Disciplines Outcome Interventions   Occupational Therapy Goal     OT, PT/OT Ongoing, Progressing    Description: Goals to be met by: 10/24/22    Patient will increase functional independence with ADLs by performing:    UE Dressing with Modified [place].  LE Dressing with Modified [place].  Grooming while standing at sink with Modified [place].  Toileting from toilet with Modified [place] for hygiene and clothing management.   Supine to sit with Modified [place].  Step transfer with Modified [place]  Toilet transfer to toilet with Modified [place].  Upper extremity exercise program x15 reps per handout, with independence.                         Time Tracking:     OT Date of Treatment: 10/25/22  OT Start Time: 1530  OT Stop Time: 1557  OT Total Time (min): 27 min    Billable Minutes:Therapeutic Activity 13 min  Neuromuscular Re-education 14 min  Total Time 27 min (co-treat with PT)    OT/CHANDNI: OT     CHANDNI Visit Number: 0    10/25/2022

## 2022-10-25 NOTE — PT/OT/SLP PROGRESS
Physical Therapy Treatment    Patient Name:  Leonides Burns   MRN:  [id number]    Recommendations:     Discharge Recommendations:  rehabilitation facility   Discharge Equipment Recommendations:  (Ongoing assessment pending pt progress)       Assessment:     Leonides Burns is a 77 y.o. male admitted with a medical diagnosis of CVA (cerebral vascular accident).  He presents with the following impairments/functional limitations:  impaired functional mobility, impaired endurance, weakness, visual deficits, decreased lower extremity function, decreased coordination, decreased upper extremity function .    Rehab Prognosis: Fair; patient would benefit from acute skilled PT services to address these deficits and reach maximum level of function.    Recent Surgery: * No surgery found *      Plan:     During this hospitalization, patient to be seen  (5-6x/wk) to address the identified rehab impairments via therapeutic activities, therapeutic exercises and progress toward the following goals:    Plan of Care Expires:  10/26/22    Subjective     Chief Complaint: None  Patient/Family Comments/goals: Pt agreeable to therapy.    Pain/Comfort:  Pain Rating 1: 0/10      Objective:     Communicated with nurse prior to session.  Patient found left sidelying with telemetry upon PT entry to room.     General Precautions: Standard, fall   Orthopedic Precautions:N/A   Braces: N/A  Respiratory Status: Room air     Functional Mobility:  Bed Mobility:     Supine to Sit: maximal assistance and of 1-2 persons  Sit to Supine: maximal assistance and of 2 persons  Transfers:     Sit to Stand:  maximal assistance, total assistance, and of 2 persons with hand-held assist and gait belt and flat sheet around hips.      AM-PAC 6 CLICK MOBILITY  Turning over in bed (including adjusting bedclothes, sheets and blankets)?: 2  Sitting down on and standing up from a chair with arms (e.g., wheelchair, bedside commode, etc.): 2  Moving from lying on back to  "sitting on the side of the bed?: 2  Moving to and from a bed to a chair (including a wheelchair)?: 1  Need to walk in hospital room?: 1  Climbing 3-5 steps with a railing?: 1  Basic Mobility Total Score: 9       Treatment & Education:  Pt performed static/dynamic sitting balance EOB.  Stood x1.     Patient left left sidelying with call button in reach and all needs in reach .    GOALS:   Multidisciplinary Problems       Physical Therapy Goals          Problem: Physical Therapy    Goal Priority Disciplines Outcome Goal Variances Interventions   Physical Therapy Goal     PT, PT/OT Ongoing, Progressing     Description: Goals to be met by: 10/14/22     Patient will increase functional independence with mobility by performin. Pt to be Min A with bed mobility.  2. Pt to transfer sit to stand with CGA  3. Pt to ambulate with HW and Min A x 10-15'  4. Pt to be Supervision with written HEP  5. Pt to transfer bed to chair with CGA  6. Pt to ascend/descend 4" curb with RW and Min A  7. Pt will propel W/C with L UE/LE and CGA approx 25'                       Time Tracking:     PT Received On: 10/25/22  PT Start Time: 1530     PT Stop Time: 1556  PT Total Time (min): 26 min     Billable Minutes: Therapeutic Activity 26 (Co-tx with Cheryl WILLIAMSON)    Treatment Type: Treatment  PT/PTA: PT     PTA Visit Number: 0     10/25/2022  "

## 2022-10-25 NOTE — PLAN OF CARE
Problem: Occupational Therapy  Goal: Occupational Therapy Goal  Description: Goals to be met by: 10/24/22    Patient will increase functional independence with ADLs by performing:    UE Dressing with Modified [place].  LE Dressing with Modified [place].  Grooming while standing at sink with Modified [place].  Toileting from toilet with Modified [place] for hygiene and clothing management.   Supine to sit with Modified [place].  Step transfer with Modified [place]  Toilet transfer to toilet with Modified [place].  Upper extremity exercise program x15 reps per handout, with independence.    Outcome: Ongoing, Progressing     Pt with improved initiation with repetitions for weight bearing activity seated EOB. Pt stood for ~1 min with total A x2 with increased forward flexed posture, R knee blocked, RUE supported.

## 2022-10-25 NOTE — PLAN OF CARE
10/25/22 1432   Medicare Message   Important Message from Medicare regarding Discharge Appeal Rights Given to patient/caregiver;Explained to patient/caregiver   Date IMM was signed 10/25/22   Time IMM was signed 1420     Copy of IMM emailed to patient's son Santi Burns at [email or web address].

## 2022-10-26 PROCEDURE — 21400001 HC TELEMETRY ROOM

## 2022-10-26 PROCEDURE — 92611 MOTION FLUOROSCOPY/SWALLOW: CPT

## 2022-10-26 PROCEDURE — 95819 EEG AWAKE AND ASLEEP: CPT

## 2022-10-26 PROCEDURE — 25000003 PHARM REV CODE 250: Performed by: HOSPITALIST

## 2022-10-26 PROCEDURE — 63600175 PHARM REV CODE 636 W HCPCS: Performed by: HOSPITALIST

## 2022-10-26 PROCEDURE — 95819 PR EEG,W/AWAKE & ASLEEP RECORD: ICD-10-PCS | Mod: 26,,, | Performed by: PSYCHIATRY & NEUROLOGY

## 2022-10-26 PROCEDURE — 94640 AIRWAY INHALATION TREATMENT: CPT

## 2022-10-26 PROCEDURE — 25000242 PHARM REV CODE 250 ALT 637 W/ HCPCS: Performed by: HOSPITALIST

## 2022-10-26 PROCEDURE — 94761 N-INVAS EAR/PLS OXIMETRY MLT: CPT

## 2022-10-26 PROCEDURE — 99232 PR SUBSEQUENT HOSPITAL CARE,LEVL II: ICD-10-PCS | Mod: ,,, | Performed by: PSYCHIATRY & NEUROLOGY

## 2022-10-26 PROCEDURE — 97535 SELF CARE MNGMENT TRAINING: CPT

## 2022-10-26 PROCEDURE — 97530 THERAPEUTIC ACTIVITIES: CPT

## 2022-10-26 PROCEDURE — 99232 SBSQ HOSP IP/OBS MODERATE 35: CPT | Mod: ,,, | Performed by: PSYCHIATRY & NEUROLOGY

## 2022-10-26 PROCEDURE — 95819 EEG AWAKE AND ASLEEP: CPT | Mod: 26,,, | Performed by: PSYCHIATRY & NEUROLOGY

## 2022-10-26 RX ADMIN — LEVETIRACETAM 1000 MG: 500 TABLET, FILM COATED ORAL at 08:10

## 2022-10-26 RX ADMIN — CARVEDILOL 25 MG: 12.5 TABLET, FILM COATED ORAL at 09:10

## 2022-10-26 RX ADMIN — IPRATROPIUM BROMIDE AND ALBUTEROL SULFATE 3 ML: 2.5; .5 SOLUTION RESPIRATORY (INHALATION) at 01:10

## 2022-10-26 RX ADMIN — MELATONIN TAB 3 MG 6 MG: 3 TAB at 08:10

## 2022-10-26 RX ADMIN — CARVEDILOL 25 MG: 12.5 TABLET, FILM COATED ORAL at 08:10

## 2022-10-26 RX ADMIN — ENOXAPARIN SODIUM 40 MG: 100 INJECTION SUBCUTANEOUS at 05:10

## 2022-10-26 RX ADMIN — AMLODIPINE BESYLATE 10 MG: 5 TABLET ORAL at 09:10

## 2022-10-26 RX ADMIN — LISINOPRIL 40 MG: 20 TABLET ORAL at 09:10

## 2022-10-26 RX ADMIN — HYDRALAZINE HYDROCHLORIDE 50 MG: 25 TABLET, FILM COATED ORAL at 09:10

## 2022-10-26 RX ADMIN — IPRATROPIUM BROMIDE AND ALBUTEROL SULFATE 3 ML: 2.5; .5 SOLUTION RESPIRATORY (INHALATION) at 08:10

## 2022-10-26 RX ADMIN — LEVETIRACETAM 1000 MG: 500 TABLET, FILM COATED ORAL at 09:10

## 2022-10-26 RX ADMIN — ATORVASTATIN CALCIUM 40 MG: 40 TABLET, FILM COATED ORAL at 08:10

## 2022-10-26 RX ADMIN — SENNOSIDES AND DOCUSATE SODIUM 1 TABLET: 50; 8.6 TABLET ORAL at 08:10

## 2022-10-26 RX ADMIN — ASPIRIN 81 MG: 81 TABLET, COATED ORAL at 09:10

## 2022-10-26 RX ADMIN — CLOPIDOGREL BISULFATE 75 MG: 75 TABLET ORAL at 09:10

## 2022-10-26 RX ADMIN — IPRATROPIUM BROMIDE AND ALBUTEROL SULFATE 3 ML: 2.5; .5 SOLUTION RESPIRATORY (INHALATION) at 07:10

## 2022-10-26 NOTE — PLAN OF CARE
Recommendations/Interventions:  1) Continue IDDSI Lvl 4 Regular Diet; Encourage PO intake  2) Monitor pt I/O   -BM and % intake of meals  3) Monitor nutrition related labs    Goals:   1) Pt to meet >85% EEN and EPN via PO intake.  Nutrition Goal Status: new  Communication of RD Recs:  (POC)

## 2022-10-26 NOTE — PT/OT/SLP PROGRESS
Occupational Therapy   Treatment    Name: Leonides Burns  MRN: [id number]  Admitting Diagnosis:  CVA (cerebral vascular accident)       Recommendations:     Discharge Recommendations: rehabilitation facility  Discharge Equipment Recommendations:   (TBD)  Barriers to discharge:   (pt was MOD I and living alone PTA. now, acute CVA with impairments with all ADLs and all aspects of functional mobility. pt at high risk of falls, unplanned readmission, and morbidity if d/c home at this time)    Assessment:     Leonides Burns is a 77 y.o. male with a medical diagnosis of CVA (cerebral vascular accident). Performance deficits affecting function are weakness, abnormal tone, visual deficits, decreased ROM, impaired cognition, impaired endurance, impaired coordination, decreased upper extremity function, impaired fine motor, impaired self care skills, decreased lower extremity function, impaired functional mobility, gait instability, decreased safety awareness, impaired balance.     MIN A with verbal/tactile cueing for use of LUE/LLE to assist with scooting to HOB in supine for improved positioning. Session abbreviated 2* holding PT/OT until after MBSS to conserve energy for test. Unfortunately, MBSS has been rescheduled from this AM to afternoon.     Rehab Prognosis:  Good; patient would benefit from acute skilled OT services to address these deficits and reach maximum level of function.       Plan:     Patient to be seen  (5-6x/week) to address the above listed problems via self-care/home management, therapeutic activities, therapeutic exercises, neuromuscular re-education  Plan of Care Expires: 11/11/22  Plan of Care Reviewed with: patient    Subjective     Chief complaint: n/a  Patient/family comments/ goals: son at bedside requesting therapist to update him and his brother (on speaker phone) of pt's progress currently with therapy     Pain/Comfort:  Pain Rating 1: 0/10    Objective:     Communicated with: Teresa nixon  prior to session.  Patient found  HOB elevated R sidelying (slid down in the bed)  with bed alarm, telemetry, peripheral IV, pressure relief boots upon OT entry to room.    General Precautions: Standard, aphasia, fall, seizure   Orthopedic Precautions:N/A   Braces: N/A  Respiratory Status: Room air     Occupational Performance:     Bed Mobility:  pt required verbal/tactile cueing for body mechanics for increased active participation. Pt followed short verbal commands.   Patient completed Scooting in supine with minimal assistance with use of LUE on above-the-head railing and use of LLE    Patient completed Rolling to the R with minimal assistance; verbal/tactile cueing for cervical rotation to the R to find side rail       [facility] 6 Click ADL: 11    Treatment & Education:  Pt re-educated on OT role/POC.   Updated 2 sons on pt's progress   Edu when visiting to sit on pt's R side to increase awareness to R visual field. Edu on awareness of RUE to protect joint integrity. OT elevated RUE on pillow.   Poor pt positioning in the bed- pt progress with MIN A to assist with bed mobility for repositioning    All questions/concerns answered within OT scope of practice       Patient left  HOB elevated R sidelying with RUE elevated on pillow and B/L pressure relief boots in place  with all lines intact, call button in reach, bed alarm on, nurse, Teresa, notified, son present, and all needs met/within reach     GOALS:   Multidisciplinary Problems       Occupational Therapy Goals          Problem: Occupational Therapy    Goal Priority Disciplines Outcome Interventions   Occupational Therapy Goal     OT, PT/OT Ongoing, Progressing    Description: Goals to be met by: 11/11/22    Patient will increase functional independence with ADLs by performing:    UE Dressing with Modified [place].  LE Dressing with Modified [place].  Grooming while standing at sink with Modified [place].  Toileting from toilet with Modified  [place] for hygiene and clothing management.   Supine to sit with Modified [place].  Step transfer with Modified [place]  Toilet transfer to toilet with Modified [place].  Upper extremity exercise program x15 reps per handout, with independence.                         Time Tracking:     OT Date of Treatment: 10/26/22  OT Start Time: 1340  OT Stop Time: 1356  OT Total Time (min): 16 min    Billable Minutes:Therapeutic Activity 16 min    OT/CHANDNI: OT     CHANDNI Visit Number: 0    10/26/2022

## 2022-10-26 NOTE — PLAN OF CARE
Problem: Adult Inpatient Plan of Care  Goal: Plan of Care Review  Outcome: Ongoing, Progressing  Goal: Patient-Specific Goal (Individualized)  Outcome: Ongoing, Progressing  Goal: Absence of Hospital-Acquired Illness or Injury  Outcome: Ongoing, Progressing  Goal: Optimal Comfort and Wellbeing  Outcome: Ongoing, Progressing  Goal: Readiness for Transition of Care  Outcome: Ongoing, Progressing     Problem: Adjustment to Illness (Stroke, Ischemic/Transient Ischemic Attack)  Goal: Optimal Coping  Outcome: Ongoing, Progressing     Problem: Bowel Elimination Impaired (Stroke, Ischemic/Transient Ischemic Attack)  Goal: Effective Bowel Elimination  Outcome: Ongoing, Progressing     Problem: Cerebral Tissue Perfusion (Stroke, Ischemic/Transient Ischemic Attack)  Goal: Optimal Cerebral Tissue Perfusion  Outcome: Ongoing, Progressing     Problem: Cognitive Impairment (Stroke, Ischemic/Transient Ischemic Attack)  Goal: Optimal Cognitive Function  Outcome: Ongoing, Progressing     Problem: Communication Impairment (Stroke, Ischemic/Transient Ischemic Attack)  Goal: Improved Communication Skills  Outcome: Ongoing, Progressing     Problem: Functional Ability Impaired (Stroke, Ischemic/Transient Ischemic Attack)  Goal: Optimal Functional Ability  Outcome: Ongoing, Progressing     Problem: Respiratory Compromise (Stroke, Ischemic/Transient Ischemic Attack)  Goal: Effective Oxygenation and Ventilation  Outcome: Ongoing, Progressing     Problem: Sensorimotor Impairment (Stroke, Ischemic/Transient Ischemic Attack)  Goal: Improved Sensorimotor Function  Outcome: Ongoing, Progressing     Problem: Swallowing Impairment (Stroke, Ischemic/Transient Ischemic Attack)  Goal: Optimal Eating and Swallowing without Aspiration  Outcome: Ongoing, Progressing     Problem: Urinary Elimination Impaired (Stroke, Ischemic/Transient Ischemic Attack)  Goal: Effective Urinary Elimination  Outcome: Ongoing, Progressing     Problem: Fall Injury  Risk  Goal: Absence of Fall and Fall-Related Injury  Outcome: Ongoing, Progressing     Problem: Infection  Goal: Absence of Infection Signs and Symptoms  Outcome: Ongoing, Progressing     Problem: Skin Injury Risk Increased  Goal: Skin Health and Integrity  Outcome: Ongoing, Progressing     Problem: Coping Ineffective  Goal: Effective Coping  Outcome: Ongoing, Progressing     Problem: Restraint, Nonbehavioral (Nonviolent)  Goal: Absence of Harm or Injury  Outcome: Ongoing, Progressing     Problem: Hypertension Acute  Goal: Blood Pressure Within Desired Range  Outcome: Ongoing, Progressing

## 2022-10-26 NOTE — SUBJECTIVE & OBJECTIVE
Interval History: No new issues     Review of Systems   Constitutional:  Positive for activity change. Negative for appetite change.   HENT:  Negative for congestion and dental problem.    Eyes:  Negative for discharge and itching.   Respiratory:  Negative for apnea and chest tightness.    Cardiovascular:  Negative for chest pain.   Gastrointestinal:  Negative for abdominal distention and abdominal pain.   Genitourinary:  Negative for difficulty urinating and dysuria.   Musculoskeletal:  Negative for arthralgias.   Allergic/Immunologic: Negative for environmental allergies.   Neurological:  Negative for dizziness and facial asymmetry.   Hematological:  Negative for adenopathy.   Psychiatric/Behavioral:  Negative for agitation and behavioral problems.    Objective:     Vital Signs (Most Recent):  Temp: 98.1 °F (36.7 °C) (10/26/22 0753)  Pulse: 75 (10/26/22 0951)  Resp: 20 (10/26/22 0959)  BP: 136/70 (10/26/22 0951)  SpO2: 99 % (10/26/22 0815) Vital Signs (24h Range):  Temp:  [97.1 °F (36.2 °C)-98.3 °F (36.8 °C)] 98.1 °F (36.7 °C)  Pulse:  [58-75] 75  Resp:  [18-28] 20  SpO2:  [91 %-99 %] 99 %  BP: (124-159)/(59-70) 136/70     Weight: 96.4 kg (212 lb 8.4 oz)  Body mass index is 32.31 kg/m².    Intake/Output Summary (Last 24 hours) at 10/26/2022 1022  Last data filed at 10/26/2022 0846  Gross per 24 hour   Intake 720 ml   Output --   Net 720 ml      Physical Exam  Vitals and nursing note reviewed.   Constitutional:       General: He is not in acute distress.     Appearance: Normal appearance. He is obese. He is ill-appearing. He is not toxic-appearing or diaphoretic.   HENT:      Head: Normocephalic and atraumatic.      Mouth/Throat:      Pharynx: Oropharynx is clear.   Eyes:      Conjunctiva/sclera: Conjunctivae normal.   Cardiovascular:      Rate and Rhythm: Normal rate and regular rhythm.   Pulmonary:      Effort: Pulmonary effort is normal. No respiratory distress.      Breath sounds: No wheezing.   Abdominal:       General: Bowel sounds are normal. There is no distension.   Skin:     General: Skin is dry.   Neurological:      Mental Status: He is alert and oriented to person, place, and time.   Psychiatric:         Mood and Affect: Mood normal.         Behavior: Behavior normal.       Significant Labs: All pertinent labs within the past 24 hours have been reviewed.  BMP: No results for input(s): GLU, NA, K, CL, CO2, BUN, CREATININE, CALCIUM, MG in the last 48 hours.  CBC: No results for input(s): WBC, HGB, HCT, PLT in the last 48 hours.    Significant Imaging:

## 2022-10-26 NOTE — PROGRESS NOTES
[facility] Medicine  Progress Note    Patient Name: Leonides Burns  MRN: [id number]  Patient Class: IP- Inpatient   Admission Date: 10/9/2022  Length of Stay: 15 days  Attending Physician: Nain Oneill MD  Primary Care Provider: Suzette Akhtar MD        Subjective:     Principal Problem:CVA (cerebral vascular accident)        HPI:  Leonides Burns 77 y.o. male with CKD, HTN, tobacco abuse presents to the hospital with a chief complaint of right leg weakness.  He reports 2 days of right lower extremity weakness and discoordination and double vision.  States the symptoms have been constant without aggravating factors.  He sometimes feels that improved with lying flat.  He reports he previously had stop aspirin due to severe and recurrent epistaxis.  He denies fevers chest pain shortness breast nausea vomiting abdominal pain leg swelling syncope numbness weakness of an arm incontinence weakness of his left leg.    In the ED, creatinine 2.1 CT head without acute abnormality, chest x-ray without focal consolidation afebrile without leukocytosis COVID negative LDL 80.6 TSH within normal limits.      Overview/Hospital Course:  Mr Leonides Burns is a 77 y.o. man admitted with R leg weakness secondary to acute punctate left parasagittal frontal lobe. PT/OT/SLP consulted. Echo and carotids unremarkable. Neurology consulted.  Patient continued on ASA, added plavix.  His Statin increased to 40 mg daily. Resumed BP medications. Plan was inpatient rehab, but he then had seizure activity on 10/13, prompting step up to ICU. Started keppra. He had difficulty with aspiration events. Speech therapy following; now safe for mechanical soft diet and thin liquids. He has been working with PT, OT.   Continued with intermittent dysphagia and ST continues to work with patient and adjust diet consistency as tolerated.  He is now medically stable for discharge to inpatient rehab when arranged.   Insurance denied inpatient rehab.  This has been appealed.  BP uncontrolled and started on oral Hydralazine. Now plan is for SNF      Interval History: No new issues     Review of Systems   Constitutional:  Positive for activity change. Negative for appetite change.   HENT:  Negative for congestion and dental problem.    Eyes:  Negative for discharge and itching.   Respiratory:  Negative for apnea and chest tightness.    Cardiovascular:  Negative for chest pain.   Gastrointestinal:  Negative for abdominal distention and abdominal pain.   Genitourinary:  Negative for difficulty urinating and dysuria.   Musculoskeletal:  Negative for arthralgias.   Allergic/Immunologic: Negative for environmental allergies.   Neurological:  Negative for dizziness and facial asymmetry.   Hematological:  Negative for adenopathy.   Psychiatric/Behavioral:  Negative for agitation and behavioral problems.    Objective:     Vital Signs (Most Recent):  Temp: 98.1 °F (36.7 °C) (10/26/22 0753)  Pulse: 75 (10/26/22 0951)  Resp: 20 (10/26/22 0959)  BP: 136/70 (10/26/22 0951)  SpO2: 99 % (10/26/22 0815) Vital Signs (24h Range):  Temp:  [97.1 °F (36.2 °C)-98.3 °F (36.8 °C)] 98.1 °F (36.7 °C)  Pulse:  [58-75] 75  Resp:  [18-28] 20  SpO2:  [91 %-99 %] 99 %  BP: (124-159)/(59-70) 136/70     Weight: 96.4 kg (212 lb 8.4 oz)  Body mass index is 32.31 kg/m².    Intake/Output Summary (Last 24 hours) at 10/26/2022 1022  Last data filed at 10/26/2022 0846  Gross per 24 hour   Intake 720 ml   Output --   Net 720 ml      Physical Exam  Vitals and nursing note reviewed.   Constitutional:       General: He is not in acute distress.     Appearance: Normal appearance. He is obese. He is ill-appearing. He is not toxic-appearing or diaphoretic.   HENT:      Head: Normocephalic and atraumatic.      Mouth/Throat:      Pharynx: Oropharynx is clear.   Eyes:      Conjunctiva/sclera: Conjunctivae normal.   Cardiovascular:      Rate and Rhythm: Normal rate and regular  rhythm.   Pulmonary:      Effort: Pulmonary effort is normal. No respiratory distress.      Breath sounds: No wheezing.   Abdominal:      General: Bowel sounds are normal. There is no distension.   Skin:     General: Skin is dry.   Neurological:      Mental Status: He is alert and oriented to person, place, and time.   Psychiatric:         Mood and Affect: Mood normal.         Behavior: Behavior normal.       Significant Labs: All pertinent labs within the past 24 hours have been reviewed.  BMP: No results for input(s): GLU, NA, K, CL, CO2, BUN, CREATININE, CALCIUM, MG in the last 48 hours.  CBC: No results for input(s): WBC, HGB, HCT, PLT in the last 48 hours.    Significant Imaging:       Assessment/Plan:      * CVA (cerebral vascular accident)  Patient presented with right leg weakness and noted to have acute stroke  asa 81 daily, statin increased to 40 mg daily. Started on Plavix for 21 days.  PT, OT, Speech evaluated  plan inpatient rehab at discharge-- he is medically stable for discharge to inpatient rehab when arranged   intermittent episodes of lucidity and aphasia confusion.  ST continues to work with patient and adjust diet consistency ad needed.  Insurance denied inpatient rehab.  Awaiting appeal.  Patient was previously home alone and independent.  Would greatly benefit from inpatient rehab.  SNF as plan B      Seizure  2 seizures occurred on 10/13 prompting step up to ICU  Neurology following  on keppra 1g BID  no further seizure activity noted     Obesity (BMI 30.0-34.9)  Body mass index is 32.31 kg/m².  Will recommend diet and lifestyle modifications outpatient    Tobacco dependence  Smokes rare cigarettes per day. Greater than 3 minutes spent counseling patient on dangers of continued tobacco abuse.    Stage 3a chronic kidney disease  Continue to monitor.  Avoid nephrotoxic medications.        Hyperlipidemia  Continue statin      Hypertension  On admit, held home amlodipine and lisinopril for  permissive HTN  BP is now elevated- resumed amlodipine and lisinopril  Added coreg  monitor vitals q4h  SBP goal of <160 in hospital  BP uncontrolled.  Started on Hydralazine.      VTE Risk Mitigation (From admission, onward)         Ordered     enoxaparin injection 40 mg  Daily         10/18/22 1107     IP VTE HIGH RISK PATIENT  Once         10/09/22 2026     Place sequential compression device  Until discontinued         10/09/22 2026                Discharge Planning   DEVIKA: 10/21/2022     Code Status: Full Code   Is the patient medically ready for discharge?:     Reason for patient still in hospital (select all that apply):     patient medically stable for discharge     Discharge Plan A: Rehab   Discharge Delays: (!) Post-Acute Set-up              Nain Cho MD  Department of Hospital Medicine   [facility]

## 2022-10-26 NOTE — PROGRESS NOTES
[facility] [place] - Telemetry  Adult Nutrition   Progress Note (Follow-Up)    SUMMARY     Recommendations/Interventions:    1) Continue IDDSI Lvl 4 Regular Diet; Encourage PO intake  2) Monitor pt I/O   -BM and % intake of meals  3) Monitor nutrition related labs    Goals:   1) Pt to meet >85% EEN and EPN via PO intake.  Nutrition Goal Status: new  Communication of RD Recs:  (POC)    Malnutrition Assessment:  Nutrition Problem  Inadequate Oral Intake    Related to (etiology):   CVA, Decreased appetite, Dysphagia    Signs and Symptoms (as evidenced by):   <75% PO intake    Interventions/Recommendations (treatment strategy):  Texture modified diet  Collaboration of care with other providers    Nutrition Diagnosis Status:   Resolved      Reason for Assessment    Reason For Assessment: follow-up  Diagnosis:  (Cerebral Vascular Accident)  Relevant Medical History:   Patient Active Problem List   Diagnosis    Hypertension    Hyperlipidemia    Glaucoma    Alcohol abuse    Elevated PSA    Arthralgia    Renal insufficiency    Erectile dysfunction    Stage 3a chronic kidney disease    Proteinuria    Uncomplicated alcohol dependence    Syncope    Tobacco dependence    Chronic gout without tophus    Allergic rhinitis    Abnormal ECG    Eczema    Memory impairment    Obesity (BMI 30.0-34.9)    Colon cancer screening    Epistaxis    CVA (cerebral vascular accident)    Seizure     General Information Comments:   10/26: Pt's diet changed to IDDSI 4, and liberalized from cardiac renal to regular. Pt's intake has greatly improved, averaging at about 75% intake the past few meals. I visited pt at lunch time and about 85% of meal had been consumed. Pt labs are stable and pt had BM yesterday. Dietetics will continue to monitor BM's, % intake and labs.    10/18: Pt admitted for CVA with AMS. SLP prescribed IDDSI Lvl 5 diet. Observed pt during lunch time, pt did not finish meal. Pt planning d/c to rehab facility soon. No BM in 4 days. PO % intake  "<75%. Liberalizing diet may encourage PO intake, will continue to monitor.    Nutrition Risk Screen    Nutrition Risk Screen: dysphagia or difficulty swallowing    Nutrition/Diet History    Food Allergies: NKFA  Factors Affecting Nutritional Intake: impaired cognitive status/motor control, chewing difficulties/inability to chew food, decreased appetite    Anthropometrics    Temp: 97.6 °F (36.4 °C)  Height Method: Stated  Height: 5' 8" (172.7 cm)  Height (inches): 68 in  Weight Method: Bed Scale  Weight: 96.4 kg (212 lb 8.4 oz)  Weight (lb): 212.53 lb  Ideal Body Weight (IBW), Male: 154 lb  % Ideal Body Weight, Male (lb): 137.14 %  BMI (Calculated): 32.3       Weight History:  Wt Readings from Last 10 Encounters:   10/23/22 96.4 kg (212 lb 8.4 oz)   10/09/22 92.5 kg (204 lb)   10/09/22 92.5 kg (203 lb 14.8 oz)   02/28/19 83.5 kg (184 lb 1.4 oz)   02/28/19 81.6 kg (180 lb)   02/22/19 86.2 kg (190 lb)   07/09/18 87.1 kg (192 lb 0.3 oz)   07/09/18 87.1 kg (192 lb 0.3 oz)   01/08/18 80.1 kg (176 lb 9.4 oz)   09/07/17 84.8 kg (187 lb)     Lab/Procedures/Meds: Pertinent Labs Reviewed  CBC:  No results for input(s): WBC, HGB, HCT, PLT in the last 24 hours.  CMP:  No results for input(s): GLUCOSE, CALCIUM, ALBUMIN, PROT, NA, K, CO2, CL, BUN, CREATININE, ALKPHOS, ALT, AST, BILITOT in the last 24 hours.      Medications:Pertinent Medications Reviewed  Scheduled Meds:   albuterol-ipratropium  3 mL Nebulization Q6H WAKE    amLODIPine  10 mg Oral Daily    aspirin  81 mg Oral Daily    atorvastatin  40 mg Oral QHS    carvediloL  25 mg Oral BID    clopidogreL  75 mg Oral Daily    enoxaparin  40 mg Subcutaneous Daily    hydrALAZINE  50 mg Oral Q12H    levETIRAcetam  1,000 mg Oral BID    lisinopriL  40 mg Oral Daily     Continuous Infusions:  PRN Meds:.acetaminophen, albuterol sulfate, dextrose 10%, dextrose 10%, glucagon (human recombinant), hydrALAZINE, insulin aspart U-100, melatonin, senna-docusate 8.6-50 mg, sodium chloride " 0.9%    Estimated/Assessed Needs    Weight Used For Calorie Calculations: 96 kg (211 lb 10.3 oz)  Energy Calorie Requirements (kcal): 2150-6792  Energy Need Method: Kcal/kg  Protein Requirements: 87g  Weight Used For Protein Calculations: 96 kg (211 lb 10.3 oz)  Fluid Requirements (mL): 1mL/senia  Estimated Fluid Requirement Method:  (Or per MD)  CHO Requirements: 240g    Nutrition Prescription Ordered    Current Diet Order: IDDSI 4; Regular  Nutrition Order Comments: 2000 kcal    Evaluation of Received Nutrient/Fluid Intake    I/O: +2.2  Energy Calories Required: meeting needs  Protein Required: meeting needs  Fluid Required: meeting needs  Comments: LBM 10/25  % Intake of Estimated Energy Needs: 75 - 100 %  % Meal Intake: 75 - 100 %  Intake/Output - Last 3 Shifts         10/24 0700  10/25 0659 10/25 0700  10/26 0659 10/26 0700  10/27 0659    P.O. 480 720 240    Total Intake(mL/kg) 480 (5) 720 (7.5) 240 (2.5)    Net +480 +720 +240           Urine Occurrence 4 x  1 x    Stool Occurrence 1 x 0 x           Nutrition Risk    Level of Risk/Frequency of Follow-up: moderate (1-2x per week)     Monitor and Evaluation    Food and Nutrient Intake: food and beverage intake, energy intake  Food and Nutrient Adminstration: diet order  Physical Activity and Function: factors affecting access to physical activity, nutrition-related ADLs and IADLs  Anthropometric Measurements: weight change  Biochemical Data, Medical Tests and Procedures: electrolyte and renal panel, lipid profile  Nutrition-Focused Physical Findings: overall appearance     Nutrition Follow-Up    RD Follow-up?: Yes  GAGE Roger, Dietetic Intern

## 2022-10-26 NOTE — PT/OT/SLP PROGRESS
Physical Therapy      Patient Name:  Leonides Burns   MRN:  [id number]    Patient not seen today secondary to Other (Comment) (Pt was scheduled for swallow study this am however delayed.  Pt awaiting swallow study this pm.). Will follow-up tomorrow.

## 2022-10-26 NOTE — PROCEDURES
Modified Barium Swallow    Patient Name:  Leonides Burns   MRN:  [id number]      Recommendations:     Recommendations:                General Recommendations:  Dysphagia therapy and Cognitive-linguistic therapy  Diet recommendations:  Mechanical soft, Thin FEED SLOWLY ALLOW FOR MULTIPLE SWALLOWS PER BITE/SIPS  Aspiration Precautions: 1 bite/sip at a time , crush meds  General Precautions: Standard  Communication strategies:  yes/no questions only    Referral     Reason for Referral  Patient was referred for a Modified Barium Swallow Study to assess the efficiency of his/her swallow function, rule out aspiration and make recommendations regarding safe dietary consistencies, effective compensatory strategies, and safe eating environment.     Diagnosis: CVA (cerebral vascular accident)       History:     Past Medical History:   Diagnosis Date    Disorder of kidney and ureter     Elevated PSA     Glaucoma     Hyperlipidemia     Hypertension     Psoriasis        Objective:     Current Respiratory Status: 10/26/22 room air    Alert: yes    Cooperative: yes    Follows Directions: inconsistent    Visualization  Patient was seen in the lateral view    Oral Peripheral Examination  Oral Musculature: general weakness, gross asymmetry present, right weakness, facial asymmetry present  Dentition: scattered dentition  Secretion Management: adequate  Mucosal Quality: coated tongue  Mandibular Strength and Mobility: WFL  Oral Labial Strength and Mobility: impaired retraction, impaired pursing, impaired coordination  Lingual Strength and Mobility: impaired strength, impaired left lateral movement  Velar Elevation: impaired  Buccal Strength and Mobility: impaired  Voice Prior to PO Intake: low volume; clear quality    Consistencies Assessed  Thin ~90 ml via straw across multiple trials, initial 2 trails via spoon  Puree X3  Solids X1    Oral Preparation/Oral Phase  prolonged A-P transfer  Oral residue present post swallow  Premature  spillage- to level of the pyriform for liquids, to valleculae for puree/solid  Lingual pumping  Piecemeal deglutition   Swallow delay  Consistent base of tongue residue    Pharyngeal Phase   Base of tongue retraction with resulting epiglottal inversion was adequate upon delayed swallow. Laryngeal excursion and elevation and vestibular closure was grossly adequate for airway protection however episode of penetration of thin liquids was noted X1.     Specific results as follows:    THIN LIQUIDS   Best  Rosenbek 8-Point Penetration-Aspiration Scale Score: 1: Material does not enter the airway.    Worst   Rosenbek 8-Point Penetration-Aspiration Scale Score: 2: Material enters the airway, remains above the vocal folds, and is ejected from the airway.     Pharyngeal Residue  Base of Tongue: mild  Valleculae: trace-mild (noted migration of base of tongue residue to vallecular sinus  Pyriforms: none  *grossly cleared via independent (although delayed) subsequent swallow.   -----    PUREE/SOLIDS  Rosenbek 8-Point Penetration-Aspiration Scale Score: 1: Material does not enter the airway.     Pharyngeal Residue  Base of Tongue: mild-moderate  Valleculae: mild  Pyriforms: none  *grossly cleared via independent (although delayed) subsequent swallow.       Cervical Esophageal Phase  UES appeared to accommodate all bolus types without stasis or retrograde movement observed     Assessment:     Impressions   Pt presents with moderate oral dysphagia c/b lingual pumping and apraxia of swallow, mild pharyngeal dysphagia negatively impacted by swallow delay.     Prognosis: Good    Barriers:  Cognitive status    Plan  Inpatient rehab for dysphagia therapy and treatment of cognitive linguistic deficits.     Education  Results were dicussed with patient in real time with monitor in view however Pt will need modeling and reinforcement as receptive language deficits inhibit his ability to understand recommendations. ST results and recs were  communicated to MD and nursing via secure chat.     Goals:   Multidisciplinary Problems       SLP Goals          Problem: SLP    Goal Priority Disciplines Outcome   SLP Goal    Low SLP Ongoing, Progressing   Description: STGS  1. Pt will perform multiple step commands regarding before and after with 90% acc. -Discont 10/14  2. Pt will perform divergent naming tasks with min assist- Discont 10/14 UPDATE: Pt will name word given 3 descriptors with 80% acc.   3. Pt will delayed recall tasks with min assist. -Discont  4. Pt will follow 1-step commands with 80% acc, min A.   5. Pt will name objects with 80% acc, min A.   6. Pt will orient to place and date with min A.  7. Pt will tolerate mechanical soft diet with thin liquids w/o overt s/s of aspiration. (Discontinued 10/17/22)  8. Pt will tolerate pureed diet with nectar thick liquids w/o overt s/s of aspiration.  9. Pt will participate in modified barium swallow study                       Plan:   Patient to be seen:  Therapy Frequency: 5 x/week   Plan of Care expires:  11/04/22  Plan of Care reviewed with:  patient        Discharge recommendations:  rehabilitation facility   Barriers to Discharge:  None    Time Tracking:   SLP Treatment Date:   10/26/22  Speech Start Time:  1450  Speech Stop Time:  1530     Speech Total Time (min):  40 min    10/26/2022

## 2022-10-26 NOTE — PLAN OF CARE
Problem: Occupational Therapy  Goal: Occupational Therapy Goal  Description: Goals to be met by: 11/11/22    Patient will increase functional independence with ADLs by performing:    UE Dressing with Modified [place].  LE Dressing with Modified [place].  Grooming while standing at sink with Modified [place].  Toileting from toilet with Modified [place] for hygiene and clothing management.   Supine to sit with Modified [place].  Step transfer with Modified [place]  Toilet transfer to toilet with Modified [place].  Upper extremity exercise program x15 reps per handout, with independence.    Outcome: Ongoing, Progressing     MIN A with verbal/tactile cueing for use of LUE/LLE to assist with scooting to HOB in supine for improved positioning.

## 2022-10-26 NOTE — PROGRESS NOTES
[facility] - Telemetry  Neurology  Progress Note    Patient Name: Leonides Burns  MRN: [id number]  Admission Date: 10/9/2022  Hospital Length of Stay: 15 days  Code Status: Full Code   Attending Provider: Nain Oneill MD  Primary Care Physician: Suzette Akhtar MD   Principal Problem:CVA (cerebral vascular accident)    Subjective:     Interval History: 78 y/o male with CKD, HTN, tobacco abuse presents to the hospital with a chief complaint of right leg weakness.  He reports 2 days of right lower extremity weakness and discoordination and double vision.  States the symptoms have been constant without aggravating factors.  He sometimes feels that improved with lying flat.  He reports he previously had stop aspirin due to severe and recurrent epistaxis.  He denies fevers chest pain shortness breast nausea vomiting abdominal pain leg swelling syncope numbness weakness of an arm incontinence weakness of his left leg.     -10/17/22: No new issues. Pt was transferred to ICU a few days back after a possible seizure.     -10/18/22: No seizures reported.     -10/22/22: Pt with worsening speech.     -10/23/22: No new issues.     -10/25/22: Pt noted to be less verbally interactive and requiring more assistance in ADL's.    -10/26/22: No new issues today.    Current Neurological Medications:     Current Facility-Administered Medications   Medication Dose Route Frequency Provider Last Rate Last Admin    acetaminophen tablet 500 mg  500 mg Oral Q6H PRN Celia Cabral MD        albuterol sulfate nebulizer solution 2.5 mg  2.5 mg Nebulization Q4H PRN Celia Cabral MD   2.5 mg at 10/21/22 1426    albuterol-ipratropium 2.5 mg-0.5 mg/3 mL nebulizer solution 3 mL  3 mL Nebulization Q6H WAKE Juventino Turner MD   3 mL at 10/26/22 1330    amLODIPine tablet 10 mg  10 mg Oral Daily Celia Cabral MD   10 mg at 10/26/22 0951    aspirin EC tablet 81 mg  81 mg Oral Daily Celia Cabral MD   81 mg at 10/26/22  0951    atorvastatin tablet 40 mg  40 mg Oral QHS Celia Cabral MD   40 mg at 10/25/22 2203    carvediloL tablet 25 mg  25 mg Oral BID Celia Cabral MD   25 mg at 10/26/22 0951    clopidogreL tablet 75 mg  75 mg Oral Daily Celia Cabral MD   75 mg at 10/26/22 0951    dextrose 10% bolus 125 mL  12.5 g Intravenous PRN Celia Cabral MD        dextrose 10% bolus 250 mL  25 g Intravenous PRN Celia Cabral MD        enoxaparin injection 40 mg  40 mg Subcutaneous Daily Celia Cabral MD   40 mg at 10/25/22 1600    glucagon (human recombinant) injection 1 mg  1 mg Intramuscular PRN Celia Cabral MD        hydrALAZINE injection 10 mg  10 mg Intravenous Q8H PRN Juventino Turner MD   10 mg at 10/23/22 1211    hydrALAZINE tablet 50 mg  50 mg Oral Q12H Juventino Turner MD   50 mg at 10/26/22 0951    insulin aspart U-100 pen 0-5 Units  0-5 Units Subcutaneous QID (AC + HS) PRN Celia Cabral MD        levETIRAcetam tablet 1,000 mg  1,000 mg Oral BID Juventino Turner MD   1,000 mg at 10/26/22 0951    lisinopriL tablet 40 mg  40 mg Oral Daily Celia Cabral MD   40 mg at 10/26/22 0951    melatonin tablet 6 mg  6 mg Oral Nightly PRN Celia Cabral MD   6 mg at 10/24/22 2124    senna-docusate 8.6-50 mg per tablet 1 tablet  1 tablet Oral Daily PRN Celia Cabral MD        sodium chloride 0.9% flush 10 mL  10 mL Intravenous PRN Celia Cabral MD   10 mL at 10/16/22 0639       Review of Systems  Unable to perform ROS: Patient nonverbal     Objective:     Vital Signs (Most Recent):  Temp: 97.6 °F (36.4 °C) (10/26/22 1124)  Pulse: 65 (10/26/22 1330)  Resp: 16 (10/26/22 1330)  BP: (!) 154/69 (10/26/22 1124)  SpO2: 95 % (10/26/22 1330)   Vital Signs (24h Range):  Temp:  [97.1 °F (36.2 °C)-98.1 °F (36.7 °C)] 97.6 °F (36.4 °C)  Pulse:  [56-75] 65  Resp:  [16-28] 16  SpO2:  [92 %-99 %] 95 %  BP: (124-154)/(59-70) 154/69     Weight: 96.4 kg (212 lb 8.4 oz)  Body mass index is 32.31 kg/m².    Physical  Exam  Constitutional:       General: He is not in acute distress.  HENT:      Head: Normocephalic.   Eyes:      General:         Right eye: No discharge.         Left eye: No discharge.   Cardiovascular:      Rate and Rhythm: Normal rate.   Pulmonary:      Effort: No respiratory distress.   Abdominal:      Palpations: Abdomen is soft.   Musculoskeletal:         General: No swelling.      Cervical back: Neck supple.   Skin:     Findings: No rash.   Neurological:      Mental Status: He is oriented to person, place, and time.   Psychiatric:         Speech: Speech normal.         NEUROLOGICAL EXAMINATION:      MENTAL STATUS   Nonverbal  Level of consciousness: alert     CRANIAL NERVES      CN III, IV, VI   Right pupil: Size: 2 mm. Shape: regular.   Left pupil: Size: 2 mm. Shape: regular.   Nystagmus: none   Conjugate gaze: present     CN V   Right facial sensation deficit: none  Left facial sensation deficit: none     CN VII   Right facial weakness: central  Left facial weakness: none     CN XII   Tongue deviation: none     MOTOR EXAM      Strength   Strength 5/5 except as noted.        RLE 2/5 effort               Significant Labs: CBC: No results for input(s): WBC, HGB, HCT, PLT in the last 48 hours.  CMP: No results for input(s): GLU, NA, K, CL, CO2, BUN, CREATININE, CALCIUM, MG, PROT, ALBUMIN, BILITOT, ALKPHOS, AST, ALT, ANIONGAP, EGFRNONAA in the last 48 hours.    Significant Imaging: I have reviewed all pertinent imaging results/findings within the past 24 hours.    Impression:     New ischemic change under 2 weeks age posterior frontal parietal high convexity junction left hemisphere superimposed on previous remote ischemic change.     MRI otherwise stable.     This report was flagged in Epic as abnormal.        Electronically signed by: Pascual Dominguez MD  Date:                                            10/26/2022  Time:                                           09:12    Assessment and Plan:     78 y/o male  with acute stroke     Stroke: stroke with extension as noted in MRI from yesterday. Hence, worsening of motor signs na d speech difficulties. US of carotids with no significant stenosis. No LAE on echo.   ASA 81 mg daily and clopidogrel 75 mg daily x 21 days is recommended.  Statin.  PT/OT. ST. Inpatient rehab.  OK for BP control.    Seizure: reported seizure in hospital. No other events but is nonverbal. Subclinical seizures? Stroke?  Levetiracetam 1000 mg BID.   EEG showed no ongoing seizures. Official report pending.    Active Diagnoses:    Diagnosis Date Noted POA    PRINCIPAL PROBLEM:  CVA (cerebral vascular accident) [I63.9] 10/10/2022 Yes    Seizure [R56.9] 10/14/2022 No    Obesity (BMI 30.0-34.9) [E66.9] 07/09/2018 Yes    Tobacco dependence [F17.200] 02/17/2016 Yes     Chronic    Stage 3a chronic kidney disease [N18.31] 06/02/2015 Yes    Hypertension [I10] 02/02/2015 Yes     Chronic    Hyperlipidemia [E78.5] 02/02/2015 Yes     Chronic      Problems Resolved During this Admission:    Diagnosis Date Noted Date Resolved POA    Right leg weakness [R29.898] 10/09/2022 10/11/2022 Yes       VTE Risk Mitigation (From admission, onward)           Ordered     enoxaparin injection 40 mg  Daily         10/18/22 1107     IP VTE HIGH RISK PATIENT  Once         10/09/22 2026     Place sequential compression device  Until discontinued         10/09/22 2026                    Ahmet Lara MD  Neurology  [facility] [place] - Telemetry

## 2022-10-26 NOTE — PLAN OF CARE
Problem: Adult Inpatient Plan of Care  Goal: Plan of Care Review  Outcome: Ongoing, Progressing  Goal: Patient-Specific Goal (Individualized)  Outcome: Ongoing, Progressing  Goal: Absence of Hospital-Acquired Illness or Injury  Outcome: Ongoing, Progressing  Goal: Optimal Comfort and Wellbeing  Outcome: Ongoing, Progressing  Goal: Readiness for Transition of Care  Outcome: Ongoing, Progressing     Problem: Adjustment to Illness (Stroke, Ischemic/Transient Ischemic Attack)  Goal: Optimal Coping  Outcome: Ongoing, Progressing     Problem: Bowel Elimination Impaired (Stroke, Ischemic/Transient Ischemic Attack)  Goal: Effective Bowel Elimination  Outcome: Ongoing, Progressing

## 2022-10-26 NOTE — PLAN OF CARE
[facility] - Telemetry  Discharge Reassessment    Primary Care Provider: Suzette Akhtar MD    Expected Discharge Date: 10/21/2022    Reassessment (most recent)       Discharge Reassessment - 10/26/22 1358          Discharge Reassessment    Assessment Type Discharge Planning Reassessment     Discharge Plan discussed with: Adult children     Communicated DEVIKA with patient/caregiver Yes     Discharge Plan A Skilled Nursing Facility     Discharge Plan B Skilled Nursing Facility;Home Health     DME Needed Upon Discharge  other (see comments)   TBD    Discharge Barriers Identified Other (see comments)   Patient's family does not like the reviews of accepting SNFs.    Why the patient remains in the hospital Social issues        Post-Acute Status    Post-Acute Placement Status Referrals Sent     Discharge Delays Patient and Family Barriers   Patient's son (Leonides) request for SNFs on [place]. Patient's son (Santi) request for to send SNF referral on [facility] due to not liking reviews from accepting SNF on [place].                    LACY spoke with Luis Angel ([place]) who informed SW that she left voice message for patient's son Santi and she spoke with patient's son Leonides. Luis Angel stated that Leonides is suppose to be coming to [place] to view facility but has not made it to [place] yet. LACY explained to Luis Angel to contact Santi as he is the person SW has be communicating with. Luis Angel stated she will contact patient's son Santi. LACY also asked for Luis Angel to submit insurance authorization if patient's son like [facility] as choice. Jaskaran voiced understanding.     LACY received call from Luis Angel ([place]) re: patient sons. Luis Angel explained that she spoke with both sons via phone 3 way. Luis Angel explained reason for patient's needed finances. Luis Angel explained that patient's son (Leonides) is coming to hospital and then heading to visit [facility], and patient's son Santi will be pulling up patient's bank  statements. Mendelia stated she will be at [facility] until 4:30 pm and will submit for insurance authorization once patient's son Leonides agrees to [facility].     LACY received call from patient's son (Santi) informing SW that his brother and him will move forward with [facility]. Santi stated he is getting patient's bank statements today to fax to Luis Angel ([place]).     LACY spoke with Luis Angel ([place]) informing her to submit for insurance authorization. LACY also emailed updated PT/OT/SPL notes and Physician notes.

## 2022-10-26 NOTE — PLAN OF CARE
LACY left voice message for Bekah ([facility]) requesting a return call.  LACY spoke with Luis Angel ([place]) who informed SW that she will talk with nurse and contact SW back.   SW left voice message Andrew ([facility]) requesting a return call.    SW received call from Luis Angel ([place]) inquiring if patient is a SNF to home? SW explained that is currently the plan. Luis Angel stated she will contact patient's son. SW explained for Luis Angel to contact patient's son Santi.    SW received call from Pat ([facility]) informing SW that patient's insurance is in network. SW explained to Pat ([facility]) that SW notified patient's son that [facility] was not in network Per: Pat ([facility]). Pat stated she will contact patient's son (Santi).     LACY spoke with Luis Angel ([place]) re: SNF placement. Luis Angel asked about patient's financial information? LACY explained for Luis Angel to contact patient's son Santi.     LACY spoke with patient's son (Santi) re: [place] and [facility] accepting patient. SW explained that Luis Angel ([place]) and Pat ([facility]) should have not contact patient's son Santi. Patient's son stated he will be contacting [place] once getting off the phone with LACY. LACY explained that Luis Angel will be needing financial information, and Luis Angel will be going over that with patient's son. LACY provided patient's son (Santi) with Luis Angel ([place]) # for contact.     LACY left voice message for Luis Angel [place] requesting a return call.

## 2022-10-27 PROCEDURE — 21400001 HC TELEMETRY ROOM

## 2022-10-27 PROCEDURE — 97112 NEUROMUSCULAR REEDUCATION: CPT

## 2022-10-27 PROCEDURE — 94640 AIRWAY INHALATION TREATMENT: CPT

## 2022-10-27 PROCEDURE — 25000003 PHARM REV CODE 250: Performed by: HOSPITALIST

## 2022-10-27 PROCEDURE — 94761 N-INVAS EAR/PLS OXIMETRY MLT: CPT

## 2022-10-27 PROCEDURE — 63600175 PHARM REV CODE 636 W HCPCS: Performed by: HOSPITALIST

## 2022-10-27 PROCEDURE — 99232 SBSQ HOSP IP/OBS MODERATE 35: CPT | Mod: ,,, | Performed by: PSYCHIATRY & NEUROLOGY

## 2022-10-27 PROCEDURE — 25000242 PHARM REV CODE 250 ALT 637 W/ HCPCS: Performed by: HOSPITALIST

## 2022-10-27 PROCEDURE — 97110 THERAPEUTIC EXERCISES: CPT | Mod: CQ

## 2022-10-27 PROCEDURE — 97530 THERAPEUTIC ACTIVITIES: CPT | Mod: CQ

## 2022-10-27 PROCEDURE — 97530 THERAPEUTIC ACTIVITIES: CPT

## 2022-10-27 PROCEDURE — 99232 PR SUBSEQUENT HOSPITAL CARE,LEVL II: ICD-10-PCS | Mod: ,,, | Performed by: PSYCHIATRY & NEUROLOGY

## 2022-10-27 PROCEDURE — 25000003 PHARM REV CODE 250: Performed by: INTERNAL MEDICINE

## 2022-10-27 RX ORDER — HYDRALAZINE HYDROCHLORIDE 25 MG/1
75 TABLET, FILM COATED ORAL EVERY 8 HOURS
Status: DISCONTINUED | OUTPATIENT
Start: 2022-10-27 | End: 2022-10-28 | Stop reason: HOSPADM

## 2022-10-27 RX ADMIN — IPRATROPIUM BROMIDE AND ALBUTEROL SULFATE 3 ML: 2.5; .5 SOLUTION RESPIRATORY (INHALATION) at 07:10

## 2022-10-27 RX ADMIN — CLOPIDOGREL BISULFATE 75 MG: 75 TABLET ORAL at 10:10

## 2022-10-27 RX ADMIN — HYDRALAZINE HYDROCHLORIDE 75 MG: 25 TABLET, FILM COATED ORAL at 09:10

## 2022-10-27 RX ADMIN — AMLODIPINE BESYLATE 10 MG: 5 TABLET ORAL at 10:10

## 2022-10-27 RX ADMIN — LEVETIRACETAM 1000 MG: 500 TABLET, FILM COATED ORAL at 10:10

## 2022-10-27 RX ADMIN — CARVEDILOL 25 MG: 12.5 TABLET, FILM COATED ORAL at 10:10

## 2022-10-27 RX ADMIN — ENOXAPARIN SODIUM 40 MG: 100 INJECTION SUBCUTANEOUS at 05:10

## 2022-10-27 RX ADMIN — ASPIRIN 81 MG: 81 TABLET, COATED ORAL at 10:10

## 2022-10-27 RX ADMIN — HYDRALAZINE HYDROCHLORIDE 75 MG: 25 TABLET, FILM COATED ORAL at 03:10

## 2022-10-27 RX ADMIN — ATORVASTATIN CALCIUM 40 MG: 40 TABLET, FILM COATED ORAL at 09:10

## 2022-10-27 RX ADMIN — LEVETIRACETAM 1000 MG: 500 TABLET, FILM COATED ORAL at 09:10

## 2022-10-27 RX ADMIN — LISINOPRIL 40 MG: 20 TABLET ORAL at 10:10

## 2022-10-27 RX ADMIN — SENNOSIDES AND DOCUSATE SODIUM 1 TABLET: 50; 8.6 TABLET ORAL at 10:10

## 2022-10-27 RX ADMIN — IPRATROPIUM BROMIDE AND ALBUTEROL SULFATE 3 ML: 2.5; .5 SOLUTION RESPIRATORY (INHALATION) at 12:10

## 2022-10-27 RX ADMIN — CARVEDILOL 25 MG: 12.5 TABLET, FILM COATED ORAL at 09:10

## 2022-10-27 NOTE — PLAN OF CARE
LACY received call from Luis Angel ([place]) re: insurance authorization. Luis Angel stated authorization is still pending and asked if LACY knew someone to expedite insurance authorization.     LACY left voice message for Fartun ([facility]) requesting a return call. Fartun # [phone] x [phone].    LACY left 2nd voice message for Fartun ([facility]) requesting a return call.

## 2022-10-27 NOTE — PLAN OF CARE
"  Problem: Physical Therapy  Goal: Physical Therapy Goal  Description: Goals to be met by: 10/28/22     Patient will increase functional independence with mobility by performin. Pt to be Min A with bed mobility.  2. Pt to transfer sit to stand with CGA  3. Pt to ambulate with HW and Min A x 10-15'  4. Pt to be Supervision with written HEP  5. Pt to transfer bed to chair with CGA  6. Pt to ascend/descend 4" curb with RW and Min A  7. Pt will propel W/C with L UE/LE and CGA approx 25'  Outcome: Ongoing, Progressing   Sit to stand with B HHA x 3 trials with max A x 2 persons from elevated bed with B knees blocked 2/2 to increased knee flexion and weakness. Pt max A/dep x 2 with bed mobility.  "

## 2022-10-27 NOTE — PT/OT/SLP PROGRESS
Occupational Therapy   Treatment    Name: Leonides Burns  MRN: [id number]  Admitting Diagnosis:  CVA (cerebral vascular accident)       Recommendations:     Discharge Recommendations: rehabilitation facility  Discharge Equipment Recommendations:   (TBD)  Barriers to discharge:   (Pt will benefit from multidisciplinary apprach in an IPR setting for returning to PLOF.)    Assessment:     Leonides Burns is a 77 y.o. male with a medical diagnosis of CVA (cerebral vascular accident).  Performance deficits affecting function are weakness, impaired endurance, impaired self care skills, impaired functional mobility, impaired balance, gait instability, decreased lower extremity function, decreased upper extremity function, decreased coordination, decreased safety awareness, pain, decreased ROM, impaired coordination, impaired fine motor.     Pt pleasant and willing to participate in tx session this date though he appeared to have a low mood; pt provided w/ support and positive feedback throughout. Pt required max A x 1 from OT for transitioning to EOB to participate in therapeutic activities and standing trials. Pt required max-total A x 2 persons for standing from EOB x 3 trials, tolerated ~20-30 sec per attempt. Pt required mod A for reaching across midline to bear weight through RUE. Pt tolerated tx session well and will continue to benefit from skilled acute OT services to maximize functional capacity for safe performance w/ ADLs and functional mobility.     Rehab Prognosis:  Good; patient would benefit from acute skilled OT services to address these deficits and reach maximum level of function.       Plan:     Patient to be seen 5 x/week, 6 x/week to address the above listed problems via self-care/home management, therapeutic activities, therapeutic exercises, neuromuscular re-education  Plan of Care Expires: 11/11/22  Plan of Care Reviewed with: patient    Subjective     Pain/Comfort:  Pain Rating 1: 0/10  Pain Rating  Post-Intervention 1: 0/10    Objective:     Communicated with: Nurse prior to session.  Patient found HOB elevated with bed alarm, telemetry, peripheral IV, pressure relief boots upon OT entry to room.    General Precautions: Standard, aphasia, fall, seizure   Orthopedic Precautions:N/A   Braces: N/A  Respiratory Status: Room air     Occupational Performance:     Bed Mobility:    Patient completed Rolling/Turning to Left with  maximal assistance and total assistance; pt required assist for managing RUE for turning to L  Patient completed Scooting hips to EOB with maximal assistance x 1 person; scooting to HOB w/ max A x 2 person; pt was able to use LLE and LUE for assisting w/ cueing for hand placement on bed rail and proper body mechanics   Patient completed Supine to Sit with maximal assistance and total assistance x 1 person   Patient completed Sit to Supine with maximal assistance and 2 persons     Functional Mobility/Transfers:  Patient completed Sit <> Stand Transfer x 3 trials with maximal assistance and total assistance x 2 persons with  hand-held assist; pt was able to use LUE on bed rail to assist w/ pushing self up from EOB; PT and OT blocked both knees and provided physical assist for clearing EOB; pt required physical assist for shifting hips forward and extending trunk   Functional Mobility: Not assessed due to safety concerns.     Activities of Daily Living:  Upper Body Dressing: moderate assistance for donning gown over back; pt required assist for threading RUE but was able to help OT w/ threading LUE    [facility] 6 Click ADL: 11    Treatment & Education:  -Pt was able to participate in functional activities as described above.  -Pt required cueing and education for using proper body mechanics to successfully stand from EOB this date.   -Pt participated in seated weight bearing activity to increase proprioceptive input and neuromuscular feedback in RUE; pt was instructed to scan towards R side for  "locating bright orange balloon to then reach across midline using LUE to tap balloon. Pt required cueing for completely scanning to R while OT ensured RUE remained at EOB in optimal position. Pt initiated minimal movement to LUE, requiring passive assist from PT for reaching across midline to make contact. Pt performed this for 1 set x 6 reps w/ mod-max A for shifting weight to R side and returning to midline.   -Pt appeared to be w/ low mood and consistently shook his head "no" as if he were disappointed or sad; pt provided w/ positive feedback and encouragement throughout.   -Questions and concerns addressed within OT scope.     Patient left HOB elevated with all lines intact, call button in reach, bed alarm on, and PT present initiating bed level exercises; RUE supported on pillow     GOALS:   Multidisciplinary Problems       Occupational Therapy Goals          Problem: Occupational Therapy    Goal Priority Disciplines Outcome Interventions   Occupational Therapy Goal     OT, PT/OT Ongoing, Progressing    Description: Goals to be met by: 11/11/22    Patient will increase functional independence with ADLs by performing:    UE Dressing with Modified [place].  LE Dressing with Modified [place].  Grooming while standing at sink with Modified [place].  Toileting from toilet with Modified [place] for hygiene and clothing management.   Supine to sit with Modified [place].  Step transfer with Modified [place]  Toilet transfer to toilet with Modified [place].  Upper extremity exercise program x15 reps per handout, with independence.                         Time Tracking:     OT Date of Treatment: 10/27/22  OT Start Time: 1552  OT Stop Time: 1620  OT Total Time (min): 28 min    Billable Minutes:Therapeutic Activity 14  Neuromuscular Re-education 14  Total Time 28 (co-tx w/ PT)     OT/CHANDNI: OT     CHANDNI Visit Number: 0    10/27/2022  "

## 2022-10-27 NOTE — PROGRESS NOTES
[facility] - Telemetry  Neurology  Progress Note    Patient Name: Leonides Burns  MRN: [id number]  Admission Date: 10/9/2022  Hospital Length of Stay: 16 days  Code Status: Full Code   Attending Provider: Nain Oneill MD  Primary Care Physician: Suzette Akhtar MD   Principal Problem:CVA (cerebral vascular accident)    Subjective:     Interval History: 78 y/o male with CKD, HTN, tobacco abuse presents to the hospital with a chief complaint of right leg weakness.  He reports 2 days of right lower extremity weakness and discoordination and double vision.  States the symptoms have been constant without aggravating factors.  He sometimes feels that improved with lying flat.  He reports he previously had stop aspirin due to severe and recurrent epistaxis.  He denies fevers chest pain shortness breast nausea vomiting abdominal pain leg swelling syncope numbness weakness of an arm incontinence weakness of his left leg.     -10/17/22: No new issues. Pt was transferred to ICU a few days back after a possible seizure.     -10/18/22: No seizures reported.     -10/22/22: Pt with worsening speech.     -10/23/22: No new issues.     -10/25/22: Pt noted to be less verbally interactive and requiring more assistance in ADL's.     -10/26/22: No new issues today.    -10/27/22: Tolerating pureed diet and liquids.      Current Neurological Medications:     Current Facility-Administered Medications   Medication Dose Route Frequency Provider Last Rate Last Admin    acetaminophen tablet 500 mg  500 mg Oral Q6H PRN Celia Cabral MD        albuterol sulfate nebulizer solution 2.5 mg  2.5 mg Nebulization Q4H PRN Celia Cabral MD   2.5 mg at 10/21/22 1426    albuterol-ipratropium 2.5 mg-0.5 mg/3 mL nebulizer solution 3 mL  3 mL Nebulization Q6H WAKE Juventino Turner MD   3 mL at 10/27/22 1222    amLODIPine tablet 10 mg  10 mg Oral Daily Celia Cabral MD   10 mg at 10/27/22 1015    aspirin EC tablet 81 mg  81 mg  Oral Daily Celia Cabral MD   81 mg at 10/27/22 1015    atorvastatin tablet 40 mg  40 mg Oral QHS Celia Cabral MD   40 mg at 10/26/22 2046    carvediloL tablet 25 mg  25 mg Oral BID Celia Cabral MD   25 mg at 10/27/22 1015    clopidogreL tablet 75 mg  75 mg Oral Daily Celia Cabral MD   75 mg at 10/27/22 1016    dextrose 10% bolus 125 mL  12.5 g Intravenous PRN Celia Cabral MD        dextrose 10% bolus 250 mL  25 g Intravenous PRN Celia Cabral MD        enoxaparin injection 40 mg  40 mg Subcutaneous Daily Celia Cabral MD   40 mg at 10/26/22 1708    glucagon (human recombinant) injection 1 mg  1 mg Intramuscular PRN Celia Cabral MD        hydrALAZINE injection 10 mg  10 mg Intravenous Q8H PRN Juventino Turner MD   10 mg at 10/23/22 1211    hydrALAZINE tablet 75 mg  75 mg Oral Q8H Nain Oneill MD        insulin aspart U-100 pen 0-5 Units  0-5 Units Subcutaneous QID (AC + HS) PRN Celia Cabral MD        levETIRAcetam tablet 1,000 mg  1,000 mg Oral BID Juventino Turner MD   1,000 mg at 10/27/22 1015    lisinopriL tablet 40 mg  40 mg Oral Daily Celia Cabral MD   40 mg at 10/27/22 1016    melatonin tablet 6 mg  6 mg Oral Nightly PRN Celia Cabral MD   6 mg at 10/26/22 2046    senna-docusate 8.6-50 mg per tablet 1 tablet  1 tablet Oral Daily PRN Celia Cabral MD   1 tablet at 10/27/22 1016    sodium chloride 0.9% flush 10 mL  10 mL Intravenous PRN Celia Cabral MD   10 mL at 10/16/22 0639       Review of Systems  Unable to perform ROS: Patient nonverbal     Objective:     Vital Signs (Most Recent):  Temp: 97.7 °F (36.5 °C) (10/27/22 1106)  Pulse: 70 (10/27/22 1221)  Resp: 14 (10/27/22 1221)  BP: 129/70 (10/27/22 1106)  SpO2: 96 % (10/27/22 1221) Vital Signs (24h Range):  Temp:  [97.7 °F (36.5 °C)-98.5 °F (36.9 °C)] 97.7 °F (36.5 °C)  Pulse:  [54-70] 70  Resp:  [14-20] 14  SpO2:  [93 %-97 %] 96 %  BP: (116-161)/(62-92) 129/70     Weight: 96.4 kg (212 lb  8.4 oz)  Body mass index is 32.31 kg/m².    Physical Exam  Constitutional:       General: He is not in acute distress.  HENT:      Head: Normocephalic.   Eyes:      General:         Right eye: No discharge.         Left eye: No discharge.   Cardiovascular:      Rate and Rhythm: Normal rate.   Pulmonary:      Effort: No respiratory distress.   Abdominal:      Palpations: Abdomen is soft.   Musculoskeletal:         General: No swelling.      Cervical back: Neck supple.   Skin:     Findings: No rash.   Neurological:      Mental Status: He is oriented to person, place, and time.   Psychiatric:         Speech: Speech normal.         NEUROLOGICAL EXAMINATION:      MENTAL STATUS   Nonverbal  Level of consciousness: alert     CRANIAL NERVES      CN III, IV, VI   Right pupil: Size: 2 mm. Shape: regular.   Left pupil: Size: 2 mm. Shape: regular.   Nystagmus: none   Conjugate gaze: present     CN V   Right facial sensation deficit: none  Left facial sensation deficit: none     CN VII   Right facial weakness: central  Left facial weakness: none     CN XII   Tongue deviation: none     MOTOR EXAM      Strength   Strength 5/5 except as noted.        RLE 3/5 effort       Significant Labs: CBC: No results for input(s): WBC, HGB, HCT, PLT in the last 48 hours.  CMP: No results for input(s): GLU, NA, K, CL, CO2, BUN, CREATININE, CALCIUM, MG, PROT, ALBUMIN, BILITOT, ALKPHOS, AST, ALT, ANIONGAP, EGFRNONAA in the last 48 hours.    Significant Imaging: I have reviewed all pertinent imaging results/findings within the past 24 hours.    Assessment and Plan:     78 y/o male with acute stroke     Stroke: stroke with extension as noted in MRI from yesterday. Hence, worsening of motor signs na d speech difficulties. US of carotids with no significant stenosis. No LAE on echo.   ASA 81 mg daily and clopidogrel 75 mg daily x 21 days is recommended.  Statin.  PT/OT. ST. Inpatient rehab.  OK for BP control.     Seizure: reported seizure in  hospital. No other events but is nonverbal. Subclinical seizures? Stroke?  Levetiracetam 1000 mg BID.   EEG showed no ongoing seizures or epileptiform discharges.    Active Diagnoses:    Diagnosis Date Noted POA    PRINCIPAL PROBLEM:  CVA (cerebral vascular accident) [I63.9] 10/10/2022 Yes    Seizure [R56.9] 10/14/2022 No    Obesity (BMI 30.0-34.9) [E66.9] 07/09/2018 Yes    Tobacco dependence [F17.200] 02/17/2016 Yes     Chronic    Stage 3a chronic kidney disease [N18.31] 06/02/2015 Yes    Hypertension [I10] 02/02/2015 Yes     Chronic    Hyperlipidemia [E78.5] 02/02/2015 Yes     Chronic      Problems Resolved During this Admission:    Diagnosis Date Noted Date Resolved POA    Right leg weakness [R29.898] 10/09/2022 10/11/2022 Yes       VTE Risk Mitigation (From admission, onward)           Ordered     enoxaparin injection 40 mg  Daily         10/18/22 1107     IP VTE HIGH RISK PATIENT  Once         10/09/22 2026     Place sequential compression device  Until discontinued         10/09/22 2026                    Ahmet Lara MD  Neurology  [facility] [place]

## 2022-10-27 NOTE — PLAN OF CARE
Problem: Occupational Therapy  Goal: Occupational Therapy Goal  Description: Goals to be met by: 11/11/22    Patient will increase functional independence with ADLs by performing:    UE Dressing with Modified [place].  LE Dressing with Modified [place].  Grooming while standing at sink with Modified [place].  Toileting from toilet with Modified [place] for hygiene and clothing management.   Supine to sit with Modified [place].  Step transfer with Modified [place]  Toilet transfer to toilet with Modified [place].  Upper extremity exercise program x15 reps per handout, with independence.    Outcome: Ongoing, Progressing

## 2022-10-27 NOTE — PT/OT/SLP PROGRESS
Physical Therapy Treatment    Patient Name:  Leonides Burns   MRN:  [id number]    Recommendations:     Discharge Recommendations:  rehabilitation facility   Discharge Equipment Recommendations:  (Ongoing assessment pending pt progress)   Barriers to discharge:  Pt Not functioning at Independent baseline and now requiring Max A x 2 for all functional mobility and unable to ambulate.  Pt could benefit from an aggressive Multidisciplinary approach(IPR) in order to help restore pt to highest functional level.    Assessment:     Leonides Burns is a 77 y.o. male admitted with a medical diagnosis of CVA (cerebral vascular accident).  He presents with the following impairments/functional limitations:  weakness, impaired endurance, impaired sensation, impaired self care skills, impaired functional mobility, gait instability, impaired balance, impaired cognition, decreased coordination, decreased upper extremity function, decreased lower extremity function, decreased safety awareness, abnormal tone, decreased ROM, impaired coordination, impaired fine motor, impaired joint extensibility.  Pt very sad about condition and needed max encouragement to participate. Pt able to follow some verbal commands but also required tactile cues. Pt with total paralysis of RLE needing PROM.    Rehab Prognosis: Good; patient would benefit from acute skilled PT services to address these deficits and reach maximum level of function.    Recent Surgery: * No surgery found *      Plan:     During this hospitalization, patient to be seen  (5-6x/wk) to address the identified rehab impairments via therapeutic activities, therapeutic exercises and progress toward the following goals:    Plan of Care Expires:  10/26/22    Subjective     Chief Complaint: can't move RLE  Patient/Family Comments/goals: Pt sitting EOB with OT. Pt needed max encouragement to transfer sit to stand.  Pain/Comfort:  Pain Rating 1: 0/10  Pain Rating Post-Intervention 1:  0/10      Objective:     Communicated with nurse prior to session.  Patient found sitting edge of bed with OT  telemetry, peripheral IV, PureWick, pressure relief boots, bed alarm, hip abduction pillow upon PT entry to room.     General Precautions: Standard, fall   Orthopedic Precautions:N/A   Braces:    Respiratory Status: Room air     Functional Mobility:  Bed Mobility:     Scooting: total assistance and of 2 persons  Sit to Supine: total assistance and of 2 persons  Transfers:     Sit to Stand:  maximal assistance and of 2 persons with hand-held assist  Balance: static F/F-      AM-PAC 6 CLICK MOBILITY  Turning over in bed (including adjusting bedclothes, sheets and blankets)?: 2  Sitting down on and standing up from a chair with arms (e.g., wheelchair, bedside commode, etc.): 2  Moving from lying on back to sitting on the side of the bed?: 2  Moving to and from a bed to a chair (including a wheelchair)?: 2  Need to walk in hospital room?: 1  Climbing 3-5 steps with a railing?: 1  Basic Mobility Total Score: 10       Treatment & Education:  Sat EOB  x 30 min with SBA, performed wt shifting and reaching with OT. Needed mod A to retun to midline from right side bend and from leaning forward to reach.  Sit to stand x 3 trials from EOB with max A x 2 persons.  Pt needed vcs for hand placement on bed rail on L, for upright posture, and to decrease YOVANA.  Pt need reinforcing and practice.    BLE TE SEATED X 10; LLE LAQ, RLE PROM LAQ  Supine x 10; A/AA LLE HS, ABD/ADD, PROM AP  RLE PROM    Assisted pt with dinner tray set up and a few bites. Once set up and spoon placed in hand with a few guided attempts, pt was able to repeat independently. Left pt in care of PCT.    Patient left HOB elevated with all lines intact, call button in reach, bed alarm on, and pct present..    GOALS:   Multidisciplinary Problems       Physical Therapy Goals          Problem: Physical Therapy    Goal Priority Disciplines Outcome Goal  "Variances Interventions   Physical Therapy Goal     PT, PT/OT Ongoing, Progressing     Description: Goals to be met by: 10/28/22     Patient will increase functional independence with mobility by performin. Pt to be Min A with bed mobility.  2. Pt to transfer sit to stand with CGA  3. Pt to ambulate with HW and Min A x 10-15'  4. Pt to be Supervision with written HEP  5. Pt to transfer bed to chair with CGA  6. Pt to ascend/descend 4" curb with RW and Min A  7. Pt will propel W/C with L UE/LE and CGA approx 25'                       Time Tracking:     PT Received On: 10/27/22  PT Start Time: 1602     PT Stop Time: 1640  PT Total Time (min): 38 min     Billable Minutes: Therapeutic Activity 15, Therapeutic Exercise 15, and Neuromuscular Re-education 8  Cotx with OT    Treatment Type: Treatment  PT/PTA: PTA     PTA Visit Number: 1     10/27/2022  "

## 2022-10-27 NOTE — PLAN OF CARE
Problem: Cerebral Tissue Perfusion (Stroke, Ischemic/Transient Ischemic Attack)  Goal: Optimal Cerebral Tissue Perfusion  Outcome: Ongoing, Progressing     Problem: Communication Impairment (Stroke, Ischemic/Transient Ischemic Attack)  Goal: Improved Communication Skills  Outcome: Ongoing, Progressing     Problem: Sensorimotor Impairment (Stroke, Ischemic/Transient Ischemic Attack)  Goal: Improved Sensorimotor Function  Outcome: Ongoing, Progressing     Problem: Swallowing Impairment (Stroke, Ischemic/Transient Ischemic Attack)  Goal: Optimal Eating and Swallowing without Aspiration  Outcome: Ongoing, Progressing     Problem: Fall Injury Risk  Goal: Absence of Fall and Fall-Related Injury  Outcome: Ongoing, Progressing     Problem: Skin Injury Risk Increased  Goal: Skin Health and Integrity  Outcome: Ongoing, Progressing     Problem: Hypertension Acute  Goal: Blood Pressure Within Desired Range  Outcome: Ongoing, Progressing

## 2022-10-27 NOTE — PROCEDURES
Routine EEG Report    Leonides Burns  [id number]  1945    DATE OF SERVICE: 10/26/2022  REASON FOR CONSULT:  77-year-old man admitted with right-sided weakness found have a left-sided stroke.  Evaluate for evidence of epileptiform activity.    METHODOLOGY   Electroencephalographic (EEG) recording is with electrodes placed according to the International 10-20 placement system.  Thirty two (32) channels of digital signal (sampling rate of 512/sec) including T1 and T2 was simultaneously recorded from the scalp and may include  EKG, EMG, and/or eye monitors.  Recording band pass was 0.1 to 512 hz.  Digital video recording of the patient is simultaneously recorded with the EEG.  The patient is instructed report clinical symptoms which may occur during the recording session.  EEG and video recording is stored and archived in digital format. Activation procedures which include photic stimulation, hyperventilation and instructing patients to perform simple task are done in selected patients.    The EEG is displayed on a monitor screen and can be reviewed using different montages.  Computer assisted analysis is employed to detect spike and electrographic seizure activity.   The entire record is submitted for computer analysis.  The entire recording is visually reviewed and the times identified by computer analysis as being spikes or seizures are reviewed again.  Compresses spectral analysis (CSA) is also performed on the activity recorded from each individual channel.  This is displayed as a power display of frequencies from 0 to 30 Hz over time.   The CSA is reviewed looking for asymmetries in power between homologous areas of the scalp and then compared with the original EEG recording.     Lat49 software is also utilized in the review of this study.  This software suite analyzes the EEG recording in multiple domains.  Coherence and rhythmicity is computed to identify EEG sections which may contain organized seizures.   Each channel undergoes analysis to detect presence of spike and sharp waves which have special and morphological characteristic of epileptic activity.  The routine EEG recording is converted from spacial into frequency domain.  This is then displayed comparing homologous areas to identify areas of significant asymmetry.  Algorithm to identify non-cortically generated artifact is used to separate eye movement, EMG and other artifact from the EEG.      EEG FINDINGS  Background activity:   The background is continuous and asymmetric.  Over the right, there is an 8.5 hz posterior dominant rhythm.  Over the left, there is more prominent theta/delta slowing which often appears rhythmic.  There is intermittent generalized and multifocal polymorphic slowing seen bilaterally, left>right.    Sleep:  There is poorly formed sleep architecture.    Activation procedures:   The patient is awake breathing room air but he does not answer orientation questions or follow simple commands.    Cardiac Monitor:   Heart rate appears generally regular on a single lead EKG.    Impression:   This is an abnormal awake and asleep routine EEG because of slowing seen over the left hemisphere which at times is somewhat rhythmic consistent with focal cortical dysfunction/irritation.  There is evidence of subcortical/deep midline dysfunction bilaterally, left>right.  There are no epileptiform discharges and no electrographic seizures.    Nikkie Braswell MD PhD  Neurology-Epilepsy  [facility].

## 2022-10-27 NOTE — PROGRESS NOTES
[facility] Medicine  Progress Note    Patient Name: Leonides Burns  MRN: [id number]  Patient Class: IP- Inpatient   Admission Date: 10/9/2022  Length of Stay: 16 days  Attending Physician: Nain Oneill MD  Primary Care Provider: Suzette Akhtar MD        Subjective:     Principal Problem:CVA (cerebral vascular accident)        HPI:  Leonides Burns 77 y.o. male with CKD, HTN, tobacco abuse presents to the hospital with a chief complaint of right leg weakness.  He reports 2 days of right lower extremity weakness and discoordination and double vision.  States the symptoms have been constant without aggravating factors.  He sometimes feels that improved with lying flat.  He reports he previously had stop aspirin due to severe and recurrent epistaxis.  He denies fevers chest pain shortness breast nausea vomiting abdominal pain leg swelling syncope numbness weakness of an arm incontinence weakness of his left leg.    In the ED, creatinine 2.1 CT head without acute abnormality, chest x-ray without focal consolidation afebrile without leukocytosis COVID negative LDL 80.6 TSH within normal limits.      Overview/Hospital Course:  Mr Leonides Burns is a 77 y.o. man admitted with R leg weakness secondary to acute punctate left parasagittal frontal lobe. PT/OT/SLP consulted. Echo and carotids unremarkable. Neurology consulted.  Patient continued on ASA, added plavix.  His Statin increased to 40 mg daily. Resumed BP medications. Plan was inpatient rehab, but he then had seizure activity on 10/13, prompting step up to ICU. Started keppra. He had difficulty with aspiration events. Speech therapy following; now safe for mechanical soft diet and thin liquids. He has been working with PT, OT.   Continued with intermittent dysphagia and ST continues to work with patient and adjust diet consistency as tolerated.  He is now medically stable for discharge to inpatient rehab when arranged.   Insurance denied inpatient rehab.  This has been appealed.  BP uncontrolled and started on oral Hydralazine. Now plan is for SNF      Interval History: stable     Review of Systems   Constitutional:  Positive for activity change. Negative for appetite change.   HENT:  Negative for congestion and dental problem.    Eyes:  Negative for discharge and itching.   Respiratory:  Negative for apnea and chest tightness.    Cardiovascular:  Negative for chest pain.   Gastrointestinal:  Negative for abdominal distention and abdominal pain.   Genitourinary:  Negative for difficulty urinating and dysuria.   Musculoskeletal:  Negative for arthralgias.   Allergic/Immunologic: Negative for environmental allergies.   Neurological:  Negative for dizziness and facial asymmetry.   Hematological:  Negative for adenopathy.   Psychiatric/Behavioral:  Negative for agitation and behavioral problems.    Objective:     Vital Signs (Most Recent):  Temp: 98.5 °F (36.9 °C) (10/27/22 0811)  Pulse: 61 (10/27/22 0811)  Resp: 20 (10/27/22 0811)  BP: (!) 157/73 (10/27/22 0811)  SpO2: 95 % (10/27/22 0811)   Vital Signs (24h Range):  Temp:  [97.6 °F (36.4 °C)-98.5 °F (36.9 °C)] 98.5 °F (36.9 °C)  Pulse:  [54-69] 61  Resp:  [14-20] 20  SpO2:  [93 %-97 %] 95 %  BP: (116-161)/(62-92) 157/73     Weight: 96.4 kg (212 lb 8.4 oz)  Body mass index is 32.31 kg/m².    Intake/Output Summary (Last 24 hours) at 10/27/2022 1011  Last data filed at 10/27/2022 0956  Gross per 24 hour   Intake 840 ml   Output --   Net 840 ml      Physical Exam  Vitals and nursing note reviewed.   Constitutional:       General: He is not in acute distress.     Appearance: Normal appearance. He is obese. He is ill-appearing. He is not toxic-appearing or diaphoretic.   HENT:      Head: Normocephalic and atraumatic.      Mouth/Throat:      Pharynx: Oropharynx is clear.   Eyes:      Conjunctiva/sclera: Conjunctivae normal.   Cardiovascular:      Rate and Rhythm: Normal rate and regular  rhythm.   Pulmonary:      Effort: Pulmonary effort is normal. No respiratory distress.      Breath sounds: No wheezing.   Abdominal:      General: Bowel sounds are normal. There is no distension.   Skin:     General: Skin is dry.   Neurological:      Mental Status: He is alert and oriented to person, place, and time.   Psychiatric:         Mood and Affect: Mood normal.         Behavior: Behavior normal.       Significant Labs: All pertinent labs within the past 24 hours have been reviewed.  BMP: No results for input(s): GLU, NA, K, CL, CO2, BUN, CREATININE, CALCIUM, MG in the last 48 hours.  CBC: No results for input(s): WBC, HGB, HCT, PLT in the last 48 hours.    Significant Imaging:   Awaiting SNF placement         Assessment/Plan:      * CVA (cerebral vascular accident)  Patient presented with right leg weakness and noted to have acute stroke  asa 81 daily, statin increased to 40 mg daily. Started on Plavix for 21 days.  PT, OT, Speech evaluated  plan inpatient rehab at discharge-- he is medically stable for discharge to inpatient rehab when arranged   intermittent episodes of lucidity and aphasia confusion.  ST continues to work with patient and adjust diet consistency ad needed.  Insurance denied inpatient rehab.  Awaiting appeal.  Patient was previously home alone and independent.  Would greatly benefit from inpatient rehab.  SNF as plan B      Seizure  2 seizures occurred on 10/13 prompting step up to ICU  Neurology following  on keppra 1g BID  no further seizure activity noted     Obesity (BMI 30.0-34.9)  Body mass index is 32.31 kg/m².  Will recommend diet and lifestyle modifications outpatient    Tobacco dependence  Smokes rare cigarettes per day. Greater than 3 minutes spent counseling patient on dangers of continued tobacco abuse.    Stage 3a chronic kidney disease  Continue to monitor.  Avoid nephrotoxic medications.        Hyperlipidemia  Continue statin      Hypertension  On admit, held home  amlodipine and lisinopril for permissive HTN  BP is now elevated- resumed amlodipine and lisinopril  Added coreg  monitor vitals q4h  SBP goal of <160 in hospital  BP uncontrolled.  Started on Hydralazine.      VTE Risk Mitigation (From admission, onward)         Ordered     enoxaparin injection 40 mg  Daily         10/18/22 1107     IP VTE HIGH RISK PATIENT  Once         10/09/22 2026     Place sequential compression device  Until discontinued         10/09/22 2026                Discharge Planning   DEVIKA: 10/21/2022     Code Status: Full Code   Is the patient medically ready for discharge?:     Reason for patient still in hospital (select all that apply):   Discharge Plan A: Skilled Nursing Facility   Discharge Delays: (!) Patient and Family Barriers (Patient's son (Leonides) request for SNFs on [place]. Patient's [place] (S[place] [facility] [place] to [place]              Nain Cho MD  Department of Hospital Medicine   [facility] [place]

## 2022-10-27 NOTE — PLAN OF CARE
Problem: Adult Inpatient Plan of Care  Goal: Plan of Care Review  Outcome: Ongoing, Progressing  Goal: Patient-Specific Goal (Individualized)  Outcome: Ongoing, Progressing  Goal: Absence of Hospital-Acquired Illness or Injury  Outcome: Ongoing, Progressing  Goal: Optimal Comfort and Wellbeing  Outcome: Ongoing, Progressing  Goal: Readiness for Transition of Care  Outcome: Ongoing, Progressing     Problem: Adjustment to Illness (Stroke, Ischemic/Transient Ischemic Attack)  Goal: Optimal Coping  Outcome: Ongoing, Progressing     Problem: Bowel Elimination Impaired (Stroke, Ischemic/Transient Ischemic Attack)  Goal: Effective Bowel Elimination  Outcome: Ongoing, Progressing    No injury during shift, Side rails up x 2, call light by bedside.   Turned Q2

## 2022-10-27 NOTE — SUBJECTIVE & OBJECTIVE
Interval History: stable     Review of Systems   Constitutional:  Positive for activity change. Negative for appetite change.   HENT:  Negative for congestion and dental problem.    Eyes:  Negative for discharge and itching.   Respiratory:  Negative for apnea and chest tightness.    Cardiovascular:  Negative for chest pain.   Gastrointestinal:  Negative for abdominal distention and abdominal pain.   Genitourinary:  Negative for difficulty urinating and dysuria.   Musculoskeletal:  Negative for arthralgias.   Allergic/Immunologic: Negative for environmental allergies.   Neurological:  Negative for dizziness and facial asymmetry.   Hematological:  Negative for adenopathy.   Psychiatric/Behavioral:  Negative for agitation and behavioral problems.    Objective:     Vital Signs (Most Recent):  Temp: 98.5 °F (36.9 °C) (10/27/22 0811)  Pulse: 61 (10/27/22 0811)  Resp: 20 (10/27/22 0811)  BP: (!) 157/73 (10/27/22 0811)  SpO2: 95 % (10/27/22 0811)   Vital Signs (24h Range):  Temp:  [97.6 °F (36.4 °C)-98.5 °F (36.9 °C)] 98.5 °F (36.9 °C)  Pulse:  [54-69] 61  Resp:  [14-20] 20  SpO2:  [93 %-97 %] 95 %  BP: (116-161)/(62-92) 157/73     Weight: 96.4 kg (212 lb 8.4 oz)  Body mass index is 32.31 kg/m².    Intake/Output Summary (Last 24 hours) at 10/27/2022 1011  Last data filed at 10/27/2022 0956  Gross per 24 hour   Intake 840 ml   Output --   Net 840 ml      Physical Exam  Vitals and nursing note reviewed.   Constitutional:       General: He is not in acute distress.     Appearance: Normal appearance. He is obese. He is ill-appearing. He is not toxic-appearing or diaphoretic.   HENT:      Head: Normocephalic and atraumatic.      Mouth/Throat:      Pharynx: Oropharynx is clear.   Eyes:      Conjunctiva/sclera: Conjunctivae normal.   Cardiovascular:      Rate and Rhythm: Normal rate and regular rhythm.   Pulmonary:      Effort: Pulmonary effort is normal. No respiratory distress.      Breath sounds: No wheezing.   Abdominal:       General: Bowel sounds are normal. There is no distension.   Skin:     General: Skin is dry.   Neurological:      Mental Status: He is alert and oriented to person, place, and time.   Psychiatric:         Mood and Affect: Mood normal.         Behavior: Behavior normal.       Significant Labs: All pertinent labs within the past 24 hours have been reviewed.  BMP: No results for input(s): GLU, NA, K, CL, CO2, BUN, CREATININE, CALCIUM, MG in the last 48 hours.  CBC: No results for input(s): WBC, HGB, HCT, PLT in the last 48 hours.    Significant Imaging:   Awaiting SNF placement

## 2022-10-28 VITALS
DIASTOLIC BLOOD PRESSURE: 75 MMHG | HEART RATE: 54 BPM | SYSTOLIC BLOOD PRESSURE: 125 MMHG | BODY MASS INDEX: 27.5 KG/M2 | WEIGHT: 181.44 LBS | HEIGHT: 68 IN | OXYGEN SATURATION: 97 % | TEMPERATURE: 98 F | RESPIRATION RATE: 24 BRPM

## 2022-10-28 PROBLEM — R56.9 SEIZURE: Status: RESOLVED | Noted: 2022-10-14 | Resolved: 2022-10-28

## 2022-10-28 LAB — SARS-COV-2 RDRP RESP QL NAA+PROBE: NEGATIVE

## 2022-10-28 PROCEDURE — 25000003 PHARM REV CODE 250: Performed by: INTERNAL MEDICINE

## 2022-10-28 PROCEDURE — 25000003 PHARM REV CODE 250: Performed by: HOSPITALIST

## 2022-10-28 PROCEDURE — 94761 N-INVAS EAR/PLS OXIMETRY MLT: CPT

## 2022-10-28 PROCEDURE — 94640 AIRWAY INHALATION TREATMENT: CPT

## 2022-10-28 PROCEDURE — 25000242 PHARM REV CODE 250 ALT 637 W/ HCPCS: Performed by: HOSPITALIST

## 2022-10-28 PROCEDURE — 97535 SELF CARE MNGMENT TRAINING: CPT

## 2022-10-28 PROCEDURE — U0002 COVID-19 LAB TEST NON-CDC: HCPCS | Performed by: INTERNAL MEDICINE

## 2022-10-28 PROCEDURE — 92507 TX SP LANG VOICE COMM INDIV: CPT

## 2022-10-28 RX ORDER — HYDRALAZINE HYDROCHLORIDE 25 MG/1
75 TABLET, FILM COATED ORAL EVERY 8 HOURS
Qty: 270 TABLET | Refills: 11 | Status: ON HOLD | OUTPATIENT
Start: 2022-10-28 | End: 2022-11-14

## 2022-10-28 RX ADMIN — HYDRALAZINE HYDROCHLORIDE 75 MG: 25 TABLET, FILM COATED ORAL at 05:10

## 2022-10-28 RX ADMIN — LEVETIRACETAM 1000 MG: 500 TABLET, FILM COATED ORAL at 09:10

## 2022-10-28 RX ADMIN — CLOPIDOGREL BISULFATE 75 MG: 75 TABLET ORAL at 09:10

## 2022-10-28 RX ADMIN — ASPIRIN 81 MG: 81 TABLET, COATED ORAL at 09:10

## 2022-10-28 RX ADMIN — CARVEDILOL 25 MG: 12.5 TABLET, FILM COATED ORAL at 09:10

## 2022-10-28 RX ADMIN — LISINOPRIL 40 MG: 20 TABLET ORAL at 09:10

## 2022-10-28 RX ADMIN — IPRATROPIUM BROMIDE AND ALBUTEROL SULFATE 3 ML: 2.5; .5 SOLUTION RESPIRATORY (INHALATION) at 07:10

## 2022-10-28 RX ADMIN — ACETAMINOPHEN 500 MG: 500 TABLET ORAL at 10:10

## 2022-10-28 RX ADMIN — IPRATROPIUM BROMIDE AND ALBUTEROL SULFATE 3 ML: 2.5; .5 SOLUTION RESPIRATORY (INHALATION) at 01:10

## 2022-10-28 RX ADMIN — AMLODIPINE BESYLATE 10 MG: 5 TABLET ORAL at 09:10

## 2022-10-28 NOTE — PT/OT/SLP PROGRESS
"Speech Language Pathology Treatment    Patient Name:  Leonides Burns   MRN:  [id number]  Admitting Diagnosis: CVA (cerebral vascular accident)    Recommendations:                 General Recommendations:  Dysphagia therapy and Cognitive-linguistic therapy  Diet recommendations:  Mechanical soft, Liquid Diet Level: Thin   Aspiration Precautions: Assistance with meals, Frequent oral care, HOB to 90 degrees, and Small bites/sips   General Precautions: Standard  Communication strategies:  provide increased time to answer    Subjective     Pt was seated upright in bed upon ST entrance. The pt smiled at ST and stated "fine" upon ST prompting for how he is doing today. Pt aware he is d/c to SNF today. Mood seems low today, however, 2/2 expressive aphasia, pt unable to verbalize how he is feeling.     Patient goals: Pt did not state     Pain/Comfort:  Pain Rating 1:  (Unable to state rating 2/2 expressive aphasia)  Location - Side 1: Right  Location 1: leg    Respiratory Status: Room air    Objective:     Has the patient been evaluated by SLP for swallowing?   Yes  Keep patient NPO? No   Current Respiratory Status:        Comprehension:   -Receptive ID of objects in FO2: The pt ID verbally named items in a FO2 with ~75% acc. Pt required repetition of named items throughout this task.   -1-step: Pt followed 1-step commands with objects with ~50% acc, requiring max verbal cues/prompts.    -Functional task- The pt required mod A to brush his teeth. ST provided hand over hand A during initial presentation of tooth brush 2/2 pt holding the tooth brush in the oral cavity and sucking on it instead of brushing his teeth. Once A was provided, the pt completed remainder of teeth brushing w/o A, with the pt comprehending to swish water in the oral cavity to rinse his mouth, as well as to spit water out.     Expression:   -Confrontational naming: Pt unable to name 6 objects presented indep, however, with max phonemic cues, the pt named " 3/6 items correctly.   -Automatic speech task: Pt indep counted to 11, however, required max verbal cues/prompts to count from 12-20.   -Repetition: Pt repeated words with 100% acc and phrases with ~75% acc. ST noted pt required increased time and consistent repetitions of words and phrases during this task.   -Conversational speech: The pt presents with cont severe expressive aphasia, with verbal output c/b one word utterances and frequent neologisms. Pt is unable to hold a conversation 2/2 deficits.       Assessment:     Leonides Burns is a 77 y.o. male with a dx of CVA (cerebral vascular accident) who presents with cont severe expressive aphasia and mod receptive aphasia. ST noted pt with increased ability to peform more functional/automatic tasks such as teeth brushing. ST recs cont cognitive-linguistic tx in the IPR setting.     Goals:   Multidisciplinary Problems       SLP Goals          Problem: SLP    Goal Priority Disciplines Outcome   SLP Goal    Low SLP Ongoing, Progressing   Description: STGS  1. Pt will perform multiple step commands regarding before and after with 90% acc. -Discont 10/14  2. Pt will perform divergent naming tasks with min assist- Discont 10/14 UPDATE: Pt will name word given 3 descriptors with 80% acc.   3. Pt will delayed recall tasks with min assist. -Discont  4. Pt will follow 1-step commands with 80% acc, min A.   5. Pt will name objects with 80% acc, min A.   6. Pt will orient to place and date with min A.  7. Pt will tolerate mechanical soft diet with thin liquids w/o overt s/s of aspiration. (Discontinued 10/17/22)  8. Pt will tolerate pureed diet with nectar thick liquids w/o overt s/s of aspiration. -Met  9. Pt will participate in modified barium swallow study- Met 10/26                       Plan:     Patient to be seen:  5 x/week   Plan of Care expires:  11/04/22  Plan of Care reviewed with:  patient   SLP Follow-Up:  Yes       Discharge recommendations:  rehabilitation  facility   Barriers to Discharge:   Cognitive-linguistic deficits     Time Tracking:     SLP Treatment Date:   10/28/22  Speech Start Time:  0940  Speech Stop Time:  1006     Speech Total Time (min):  26 min    Billable Minutes: Speech Therapy Individual 16 and Self Care/Home Management Training 10    10/28/2022

## 2022-10-28 NOTE — PLAN OF CARE
[facility] - Telemetry  Discharge Final Note    Primary Care Provider: Suzette Akhtar MD    Expected Discharge Date: 10/28/2022    Final Discharge Note (most recent)       Final Note - 10/28/22 1335          Final Note    Assessment Type Final Discharge Note     Anticipated Discharge Disposition Skilled Nursing Facility     What phone number can be called within the next 1-3 days to see how you are doing after discharge? [phone]     Hospital Resources/Appts/Education Provided Appointments scheduled and added to AVS;Appointments scheduled by Navigator/Coordinator        Post-Acute Status    Post-Acute Authorization Placement     Post-Acute Placement Status Set-up Complete/Auth obtained     Home Health Status Discharge Plan Changed     Discharge Delays None known at this time                     Important Message from Medicare  Important Message from Medicare regarding Discharge Appeal Rights: Given to patient/caregiver, Explained to patient/caregiver     Date IMM was signed: 10/28/22  Time IMM was signed: 0944    Contact Info       Suzette Akhtar MD   Specialty: Geriatric Medicine   Relationship: PCP - General    [address] [facility] [address]   Phone: [phone]       Next Steps: Go on 10/18/2022    Instructions: Hospital F/U with at 9:10 am.    [facility]   Specialty: Home Health Services, Home Therapy Services, Home Living Aide Services    [address] [facility] [place] [address]  [place] [address]   Phone: [phone]       Next Steps: Follow up    Instructions: Home Health will call to schedule first visit.    [facility]   Specialty: Physical Therapy, Occupational Therapy, Rehab Facility, Speech Pathology    [address] [place] [id number]   Phone: [phone]       Next Steps: Follow up    Instructions: Rehab    Suzette Akhtar MD   Specialty: Geriatric Medicine   Relationship: PCP - General    [address]  [facility] [address]   Phone: [phone]       Next Steps: Follow up in 1 week(s)          [facility] received report information from Luis Angel ([place]). [facility] nurse to call report to Amanda @ [phone] and patient is going to room 215.     [facility] secure chat nurse Steven with report information and patient cleared from case management standpoint. Transportation for 3:00 pm     ADT 30 order placed for [employer].  Requested  time: 3:00 pm  If transportation does not arrive at ETA time nurse will be instructed to follow protocol for transportation below:  How can I get in touch directly with dispatch, if needed?                 Non-emergent dispatch: [phone]      +++NURSING:  If Van does not arrive at requested time please call the above Non Emergent Dispatcher.  If issue not resolved please escalate to your charge nurse for further instructions.

## 2022-10-28 NOTE — SUBJECTIVE & OBJECTIVE
Interval History: No new issues     Review of Systems   Constitutional:  Positive for activity change. Negative for appetite change.   HENT:  Negative for congestion and dental problem.    Eyes:  Negative for discharge and itching.   Respiratory:  Negative for apnea and chest tightness.    Cardiovascular:  Negative for chest pain.   Gastrointestinal:  Negative for abdominal distention and abdominal pain.   Genitourinary:  Negative for difficulty urinating and dysuria.   Musculoskeletal:  Negative for arthralgias.   Allergic/Immunologic: Negative for environmental allergies.   Neurological:  Negative for dizziness and facial asymmetry.   Hematological:  Negative for adenopathy.   Psychiatric/Behavioral:  Negative for agitation and behavioral problems.    Objective:     Vital Signs (Most Recent):  Temp: 97.9 °F (36.6 °C) (10/28/22 0749)  Pulse: 69 (10/28/22 0749)  Resp: 20 (10/28/22 0749)  BP: 124/71 (10/28/22 0749)  SpO2: 100 % (10/28/22 0749) Vital Signs (24h Range):  Temp:  [97.5 °F (36.4 °C)-97.9 °F (36.6 °C)] 97.9 °F (36.6 °C)  Pulse:  [54-70] 69  Resp:  [14-22] 20  SpO2:  [96 %-100 %] 100 %  BP: (122-148)/(61-88) 124/71     Weight: 82.3 kg (181 lb 7 oz)  Body mass index is 27.59 kg/m².    Intake/Output Summary (Last 24 hours) at 10/28/2022 0920  Last data filed at 10/27/2022 1730  Gross per 24 hour   Intake 360 ml   Output --   Net 360 ml      Physical Exam  Vitals and nursing note reviewed.   Constitutional:       General: He is not in acute distress.     Appearance: Normal appearance. He is obese. He is ill-appearing. He is not toxic-appearing or diaphoretic.   HENT:      Head: Normocephalic and atraumatic.      Mouth/Throat:      Pharynx: Oropharynx is clear.   Eyes:      Conjunctiva/sclera: Conjunctivae normal.   Cardiovascular:      Rate and Rhythm: Normal rate and regular rhythm.   Pulmonary:      Effort: Pulmonary effort is normal. No respiratory distress.      Breath sounds: No wheezing.   Abdominal:       General: Bowel sounds are normal. There is no distension.   Skin:     General: Skin is dry.   Neurological:      Mental Status: He is alert and oriented to person, place, and time.   Psychiatric:         Mood and Affect: Mood normal.         Behavior: Behavior normal.       Significant Labs: All pertinent labs within the past 24 hours have been reviewed.  BMP: No results for input(s): GLU, NA, K, CL, CO2, BUN, CREATININE, CALCIUM, MG in the last 48 hours.  CBC: No results for input(s): WBC, HGB, HCT, PLT in the last 48 hours.    Significant Imaging:

## 2022-10-28 NOTE — PLAN OF CARE
Problem: Adult Inpatient Plan of Care  Goal: Plan of Care Review  Outcome: Met  Goal: Patient-Specific Goal (Individualized)  Outcome: Met  Goal: Absence of Hospital-Acquired Illness or Injury  Outcome: Met  Goal: Optimal Comfort and Wellbeing  Outcome: Met  Goal: Readiness for Transition of Care  Outcome: Met     Problem: Adjustment to Illness (Stroke, Ischemic/Transient Ischemic Attack)  Goal: Optimal Coping  Outcome: Met     Problem: Bowel Elimination Impaired (Stroke, Ischemic/Transient Ischemic Attack)  Goal: Effective Bowel Elimination  Outcome: Met     Problem: Cerebral Tissue Perfusion (Stroke, Ischemic/Transient Ischemic Attack)  Goal: Optimal Cerebral Tissue Perfusion  Outcome: Met     Problem: Cognitive Impairment (Stroke, Ischemic/Transient Ischemic Attack)  Goal: Optimal Cognitive Function  Outcome: Met     Problem: Communication Impairment (Stroke, Ischemic/Transient Ischemic Attack)  Goal: Improved Communication Skills  Outcome: Met     Problem: Functional Ability Impaired (Stroke, Ischemic/Transient Ischemic Attack)  Goal: Optimal Functional Ability  Outcome: Met     Problem: Respiratory Compromise (Stroke, Ischemic/Transient Ischemic Attack)  Goal: Effective Oxygenation and Ventilation  Outcome: Met     Problem: Sensorimotor Impairment (Stroke, Ischemic/Transient Ischemic Attack)  Goal: Improved Sensorimotor Function  Outcome: Met     Problem: Swallowing Impairment (Stroke, Ischemic/Transient Ischemic Attack)  Goal: Optimal Eating and Swallowing without Aspiration  Outcome: Met     Problem: Urinary Elimination Impaired (Stroke, Ischemic/Transient Ischemic Attack)  Goal: Effective Urinary Elimination  Outcome: Met     Problem: Fall Injury Risk  Goal: Absence of Fall and Fall-Related Injury  Outcome: Met     Problem: Infection  Goal: Absence of Infection Signs and Symptoms  Outcome: Met     Problem: Skin Injury Risk Increased  Goal: Skin Health and Integrity  Outcome: Met     Problem: Coping  Ineffective  Goal: Effective Coping  Outcome: Met     Problem: Restraint, Nonbehavioral (Nonviolent)  Goal: Absence of Harm or Injury  Outcome: Met     Problem: Hypertension Acute  Goal: Blood Pressure Within Desired Range  Outcome: Met   Midline removed safely, wire appears intact. Pt transported to unit via EMS. NADN or reported, family aware of transport, all questions answered.

## 2022-10-28 NOTE — PROGRESS NOTES
[facility] Medicine  Progress Note    Patient Name: Leonides Burns  MRN: [id number]  Patient Class: IP- Inpatient   Admission Date: 10/9/2022  Length of Stay: 17 days  Attending Physician: Nain Oneill MD  Primary Care Provider: Suzette Akhtar MD        Subjective:     Principal Problem:CVA (cerebral vascular accident)        HPI:  Leonides Burns 77 y.o. male with CKD, HTN, tobacco abuse presents to the hospital with a chief complaint of right leg weakness.  He reports 2 days of right lower extremity weakness and discoordination and double vision.  States the symptoms have been constant without aggravating factors.  He sometimes feels that improved with lying flat.  He reports he previously had stop aspirin due to severe and recurrent epistaxis.  He denies fevers chest pain shortness breast nausea vomiting abdominal pain leg swelling syncope numbness weakness of an arm incontinence weakness of his left leg.    In the ED, creatinine 2.1 CT head without acute abnormality, chest x-ray without focal consolidation afebrile without leukocytosis COVID negative LDL 80.6 TSH within normal limits.      Overview/Hospital Course:  Mr Leonides Burns is a 77 y.o. man admitted with R leg weakness secondary to acute punctate left parasagittal frontal lobe. PT/OT/SLP consulted. Echo and carotids unremarkable. Neurology consulted.  Patient continued on ASA, added plavix.  His Statin increased to 40 mg daily. Resumed BP medications. Plan was inpatient rehab, but he then had seizure activity on 10/13, prompting step up to ICU. Started keppra. He had difficulty with aspiration events. Speech therapy following; now safe for mechanical soft diet and thin liquids. He has been working with PT, OT.   Continued with intermittent dysphagia and ST continues to work with patient and adjust diet consistency as tolerated.  He is now medically stable for discharge to inpatient rehab when arranged.   Insurance denied inpatient rehab.  This has been appealed.  BP uncontrolled and started on oral Hydralazine. Now plan is for SNF. Patient was discharged to SNF on 10/28/22. Activity as tolerated. Diet- low NA. Follow up with PCP after SNF.      Interval History: No new issues     Review of Systems   Constitutional:  Positive for activity change. Negative for appetite change.   HENT:  Negative for congestion and dental problem.    Eyes:  Negative for discharge and itching.   Respiratory:  Negative for apnea and chest tightness.    Cardiovascular:  Negative for chest pain.   Gastrointestinal:  Negative for abdominal distention and abdominal pain.   Genitourinary:  Negative for difficulty urinating and dysuria.   Musculoskeletal:  Negative for arthralgias.   Allergic/Immunologic: Negative for environmental allergies.   Neurological:  Negative for dizziness and facial asymmetry.   Hematological:  Negative for adenopathy.   Psychiatric/Behavioral:  Negative for agitation and behavioral problems.    Objective:     Vital Signs (Most Recent):  Temp: 97.9 °F (36.6 °C) (10/28/22 0749)  Pulse: 69 (10/28/22 0749)  Resp: 20 (10/28/22 0749)  BP: 124/71 (10/28/22 0749)  SpO2: 100 % (10/28/22 0749) Vital Signs (24h Range):  Temp:  [97.5 °F (36.4 °C)-97.9 °F (36.6 °C)] 97.9 °F (36.6 °C)  Pulse:  [54-70] 69  Resp:  [14-22] 20  SpO2:  [96 %-100 %] 100 %  BP: (122-148)/(61-88) 124/71     Weight: 82.3 kg (181 lb 7 oz)  Body mass index is 27.59 kg/m².    Intake/Output Summary (Last 24 hours) at 10/28/2022 0920  Last data filed at 10/27/2022 1730  Gross per 24 hour   Intake 360 ml   Output --   Net 360 ml      Physical Exam  Vitals and nursing note reviewed.   Constitutional:       General: He is not in acute distress.     Appearance: Normal appearance. He is obese. He is ill-appearing. He is not toxic-appearing or diaphoretic.   HENT:      Head: Normocephalic and atraumatic.      Mouth/Throat:      Pharynx: Oropharynx is clear.   Eyes:       Conjunctiva/sclera: Conjunctivae normal.   Cardiovascular:      Rate and Rhythm: Normal rate and regular rhythm.   Pulmonary:      Effort: Pulmonary effort is normal. No respiratory distress.      Breath sounds: No wheezing.   Abdominal:      General: Bowel sounds are normal. There is no distension.   Skin:     General: Skin is dry.   Neurological:      Mental Status: He is alert and oriented to person, place, and time.   Psychiatric:         Mood and Affect: Mood normal.         Behavior: Behavior normal.       Significant Labs: All pertinent labs within the past 24 hours have been reviewed.  BMP: No results for input(s): GLU, NA, K, CL, CO2, BUN, CREATININE, CALCIUM, MG in the last 48 hours.  CBC: No results for input(s): WBC, HGB, HCT, PLT in the last 48 hours.    Significant Imaging:       Assessment/Plan:      * CVA (cerebral vascular accident)  Patient presented with right leg weakness and noted to have acute stroke  asa 81 daily, statin increased to 40 mg daily. Started on Plavix for 21 days.  PT, OT, Speech evaluated  plan inpatient rehab at discharge-- he is medically stable for discharge to inpatient rehab when arranged   intermittent episodes of lucidity and aphasia confusion.  ST continues to work with patient and adjust diet consistency ad needed.  Insurance denied inpatient rehab.  Awaiting appeal.  Patient was previously home alone and independent.  Would greatly benefit from inpatient rehab.  SNF as plan B      Seizure  2 seizures occurred on 10/13 prompting step up to ICU  Neurology following  on keppra 1g BID  no further seizure activity noted     Obesity (BMI 30.0-34.9)  Body mass index is 32.31 kg/m².  Will recommend diet and lifestyle modifications outpatient    Tobacco dependence  Smokes rare cigarettes per day. Greater than 3 minutes spent counseling patient on dangers of continued tobacco abuse.    Stage 3a chronic kidney disease  Continue to monitor.  Avoid nephrotoxic  medications.        Hyperlipidemia  Continue statin      Hypertension  On admit, held home amlodipine and lisinopril for permissive HTN  BP is now elevated- resumed amlodipine and lisinopril  Added coreg  monitor vitals q4h  SBP goal of <160 in hospital  BP uncontrolled.  Started on Hydralazine.      VTE Risk Mitigation (From admission, onward)         Ordered     enoxaparin injection 40 mg  Daily         10/18/22 1107     IP VTE HIGH RISK PATIENT  Once         10/09/22 2026     Place sequential compression device  Until discontinued         10/09/22 2026                Discharge Planning   DEVIKA: 10/21/2022     Code Status: Full Code   Is the patient medically ready for discharge?:     Reason for patient still in hospital (select all that apply)  Discharge Plan A: Skilled Nursing Facility   Discharge Delays: (!) Patient and Family Barriers (Patient's son (Leonides) request for SNFs on [place]. Patient's son (Santi) request for to send SNF referral on [facility] due to not liking reviews from accepting SNF on [place].)              Nain Cho MD  Department of Hospital Medicine   [facility] - Telemetry

## 2022-10-28 NOTE — PLAN OF CARE
10/28/22 1036   Medicare Message   Important Message from Medicare regarding Discharge Appeal Rights Given to patient/caregiver;Explained to patient/caregiver   Date IMM was signed 10/28/22   Time IMM was signed 0944      explained IMM to patient's son Santi Burns.   Copy emailed to patient's son at [email or web address].

## 2022-10-28 NOTE — DISCHARGE SUMMARY
[facility] Medicine  Discharge Summary      Patient Name: Leonides Burns  MRN: [id number]  Patient Class: IP- Inpatient  Admission Date: 10/9/2022  Hospital Length of Stay: 17 days  Discharge Date and Time:  10/28/2022 9:36 AM  Attending Physician: Nain Oneill MD   Discharging Provider: Nain Oneill MD  Primary Care Provider: Suzette Akhtar MD      HPI:   Leonides Burns 77 y.o. male with CKD, HTN, tobacco abuse presents to the hospital with a chief complaint of right leg weakness.  He reports 2 days of right lower extremity weakness and discoordination and double vision.  States the symptoms have been constant without aggravating factors.  He sometimes feels that improved with lying flat.  He reports he previously had stop aspirin due to severe and recurrent epistaxis.  He denies fevers chest pain shortness breast nausea vomiting abdominal pain leg swelling syncope numbness weakness of an arm incontinence weakness of his left leg.    In the ED, creatinine 2.1 CT head without acute abnormality, chest x-ray without focal consolidation afebrile without leukocytosis COVID negative LDL 80.6 TSH within normal limits.      * No surgery found *      Hospital Course:   Mr Leonides Burns is a 77 y.o. man admitted with R leg weakness secondary to acute punctate left parasagittal frontal lobe. PT/OT/SLP consulted. Echo and carotids unremarkable. Neurology consulted.  Patient continued on ASA, added plavix.  His Statin increased to 40 mg daily. Resumed BP medications. Plan was inpatient rehab, but he then had seizure activity on 10/13, prompting step up to ICU. Started keppra. He had difficulty with aspiration events. Speech therapy following; now safe for mechanical soft diet and thin liquids. He has been working with PT, OT.   Continued with intermittent dysphagia and ST continues to work with patient and adjust diet consistency as tolerated.  He is now medically stable for  discharge to inpatient rehab when arranged.  Insurance denied inpatient rehab.  This has been appealed.  BP uncontrolled and started on oral Hydralazine. Now plan is for SNF. Patient was discharged to SNF on 10/28/22. Activity as tolerated. Diet- low NA. Follow up with PCP after SNF.       Goals of Care Treatment Preferences:  Code Status: Full Code      Consults:   Consults (From admission, onward)        Status Ordering Provider     Inpatient virtual consult to Hospital Medicine  Once        Provider:  (Not yet assigned)    Completed JERED MOSS     Inpatient consult to Palliative Care  Once        Provider:  Emelia Horton NP    Completed AKHONDZADEWALTER, ABDOLAZIM     Inpatient consult to Spiritual Care  Once        Provider:  (Not yet assigned)    Completed AKHONDZADEH, ABDOLAZIM     Inpatient consult to Neurology  Once        Provider:  Ahmet Lara MD    Completed AKHOSHAGGY, ABDOLAPETE     Inpatient consult to Social Work  Once        Provider:  (Not yet assigned)    Completed AKHONDZADEH, ABDOLAZIM     Inpatient consult to Registered Dietitian/Nutritionist  Once        Provider:  (Not yet assigned)    Completed DEANDRE SAM     IP consult to case management/social work  Once        Provider:  (Not yet assigned)    Completed DEANDRE SAM     Inpatient consult to Neurology  Once        Provider:  Ahmet Lara MD    Completed DEANDRE SAM          No new Assessment & Plan notes have been filed under this hospital service since the last note was generated.  Service: Hospital Medicine    Final Active Diagnoses:    Diagnosis Date Noted POA    PRINCIPAL PROBLEM:  CVA (cerebral vascular accident) [I63.9] 10/10/2022 Yes    Obesity (BMI 30.0-34.9) [E66.9] 07/09/2018 Yes    Tobacco dependence [F17.200] 02/17/2016 Yes     Chronic    Stage 3a chronic kidney disease [N18.31] 06/02/2015 Yes    Hyperlipidemia [E78.5] 02/02/2015 Yes     Chronic      Problems Resolved During this Admission:     Diagnosis Date Noted Date Resolved POA    Seizure [R56.9] 10/14/2022 10/28/2022 No    Right leg weakness [R29.898] 10/09/2022 10/11/2022 Yes    Hypertension [I10] 02/02/2015 10/28/2022 Yes     Chronic       Discharged Condition: good    Disposition: Skilled Nursing Facility    Follow Up:   Follow-up Information     Suzette Akhtar MD. Go on 10/18/2022.    Specialty: Geriatric Medicine  Why: Hospital F/U with at 9:10 am.  Contact information:  [address] [facility] [id number] [phone]             [facility] Follow up.    Specialties: Home Health Services, Home Therapy Services, Home Living Aide Services  Why: Home Health will call to schedule first visit.  Contact information:  [address] [address]  [phone]           [facility] Follow up.    Specialties: Physical Therapy, Occupational Therapy, Rehab Facility, Speech Pathology  Why: Rehab  Contact information:  [address] [facility] [address]  [phone]             Suzette Akhtar MD Follow up in 1 week(s).    Specialty: Geriatric Medicine  Contact information:  [address] [facility] [id number] [phone]                       Patient Instructions:      Ambulatory referral/consult to Neurology   Standing Status: Future   Referral Priority: Routine Referral Type: Consultation   Referral Reason: Specialty Services Required   Requested Specialty: Neurology   Number of Visits Requested: 1     Notify your health care provider if you experience any of the following:  temperature >100.4     Notify your health care provider if you experience any of the following:  persistent nausea and vomiting or diarrhea     Notify your health care provider if you experience any of the following:  severe uncontrolled pain     Notify your health care provider if you experience any of the following:   difficulty breathing or increased cough     Notify your health care provider if you experience any of the following:  persistent dizziness, light-headedness, or visual disturbances     Notify your health care provider if you experience any of the following:  increased confusion or weakness     Activity as tolerated       Significant Diagnostic Studies:     Pending Diagnostic Studies:     None         Medications:  Reconciled Home Medications:      Medication List      START taking these medications    carvediloL 25 MG tablet  Commonly known as: COREG  Take 1 tablet (25 mg total) by mouth 2 (two) times daily.     clopidogreL 75 mg tablet  Commonly known as: PLAVIX  Take 1 tablet (75 mg total) by mouth once daily. for 21 days     hydrALAZINE 25 MG tablet  Commonly known as: APRESOLINE  Take 3 tablets (75 mg total) by mouth every 8 (eight) hours.     levETIRAcetam 1000 MG tablet  Commonly known as: KEPPRA  Take 1 tablet (1,000 mg total) by mouth 2 (two) times daily.     senna-docusate 8.6-50 mg 8.6-50 mg per tablet  Commonly known as: PERICOLACE  Take 1 tablet by mouth daily as needed for Constipation.        CHANGE how you take these medications    amLODIPine 10 MG tablet  Commonly known as: NORVASC  Take 1 tablet (10 mg total) by mouth once daily.  What changed:   · medication strength  · how much to take     atorvastatin 40 MG tablet  Commonly known as: LIPITOR  Take 1 tablet (40 mg total) by mouth every evening.  What changed:   · medication strength  · how much to take     lisinopriL 40 MG tablet  Commonly known as: PRINIVIL,ZESTRIL  Take 1 tablet (40 mg total) by mouth once daily.  What changed:   · medication strength  · how much to take        CONTINUE taking these medications    allopurinoL 100 MG tablet  Commonly known as: ZYLOPRIM  TAKE 2 TABLETS BY MOUTH EVERY DAY     aspirin 81 MG Chew  Take 1 tablet (81 mg total) by mouth once daily.     LUMIGAN 0.01 % Drop  Generic drug: bimatoprost  Place 1 drop into  both eyes every evening.        STOP taking these medications    ketoconazole 2 % cream  Commonly known as: NIZORAL            Indwelling Lines/Drains at time of discharge:   Lines/Drains/Airways     None                 Time spent on the discharge of patient: > 35  minutes         Nain Cho MD  Department of Hospital Medicine  [facility]

## 2022-10-28 NOTE — PLAN OF CARE
Problem: SLP  Goal: SLP Goal  Description: STGS  1. Pt will perform multiple step commands regarding before and after with 90% acc. -Discont 10/14  2. Pt will perform divergent naming tasks with min assist- Discont 10/14 UPDATE: Pt will name word given 3 descriptors with 80% acc.   3. Pt will delayed recall tasks with min assist. -Discont  4. Pt will follow 1-step commands with 80% acc, min A.   5. Pt will name objects with 80% acc, min A.   6. Pt will orient to place and date with min A.  7. Pt will tolerate mechanical soft diet with thin liquids w/o overt s/s of aspiration. (Discontinued 10/17/22)  8. Pt will tolerate pureed diet with nectar thick liquids w/o overt s/s of aspiration. -Met  9. Pt will participate in modified barium swallow study- Met 10/26  Outcome: Ongoing, Progressing

## 2022-10-28 NOTE — PLAN OF CARE
LACY left voice message for Fartun ([employer]) requesting a return call.     LACY informed by Luis Angel ([facility]) that she received insurance authorization and Plan of care can be written.     LACY secure chat MD for NH Plan of Care and a rapid covid test.     LACY submitted patient SNF Plan of Care to [facility].     LACY left voice message for Luis Angel ([facility]) re: follow up for SNF report.

## 2022-10-28 NOTE — PLAN OF CARE
[facility]     Department of Hospital Medicine     [address] [place] [address]     [phone] after hours  [phone] fax       NURSING HOME ORDERS    10/28/2022    Admit to Nursing Home:       Skilled Bed                                            Diagnoses: Stroke     Active Hospital Problems    Diagnosis  POA    *CVA (cerebral vascular accident) [I63.9]  Yes     Priority: 1 - High    Obesity (BMI 30.0-34.9) [E66.9]  Yes    Tobacco dependence [F17.200]  Yes     Chronic    Stage 3a chronic kidney disease [N18.31]  Yes    Hyperlipidemia [E78.5]  Yes     Chronic      Resolved Hospital Problems    Diagnosis Date Resolved POA    Seizure [R56.9] 10/28/2022 No    Right leg weakness [R29.898] 10/11/2022 Yes    Hypertension [I10] 10/28/2022 Yes     Chronic       Patient is homebound due to:  CVA (cerebral vascular accident)    Allergies:  Review of patient's allergies indicates:   Allergen Reactions    Latex, natural rubber Rash    Iodine and iodide containing products Rash       Vitals:      Routine    Diet:  Low Na    Acitivities:      - Up in a chair each morning as tolerated     LABS:  Per facility protocol      Nursing Precautions:    - Aspiration precautions:             - Total assistance with meals            -  Upright 90 degrees befor during and after meals             -  Suction at bedside          - Fall precautions per nursing home protocol   - Seizure precaution per penitentiary protocol   - Decubitus precautions:        -  for positioning   - Pressure reducing foam mattress   - Turn patient every two hours. Use wedge pillows to anchor patient    CONSULTS:      Physical Therapy to evaluate and treat     Occupational Therapy to evaluate and treat                 Speech Therapy to evaluate and treat                           0 units then call physician      Medications: Discontinue all previous medication orders, if any. See new list below.         Medication List        START taking these medications      carvediloL 25 MG tablet  Commonly known as: COREG  Take 1 tablet (25 mg total) by mouth 2 (two) times daily.     clopidogreL 75 mg tablet  Commonly known as: PLAVIX  Take 1 tablet (75 mg total) by mouth once daily. for 21 days     hydrALAZINE 25 MG tablet  Commonly known as: APRESOLINE  Take 3 tablets (75 mg total) by mouth every 8 (eight) hours.     levETIRAcetam 1000 MG tablet  Commonly known as: KEPPRA  Take 1 tablet (1,000 mg total) by mouth 2 (two) times daily.     senna-docusate 8.6-50 mg 8.6-50 mg per tablet  Commonly known as: PERICOLACE  Take 1 tablet by mouth daily as needed for Constipation.            CHANGE how you take these medications      amLODIPine 10 MG tablet  Commonly known as: NORVASC  Take 1 tablet (10 mg total) by mouth once daily.  What changed:   medication strength  how much to take     atorvastatin 40 MG tablet  Commonly known as: LIPITOR  Take 1 tablet (40 mg total) by mouth every evening.  What changed:   medication strength  how much to take     lisinopriL 40 MG tablet  Commonly known as: PRINIVIL,ZESTRIL  Take 1 tablet (40 mg total) by mouth once daily.  What changed:   medication strength  how much to take            CONTINUE taking these medications      allopurinoL 100 MG tablet  Commonly known as: ZYLOPRIM  TAKE 2 TABLETS BY MOUTH EVERY DAY     aspirin 81 MG Chew  Take 1 tablet (81 mg total) by mouth once daily.     LUMIGAN 0.01 % Drop  Generic drug: bimatoprost  Place 1 drop into both eyes every evening.            STOP taking these medications      ketoconazole 2 % cream  Commonly known as: NIZORAL                    _________________________________  Nain Cho MD  10/28/2022

## 2022-10-28 NOTE — PLAN OF CARE
Pt must take po meds crushed at all times reported via day shift nurse that patient was able to swallow pills one by one. Pt could not swallow one pill on last night he must take his medication crush to prevent aspirations. Pt has frequent nonproductive cough.

## 2022-11-07 ENCOUNTER — HOSPITAL ENCOUNTER (INPATIENT)
Facility: HOSPITAL | Age: 77
LOS: 8 days | Discharge: HOSPICE | DRG: 683 | End: 2022-11-15
Attending: STUDENT IN AN ORGANIZED HEALTH CARE EDUCATION/TRAINING PROGRAM | Admitting: STUDENT IN AN ORGANIZED HEALTH CARE EDUCATION/TRAINING PROGRAM
Payer: MEDICARE

## 2022-11-07 DIAGNOSIS — G40.909 SEIZURE DISORDER: ICD-10-CM

## 2022-11-07 DIAGNOSIS — R13.10 DYSPHAGIA, UNSPECIFIED TYPE: ICD-10-CM

## 2022-11-07 DIAGNOSIS — R62.7 FAILURE TO THRIVE IN ADULT: ICD-10-CM

## 2022-11-07 DIAGNOSIS — I63.9 CEREBROVASCULAR ACCIDENT (CVA), UNSPECIFIED MECHANISM: ICD-10-CM

## 2022-11-07 DIAGNOSIS — E87.0 HYPERNATREMIA: ICD-10-CM

## 2022-11-07 DIAGNOSIS — N18.32 ACUTE RENAL FAILURE WITH ACUTE RENAL CORTICAL NECROSIS SUPERIMPOSED ON STAGE 3B CHRONIC KIDNEY DISEASE: Primary | ICD-10-CM

## 2022-11-07 DIAGNOSIS — Z66 DNR (DO NOT RESUSCITATE): ICD-10-CM

## 2022-11-07 DIAGNOSIS — Z51.5 COMFORT MEASURES ONLY STATUS: ICD-10-CM

## 2022-11-07 DIAGNOSIS — N19 RENAL FAILURE, UNSPECIFIED CHRONICITY: ICD-10-CM

## 2022-11-07 DIAGNOSIS — N17.1 ACUTE RENAL FAILURE WITH ACUTE RENAL CORTICAL NECROSIS SUPERIMPOSED ON STAGE 3B CHRONIC KIDNEY DISEASE: Primary | ICD-10-CM

## 2022-11-07 DIAGNOSIS — R07.9 CHEST PAIN: ICD-10-CM

## 2022-11-07 DIAGNOSIS — Z71.89 ACP (ADVANCE CARE PLANNING): ICD-10-CM

## 2022-11-07 DIAGNOSIS — R13.10 DYSPHAGIA: ICD-10-CM

## 2022-11-07 DIAGNOSIS — E87.5 HYPERKALEMIA: ICD-10-CM

## 2022-11-07 PROBLEM — N17.9 AKI (ACUTE KIDNEY INJURY): Status: ACTIVE | Noted: 2022-11-07

## 2022-11-07 LAB
ALBUMIN SERPL BCP-MCNC: 3.3 G/DL (ref 3.5–5.2)
ALP SERPL-CCNC: 137 U/L (ref 55–135)
ALT SERPL W/O P-5'-P-CCNC: 50 U/L (ref 10–44)
AMM URATE CRY URNS QL MICRO: ABNORMAL
ANION GAP SERPL CALC-SCNC: ABNORMAL MMOL/L (ref 8–16)
AST SERPL-CCNC: 48 U/L (ref 10–40)
BACTERIA #/AREA URNS HPF: ABNORMAL /HPF
BASOPHILS # BLD AUTO: 0.12 K/UL (ref 0–0.2)
BASOPHILS NFR BLD: 1.5 % (ref 0–1.9)
BILIRUB SERPL-MCNC: 0.4 MG/DL (ref 0.1–1)
BILIRUB UR QL STRIP: NEGATIVE
BNP SERPL-MCNC: 12 PG/ML (ref 0–99)
BUN SERPL-MCNC: 109 MG/DL (ref 8–23)
CALCIUM SERPL-MCNC: 10.1 MG/DL (ref 8.7–10.5)
CHLORIDE SERPL-SCNC: >130 MMOL/L (ref 95–110)
CLARITY UR: ABNORMAL
CO2 SERPL-SCNC: 14 MMOL/L (ref 23–29)
COLOR UR: YELLOW
CREAT SERPL-MCNC: 4.6 MG/DL (ref 0.5–1.4)
DIFFERENTIAL METHOD: ABNORMAL
EOSINOPHIL # BLD AUTO: 0.6 K/UL (ref 0–0.5)
EOSINOPHIL NFR BLD: 6.9 % (ref 0–8)
ERYTHROCYTE [DISTWIDTH] IN BLOOD BY AUTOMATED COUNT: 21.9 % (ref 11.5–14.5)
EST. GFR  (NO RACE VARIABLE): 12 ML/MIN/1.73 M^2
GLUCOSE SERPL-MCNC: 94 MG/DL (ref 70–110)
GLUCOSE UR QL STRIP: NEGATIVE
HCT VFR BLD AUTO: 65.2 % (ref 40–54)
HGB BLD-MCNC: 20.3 G/DL (ref 14–18)
HGB UR QL STRIP: NEGATIVE
HYALINE CASTS #/AREA URNS LPF: 5 /LPF
IMM GRANULOCYTES # BLD AUTO: 0.02 K/UL (ref 0–0.04)
IMM GRANULOCYTES NFR BLD AUTO: 0.2 % (ref 0–0.5)
KETONES UR QL STRIP: NEGATIVE
LACTATE SERPL-SCNC: 1.6 MMOL/L (ref 0.5–2.2)
LEUKOCYTE ESTERASE UR QL STRIP: NEGATIVE
LYMPHOCYTES # BLD AUTO: 2.1 K/UL (ref 1–4.8)
LYMPHOCYTES NFR BLD: 25.7 % (ref 18–48)
MAGNESIUM SERPL-MCNC: 4.1 MG/DL (ref 1.6–2.6)
MCH RBC QN AUTO: 25.3 PG (ref 27–31)
MCHC RBC AUTO-ENTMCNC: 31.1 G/DL (ref 32–36)
MCV RBC AUTO: 81 FL (ref 82–98)
MICROSCOPIC COMMENT: ABNORMAL
MONOCYTES # BLD AUTO: 1.6 K/UL (ref 0.3–1)
MONOCYTES NFR BLD: 19.5 % (ref 4–15)
NEUTROPHILS # BLD AUTO: 3.8 K/UL (ref 1.8–7.7)
NEUTROPHILS NFR BLD: 46.2 % (ref 38–73)
NITRITE UR QL STRIP: NEGATIVE
NRBC BLD-RTO: 0 /100 WBC
PH UR STRIP: 6 [PH] (ref 5–8)
PLATELET # BLD AUTO: 347 K/UL (ref 150–450)
PMV BLD AUTO: ABNORMAL FL (ref 9.2–12.9)
POTASSIUM SERPL-SCNC: 6.7 MMOL/L (ref 3.5–5.1)
PROT SERPL-MCNC: 9.4 G/DL (ref 6–8.4)
PROT UR QL STRIP: ABNORMAL
RBC # BLD AUTO: 8.03 M/UL (ref 4.6–6.2)
RBC #/AREA URNS HPF: 3 /HPF (ref 0–4)
SODIUM SERPL-SCNC: 159 MMOL/L (ref 136–145)
SP GR UR STRIP: 1.02 (ref 1–1.03)
SQUAMOUS #/AREA URNS HPF: 2 /HPF
TROPONIN I SERPL DL<=0.01 NG/ML-MCNC: 0.02 NG/ML (ref 0–0.03)
URN SPEC COLLECT METH UR: ABNORMAL
UROBILINOGEN UR STRIP-ACNC: NEGATIVE EU/DL
WBC # BLD AUTO: 8.21 K/UL (ref 3.9–12.7)
WBC #/AREA URNS HPF: 6 /HPF (ref 0–5)
WBC CLUMPS URNS QL MICRO: ABNORMAL
YEAST URNS QL MICRO: ABNORMAL

## 2022-11-07 PROCEDURE — 36410 VNPNXR 3YR/> PHY/QHP DX/THER: CPT

## 2022-11-07 PROCEDURE — 94640 AIRWAY INHALATION TREATMENT: CPT

## 2022-11-07 PROCEDURE — 99285 EMERGENCY DEPT VISIT HI MDM: CPT | Mod: 25

## 2022-11-07 PROCEDURE — 84484 ASSAY OF TROPONIN QUANT: CPT | Performed by: STUDENT IN AN ORGANIZED HEALTH CARE EDUCATION/TRAINING PROGRAM

## 2022-11-07 PROCEDURE — 80053 COMPREHEN METABOLIC PANEL: CPT | Performed by: STUDENT IN AN ORGANIZED HEALTH CARE EDUCATION/TRAINING PROGRAM

## 2022-11-07 PROCEDURE — 93010 EKG 12-LEAD: ICD-10-PCS | Mod: ,,, | Performed by: INTERNAL MEDICINE

## 2022-11-07 PROCEDURE — 63600175 PHARM REV CODE 636 W HCPCS: Performed by: STUDENT IN AN ORGANIZED HEALTH CARE EDUCATION/TRAINING PROGRAM

## 2022-11-07 PROCEDURE — 83605 ASSAY OF LACTIC ACID: CPT | Performed by: STUDENT IN AN ORGANIZED HEALTH CARE EDUCATION/TRAINING PROGRAM

## 2022-11-07 PROCEDURE — 81000 URINALYSIS NONAUTO W/SCOPE: CPT | Performed by: STUDENT IN AN ORGANIZED HEALTH CARE EDUCATION/TRAINING PROGRAM

## 2022-11-07 PROCEDURE — 93010 ELECTROCARDIOGRAM REPORT: CPT | Mod: ,,, | Performed by: INTERNAL MEDICINE

## 2022-11-07 PROCEDURE — 93005 ELECTROCARDIOGRAM TRACING: CPT

## 2022-11-07 PROCEDURE — C1751 CATH, INF, PER/CENT/MIDLINE: HCPCS

## 2022-11-07 PROCEDURE — 25000242 PHARM REV CODE 250 ALT 637 W/ HCPCS: Performed by: STUDENT IN AN ORGANIZED HEALTH CARE EDUCATION/TRAINING PROGRAM

## 2022-11-07 PROCEDURE — 83735 ASSAY OF MAGNESIUM: CPT | Performed by: STUDENT IN AN ORGANIZED HEALTH CARE EDUCATION/TRAINING PROGRAM

## 2022-11-07 PROCEDURE — 83880 ASSAY OF NATRIURETIC PEPTIDE: CPT | Performed by: STUDENT IN AN ORGANIZED HEALTH CARE EDUCATION/TRAINING PROGRAM

## 2022-11-07 PROCEDURE — 96360 HYDRATION IV INFUSION INIT: CPT

## 2022-11-07 PROCEDURE — 25000003 PHARM REV CODE 250: Performed by: STUDENT IN AN ORGANIZED HEALTH CARE EDUCATION/TRAINING PROGRAM

## 2022-11-07 PROCEDURE — 12000002 HC ACUTE/MED SURGE SEMI-PRIVATE ROOM

## 2022-11-07 PROCEDURE — 85025 COMPLETE CBC W/AUTO DIFF WBC: CPT | Performed by: STUDENT IN AN ORGANIZED HEALTH CARE EDUCATION/TRAINING PROGRAM

## 2022-11-07 RX ORDER — PROCHLORPERAZINE EDISYLATE 5 MG/ML
5 INJECTION INTRAMUSCULAR; INTRAVENOUS EVERY 6 HOURS PRN
Status: DISCONTINUED | OUTPATIENT
Start: 2022-11-08 | End: 2022-11-15 | Stop reason: HOSPADM

## 2022-11-07 RX ORDER — ALBUTEROL SULFATE 2.5 MG/.5ML
15 SOLUTION RESPIRATORY (INHALATION)
Status: COMPLETED | OUTPATIENT
Start: 2022-11-07 | End: 2022-11-07

## 2022-11-07 RX ORDER — IBUPROFEN 200 MG
16 TABLET ORAL
Status: DISCONTINUED | OUTPATIENT
Start: 2022-11-08 | End: 2022-11-15 | Stop reason: HOSPADM

## 2022-11-07 RX ORDER — SODIUM,POTASSIUM PHOSPHATES 280-250MG
2 POWDER IN PACKET (EA) ORAL
Status: DISCONTINUED | OUTPATIENT
Start: 2022-11-08 | End: 2022-11-08

## 2022-11-07 RX ORDER — HYDROCODONE BITARTRATE AND ACETAMINOPHEN 5; 325 MG/1; MG/1
1 TABLET ORAL EVERY 6 HOURS PRN
Status: DISCONTINUED | OUTPATIENT
Start: 2022-11-08 | End: 2022-11-08

## 2022-11-07 RX ORDER — LANOLIN ALCOHOL/MO/W.PET/CERES
800 CREAM (GRAM) TOPICAL
Status: DISCONTINUED | OUTPATIENT
Start: 2022-11-08 | End: 2022-11-08

## 2022-11-07 RX ORDER — ACETAMINOPHEN 325 MG/1
650 TABLET ORAL EVERY 8 HOURS PRN
Status: DISCONTINUED | OUTPATIENT
Start: 2022-11-08 | End: 2022-11-11

## 2022-11-07 RX ORDER — SODIUM CHLORIDE 0.9 % (FLUSH) 0.9 %
10 SYRINGE (ML) INJECTION EVERY 12 HOURS PRN
Status: DISCONTINUED | OUTPATIENT
Start: 2022-11-08 | End: 2022-11-15 | Stop reason: HOSPADM

## 2022-11-07 RX ORDER — CALCIUM GLUCONATE 20 MG/ML
1 INJECTION, SOLUTION INTRAVENOUS EVERY 10 MIN PRN
Status: DISCONTINUED | OUTPATIENT
Start: 2022-11-08 | End: 2022-11-11

## 2022-11-07 RX ORDER — CALCIUM GLUCONATE 20 MG/ML
1 INJECTION, SOLUTION INTRAVENOUS ONCE
Status: COMPLETED | OUTPATIENT
Start: 2022-11-08 | End: 2022-11-08

## 2022-11-07 RX ORDER — LEVETIRACETAM 500 MG/1
1000 TABLET ORAL 2 TIMES DAILY
Status: DISCONTINUED | OUTPATIENT
Start: 2022-11-08 | End: 2022-11-08

## 2022-11-07 RX ORDER — NALOXONE HCL 0.4 MG/ML
0.02 VIAL (ML) INJECTION
Status: DISCONTINUED | OUTPATIENT
Start: 2022-11-08 | End: 2022-11-15 | Stop reason: HOSPADM

## 2022-11-07 RX ORDER — TALC
6 POWDER (GRAM) TOPICAL NIGHTLY PRN
Status: DISCONTINUED | OUTPATIENT
Start: 2022-11-08 | End: 2022-11-11

## 2022-11-07 RX ORDER — HEPARIN SODIUM 5000 [USP'U]/ML
5000 INJECTION, SOLUTION INTRAVENOUS; SUBCUTANEOUS EVERY 8 HOURS
Status: DISCONTINUED | OUTPATIENT
Start: 2022-11-08 | End: 2022-11-15 | Stop reason: HOSPADM

## 2022-11-07 RX ORDER — CARVEDILOL 12.5 MG/1
25 TABLET ORAL 2 TIMES DAILY
Status: DISCONTINUED | OUTPATIENT
Start: 2022-11-08 | End: 2022-11-11

## 2022-11-07 RX ORDER — NAPROXEN SODIUM 220 MG/1
81 TABLET, FILM COATED ORAL DAILY
Status: DISCONTINUED | OUTPATIENT
Start: 2022-11-08 | End: 2022-11-08

## 2022-11-07 RX ORDER — SIMETHICONE 80 MG
1 TABLET,CHEWABLE ORAL 4 TIMES DAILY PRN
Status: DISCONTINUED | OUTPATIENT
Start: 2022-11-08 | End: 2022-11-11

## 2022-11-07 RX ORDER — IBUPROFEN 200 MG
24 TABLET ORAL
Status: DISCONTINUED | OUTPATIENT
Start: 2022-11-08 | End: 2022-11-15 | Stop reason: HOSPADM

## 2022-11-07 RX ORDER — SODIUM CHLORIDE, SODIUM LACTATE, POTASSIUM CHLORIDE, CALCIUM CHLORIDE 600; 310; 30; 20 MG/100ML; MG/100ML; MG/100ML; MG/100ML
INJECTION, SOLUTION INTRAVENOUS CONTINUOUS
Status: DISCONTINUED | OUTPATIENT
Start: 2022-11-08 | End: 2022-11-08

## 2022-11-07 RX ORDER — IPRATROPIUM BROMIDE AND ALBUTEROL SULFATE 2.5; .5 MG/3ML; MG/3ML
3 SOLUTION RESPIRATORY (INHALATION) EVERY 4 HOURS PRN
Status: DISCONTINUED | OUTPATIENT
Start: 2022-11-08 | End: 2022-11-15 | Stop reason: HOSPADM

## 2022-11-07 RX ORDER — ALLOPURINOL 100 MG/1
200 TABLET ORAL DAILY
Status: DISCONTINUED | OUTPATIENT
Start: 2022-11-08 | End: 2022-11-08

## 2022-11-07 RX ORDER — POLYETHYLENE GLYCOL 3350 17 G/17G
17 POWDER, FOR SOLUTION ORAL DAILY
Status: DISCONTINUED | OUTPATIENT
Start: 2022-11-08 | End: 2022-11-11

## 2022-11-07 RX ORDER — MAG HYDROX/ALUMINUM HYD/SIMETH 200-200-20
30 SUSPENSION, ORAL (FINAL DOSE FORM) ORAL 4 TIMES DAILY PRN
Status: DISCONTINUED | OUTPATIENT
Start: 2022-11-08 | End: 2022-11-11

## 2022-11-07 RX ORDER — ONDANSETRON 2 MG/ML
4 INJECTION INTRAMUSCULAR; INTRAVENOUS EVERY 8 HOURS PRN
Status: DISCONTINUED | OUTPATIENT
Start: 2022-11-08 | End: 2022-11-15 | Stop reason: HOSPADM

## 2022-11-07 RX ORDER — GLUCAGON 1 MG
1 KIT INJECTION
Status: DISCONTINUED | OUTPATIENT
Start: 2022-11-08 | End: 2022-11-15 | Stop reason: HOSPADM

## 2022-11-07 RX ORDER — ACETAMINOPHEN 325 MG/1
650 TABLET ORAL EVERY 4 HOURS PRN
Status: DISCONTINUED | OUTPATIENT
Start: 2022-11-08 | End: 2022-11-11

## 2022-11-07 RX ORDER — BISACODYL 10 MG
10 SUPPOSITORY, RECTAL RECTAL DAILY PRN
Status: DISCONTINUED | OUTPATIENT
Start: 2022-11-08 | End: 2022-11-15 | Stop reason: HOSPADM

## 2022-11-07 RX ORDER — ATORVASTATIN CALCIUM 40 MG/1
40 TABLET, FILM COATED ORAL NIGHTLY
Status: DISCONTINUED | OUTPATIENT
Start: 2022-11-08 | End: 2022-11-11

## 2022-11-07 RX ADMIN — SODIUM CHLORIDE 1000 ML: 0.9 INJECTION, SOLUTION INTRAVENOUS at 10:11

## 2022-11-07 RX ADMIN — DEXTROSE 125 ML: 10 SOLUTION INTRAVENOUS at 11:11

## 2022-11-07 RX ADMIN — DEXTROSE 250 ML: 10 SOLUTION INTRAVENOUS at 11:11

## 2022-11-07 RX ADMIN — ALBUTEROL SULFATE 15 MG: 2.5 SOLUTION RESPIRATORY (INHALATION) at 11:11

## 2022-11-07 RX ADMIN — INSULIN HUMAN 5 UNITS: 100 INJECTION, SOLUTION PARENTERAL at 11:11

## 2022-11-07 NOTE — Clinical Note
Diagnosis: Failure to thrive in adult [456002]   Admitting Provider:: YAO WELCH [[id number]]   Future Attending Provider: JERED MOSS [[id number]]   Reason for IP Medical Treatment  (Clinical interventions that can only be accomplished in the IP setting? ) :: Acute kidney failure   Estimated Length of Stay:: 2 midnights   I certify that Inpatient services for greater than or equal to 2 midnights are medically necessary:: Yes   Plans for Post-Acute care--if anticipated (pick the single best option):: I. Nursing Home (group home)

## 2022-11-08 PROBLEM — N19 RENAL FAILURE: Status: RESOLVED | Noted: 2022-11-08 | Resolved: 2022-11-08

## 2022-11-08 PROBLEM — R19.7 DIARRHEA: Status: ACTIVE | Noted: 2022-11-08

## 2022-11-08 PROBLEM — N19 RENAL FAILURE: Status: ACTIVE | Noted: 2022-11-08

## 2022-11-08 PROBLEM — R13.10 DYSPHAGIA: Status: ACTIVE | Noted: 2022-11-08

## 2022-11-08 PROBLEM — E87.0 HYPERNATREMIA: Status: ACTIVE | Noted: 2022-11-08

## 2022-11-08 PROBLEM — N18.30 ACUTE RENAL FAILURE SUPERIMPOSED ON STAGE 3 CHRONIC KIDNEY DISEASE: Status: ACTIVE | Noted: 2022-11-07

## 2022-11-08 PROBLEM — Z66 DNR (DO NOT RESUSCITATE): Status: ACTIVE | Noted: 2022-11-08

## 2022-11-08 PROBLEM — Z71.89 ACP (ADVANCE CARE PLANNING): Status: ACTIVE | Noted: 2022-11-08

## 2022-11-08 LAB
ALBUMIN SERPL BCP-MCNC: 3 G/DL (ref 3.5–5.2)
ALLENS TEST: ABNORMAL
ALP SERPL-CCNC: 120 U/L (ref 55–135)
ALT SERPL W/O P-5'-P-CCNC: 45 U/L (ref 10–44)
AMMONIA PLAS-SCNC: 26 UMOL/L (ref 10–50)
ANION GAP SERPL CALC-SCNC: ABNORMAL MMOL/L (ref 8–16)
ANISOCYTOSIS BLD QL SMEAR: SLIGHT
AST SERPL-CCNC: 37 U/L (ref 10–40)
BASOPHILS # BLD AUTO: 0.12 K/UL (ref 0–0.2)
BASOPHILS # BLD AUTO: 0.15 K/UL (ref 0–0.2)
BASOPHILS NFR BLD: 1.3 % (ref 0–1.9)
BASOPHILS NFR BLD: 1.5 % (ref 0–1.9)
BILIRUB SERPL-MCNC: 0.3 MG/DL (ref 0.1–1)
BUN SERPL-MCNC: 106 MG/DL (ref 8–23)
BUN SERPL-MCNC: 114 MG/DL (ref 8–23)
BUN SERPL-MCNC: 114 MG/DL (ref 8–23)
BUN SERPL-MCNC: 115 MG/DL (ref 8–23)
BUN SERPL-MCNC: 120 MG/DL (ref 8–23)
CALCIUM SERPL-MCNC: 8.6 MG/DL (ref 8.7–10.5)
CALCIUM SERPL-MCNC: 8.8 MG/DL (ref 8.7–10.5)
CALCIUM SERPL-MCNC: 9.1 MG/DL (ref 8.7–10.5)
CALCIUM SERPL-MCNC: 9.3 MG/DL (ref 8.7–10.5)
CALCIUM SERPL-MCNC: 9.6 MG/DL (ref 8.7–10.5)
CHLORIDE SERPL-SCNC: >130 MMOL/L (ref 95–110)
CO2 SERPL-SCNC: 16 MMOL/L (ref 23–29)
CO2 SERPL-SCNC: 16 MMOL/L (ref 23–29)
CO2 SERPL-SCNC: 17 MMOL/L (ref 23–29)
CO2 SERPL-SCNC: 18 MMOL/L (ref 23–29)
CO2 SERPL-SCNC: 9 MMOL/L (ref 23–29)
CREAT SERPL-MCNC: 3.5 MG/DL (ref 0.5–1.4)
CREAT SERPL-MCNC: 3.8 MG/DL (ref 0.5–1.4)
CREAT SERPL-MCNC: 4.2 MG/DL (ref 0.5–1.4)
CREAT SERPL-MCNC: 4.5 MG/DL (ref 0.5–1.4)
CREAT SERPL-MCNC: 4.7 MG/DL (ref 0.5–1.4)
CTP QC/QA: YES
DELSYS: ABNORMAL
DIFFERENTIAL METHOD: ABNORMAL
DIFFERENTIAL METHOD: ABNORMAL
EOSINOPHIL # BLD AUTO: 0.6 K/UL (ref 0–0.5)
EOSINOPHIL # BLD AUTO: 0.6 K/UL (ref 0–0.5)
EOSINOPHIL NFR BLD: 6.5 % (ref 0–8)
EOSINOPHIL NFR BLD: 6.8 % (ref 0–8)
ERYTHROCYTE [DISTWIDTH] IN BLOOD BY AUTOMATED COUNT: 21.1 % (ref 11.5–14.5)
ERYTHROCYTE [DISTWIDTH] IN BLOOD BY AUTOMATED COUNT: 21.7 % (ref 11.5–14.5)
EST. GFR  (NO RACE VARIABLE): 12 ML/MIN/1.73 M^2
EST. GFR  (NO RACE VARIABLE): 13 ML/MIN/1.73 M^2
EST. GFR  (NO RACE VARIABLE): 14 ML/MIN/1.73 M^2
EST. GFR  (NO RACE VARIABLE): 16 ML/MIN/1.73 M^2
EST. GFR  (NO RACE VARIABLE): 17 ML/MIN/1.73 M^2
FOLATE SERPL-MCNC: 9.3 NG/ML (ref 4–24)
GLUCOSE SERPL-MCNC: 102 MG/DL (ref 70–110)
GLUCOSE SERPL-MCNC: 103 MG/DL (ref 70–110)
GLUCOSE SERPL-MCNC: 74 MG/DL (ref 70–110)
GLUCOSE SERPL-MCNC: 86 MG/DL (ref 70–110)
GLUCOSE SERPL-MCNC: 90 MG/DL (ref 70–110)
HCO3 UR-SCNC: 13.5 MMOL/L (ref 24–28)
HCT VFR BLD AUTO: 52.6 % (ref 40–54)
HCT VFR BLD AUTO: 58.1 % (ref 40–54)
HGB BLD-MCNC: 16.5 G/DL (ref 14–18)
HGB BLD-MCNC: 17.3 G/DL (ref 14–18)
IMM GRANULOCYTES # BLD AUTO: 0.03 K/UL (ref 0–0.04)
IMM GRANULOCYTES # BLD AUTO: 0.04 K/UL (ref 0–0.04)
IMM GRANULOCYTES NFR BLD AUTO: 0.3 % (ref 0–0.5)
IMM GRANULOCYTES NFR BLD AUTO: 0.4 % (ref 0–0.5)
LYMPHOCYTES # BLD AUTO: 2 K/UL (ref 1–4.8)
LYMPHOCYTES # BLD AUTO: 2.1 K/UL (ref 1–4.8)
LYMPHOCYTES NFR BLD: 20.8 % (ref 18–48)
LYMPHOCYTES NFR BLD: 21.6 % (ref 18–48)
MAGNESIUM SERPL-MCNC: 3.5 MG/DL (ref 1.6–2.6)
MCH RBC QN AUTO: 25.5 PG (ref 27–31)
MCH RBC QN AUTO: 26 PG (ref 27–31)
MCHC RBC AUTO-ENTMCNC: 29.8 G/DL (ref 32–36)
MCHC RBC AUTO-ENTMCNC: 31.4 G/DL (ref 32–36)
MCV RBC AUTO: 83 FL (ref 82–98)
MCV RBC AUTO: 86 FL (ref 82–98)
MONOCYTES # BLD AUTO: 1.7 K/UL (ref 0.3–1)
MONOCYTES # BLD AUTO: 1.8 K/UL (ref 0.3–1)
MONOCYTES NFR BLD: 18.3 % (ref 4–15)
MONOCYTES NFR BLD: 18.6 % (ref 4–15)
NEUTROPHILS # BLD AUTO: 4.9 K/UL (ref 1.8–7.7)
NEUTROPHILS # BLD AUTO: 5.1 K/UL (ref 1.8–7.7)
NEUTROPHILS NFR BLD: 51.5 % (ref 38–73)
NEUTROPHILS NFR BLD: 52.4 % (ref 38–73)
NRBC BLD-RTO: 0 /100 WBC
NRBC BLD-RTO: 0 /100 WBC
OVALOCYTES BLD QL SMEAR: ABNORMAL
PCO2 BLDA: 26.3 MMHG (ref 35–45)
PH SMN: 7.32 [PH] (ref 7.35–7.45)
PHOSPHATE SERPL-MCNC: 5.9 MG/DL (ref 2.7–4.5)
PLATELET # BLD AUTO: 336 K/UL (ref 150–450)
PLATELET # BLD AUTO: 347 K/UL (ref 150–450)
PLATELET BLD QL SMEAR: ABNORMAL
PMV BLD AUTO: ABNORMAL FL (ref 9.2–12.9)
PMV BLD AUTO: ABNORMAL FL (ref 9.2–12.9)
PO2 BLDA: 80 MMHG (ref 80–100)
POC BE: -11 MMOL/L
POC SATURATED O2: 95 % (ref 95–100)
POC TCO2: 14 MMOL/L (ref 23–27)
POCT GLUCOSE: 103 MG/DL (ref 70–110)
POCT GLUCOSE: 106 MG/DL (ref 70–110)
POCT GLUCOSE: 87 MG/DL (ref 70–110)
POCT GLUCOSE: 96 MG/DL (ref 70–110)
POLYCHROMASIA BLD QL SMEAR: ABNORMAL
POTASSIUM SERPL-SCNC: 4.8 MMOL/L (ref 3.5–5.1)
POTASSIUM SERPL-SCNC: 4.9 MMOL/L (ref 3.5–5.1)
POTASSIUM SERPL-SCNC: 5.2 MMOL/L (ref 3.5–5.1)
POTASSIUM SERPL-SCNC: 5.2 MMOL/L (ref 3.5–5.1)
POTASSIUM SERPL-SCNC: 5.6 MMOL/L (ref 3.5–5.1)
PROT SERPL-MCNC: 8.1 G/DL (ref 6–8.4)
PTH-INTACT SERPL-MCNC: 158.5 PG/ML (ref 9–77)
RBC # BLD AUTO: 6.34 M/UL (ref 4.6–6.2)
RBC # BLD AUTO: 6.79 M/UL (ref 4.6–6.2)
SAMPLE: ABNORMAL
SARS-COV-2 RDRP RESP QL NAA+PROBE: NEGATIVE
SITE: ABNORMAL
SODIUM SERPL-SCNC: 159 MMOL/L (ref 136–145)
SODIUM SERPL-SCNC: 161 MMOL/L (ref 136–145)
SODIUM SERPL-SCNC: 162 MMOL/L (ref 136–145)
TARGETS BLD QL SMEAR: ABNORMAL
TSH SERPL DL<=0.005 MIU/L-ACNC: 1.63 UIU/ML (ref 0.4–4)
VIT B12 SERPL-MCNC: 1242 PG/ML (ref 210–950)
WBC # BLD AUTO: 9.38 K/UL (ref 3.9–12.7)
WBC # BLD AUTO: 9.82 K/UL (ref 3.9–12.7)

## 2022-11-08 PROCEDURE — 99223 PR INITIAL HOSPITAL CARE,LEVL III: ICD-10-PCS | Mod: ,,, | Performed by: REGISTERED NURSE

## 2022-11-08 PROCEDURE — 80053 COMPREHEN METABOLIC PANEL: CPT | Performed by: HOSPITALIST

## 2022-11-08 PROCEDURE — 25000003 PHARM REV CODE 250: Performed by: STUDENT IN AN ORGANIZED HEALTH CARE EDUCATION/TRAINING PROGRAM

## 2022-11-08 PROCEDURE — 82746 ASSAY OF FOLIC ACID SERUM: CPT | Performed by: HOSPITALIST

## 2022-11-08 PROCEDURE — 63600175 PHARM REV CODE 636 W HCPCS: Performed by: HOSPITALIST

## 2022-11-08 PROCEDURE — 85025 COMPLETE CBC W/AUTO DIFF WBC: CPT | Mod: 91 | Performed by: HOSPITALIST

## 2022-11-08 PROCEDURE — 80048 BASIC METABOLIC PNL TOTAL CA: CPT | Mod: 91,XB | Performed by: STUDENT IN AN ORGANIZED HEALTH CARE EDUCATION/TRAINING PROGRAM

## 2022-11-08 PROCEDURE — 25000003 PHARM REV CODE 250: Performed by: HOSPITALIST

## 2022-11-08 PROCEDURE — 99223 PR INITIAL HOSPITAL CARE,LEVL III: ICD-10-PCS | Mod: ,,, | Performed by: STUDENT IN AN ORGANIZED HEALTH CARE EDUCATION/TRAINING PROGRAM

## 2022-11-08 PROCEDURE — 25000003 PHARM REV CODE 250: Performed by: INTERNAL MEDICINE

## 2022-11-08 PROCEDURE — 83970 ASSAY OF PARATHORMONE: CPT | Performed by: INTERNAL MEDICINE

## 2022-11-08 PROCEDURE — 82803 BLOOD GASES ANY COMBINATION: CPT

## 2022-11-08 PROCEDURE — 82140 ASSAY OF AMMONIA: CPT | Performed by: HOSPITALIST

## 2022-11-08 PROCEDURE — 36415 COLL VENOUS BLD VENIPUNCTURE: CPT | Performed by: INTERNAL MEDICINE

## 2022-11-08 PROCEDURE — 21400001 HC TELEMETRY ROOM

## 2022-11-08 PROCEDURE — 92610 EVALUATE SWALLOWING FUNCTION: CPT

## 2022-11-08 PROCEDURE — 99900035 HC TECH TIME PER 15 MIN (STAT)

## 2022-11-08 PROCEDURE — 99498 ADVNCD CARE PLAN ADDL 30 MIN: CPT | Mod: ,,, | Performed by: REGISTERED NURSE

## 2022-11-08 PROCEDURE — 99497 PR ADVNCD CARE PLAN 30 MIN: ICD-10-PCS | Mod: 25,,, | Performed by: REGISTERED NURSE

## 2022-11-08 PROCEDURE — 80048 BASIC METABOLIC PNL TOTAL CA: CPT | Mod: 91,XB | Performed by: HOSPITALIST

## 2022-11-08 PROCEDURE — 99497 ADVNCD CARE PLAN 30 MIN: CPT | Mod: 25,,, | Performed by: REGISTERED NURSE

## 2022-11-08 PROCEDURE — 99223 1ST HOSP IP/OBS HIGH 75: CPT | Mod: ,,, | Performed by: REGISTERED NURSE

## 2022-11-08 PROCEDURE — 84443 ASSAY THYROID STIM HORMONE: CPT | Performed by: HOSPITALIST

## 2022-11-08 PROCEDURE — 83735 ASSAY OF MAGNESIUM: CPT | Performed by: STUDENT IN AN ORGANIZED HEALTH CARE EDUCATION/TRAINING PROGRAM

## 2022-11-08 PROCEDURE — 99498 PR ADVNCD CARE PLAN ADDL 30 MIN: ICD-10-PCS | Mod: ,,, | Performed by: REGISTERED NURSE

## 2022-11-08 PROCEDURE — 85025 COMPLETE CBC W/AUTO DIFF WBC: CPT | Performed by: STUDENT IN AN ORGANIZED HEALTH CARE EDUCATION/TRAINING PROGRAM

## 2022-11-08 PROCEDURE — 27000207 HC ISOLATION

## 2022-11-08 PROCEDURE — 36415 COLL VENOUS BLD VENIPUNCTURE: CPT | Performed by: HOSPITALIST

## 2022-11-08 PROCEDURE — 36415 COLL VENOUS BLD VENIPUNCTURE: CPT | Performed by: STUDENT IN AN ORGANIZED HEALTH CARE EDUCATION/TRAINING PROGRAM

## 2022-11-08 PROCEDURE — 87635 SARS-COV-2 COVID-19 AMP PRB: CPT | Performed by: HOSPITALIST

## 2022-11-08 PROCEDURE — 86592 SYPHILIS TEST NON-TREP QUAL: CPT | Performed by: HOSPITALIST

## 2022-11-08 PROCEDURE — 99223 1ST HOSP IP/OBS HIGH 75: CPT | Mod: ,,, | Performed by: STUDENT IN AN ORGANIZED HEALTH CARE EDUCATION/TRAINING PROGRAM

## 2022-11-08 PROCEDURE — 63600175 PHARM REV CODE 636 W HCPCS: Performed by: STUDENT IN AN ORGANIZED HEALTH CARE EDUCATION/TRAINING PROGRAM

## 2022-11-08 PROCEDURE — 84100 ASSAY OF PHOSPHORUS: CPT | Performed by: STUDENT IN AN ORGANIZED HEALTH CARE EDUCATION/TRAINING PROGRAM

## 2022-11-08 PROCEDURE — 36600 WITHDRAWAL OF ARTERIAL BLOOD: CPT

## 2022-11-08 PROCEDURE — 82607 VITAMIN B-12: CPT | Performed by: HOSPITALIST

## 2022-11-08 RX ORDER — FLUCONAZOLE 2 MG/ML
200 INJECTION, SOLUTION INTRAVENOUS
Status: DISCONTINUED | OUTPATIENT
Start: 2022-11-08 | End: 2022-11-15 | Stop reason: HOSPADM

## 2022-11-08 RX ORDER — DEXTROSE MONOHYDRATE 50 MG/ML
INJECTION, SOLUTION INTRAVENOUS CONTINUOUS
Status: DISCONTINUED | OUTPATIENT
Start: 2022-11-08 | End: 2022-11-08

## 2022-11-08 RX ORDER — SODIUM CHLORIDE 9 MG/ML
INJECTION, SOLUTION INTRAVENOUS ONCE
Status: COMPLETED | OUTPATIENT
Start: 2022-11-08 | End: 2022-11-08

## 2022-11-08 RX ORDER — LEVETIRACETAM 10 MG/ML
1000 INJECTION INTRAVASCULAR EVERY 12 HOURS
Status: DISCONTINUED | OUTPATIENT
Start: 2022-11-08 | End: 2022-11-15 | Stop reason: HOSPADM

## 2022-11-08 RX ADMIN — SODIUM BICARBONATE: 84 INJECTION, SOLUTION INTRAVENOUS at 11:11

## 2022-11-08 RX ADMIN — SODIUM CHLORIDE: 0.9 INJECTION, SOLUTION INTRAVENOUS at 11:11

## 2022-11-08 RX ADMIN — CALCIUM GLUCONATE 1 G: 20 INJECTION, SOLUTION INTRAVENOUS at 12:11

## 2022-11-08 RX ADMIN — FLUCONAZOLE 200 MG: 2 INJECTION, SOLUTION INTRAVENOUS at 01:11

## 2022-11-08 RX ADMIN — HEPARIN SODIUM 5000 UNITS: 5000 INJECTION INTRAVENOUS; SUBCUTANEOUS at 09:11

## 2022-11-08 RX ADMIN — LEVETIRACETAM INJECTION 1000 MG: 10 INJECTION INTRAVENOUS at 09:11

## 2022-11-08 RX ADMIN — SODIUM BICARBONATE: 84 INJECTION, SOLUTION INTRAVENOUS at 10:11

## 2022-11-08 RX ADMIN — LEVETIRACETAM INJECTION 1000 MG: 10 INJECTION INTRAVENOUS at 10:11

## 2022-11-08 RX ADMIN — HEPARIN SODIUM 5000 UNITS: 5000 INJECTION INTRAVENOUS; SUBCUTANEOUS at 06:11

## 2022-11-08 RX ADMIN — HEPARIN SODIUM 5000 UNITS: 5000 INJECTION INTRAVENOUS; SUBCUTANEOUS at 01:11

## 2022-11-08 RX ADMIN — SODIUM CHLORIDE, SODIUM LACTATE, POTASSIUM CHLORIDE, AND CALCIUM CHLORIDE: .6; .31; .03; .02 INJECTION, SOLUTION INTRAVENOUS at 12:11

## 2022-11-08 NOTE — ED NOTES
IV attempt x2 unsuccessful. ED OC and nursing staff notified for assistance. Patient tolerated well. No active bleeding to sites.

## 2022-11-08 NOTE — ASSESSMENT & PLAN NOTE
Patient with acute kidney injury likely due to pre-renal azotemia EDGAR is currently stable. Labs reviewed- Renal function/electrolytes with Estimated Creatinine Clearance: 14.2 mL/min (A) (based on SCr of 4.6 mg/dL (H)). according to latest data. Monitor urine output and serial BMP and adjust therapy as needed. Avoid nephrotoxins and renally dose meds for GFR listed above.       IVF  Monitor BMP

## 2022-11-08 NOTE — ASSESSMENT & PLAN NOTE
-Admitted to inpatient status  -Noted recent strokes and over last 5 days not eating at all  -Noted to have Hypernatremia, hyperkalemia, renal failure and significant dysphagia  -High risk for terminal condition - and hospice may be appropriate.  -Palliative care consulted and input appreciated.  -Specific treatment as below

## 2022-11-08 NOTE — CONSULTS
[facility] - Telemetry  Palliative Medicine  Consult Note    Patient Name: Leonides Burns  MRN: [id number]  Admission Date: 11/7/2022  Hospital Length of Stay: 1 days  Code Status: Full Code   Attending Provider: Emilio Hyatt MD  Consulting Provider: Emelia Horton NP  Primary Care Physician: Suzette Akhtar MD  Principal Problem:Failure to thrive in adult    Patient information was obtained from patient, relative(s), past medical records, ER records and primary team.      Inpatient consult to Palliative Care  Consult performed by: Emelia Horton NP  Consult ordered by: Jerome Car MD  Reason for consult: continuity of care, goals of care, advanced care planning         Assessment/Plan:   Advance Care Planning   Palliative Consult:  Date: 11/08/2022  - consult received; pt known to myself and palliative team from previous admit 10/2022; interval chart reviewed in detail; discussed pt with Dr. Hyatt (primary)   - pt was discharged at the end of Oct. to SNF for post stroke rehab; now returns following nurses's concern for chocking while eating/drinking; pt also required ED care on 10/31 for low O2 sats  - met with patient and son LeonidesJr., at bedside; introduction to palliative medicine team and role in current care and admission; Christiano (son) is en route from [place] currently; began GOC/ACP discussion with Zander Vu, with understanding that no decisions would be made without pt's other son Christiano and daughter Felix being included/updated   - discussed pt/progress/decline since discharge; Leonides Blackmon With good insight that pt's condition is decline and he is not progressing as well as MDT and family had hoped in rehab; Leonides shared that he is aware that this is not a life pt would wish for himself as his independence was very important to him; he is concerned since he has seen pt's decline since stroke in person, that his understanding of this differs from his other siblings but is  hopeful that they will have better understanding once present and provided with medical updates   - in depth discussion of trajectory of pt's condition and concerns that continued aggressive treatment and rehab would lead to more burden on pt, without potential for meaningful rehab to previous independent state; discussed risks associated with artificial feeding options for pt and end of life nutritional needs   - planned for continued GOC/ACP discussion, including code status and recommendation for DNR once son, Christiano, arrives  - emotional support provided   - Allowed time for questions/concerns; all addressed; expressed availability of myself/palliative team for additional questions/concerns     Failure to thrive in adult  - Presented with inability to tolerate po intake (possibly related to thrush? See below), renal failure and hyperkalemia   - SLP consulted/follow; recommendations for continued NPO   - see ACP discussion above   - noted; management per primary with support from palliative on GOC with family      EDGAR (acute kidney injury)  Hyperkalemia  - minimal PO fluid intake per son; now with loose stools as well   - nephrology consulted/following; treating dehydration with fluids   - will plan to include continued GOC/ACP discussion with family      Seizure disorder  - experienced sezires during last admit, no known episodes of seizures or seizure like activity since discharge   - continuing Keppra  - noted; management per primary      CVA (cerebral vascular accident)  - History of CVA, recent hospitalization for an acute stroke (10/9-10/28) with discharge to SNF for rehab  - CT head: Large regions of new worsening parenchymal hypoattenuation within the left cerebral hemisphere consistent with acute/recent infarction  - Neurology consulted   - SLP, PT, OT consulted/following   - transparent conversation with Leonides JrMichele (son) about my concern for new/worseing infarctions on CT scan and decline in functional  "status since last discharge; he has good insight into this (see ACP above)   - noted; management per primary and neurology      Oral Candidiasis  - IV fluconazole as SLP recommending against swish and swallow at this time  - possibly contributing to pt's decreased PO intake  - son reports pt grimaces with PO intake for past week or more     Possible C-diff  - episode of large volume of loose stool during visit that was consistent in odor and appearance with c-diff; communicated to primary recommendation for testing  - contact precautions initiated by nursing staff      Chronic gout without tophu  Hyperlipidemia  - chronic histories noted; management per primary     Thank you for your consult. I will follow-up with patient. Please contact us if you have any additional questions.    Subjective:     HPI:   From H&P: "This is a 77 year old male with a PMHx of CVA, CKD3, HLD, HTN, gout, tobacco use who presents with failure to thrive.      Patient is non verbal. Presented from a nursing home with inability to tolerate PO for 5 days duration. Per documentation, patient chokes on liquids, and honey thick liquids. In the ED, he was hypotensive (90/55), tachypnic, but otherwise stable. Labs showed hypernatremia (159), hyperkalemia (6.7), elevated creatinine (4.6- baseline of 1.6), negative troponin, negative lactic acid, polycythemia (20.3). CXR was negative. CT head showed Large regions of new worsening parenchymal hypoattenuation within the left cerebral hemisphere consistent with acute/recent infarction. He was given insulin/albuterol and fluids. The patient was admitted for further management. "     Palliative medicine consulted for advance care planning and continuity of care; Met with pt at bedside; for details of visit, see advance care planning section of plan.         Hospital Course:  No notes on file    Past Medical History:   Diagnosis Date    Disorder of kidney and ureter     Elevated PSA     Glaucoma     " Hyperlipidemia     Hypertension     Psoriasis      Past Surgical History:   Procedure Laterality Date    FUNCTIONAL ENDOSCOPIC SINUS SURGERY (FESS) USING COMPUTER-ASSISTED NAVIGATION N/A 2/28/2019    Procedure: FESS, USING COMPUTER-ASSISTED NAVIGATION (ADD ON );  Surgeon: Mikael Serrano MD;  Location: [facility];  Service: ENT;  Laterality: N/A;  (ADD ON )    NASAL SEPTOPLASTY N/A 2/28/2019    Procedure: SEPTOPLASTY, NOSE;  Surgeon: Mikael Serrano MD;  Location: [facility];  Service: ENT;  Laterality: N/A;     Review of patient's allergies indicates:   Allergen Reactions    Latex, natural rubber Rash    Iodine and iodide containing products Rash     Medications:  Continuous Infusions:   sodium bicarbonate drip 100 mL/hr at 11/08/22 1131     Scheduled Meds:   atorvastatin  40 mg Oral QHS    carvediloL  25 mg Oral BID    fluconazole  200 mg Intravenous Q24H    heparin (porcine)  5,000 Units Subcutaneous Q8H    levetiracetam IV  1,000 mg Intravenous Q12H    polyethylene glycol  17 g Oral Daily     PRN Meds:acetaminophen, acetaminophen, albuterol-ipratropium, aluminum-magnesium hydroxide-simethicone, bisacodyL, [COMPLETED] calcium gluconate IVPB **AND** calcium gluconate IVPB, dextrose 10%, dextrose 10%, dextrose 10%, dextrose 10%, glucagon (human recombinant), glucose, glucose, melatonin, naloxone, ondansetron, prochlorperazine, simethicone, sodium chloride 0.9%    Family History       Problem Relation (Age of Onset)    Anemia Sister, Daughter    Cancer Brother    Diabetes Mother    KEVIN disease Sister    Hypertension Mother, Sister    No Known Problems Father, Son          Tobacco Use    Smoking status: Some Days     Packs/day: 0.50     Years: 40.00     Pack years: 20.00     Types: Cigarettes    Smokeless tobacco: Current   Substance and Sexual Activity    Alcohol use: Yes     Alcohol/week: 9.0 standard drinks     Types: 9 Cans of beer per week    Drug use: No    Sexual activity: Yes      Partners: Female     Review of Systems   Unable to perform ROS: Other (advanced dysphagia related to stroke)   Objective:     Vital Signs (Most Recent):  Temp: 97.4 °F (36.3 °C) (11/08/22 0715)  Pulse: 64 (11/08/22 0715)  Resp: 20 (11/08/22 0715)  BP: (!) 111/59 (11/08/22 0715)  SpO2: 96 % (11/08/22 0715)   Vital Signs (24h Range):  Temp:  [96.9 °F (36.1 °C)-97.4 °F (36.3 °C)] 97.4 °F (36.3 °C)  Pulse:  [60-76] 64  Resp:  [11-23] 20  SpO2:  [93 %-97 %] 96 %  BP: ()/() 111/59     Weight: 73.5 kg (162 lb 0.6 oz)  Body mass index is 24.64 kg/m².    Physical Exam  Vitals and nursing note reviewed.   HENT:      Head: Normocephalic and atraumatic.      Mouth/Throat:      Pharynx: Oropharyngeal exudate and posterior oropharyngeal erythema present.      Comments: thrush  Pulmonary:      Effort: Pulmonary effort is normal.      Comments: RA  Musculoskeletal:      Right lower leg: No edema.      Left lower leg: No edema.   Neurological:      Mental Status: He is alert.      Comments: Unable to fully assess due to dysphagia and minimally verbal.  Will occasionally nod head yes/no and attempt to say 1 word statements.        Significant Labs: All pertinent labs within the past 24 hours have been reviewed.  CBC:   Recent Labs   Lab 11/08/22 0818   WBC 9.38   HGB 16.5   HCT 52.6   MCV 83        BMP:  Recent Labs   Lab 11/08/22  0617 11/08/22  0818 11/08/22  1148   GLU 74   < > 102   *   < > 162*   K 5.6*   < > 5.2*   CL >130*   < > >130*   CO2 9*   < > 17*   *   < > 114*   CREATININE 4.5*   < > 4.2*   CALCIUM 9.3   < > 9.1   MG 3.5*  --   --     < > = values in this interval not displayed.     LFT:  Lab Results   Component Value Date    AST 37 11/08/2022    ALKPHOS 120 11/08/2022    BILITOT 0.3 11/08/2022     Albumin:   Albumin   Date Value Ref Range Status   11/08/2022 3.0 (L) 3.5 - 5.2 g/dL Final     Protein:   Total Protein   Date Value Ref Range Status   11/08/2022 8.1 6.0 - 8.4 g/dL Final      Lactic acid:   Lab Results   Component Value Date    LACTATE 1.6 11/07/2022       Significant Imaging: I have reviewed all pertinent imaging results/findings within the past 24 hours.        Total visit time: 86 minutes    > 50% of  70  min visit spent in chart review, face to face discussion of symptom assessment, coordination of care with other specialists, and discharge planning.    16 min ACP time spent: goals of care, emotional support, formulating and communicating prognosis, exploring burden/ benefit of various approaches of treatment.     Emelia Horton NP  Palliative Medicine  [facility]

## 2022-11-08 NOTE — ACP (ADVANCE CARE PLANNING)
Advance Care Planning     Date: 11/08/2022    Today a meeting took place: other (conference room) 3rd floor conference room     Patient Participation: Patient is unable to participate     Attendees (Name and  Relationship to patient):  Pt's sons, Leonides Vu and Christiano and 3 other family members (sister/cousin)    Staff attendees (Name and  Role): Emelia Horton NP    ACP Conversation (General): Understanding of advance care planning and role of health care agent defined .  Understanding of current condition related to failure to thrive and post CVA status and decompensated condition currently.    Experience with serious illness    'Living well': What does living well mean to you? Discussed pt's previous desire for independence, which does not align with current abilities.   Cultural, Scientologist, spiritual, or personal beliefs that would affect decisions for future care . Family requesting Johanna Irvin from [facility]      Code Status: DNR; status confirmed/order placed in chart    ACP Documents: None [facility] needed with hospice intake    Goals of care: The family endorses that what is most important right now is to focus on avoiding the hospital, symptom/pain control, quality of life, even if it means sacrificing a little time, improvement in condition but with limits to invasive therapies, and comfort and QOL     Accordingly, we have decided that the best plan to meet the patient's goals includes  medical optimization and treating reversible conditions that will improve QOL and then transition to hospice care      Recommendations/  Follow-up tasks: Other (specify below) Family to discuss/decided on home with hospice or skilled nursing NH with hospice; plan for follow up discussion on 11/9/2022         Code Status  In light of the patients advanced and life limiting illness,I engaged the the family in a conversation about the patient's preferences for care  at the very end of life. The patient wishes to  have a natural, peaceful death.  Along those lines, the patient does not wish to have CPR or other invasive treatments performed when his heart and/or breathing stops. I communicated to the family that a DNR order would be placed in his medical record to reflect this preference.       [facility]  I engaged the family in a conversation about advance care planning and we specifically addressed what the goals of care would be moving forward, in light of the patient's change in clinical status, specifically readmission with worsened overall status and decline in abilities while at SNF, along with new/worsening infarcts to brain on CT.  We did specifically address the patient's likely prognosis, which is poor.  We explored the patient's values and preferences for future care.  The family endorses that what is most important right now is to focus on avoiding the hospital, symptom/pain control, quality of life, even if it means sacrificing a little time, improvement in condition but with limits to invasive therapies, and comfort and QOL     We discussed artifical feeding intervention options and risks associated.  Recommend against these options for pt as risk/burden out weights overall benefit other than prolonging life.  Family in agreement of this at this time but plan for further discussion.     Accordingly, we have decided that the best plan to meet the patient's goals includes enrolling in hospice care    I did explain the role for hospice care at this stage of the patient's illness, including its ability to help the patient live with the best quality of life possible.  We will be making a hospice referral.    I spent a total of 50 minutes engaging the patient in this advance care planning discussion.

## 2022-11-08 NOTE — PT/OT/SLP EVAL
Speech Language Pathology Evaluation  Bedside Swallow    Patient Name:  Leonides Burns   MRN:  [id number]  Admitting Diagnosis: Failure to thrive in adult    Recommendations:                 General Recommendations:  Dysphagia therapy and Cognitive-linguistic evaluation  Diet recommendations:   , NPO, Other (Comment) (except ice)   Aspiration Precautions:  good oral care    General Precautions: Standard,    Communication strategies:  yes/no questions only    History:     Past Medical History:   Diagnosis Date    Disorder of kidney and ureter     Elevated PSA     Glaucoma     Hyperlipidemia     Hypertension     Psoriasis        Past Surgical History:   Procedure Laterality Date    FUNCTIONAL ENDOSCOPIC SINUS SURGERY (FESS) USING COMPUTER-ASSISTED NAVIGATION N/A 2/28/2019    Procedure: FESS, USING COMPUTER-ASSISTED NAVIGATION (ADD ON );  Surgeon: Mikael Serrano MD;  Location: [facility];  Service: ENT;  Laterality: N/A;  (ADD ON )    NASAL SEPTOPLASTY N/A 2/28/2019    Procedure: SEPTOPLASTY, NOSE;  Surgeon: Mikael Serrano MD;  Location: [facility];  Service: ENT;  Laterality: N/A;       Social History: Patient coming from [facility] after recent d/c from this admit    Modified Barium Swallow: 10/25 Pt presents with moderate oral dysphagia c/b lingual pumping and apraxia of swallow, mild pharyngeal dysphagia negatively impacted by swallow delay.     Chest X-Rays: 11/8 Pt presents with moderate oral dysphagia c/b lingual pumping and apraxia of swallow, mild pharyngeal dysphagia negatively impacted by swallow delay.     Prior diet: Mech soft with thin liquids however per report nursing home downgraded Pt to puree    Occupation/hobbies/homemaking: retired     Subjective   Pt know by ST from previous admit. Cognitive linguistic skills varied throughout recent admission upon discharge he was repeating words with 100% accuracy (although dysarthric) and repeating phrases with ~75% acc. He could  independently count to 10. Today he was unable to sustain attention to tasks without max cuing including po presentations and grossly remained non verbal despite max cuing from ST.   Patient goals: Pt unable to report    Pain/Comfort:  Pain Rating 1: 0/10  Pain Rating Post-Intervention 1: 0/10    Respiratory Status: Room air    Objective:     Oral Musculature Evaluation  Oral Musculature: right weakness, unable to assess due to poor participation/comprehension  Dentition: scattered dentition  Mucosal Quality: dry, coated tongue  Mandibular Strength and Mobility: flaccid  Oral Labial Strength and Mobility: impaired seal, impaired coordination, impaired retraction, impaired pursing  Lingual Strength and Mobility: impaired strength  Voice Prior to PO Intake: wfl  Oral Musculature Comments: right droop, not following oral motro commands, lingual thrush    Bedside Swallow Eval:   Consistencies Assessed:  Ice via spoon  Thin liquid via straw    Oral Phase:   Poor attention to bolus and bolus attention'  Decreased ability to draw from straw  holding    Pharyngeal Phase:   coughing/choking- ice    Compensatory Strategies  None    Treatment: address thrush. Following for ongoing assessment.     Assessment:     Leonides Burns is a 77 y.o. male with dx of failure to thrive (CT is suspicious for extension of previous CVA) he presents with oropharyngeal dysphagia of a yet to be determined severity significantly worse than recent admit c/b decreased attention to bolus presentation/ bolus. He has known recent medical history of swallow delay. Oral phase is also negatively impacted by thrush.     Goals:   Multidisciplinary Problems       SLP Goals          Problem: SLP    Goal Priority Disciplines Outcome   SLP Goal    Medium SLP Ongoing, Progressing   Description: Pt will participate in ongoing assessment of swallow  Pt will participate in cognitive linguistic eval.                        Plan:     Patient to be seen:  3 x/week    Plan of Care expires:  11/17/22  Plan of Care reviewed with:  patient, son   SLP Follow-Up:  Yes       Discharge recommendations:  other (see comments) (TBD)   Barriers to Discharge:  Level of Skilled Assistance Needed .    Time Tracking:     SLP Treatment Date:   11/08/22  Speech Start Time:  1245  Speech Stop Time:  1300     Speech Total Time (min):  15 min    Billable Minutes: Eval Swallow and Oral Function 15    11/08/2022          100

## 2022-11-08 NOTE — NURSING
Nurses Note -- 4 Eyes      11/8/2022   2:46 AM      Skin assessed during: Admit      [x] No Pressure Injuries Present    [x]Prevention Measures Documented      [] Yes- Altered Skin Integrity Present or Discovered   [] LDA Added if Not in Epic (Describe Wound)   [] New Altered Skin Integrity was Present on Admit and Documented in LDA   [] Wound Image Taken    Wound Care Consulted? No    Attending Nurse:  YOUNG SUH RN     Second RN/Staff Member:  Lana Pulliam RN

## 2022-11-08 NOTE — ASSESSMENT & PLAN NOTE
-Palliative care consulted and input appreciated.  -Family wishes for patient to be DNR and to have active medical treatment.    -If no improvement then likely will move towards comfort measures only at discharge - ?in NH or at home?  -DNR order placed.

## 2022-11-08 NOTE — SUBJECTIVE & OBJECTIVE
Interval History: No acute events overnight.  Patient somnolent but arousable.  Follows simple commands.  Coughs frequently - appears to be aspirating on his own secretions.  Nurses noted large foul smelling diarrhea x 1.  Son at bedside and also called daughter to discuss together while in room.  All questions answered and patient had no further complaints.    Review of Systems   Unable to perform ROS: Mental status change   Objective:     Vital Signs (Most Recent):  Temp: 98 °F (36.7 °C) (11/08/22 1536)  Pulse: 65 (11/08/22 1536)  Resp: 20 (11/08/22 1536)  BP: 126/60 (11/08/22 1536)  SpO2: 96 % (11/08/22 1536) Vital Signs (24h Range):  Temp:  [96.9 °F (36.1 °C)-98 °F (36.7 °C)] 98 °F (36.7 °C)  Pulse:  [60-76] 65  Resp:  [11-23] 20  SpO2:  [93 %-97 %] 96 %  BP: ()/() 126/60     Weight: 73.5 kg (162 lb 0.6 oz)  Body mass index is 24.64 kg/m².    Intake/Output Summary (Last 24 hours) at 11/8/2022 1621  Last data filed at 11/8/2022 0622  Gross per 24 hour   Intake 1969.06 ml   Output --   Net 1969.06 ml      Physical Exam  Vitals and nursing note reviewed.   Constitutional:       General: He is not in acute distress.     Appearance: Normal appearance. He is ill-appearing. He is not toxic-appearing.   HENT:      Head: Normocephalic and atraumatic.      Right Ear: External ear normal.      Left Ear: External ear normal.      Nose: Nose normal.      Mouth/Throat:      Mouth: Mucous membranes are moist.   Eyes:      Extraocular Movements: Extraocular movements intact.      Conjunctiva/sclera: Conjunctivae normal.   Cardiovascular:      Rate and Rhythm: Normal rate and regular rhythm.      Pulses: Normal pulses.      Heart sounds: Murmur heard.   Pulmonary:      Effort: Pulmonary effort is normal. No respiratory distress.   Abdominal:      General: Abdomen is flat.      Palpations: Abdomen is soft.      Tenderness: There is no abdominal tenderness.   Musculoskeletal:      Cervical back: No rigidity.      Right  lower leg: No edema.      Left lower leg: No edema.   Skin:     General: Skin is warm.      Capillary Refill: Capillary refill takes less than 2 seconds.   Neurological:      Comments: Somnolent but arousable.  Follows simple commands.  Severe RUE weakness.  Follows simple commands.  Unable to produce speech, but attempts       Significant Labs: All pertinent labs within the past 24 hours have been reviewed.    Significant Imaging: I have reviewed all pertinent imaging results/findings within the past 24 hours.

## 2022-11-08 NOTE — H&P
[facility]  [facility] Medicine  History & Physical    Patient Name: Leonides Burns  MRN: [id number]  Patient Class: IP- Inpatient  Admission Date: 11/7/2022  Attending Physician: Juventino Turner MD   Primary Care Provider: Suzette Akhtar MD         Patient information was obtained from ER records.     Subjective:     Principal Problem:Failure to thrive in adult    Chief Complaint:   Chief Complaint   Patient presents with    Dysphagia     Pt comes to the er via ems from [facility] with c/o pt not eating x5 days. Family reports that pt chokes on liquids and honey thick liquids. Pt is not responding to questions at this time but family reports that is normal for pt.        HPI: This is a 77 year old male with a PMHx of CVA, CKD3, HLD, HTN, gout, tobacco use who presents with failure to thrive.     Patient is non verbal. Presented from a nursing home with inability to tolerate PO for 5 days duration. Per documentation, patient chokes on liquids, and honey thick liquids. In the ED, he was hypotensive (90/55), tachypnic, but otherwise stable. Labs showed hypernatremia (159), hyperkalemia (6.7), elevated creatinine (4.6- baseline of 1.6), negative troponin, negative lactic acid, polycythemia (20.3). CXR was negative. CT head showed Large regions of new worsening parenchymal hypoattenuation within the left cerebral hemisphere consistent with acute/recent infarction. He was given insulin/albuterol and fluids. The patient was admitted for further management.       Past Medical History:   Diagnosis Date    Disorder of kidney and ureter     Elevated PSA     Glaucoma     Hyperlipidemia     Hypertension     Psoriasis        Past Surgical History:   Procedure Laterality Date    FUNCTIONAL ENDOSCOPIC SINUS SURGERY (FESS) USING COMPUTER-ASSISTED NAVIGATION N/A 2/28/2019    Procedure: FESS, USING COMPUTER-ASSISTED NAVIGATION (ADD ON );  Surgeon: Mikael Serrano MD;  Location:  [facility] OR;  Service: ENT;  Laterality: N/A;  (ADD ON )    NASAL SEPTOPLASTY N/A 2/28/2019    Procedure: SEPTOPLASTY, NOSE;  Surgeon: Mikael Serrano MD;  Location: [facility] OR;  Service: ENT;  Laterality: N/A;       Review of patient's allergies indicates:   Allergen Reactions    Latex, natural rubber Rash    Iodine and iodide containing products Rash       No current facility-administered medications on file prior to encounter.     Current Outpatient Medications on File Prior to Encounter   Medication Sig    allopurinol (ZYLOPRIM) 100 MG tablet TAKE 2 TABLETS BY MOUTH EVERY DAY    amLODIPine (NORVASC) 10 MG tablet Take 1 tablet (10 mg total) by mouth once daily.    aspirin 81 MG Chew Take 1 tablet (81 mg total) by mouth once daily.    atorvastatin (LIPITOR) 40 MG tablet Take 1 tablet (40 mg total) by mouth every evening.    bimatoprost (LUMIGAN) 0.01 % Drop Place 1 drop into both eyes every evening.    carvediloL (COREG) 25 MG tablet Take 1 tablet (25 mg total) by mouth 2 (two) times daily.    clopidogreL (PLAVIX) 75 mg tablet Take 1 tablet (75 mg total) by mouth once daily. for 21 days    hydrALAZINE (APRESOLINE) 25 MG tablet Take 3 tablets (75 mg total) by mouth every 8 (eight) hours.    levETIRAcetam (KEPPRA) 1000 MG tablet Take 1 tablet (1,000 mg total) by mouth 2 (two) times daily.    lisinopriL (PRINIVIL,ZESTRIL) 40 MG tablet Take 1 tablet (40 mg total) by mouth once daily.    senna-docusate 8.6-50 mg (PERICOLACE) 8.6-50 mg per tablet Take 1 tablet by mouth daily as needed for Constipation.     Family History       Problem Relation (Age of Onset)    Anemia Sister, Daughter    Cancer Brother    Diabetes Mother    KEVIN disease Sister    Hypertension Mother, Sister    No Known Problems Father, Son          Tobacco Use    Smoking status: Some Days     Packs/day: 0.50     Years: 40.00     Pack years: 20.00     Types: Cigarettes    Smokeless tobacco: Current   Substance and Sexual Activity     Alcohol use: Yes     Alcohol/week: 9.0 standard drinks     Types: 9 Cans of beer per week    Drug use: No    Sexual activity: Yes     Partners: Female     Review of Systems   Unable to perform ROS: Mental status change   Objective:     Vital Signs (Most Recent):  Temp: 96.9 °F (36.1 °C) (11/07/22 2030)  Pulse: 64 (11/07/22 2216)  Resp: (!) 23 (11/07/22 2216)  BP: (!) 121/100 (11/07/22 2216)  SpO2: 96 % (11/07/22 2216)   Vital Signs (24h Range):  Temp:  [96.9 °F (36.1 °C)] 96.9 °F (36.1 °C)  Pulse:  [64-76] 64  Resp:  [19-23] 23  SpO2:  [95 %-97 %] 96 %  BP: ()/() 121/100     Weight: 86.3 kg (190 lb 4.1 oz)  Body mass index is 28.93 kg/m².    Physical Exam  Vitals and nursing note reviewed.   Constitutional:       General: He is not in acute distress.     Appearance: Normal appearance. He is not ill-appearing.   HENT:      Head: Normocephalic and atraumatic.      Nose: Nose normal.      Mouth/Throat:      Mouth: Mucous membranes are moist.   Eyes:      Extraocular Movements: Extraocular movements intact.   Cardiovascular:      Rate and Rhythm: Normal rate.      Pulses: Normal pulses.      Heart sounds: No murmur heard.  Pulmonary:      Effort: Pulmonary effort is normal. No respiratory distress.   Abdominal:      General: Abdomen is flat.      Palpations: Abdomen is soft.      Tenderness: There is no abdominal tenderness.   Musculoskeletal:      Right lower leg: No edema.      Left lower leg: No edema.   Skin:     General: Skin is warm.      Capillary Refill: Capillary refill takes less than 2 seconds.   Neurological:      Mental Status: He is alert.      Comments: Non verbal   Follows commands   Limited neurologic exam           Significant Labs: All pertinent labs within the past 24 hours have been reviewed.    Significant Imaging: I have reviewed all pertinent imaging results/findings within the past 24 hours.    Assessment/Plan:     * Failure to thrive in adult  Presented with inability to tolerate  po intake, renal failure and hyperkalemia     Plan:  SLP  Palliative care consult       EDGAR (acute kidney injury)  Patient with acute kidney injury likely due to pre-renal azotemia EDGAR is currently stable. Labs reviewed- Renal function/electrolytes with Estimated Creatinine Clearance: 14.2 mL/min (A) (based on SCr of 4.6 mg/dL (H)). according to latest data. Monitor urine output and serial BMP and adjust therapy as needed. Avoid nephrotoxins and renally dose meds for GFR listed above.       Seizure disorder  Resume Keppra    CVA (cerebral vascular accident)  History of CVA, recent hospitalization for an acute stroke   CT head showed Large regions of new worsening parenchymal hypoattenuation within the left cerebral hemisphere consistent with acute/recent infarction    Plan:   - Neurology consult   - SLP, PT, OT  - Resume home medications     Chronic gout without tophus  Resume home medications       Hyperkalemia  Secondary to renal failure   Medical treatment       Hyperlipidemia  Resume home medications        VTE Risk Mitigation (From admission, onward)         Ordered     heparin (porcine) injection 5,000 Units  Every 8 hours         11/07/22 2306     IP VTE HIGH RISK PATIENT  Once         11/07/22 2306     Place sequential compression device  Until discontinued         11/07/22 2306                   Jerome Car MD  Department of Hospital Medicine   [facility] [place]

## 2022-11-08 NOTE — HPI
This is a 77 year old male with a PMHx of CVA, CKD3, HLD, HTN, gout, tobacco use who presents with failure to thrive.     Patient is non verbal. Presented from a nursing home with inability to tolerate PO for 5 days duration. Per documentation, patient chokes on liquids, and honey thick liquids. In the ED, he was hypotensive (90/55), tachypnic, but otherwise stable. Labs showed hypernatremia (159), hyperkalemia (6.7), elevated creatinine (4.6- baseline of 1.6), negative troponin, negative lactic acid, polycythemia (20.3). CXR was negative. CT head showed Large regions of new worsening parenchymal hypoattenuation within the left cerebral hemisphere consistent with acute/recent infarction. He was given insulin/albuterol and fluids. The patient was admitted for further management.

## 2022-11-08 NOTE — SUBJECTIVE & OBJECTIVE
Past Medical History:   Diagnosis Date    Disorder of kidney and ureter     Elevated PSA     Glaucoma     Hyperlipidemia     Hypertension     Psoriasis      Past Surgical History:   Procedure Laterality Date    FUNCTIONAL ENDOSCOPIC SINUS SURGERY (FESS) USING COMPUTER-ASSISTED NAVIGATION N/A 2/28/2019    Procedure: FESS, USING COMPUTER-ASSISTED NAVIGATION (ADD ON );  Surgeon: Mikael Serrano MD;  Location: [facility];  Service: ENT;  Laterality: N/A;  (ADD ON )    NASAL SEPTOPLASTY N/A 2/28/2019    Procedure: SEPTOPLASTY, NOSE;  Surgeon: Mikael Serrano MD;  Location: [facility];  Service: ENT;  Laterality: N/A;     Review of patient's allergies indicates:   Allergen Reactions    Latex, natural rubber Rash    Iodine and iodide containing products Rash     Medications:  Continuous Infusions:   sodium bicarbonate drip 100 mL/hr at 11/08/22 1131     Scheduled Meds:   atorvastatin  40 mg Oral QHS    carvediloL  25 mg Oral BID    fluconazole  200 mg Intravenous Q24H    heparin (porcine)  5,000 Units Subcutaneous Q8H    levetiracetam IV  1,000 mg Intravenous Q12H    polyethylene glycol  17 g Oral Daily     PRN Meds:acetaminophen, acetaminophen, albuterol-ipratropium, aluminum-magnesium hydroxide-simethicone, bisacodyL, [COMPLETED] calcium gluconate IVPB **AND** calcium gluconate IVPB, dextrose 10%, dextrose 10%, dextrose 10%, dextrose 10%, glucagon (human recombinant), glucose, glucose, melatonin, naloxone, ondansetron, prochlorperazine, simethicone, sodium chloride 0.9%    Family History       Problem Relation (Age of Onset)    Anemia Sister, Daughter    Cancer Brother    Diabetes Mother    KEVIN disease Sister    Hypertension Mother, Sister    No Known Problems Father, Son          Tobacco Use    Smoking status: Some Days     Packs/day: 0.50     Years: 40.00     Pack years: 20.00     Types: Cigarettes    Smokeless tobacco: Current   Substance and Sexual Activity    Alcohol use: Yes     Alcohol/week: 9.0 standard  drinks     Types: 9 Cans of beer per week    Drug use: No    Sexual activity: Yes     Partners: Female     Review of Systems   Unable to perform ROS: Other (advanced dysphagia related to stroke)   Objective:     Vital Signs (Most Recent):  Temp: 97.4 °F (36.3 °C) (11/08/22 0715)  Pulse: 64 (11/08/22 0715)  Resp: 20 (11/08/22 0715)  BP: (!) 111/59 (11/08/22 0715)  SpO2: 96 % (11/08/22 0715)   Vital Signs (24h Range):  Temp:  [96.9 °F (36.1 °C)-97.4 °F (36.3 °C)] 97.4 °F (36.3 °C)  Pulse:  [60-76] 64  Resp:  [11-23] 20  SpO2:  [93 %-97 %] 96 %  BP: ()/() 111/59     Weight: 73.5 kg (162 lb 0.6 oz)  Body mass index is 24.64 kg/m².    Physical Exam  Vitals and nursing note reviewed.   HENT:      Head: Normocephalic and atraumatic.      Mouth/Throat:      Pharynx: Oropharyngeal exudate and posterior oropharyngeal erythema present.      Comments: thrush  Pulmonary:      Effort: Pulmonary effort is normal.      Comments: RA  Musculoskeletal:      Right lower leg: No edema.      Left lower leg: No edema.   Neurological:      Mental Status: He is alert.      Comments: Unable to fully assess due to dysphagia and minimally verbal.  Will occasionally nod head yes/no and attempt to say 1 word statements.        Significant Labs: All pertinent labs within the past 24 hours have been reviewed.  CBC:   Recent Labs   Lab 11/08/22 0818   WBC 9.38   HGB 16.5   HCT 52.6   MCV 83        BMP:  Recent Labs   Lab 11/08/22  0617 11/08/22  0818 11/08/22  1148   GLU 74   < > 102   *   < > 162*   K 5.6*   < > 5.2*   CL >130*   < > >130*   CO2 9*   < > 17*   *   < > 114*   CREATININE 4.5*   < > 4.2*   CALCIUM 9.3   < > 9.1   MG 3.5*  --   --     < > = values in this interval not displayed.     LFT:  Lab Results   Component Value Date    AST 37 11/08/2022    ALKPHOS 120 11/08/2022    BILITOT 0.3 11/08/2022     Albumin:   Albumin   Date Value Ref Range Status   11/08/2022 3.0 (L) 3.5 - 5.2 g/dL Final     Protein:    Total Protein   Date Value Ref Range Status   11/08/2022 8.1 6.0 - 8.4 g/dL Final     Lactic acid:   Lab Results   Component Value Date    LACTATE 1.6 11/07/2022       Significant Imaging: I have reviewed all pertinent imaging results/findings within the past 24 hours.

## 2022-11-08 NOTE — ASSESSMENT & PLAN NOTE
-Noted one large episode 11/8 and nursing felt it smelt of C.diff  -Check stool wbc, culture and C.diff

## 2022-11-08 NOTE — CONSULTS
[facility]  [facility]ult Note    Patient Name: Leonides Burns  MRN: [id number]  Admission Date: 11/7/2022  Hospital Length of Stay: 1 days  Code Status: Full Code   Attending Provider: Emilio Hyatt MD  Consulting Provider: Tiff Franklin MD  Primary Care Physician: Suzette Akhtar MD  Principal Problem:Failure to thrive in adult    Inpatient consult to Neurology  Consult performed by: Tiff Franklin MD  Consult ordered by: Jerome Car MD      Subjective:     Chief Complaint:  continued dysphagia    HPI:   77 year old male with a PMHx of CVA, CKD3, HLD, HTN, gout, tobacco use. Recent discharge 10/28/22 for acute stroke. Brought in for failure to thrive with continue dysphagia. CTH consistent w recent infarct in L hemisphere. Aspirin, plavix, and statin currently held 2/2 dysphagia.     Per discussion w family at bedside and nursing who saw patient on prior admission, no new symptoms. However, inability to swallow/tolerated pureed foods w the continued dysphagia.      Labs here consistent with FTT which primary team is addressing.    Past Medical History:   Diagnosis Date    Disorder of kidney and ureter     Elevated PSA     Glaucoma     Hyperlipidemia     Hypertension     Psoriasis        Past Surgical History:   Procedure Laterality Date    FUNCTIONAL ENDOSCOPIC SINUS SURGERY (FESS) USING COMPUTER-ASSISTED NAVIGATION N/A 2/28/2019    Procedure: FESS, USING COMPUTER-ASSISTED NAVIGATION (ADD ON );  Surgeon: Mikael Serrano MD;  Location: [facility];  Service: ENT;  Laterality: N/A;  (ADD ON )    NASAL SEPTOPLASTY N/A 2/28/2019    Procedure: SEPTOPLASTY, NOSE;  Surgeon: Mikael Serrano MD;  Location: [facility];  Service: ENT;  Laterality: N/A;       Review of patient's allergies indicates:   Allergen Reactions    Latex, natural rubber Rash    Iodine and iodide containing products Rash       Current Neurological Medications: aspirin, plavix, statin currently held    No current  facility-administered medications on file prior to encounter.     Current Outpatient Medications on File Prior to Encounter   Medication Sig    allopurinol (ZYLOPRIM) 100 MG tablet TAKE 2 TABLETS BY MOUTH EVERY DAY    amLODIPine (NORVASC) 10 MG tablet Take 1 tablet (10 mg total) by mouth once daily.    aspirin 81 MG Chew Take 1 tablet (81 mg total) by mouth once daily.    atorvastatin (LIPITOR) 40 MG tablet Take 1 tablet (40 mg total) by mouth every evening.    bimatoprost (LUMIGAN) 0.01 % Drop Place 1 drop into both eyes every evening.    carvediloL (COREG) 25 MG tablet Take 1 tablet (25 mg total) by mouth 2 (two) times daily.    clopidogreL (PLAVIX) 75 mg tablet Take 1 tablet (75 mg total) by mouth once daily. for 21 days    hydrALAZINE (APRESOLINE) 25 MG tablet Take 3 tablets (75 mg total) by mouth every 8 (eight) hours.    levETIRAcetam (KEPPRA) 1000 MG tablet Take 1 tablet (1,000 mg total) by mouth 2 (two) times daily.    lisinopriL (PRINIVIL,ZESTRIL) 40 MG tablet Take 1 tablet (40 mg total) by mouth once daily.    senna-docusate 8.6-50 mg (PERICOLACE) 8.6-50 mg per tablet Take 1 tablet by mouth daily as needed for Constipation.      Family History       Problem Relation (Age of Onset)    Anemia Sister, Daughter    Cancer Brother    Diabetes Mother    KEVIN disease Sister    Hypertension Mother, Sister    No Known Problems Father, Son          Tobacco Use    Smoking status: Some Days     Packs/day: 0.50     Years: 40.00     Pack years: 20.00     Types: Cigarettes    Smokeless tobacco: Current   Substance and Sexual Activity    Alcohol use: Yes     Alcohol/week: 9.0 standard drinks     Types: 9 Cans of beer per week    Drug use: No    Sexual activity: Yes     Partners: Female     ROS negative other than stated in HPI    Objective:     Vital Signs (Most Recent):  Temp: 97.4 °F (36.3 °C) (11/08/22 0715)  Pulse: 64 (11/08/22 0715)  Resp: 20 (11/08/22 0715)  BP: (!) 111/59 (11/08/22 0715)  SpO2: 96 % (11/08/22 0715)    Vital Signs (24h Range):  Temp:  [96.9 °F (36.1 °C)-97.4 °F (36.3 °C)] 97.4 °F (36.3 °C)  Pulse:  [60-76] 64  Resp:  [11-23] 20  SpO2:  [93 %-97 %] 96 %  BP: ()/() 111/59     Weight: 73.5 kg (162 lb 0.6 oz)  Body mass index is 24.64 kg/m².    Patient is nonverbal baseline  Attn: intact  CN: PERRL, EOMI, facial sensation intact V1-V3 (withdraws to light touch), symmetric facies. Patient notably dysphagic  Motor: not moving his R side 3/5 w some effort, L side 5/5  Reflexes 2+ symmetric  Sensation: withdraws to noxious stimuli  Gait not assessed    Significant Labs: reviewed    Significant Imaging:     CTH 11/7/22 - findings consistent with prior L hemispheric infarct w evolution      L hemispheric stroke on MRI 10/2022  Assessment and Plan:     77 year old male with a PMHx of CVA, CKD3, HLD, HTN, gout, tobacco use. Recent discharge 10/28/22 for acute stroke in L hemisphere, discharged to nursing facility for R sided weakness and continued dysarthria, nonverbal. Patient brought in for FTT with continued dysarthria. CTH consistent w prior infarct.    - continue treatment for FTT  - aspirin, plavix, statin crushed if possible  - PT / OT /ST assistance for continued assessment for dysarhtria, may require PEG tube for feeding if unable to tolerate PO intake    No new recommendations at this time w evolution of prior stroke, concern for continued dysphagia. Defer to primary.           Active Diagnoses:    Diagnosis Date Noted POA    PRINCIPAL PROBLEM:  Failure to thrive in adult [R62.7] 11/07/2022 Unknown    EDGAR (acute kidney injury) [N17.9] 11/07/2022 Unknown    Seizure disorder [G40.909] 10/14/2022 Unknown    CVA (cerebral vascular accident) [I63.9] 10/10/2022 Yes    Chronic gout without tophus [M1A.9XX0] 03/23/2016 Yes    Hyperkalemia [E87.5] 12/02/2015 Unknown    Hyperlipidemia [E78.5] 02/02/2015 Yes     Chronic      Problems Resolved During this Admission:       VTE Risk Mitigation (From admission, onward)            Ordered     heparin (porcine) injection 5,000 Units  Every 8 hours         11/07/22 2306     IP VTE HIGH RISK PATIENT  Once         11/07/22 2306     Place sequential compression device  Until discontinued         11/07/22 2306                    Thank you for your consult. I will sign off. Please contact us if you have any additional questions.    Tiff Franklin MD  Neurology  [facility] [place]

## 2022-11-08 NOTE — ASSESSMENT & PLAN NOTE
-Present at times since recent stroke  -Now seems to be struggling to even control his own secretions  -SLP consulted  -STRICT NPO for now.

## 2022-11-08 NOTE — NURSING
MD notified about critical labs. ( Chloride and C02)  Request to change medications to IV route due to patient swallowing difficulties. Speech consult already in place.

## 2022-11-08 NOTE — ASSESSMENT & PLAN NOTE
History of CVA, recent hospitalization for an acute stroke   CT head showed Large regions of new worsening parenchymal hypoattenuation within the left cerebral hemisphere consistent with acute/recent infarction    Plan:   - Neurology consult   - SLP, PT, OT  - Resume home medications

## 2022-11-08 NOTE — PROGRESS NOTES
[facility] Medicine  Progress Note    Patient Name: Leonides Burns  MRN: [id number]  Patient Class: IP- Inpatient   Admission Date: 11/7/2022  Length of Stay: 1 days  Attending Physician: Emilio Hyatt MD  Primary Care Provider: Suzette Akhtar MD        Subjective:     Principal Problem:Failure to thrive in adult        HPI:  This is a 77 year old male with a PMHx of CVA, CKD3, HLD, HTN, gout, tobacco use who presents with failure to thrive.     Patient is non verbal. Presented from a nursing home with inability to tolerate PO for 5 days duration. Per documentation, patient chokes on liquids, and honey thick liquids. In the ED, he was hypotensive (90/55), tachypnic, but otherwise stable. Labs showed hypernatremia (159), hyperkalemia (6.7), elevated creatinine (4.6- baseline of 1.6), negative troponin, negative lactic acid, polycythemia (20.3). CXR was negative. CT head showed Large regions of new worsening parenchymal hypoattenuation within the left cerebral hemisphere consistent with acute/recent infarction. He was given insulin/albuterol and fluids. The patient was admitted for further management.       Overview/Hospital Course:  No notes on file    Interval History: No acute events overnight.  Patient somnolent but arousable.  Follows simple commands.  Coughs frequently - appears to be aspirating on his own secretions.  Nurses noted large foul smelling diarrhea x 1.  Son at bedside and also called daughter to discuss together while in room.  All questions answered and patient had no further complaints.    Review of Systems   Unable to perform ROS: Mental status change   Objective:     Vital Signs (Most Recent):  Temp: 98 °F (36.7 °C) (11/08/22 1536)  Pulse: 65 (11/08/22 1536)  Resp: 20 (11/08/22 1536)  BP: 126/60 (11/08/22 1536)  SpO2: 96 % (11/08/22 1536) Vital Signs (24h Range):  Temp:  [96.9 °F (36.1 °C)-98 °F (36.7 °C)] 98 °F (36.7 °C)  Pulse:  [60-76] 65  Resp:  [11-23]  20  SpO2:  [93 %-97 %] 96 %  BP: ()/() 126/60     Weight: 73.5 kg (162 lb 0.6 oz)  Body mass index is 24.64 kg/m².    Intake/Output Summary (Last 24 hours) at 11/8/2022 1621  Last data filed at 11/8/2022 0622  Gross per 24 hour   Intake 1969.06 ml   Output --   Net 1969.06 ml      Physical Exam  Vitals and nursing note reviewed.   Constitutional:       General: He is not in acute distress.     Appearance: Normal appearance. He is ill-appearing. He is not toxic-appearing.   HENT:      Head: Normocephalic and atraumatic.      Right Ear: External ear normal.      Left Ear: External ear normal.      Nose: Nose normal.      Mouth/Throat:      Mouth: Mucous membranes are moist.   Eyes:      Extraocular Movements: Extraocular movements intact.      Conjunctiva/sclera: Conjunctivae normal.   Cardiovascular:      Rate and Rhythm: Normal rate and regular rhythm.      Pulses: Normal pulses.      Heart sounds: Murmur heard.   Pulmonary:      Effort: Pulmonary effort is normal. No respiratory distress.   Abdominal:      General: Abdomen is flat.      Palpations: Abdomen is soft.      Tenderness: There is no abdominal tenderness.   Musculoskeletal:      Cervical back: No rigidity.      Right lower leg: No edema.      Left lower leg: No edema.   Skin:     General: Skin is warm.      Capillary Refill: Capillary refill takes less than 2 seconds.   Neurological:      Comments: Somnolent but arousable.  Follows simple commands.  Severe RUE weakness.  Follows simple commands.  Unable to produce speech, but attempts       Significant Labs: All pertinent labs within the past 24 hours have been reviewed.    Significant Imaging: I have reviewed all pertinent imaging results/findings within the past 24 hours.      Assessment/Plan:      * Failure to thrive in adult  -Admitted to inpatient status  -Noted recent strokes and over last 5 days not eating at all  -Noted to have Hypernatremia, hyperkalemia, renal failure and significant  dysphagia  -High risk for terminal condition - and hospice may be appropriate.  -Palliative care consulted and input appreciated.  -Specific treatment as below    CVA (cerebral vascular accident)  -Noted history of CVA with recent hospitalization for an acute stroke (D/c 10/28) Plan was for plavix x 21 days, aspirin and statin  -CT head showed large regions of new worsening parenchymal hypoattenuation within the left cerebral hemisphere consistent with acute/recent infarction  -Consulted PT, OT, SLP  -Await consult from neurology  -Presently unable to take oral meds - so holding aspirin, plavix and statin  -Resume home medications when able    Dysphagia  -Present at times since recent stroke  -Now seems to be struggling to even control his own secretions  -SLP consulted  -STRICT NPO for now.    EDGAR (acute kidney injury)  -Cr on admit 4.1  -Had hyperkalemia, hypernatremia and anion-gap acidosis on admit  -Avoid nephrotoxic agents and renally dose meds  -Check ABG  -Treat with D5W +NaBicarb  -Monitor BMP q4h  -Consult nephrology.    Hyperkalemia  -Treatment as above.    Hypernatremia  -Due to severe dehydration and diminished oral intake  -Treat as above.    Diarrhea  -Noted one large episode 11/8 and nursing felt it smelt of C.diff  -Check stool wbc, culture and C.diff    ACP (advance care planning)  -Palliative care consulted and input appreciated.  -Family wishes for patient to be DNR and to have active medical treatment.    -If no improvement then likely will move towards comfort measures only at discharge - ?in NH or at home?  -DNR order placed.    DNR (do not resuscitate)  -Noted as above.    Seizure disorder  -Change keppra to IV    Chronic gout without tophus  -Resume home allopurinol when/if able    Hyperlipidemia  -Resume statin when/if able      VTE Risk Mitigation (From admission, onward)         Ordered     heparin (porcine) injection 5,000 Units  Every 8 hours         11/07/22 2306     IP VTE HIGH RISK  PATIENT  Once         11/07/22 2306     Place sequential compression device  Until discontinued         11/07/22 2306                Discharge Planning   DEVIKA:      Code Status: DNR   Is the patient medically ready for discharge?:     Reason for patient still in hospital (select all that apply): Treatment and Consult recommendations  Discharge Plan A: Return to nursing home                  Emilio Hyatt MD  Department of [facility] Medicine   [facility]

## 2022-11-08 NOTE — ED NOTES
Patient from LTC facility. Ems reports that facility stated that patient has had difficulty eating/ not eating for approximately five days. Family stated to EMS that patient has been choking on honey thickened liquids. Patient non verbal at this time. Family stated to EMS that that is patients normal.

## 2022-11-08 NOTE — ASSESSMENT & PLAN NOTE
-Cr on admit 4.1  -Had hyperkalemia, hypernatremia and anion-gap acidosis on admit  -Avoid nephrotoxic agents and renally dose meds  -Check ABG  -Treat with D5W +NaBicarb  -Monitor BMP q4h  -Consult nephrology.

## 2022-11-08 NOTE — PT/OT/SLP PROGRESS
Occupational Therapy      Patient Name:  Leonides Burns   MRN:  [id number]    10:00 AM: Patient not seen today secondary to nurse/ DEANNE hold.  Will follow-up tomorrow or as able.     11/8/2022

## 2022-11-08 NOTE — ASSESSMENT & PLAN NOTE
Presented with inability to tolerate po intake, renal failure and hyperkalemia     Plan:  SLP  Palliative care consult

## 2022-11-08 NOTE — SUBJECTIVE & OBJECTIVE
Past Medical History:   Diagnosis Date    Disorder of kidney and ureter     Elevated PSA     Glaucoma     Hyperlipidemia     Hypertension     Psoriasis        Past Surgical History:   Procedure Laterality Date    FUNCTIONAL ENDOSCOPIC SINUS SURGERY (FESS) USING COMPUTER-ASSISTED NAVIGATION N/A 2/28/2019    Procedure: FESS, USING COMPUTER-ASSISTED NAVIGATION (ADD ON );  Surgeon: Mikael Serrano MD;  Location: [facility];  Service: ENT;  Laterality: N/A;  (ADD ON )    NASAL SEPTOPLASTY N/A 2/28/2019    Procedure: SEPTOPLASTY, NOSE;  Surgeon: Mikael Serrano MD;  Location: [facility];  Service: ENT;  Laterality: N/A;       Review of patient's allergies indicates:   Allergen Reactions    Latex, natural rubber Rash    Iodine and iodide containing products Rash       No current facility-administered medications on file prior to encounter.     Current Outpatient Medications on File Prior to Encounter   Medication Sig    allopurinol (ZYLOPRIM) 100 MG tablet TAKE 2 TABLETS BY MOUTH EVERY DAY    amLODIPine (NORVASC) 10 MG tablet Take 1 tablet (10 mg total) by mouth once daily.    aspirin 81 MG Chew Take 1 tablet (81 mg total) by mouth once daily.    atorvastatin (LIPITOR) 40 MG tablet Take 1 tablet (40 mg total) by mouth every evening.    bimatoprost (LUMIGAN) 0.01 % Drop Place 1 drop into both eyes every evening.    carvediloL (COREG) 25 MG tablet Take 1 tablet (25 mg total) by mouth 2 (two) times daily.    clopidogreL (PLAVIX) 75 mg tablet Take 1 tablet (75 mg total) by mouth once daily. for 21 days    hydrALAZINE (APRESOLINE) 25 MG tablet Take 3 tablets (75 mg total) by mouth every 8 (eight) hours.    levETIRAcetam (KEPPRA) 1000 MG tablet Take 1 tablet (1,000 mg total) by mouth 2 (two) times daily.    lisinopriL (PRINIVIL,ZESTRIL) 40 MG tablet Take 1 tablet (40 mg total) by mouth once daily.    senna-docusate 8.6-50 mg (PERICOLACE) 8.6-50 mg per tablet Take 1 tablet by mouth daily as needed for Constipation.      Family History       Problem Relation (Age of Onset)    Anemia Sister, Daughter    Cancer Brother    Diabetes Mother    KEVIN disease Sister    Hypertension Mother, Sister    No Known Problems Father, Son          Tobacco Use    Smoking status: Some Days     Packs/day: 0.50     Years: 40.00     Pack years: 20.00     Types: Cigarettes    Smokeless tobacco: Current   Substance and Sexual Activity    Alcohol use: Yes     Alcohol/week: 9.0 standard drinks     Types: 9 Cans of beer per week    Drug use: No    Sexual activity: Yes     Partners: Female     Review of Systems   Unable to perform ROS: Mental status change   Objective:     Vital Signs (Most Recent):  Temp: 96.9 °F (36.1 °C) (11/07/22 2030)  Pulse: 64 (11/07/22 2216)  Resp: (!) 23 (11/07/22 2216)  BP: (!) 121/100 (11/07/22 2216)  SpO2: 96 % (11/07/22 2216)   Vital Signs (24h Range):  Temp:  [96.9 °F (36.1 °C)] 96.9 °F (36.1 °C)  Pulse:  [64-76] 64  Resp:  [19-23] 23  SpO2:  [95 %-97 %] 96 %  BP: ()/() 121/100     Weight: 86.3 kg (190 lb 4.1 oz)  Body mass index is 28.93 kg/m².    Physical Exam  Vitals and nursing note reviewed.   Constitutional:       General: He is not in acute distress.     Appearance: Normal appearance. He is not ill-appearing.   HENT:      Head: Normocephalic and atraumatic.      Nose: Nose normal.      Mouth/Throat:      Mouth: Mucous membranes are moist.   Eyes:      Extraocular Movements: Extraocular movements intact.   Cardiovascular:      Rate and Rhythm: Normal rate.      Pulses: Normal pulses.      Heart sounds: No murmur heard.  Pulmonary:      Effort: Pulmonary effort is normal. No respiratory distress.   Abdominal:      General: Abdomen is flat.      Palpations: Abdomen is soft.      Tenderness: There is no abdominal tenderness.   Musculoskeletal:      Right lower leg: No edema.      Left lower leg: No edema.   Skin:     General: Skin is warm.      Capillary Refill: Capillary refill takes less than 2 seconds.    Neurological:      Mental Status: He is alert.      Comments: Non verbal   Follows commands   Limited neurologic exam           Significant Labs: All pertinent labs within the past 24 hours have been reviewed.    Significant Imaging: I have reviewed all pertinent imaging results/findings within the past 24 hours.

## 2022-11-08 NOTE — PT/OT/SLP PROGRESS
Physical Therapy      Patient Name:  Leonides Burns   MRN:  [id number]    Patient not seen today secondary to  (Nursing recommends hold for today). Will follow-up .

## 2022-11-08 NOTE — ED PROVIDER NOTES
Encounter Date: 11/7/2022    SCRIBE #1 NOTE: I, Zahraa Crockett, am scribing for, and in the presence of,  Keven Greco MD. I have scribed the following portions of the note - Other sections scribed: HPI, ROS, PE.     History     Chief Complaint   Patient presents with    Dysphagia     Pt comes to the er via ems from [facility] with c/o pt not eating x5 days. Family reports that pt chokes on liquids and honey thick liquids. Pt is not responding to questions at this time but family reports that is normal for pt.     Leonides Burns is a 77 y.o. male, with a past medical history of HLD, HTN, and kidney/ureter disorder, who presents to the ED via EMS from [facility] with inability to tolerate PO for 5 days with choking episodes. Pt is nonverbal at baseline. No other exacerbating or alleviating factors.     The history is provided by the EMS personnel. No  was used.   Review of patient's allergies indicates:   Allergen Reactions    Latex, natural rubber Rash    Iodine and iodide containing products Rash     Past Medical History:   Diagnosis Date    Disorder of kidney and ureter     Elevated PSA     Glaucoma     Hyperlipidemia     Hypertension     Psoriasis      Past Surgical History:   Procedure Laterality Date    FUNCTIONAL ENDOSCOPIC SINUS SURGERY (FESS) USING COMPUTER-ASSISTED NAVIGATION N/A 2/28/2019    Procedure: FESS, USING COMPUTER-ASSISTED NAVIGATION (ADD ON );  Surgeon: Mikael Serrano MD;  Location: [facility];  Service: ENT;  Laterality: N/A;  (ADD ON )    NASAL SEPTOPLASTY N/A 2/28/2019    Procedure: SEPTOPLASTY, NOSE;  Surgeon: Mikael Serrano MD;  Location: [facility];  Service: ENT;  Laterality: N/A;     Family History   Problem Relation Age of Onset    Hypertension Mother     Diabetes Mother     No Known Problems Father     Hypertension Sister     KEVIN disease Sister     Anemia Sister     Cancer Brother     Anemia Daughter     No Known Problems Son      Kidney disease Neg Hx     Prostate cancer Neg Hx     Melanoma Neg Hx      Social History     Tobacco Use    Smoking status: Some Days     Packs/day: 0.50     Years: 40.00     Pack years: 20.00     Types: Cigarettes    Smokeless tobacco: Current   Substance Use Topics    Alcohol use: Yes     Alcohol/week: 9.0 standard drinks     Types: 9 Cans of beer per week    Drug use: No     Review of Systems   Reason unable to perform ROS: Pt is nonverbal.     Physical Exam     Initial Vitals   BP Pulse Resp Temp SpO2   11/07/22 1923 11/07/22 1923 11/07/22 1923 11/07/22 2030 11/07/22 1923   (!) 90/55 76 (!) 22 96.9 °F (36.1 °C) 97 %      MAP       --                Physical Exam    Nursing note and vitals reviewed.  Constitutional: He appears well-developed and well-nourished. He is not diaphoretic. No distress.   HENT:   Head: Normocephalic and atraumatic.   Mouth/Throat: Mucous membranes are dry.   Eyes: Conjunctivae and EOM are normal. Pupils are equal, round, and reactive to light.   Neck: No JVD present.   Normal range of motion.  Cardiovascular:  Normal rate and regular rhythm.           Pulses:       Radial pulses are 2+ on the right side and 2+ on the left side.        Posterior tibial pulses are 2+ on the right side and 2+ on the left side.   Pulmonary/Chest: Breath sounds normal. No respiratory distress.   Abdominal: Abdomen is soft. Bowel sounds are normal. He exhibits no distension. There is no abdominal tenderness. There is no rebound and no guarding.   Musculoskeletal:         General: No tenderness or edema. Normal range of motion.      Cervical back: Normal range of motion.     Lymphadenopathy:     He has no cervical adenopathy.   Neurological: He is alert.   Alert nonverbal, able to nod up and down to answer simple questions.   Skin: Skin is warm. Capillary refill takes less than 2 seconds.       ED Course   Procedures  Labs Reviewed   CBC W/ AUTO DIFFERENTIAL - Abnormal; Notable for the following components:        Result Value    RBC 8.03 (*)     Hemoglobin 20.3 (*)     Hematocrit 65.2 (*)     MCV 81 (*)     MCH 25.3 (*)     MCHC 31.1 (*)     RDW 21.9 (*)     Mono # 1.6 (*)     Eos # 0.6 (*)     Mono % 19.5 (*)     All other components within normal limits    Narrative:     HGB   critical result(s) called and verbal readback obtained from   Jeanne Garcia RN at 2113 aw  by 3AW 11/07/2022 21:14   COMPREHENSIVE METABOLIC PANEL - Abnormal; Notable for the following components:    Sodium 159 (*)     Potassium 6.7 (*)     Chloride >130 (*)     CO2 14 (*)      (*)     Creatinine 4.6 (*)     Total Protein 9.4 (*)     Albumin 3.3 (*)     Alkaline Phosphatase 137 (*)     AST 48 (*)     ALT 50 (*)     eGFR 12 (*)     All other components within normal limits    Narrative:     K and Cl. Critical result called and verbal readback obtained from   LAURO Feliciano @ 7090 on 07Nov2022. BML    (Specimen was recollected to verify discrepancies from previous   results. BML) by BL1 11/07/2022 22:45   URINALYSIS, REFLEX TO URINE CULTURE - Abnormal; Notable for the following components:    Appearance, UA Hazy (*)     Protein, UA 2+ (*)     All other components within normal limits    Narrative:     Specimen Source->Urine   MAGNESIUM - Abnormal; Notable for the following components:    Magnesium 4.1 (*)     All other components within normal limits   URINALYSIS MICROSCOPIC - Abnormal; Notable for the following components:    WBC, UA 6 (*)     Bacteria Few (*)     Hyaline Casts, UA 5 (*)     All other components within normal limits    Narrative:     Specimen Source->Urine   LACTIC ACID, PLASMA   TROPONIN I   B-TYPE NATRIURETIC PEPTIDE   PHOSPHORUS   MAGNESIUM   BASIC METABOLIC PANEL   CBC W/ AUTO DIFFERENTIAL   SARS-COV-2 RDRP GENE   POCT GLUCOSE MONITORING CONTINUOUS     EKG Readings: (Independently Interpreted)   EKG obtained at 7:51 p.m. normal sinus rhythm with a ventricular rate of 67.  No acute T-wave abnormalities.  No sign of ischemia.   Intervals within normal limits.     Imaging Results               CT Head Without Contrast (Final result)  Result time 11/07/22 20:57:30      Final result by Stella Deleon MD (11/07/22 20:57:30)                   Impression:      Large regions of new worsening parenchymal hypoattenuation within the left cerebral hemisphere consistent with acute/recent infarction.  Findings appear more extensive compared to recent left-sided acute infarction seen on MRI from 10/25/2022.    This report was flagged in Epic as abnormal.      Electronically signed by: Stella Deelon MD  Date:    11/07/2022  Time:    20:57               Narrative:    EXAMINATION:  CT HEAD WITHOUT CONTRAST    CLINICAL HISTORY:  New onset dysphagia;    TECHNIQUE:  Low dose axial images were obtained through the head.  Coronal and sagittal reformations were also performed. Contrast was not administered.    COMPARISON:  CT head 10/22/2022.  MRI brain 10/25/2022    FINDINGS:  New worsening diffuse abnormal parenchymal hypoattenuation is seen throughout the left cerebral hemisphere consistent with acute/recent infarction.  This involves the left temporal, parietal, and occipital lobes and to lesser degree the posterior aspect of the left frontal lobe.  Findings are more extensive compared to left-sided region of acute infarction seen on recent MRI.  There is chronic microvascular ischemic disease.  Additional small remote area of infarction is seen within the posterior right frontal lobe.  No evidence of intraparenchymal hemorrhage.  The ventricular system is normal in size and configuration with no evidence of hydrocephalus. No effacement of the skull-base cisterns. No abnormal extra-axial fluid collections or blood products. Visualized paranasal sinuses and mastoid air cells are clear. The calvarium shows no significant abnormality.                                       X-Ray Chest AP Portable (Final result)  Result time 11/07/22 20:21:43      Final  result by Stella Deleon MD (11/07/22 20:21:43)                   Impression:      No acute cardiopulmonary process identified.      Electronically signed by: Stella Deleon MD  Date:    11/07/2022  Time:    20:21               Narrative:    EXAMINATION:  XR CHEST AP PORTABLE    CLINICAL HISTORY:  Dysphagia, unspecified    TECHNIQUE:  Single frontal view of the chest was performed.    COMPARISON:  10/09/2022.    FINDINGS:  Cardiac silhouette is normal in size.  Lungs are symmetrically expanded.  No evidence of new focal consolidative process, pneumothorax, or significant pleural effusion.  No acute osseous abnormality identified.                                       Medications   dextrose 10% bolus 125 mL (0 mLs Intravenous Stopped 11/8/22 0022)   dextrose 10% bolus 250 mL (has no administration in time range)   sodium chloride 0.9% flush 10 mL (has no administration in time range)   heparin (porcine) injection 5,000 Units (has no administration in time range)   albuterol-ipratropium 2.5 mg-0.5 mg/3 mL nebulizer solution 3 mL (has no administration in time range)   melatonin tablet 6 mg (has no administration in time range)   ondansetron injection 4 mg (has no administration in time range)   prochlorperazine injection Soln 5 mg (has no administration in time range)   polyethylene glycol packet 17 g (has no administration in time range)   bisacodyL suppository 10 mg (has no administration in time range)   acetaminophen tablet 650 mg (has no administration in time range)   simethicone chewable tablet 80 mg (has no administration in time range)   aluminum-magnesium hydroxide-simethicone 200-200-20 mg/5 mL suspension 30 mL (has no administration in time range)   acetaminophen tablet 650 mg (has no administration in time range)   HYDROcodone-acetaminophen 5-325 mg per tablet 1 tablet (has no administration in time range)   naloxone 0.4 mg/mL injection 0.02 mg (has no administration in time range)   potassium  bicarbonate disintegrating tablet 50 mEq (has no administration in time range)   potassium bicarbonate disintegrating tablet 35 mEq (has no administration in time range)   potassium bicarbonate disintegrating tablet 60 mEq (has no administration in time range)   magnesium oxide tablet 800 mg (has no administration in time range)   magnesium oxide tablet 800 mg (has no administration in time range)   potassium, sodium phosphates 280-160-250 mg packet 2 packet (has no administration in time range)   potassium, sodium phosphates 280-160-250 mg packet 2 packet (has no administration in time range)   potassium, sodium phosphates 280-160-250 mg packet 2 packet (has no administration in time range)   glucose chewable tablet 16 g (has no administration in time range)   glucose chewable tablet 24 g (has no administration in time range)   glucagon (human recombinant) injection 1 mg (has no administration in time range)   dextrose 10% bolus 125 mL (has no administration in time range)   dextrose 10% bolus 250 mL (0 mLs Intravenous Stopped 11/8/22 0023)   allopurinoL tablet 200 mg (has no administration in time range)   aspirin chewable tablet 81 mg (has no administration in time range)   atorvastatin tablet 40 mg (has no administration in time range)   carvediloL tablet 25 mg (has no administration in time range)   levETIRAcetam tablet 1,000 mg (has no administration in time range)   calcium gluconate 1 g in NS IVPB (premixed) (0 g Intravenous Stopped 11/8/22 0045)     And   calcium gluconate 1 g in NS IVPB (premixed) (has no administration in time range)   lactated ringers infusion ( Intravenous New Bag 11/8/22 0043)   sodium chloride 0.9% bolus 1,000 mL (0 mLs Intravenous Stopped 11/7/22 2259)   insulin regular injection 5 Units 0.05 mL (5 Units Intravenous Given 11/7/22 2339)   albuterol sulfate nebulizer solution 15 mg (15 mg Nebulization Given 11/7/22 2300)     Medical Decision Making:   Initial Assessment:   77 y.o.  male, with a past medical history of HLD, HTN, and kidney/ureter disorder, who presents to the ED via EMS from [facility] with inability to tolerate PO for 5 days with choking episodes.  Patient no acute distress and dry in appearance on exam.  Vitals notable for soft blood pressure otherwise unremarkable.  Lungs clear to auscultation abdomen soft nontender. BgL is 94, making hypoglycemia, DKA, HHS and hypoglycemia less likely, Given patient has normal O2, hypoxia less likely. The differential includes toxic metabolic etiologies such as electrolyte disturbances (Na/Ca), hypoglycemia, and uremia; acidosis states, infection (i.e. Sepsis); toxidromes of intoxication or withdrawal, hypoxemia or hypercarbia, liver disease or failure causing hepatic encephalopathy, endocrine emergencies (hyper/hypothyroidism, adrenal insufficiency), seizure, trauma, intracranial bleeds or ischemic stroke. Given this wide differential, will send basic labs and lytes to evaluate for metabolic causes, FSBS, LFTs,, TSH, CT head,      Clinical Tests:   Lab Tests: Reviewed and Ordered  Radiological Study: Reviewed and Ordered  Medical Tests: Reviewed and Ordered        Scribe Attestation:   Scribe #1: I performed the above scribed service and the documentation accurately describes the services I performed. I attest to the accuracy of the note.      ED Course as of 11/08/22 0202   Mon Nov 07, 2022 2012 EKG with normal sinus rhythm ventric.  ular rate of 67 No signs ischemia.  No acute T-wa abnormalities.  Intervals wnl [AS]   2123 Large regions of new worsening parenchymal hypoattenuation within the left cerebral hemisphere consistent with acute/recent infarction.  Findings appear more extensive compared to recent left-sided acute infarction seen on MRI from 10/25/2022.  Neurology consulted [AS]   2240 CT imaging findings expected given recent stroke.  CBC notable for hemoglobin of 20 likely from hemoconcentration as patient appears  very dry.  Lactic acid 1.6.  Troponin within normal limits.  CMP currently pending [AS]   2259 CMP notable for hyperchloremic hypernatremia, hyperkalemia with potassium of 6.7.  EKG without any changes.  Patient in acute renal failure with the creatinine of 4.6.  Suspect symptoms are secondary to decreased p.o. intake leading to ARF.  Patient given a 2 L of normal saline.  Patient admitted to Hospital Medicine for further management and care. [AS]      ED Course User Index  [AS] Keven Greco MD          This dictation has been generated using M-Modal Fluency Direct dictation; some phonetic errors may occur.     Please put in 45 minutes of critical care due to patient having a high risk of cardiovascular and neurological failure.   Separate from teaching and exclusive of procedure and ekg time  Includes:  Time at bedside  Time reviewing test results  Time discussing case with staff  Time documenting the medical record  Time spent with family members  Time spent with consults  Management             Clinical Impression:   Final diagnoses:  [R62.7] Failure to thrive in adult  [R13.10] Dysphagia  [R07.9] Chest pain  [E87.5] Hyperkalemia  [E87.0] Hypernatremia  [N19] Renal failure, unspecified chronicity (Primary)     I, Kveen Greco MD, personally performed the services described in this documentation. All medical record entries made by the scribe were at my direction and in my presence. I have reviewed the chart and agree that the record reflects my personal performance and is accurate and complete.    ED Disposition Condition    Admit Stable                Keven Greco MD  11/08/22 6301

## 2022-11-08 NOTE — ASSESSMENT & PLAN NOTE
-Noted history of CVA with recent hospitalization for an acute stroke (D/c 10/28) Plan was for plavix x 21 days, aspirin and statin  -CT head showed large regions of new worsening parenchymal hypoattenuation within the left cerebral hemisphere consistent with acute/recent infarction  -Consulted PT, OT, SLP  -Await consult from neurology  -Presently unable to take oral meds - so holding aspirin, plavix and statin  -Resume home medications when able

## 2022-11-08 NOTE — CONSULTS
Date of Admit: 11/7/2022  Length of Stay: 1   Days  Consulting Staff: MD Olena    Date of Consult: 11/8/2022    Reason for Consultation       jaylene on ckd with hypernatremia and hyperkalemia  Subjective:      History of Present Illness:  Leonides Burns is a 77 y.o. year old male with a past medical history significant for ckd 3, cva, and htn. Pt recently suffered a cva and was admitted to [facility]> Pt came back here with decrease po intake, diarrhea, and jaylene. Pt had a creatinine last  mo of 1.6. Pt has seen [facility] nephrology for ckd3. Pt is now here with met acidosis, jaylene,hypernatremia.  Pt now has a new cva.      Past Medical History:  Past Medical History:   Diagnosis Date    Disorder of kidney and ureter     Elevated PSA     Glaucoma     Hyperlipidemia     Hypertension     Psoriasis    Cva  Ckd3      Past Surgical History:  Past Surgical History:   Procedure Laterality Date    FUNCTIONAL ENDOSCOPIC SINUS SURGERY (FESS) USING COMPUTER-ASSISTED NAVIGATION N/A 2/28/2019    Procedure: FESS, USING COMPUTER-ASSISTED NAVIGATION (ADD ON );  Surgeon: Mikael Serrano MD;  Location: [facility];  Service: ENT;  Laterality: N/A;  (ADD ON )    NASAL SEPTOPLASTY N/A 2/28/2019    Procedure: SEPTOPLASTY, NOSE;  Surgeon: Mikael Serrano MD;  Location: [facility];  Service: ENT;  Laterality: N/A;       Allergies:  Review of patient's allergies indicates:   Allergen Reactions    Latex, natural rubber Rash    Iodine and iodide containing products Rash       Home Medications:    No current facility-administered medications on file prior to encounter.     Current Outpatient Medications on File Prior to Encounter   Medication Sig Dispense Refill    allopurinol (ZYLOPRIM) 100 MG tablet TAKE 2 TABLETS BY MOUTH EVERY  tablet 0    amLODIPine (NORVASC) 10 MG tablet Take 1 tablet (10 mg total) by mouth once daily.      aspirin 81 MG Chew Take 1 tablet (81 mg total) by mouth once daily.  0    atorvastatin (LIPITOR) 40 MG tablet Take  1 tablet (40 mg total) by mouth every evening. 90 tablet 3    bimatoprost (LUMIGAN) 0.01 % Drop Place 1 drop into both eyes every evening.      carvediloL (COREG) 25 MG tablet Take 1 tablet (25 mg total) by mouth 2 (two) times daily. 60 tablet 11    clopidogreL (PLAVIX) 75 mg tablet Take 1 tablet (75 mg total) by mouth once daily. for 21 days 21 tablet 0    hydrALAZINE (APRESOLINE) 25 MG tablet Take 3 tablets (75 mg total) by mouth every 8 (eight) hours. 270 tablet 11    levETIRAcetam (KEPPRA) 1000 MG tablet Take 1 tablet (1,000 mg total) by mouth 2 (two) times daily. 60 tablet 11    lisinopriL (PRINIVIL,ZESTRIL) 40 MG tablet Take 1 tablet (40 mg total) by mouth once daily. 90 tablet 3    senna-docusate 8.6-50 mg (PERICOLACE) 8.6-50 mg per tablet Take 1 tablet by mouth daily as needed for Constipation.         Family History:  Family History   Problem Relation Age of Onset    Hypertension Mother     Diabetes Mother     No Known Problems Father     Hypertension Sister     KEVIN disease Sister     Anemia Sister     Cancer Brother     Anemia Daughter     No Known Problems Son     Kidney disease Neg Hx     Prostate cancer Neg Hx     Melanoma Neg Hx        Social History:  Social History     Tobacco Use    Smoking status: Some Days     Packs/day: 0.50     Years: 40.00     Pack years: 20.00     Types: Cigarettes    Smokeless tobacco: Current   Substance Use Topics    Alcohol use: Yes     Alcohol/week: 9.0 standard drinks     Types: 9 Cans of beer per week    Drug use: No       Review of Systems:10pt ros-diarrhea, choking, ams       Objective:     Scheduled Meds:   atorvastatin  40 mg Oral QHS    carvediloL  25 mg Oral BID    fluconazole  200 mg Intravenous Q24H    heparin (porcine)  5,000 Units Subcutaneous Q8H    levetiracetam IV  1,000 mg Intravenous Q12H    polyethylene glycol  17 g Oral Daily     Continuous Infusions:   sodium bicarbonate drip 100 mL/hr at 11/08/22 1131     PRN Meds:.acetaminophen, acetaminophen,  "albuterol-ipratropium, aluminum-magnesium hydroxide-simethicone, bisacodyL, [COMPLETED] calcium gluconate IVPB **AND** calcium gluconate IVPB, dextrose 10%, dextrose 10%, dextrose 10%, dextrose 10%, glucagon (human recombinant), glucose, glucose, melatonin, naloxone, ondansetron, prochlorperazine, simethicone, sodium chloride 0.9%      Physical Examination:    Vitals: /60 (BP Location: Left arm, Patient Position: Lying)   Pulse 65   Temp 98 °F (36.7 °C) (Oral)   Resp 20   Ht 5' 8" (1.727 m)   Wt 73.5 kg (162 lb 0.6 oz)   SpO2 96%   BMI 24.64 kg/m²    I/O last 3 completed shifts:  In: 1969.1 [I.V.:550.7; IV Piggyback:1418.4]  Out: -   No intake/output data recorded.      General: wd male in nad  HEENT:ncat,eomi, droop, dry mucosa  CVS:s1s2 regular  PULM:ctab  ABD:bs, soft  EXT:no leg edema, skin dry  NEURO:awake  Laboratory:  Recent Results (from the past 24 hour(s))   CBC auto differential    Collection Time: 11/07/22  9:03 PM   Result Value Ref Range    WBC 8.21 3.90 - 12.70 K/uL    RBC 8.03 (H) 4.60 - 6.20 M/uL    Hemoglobin 20.3 (HH) 14.0 - 18.0 g/dL    Hematocrit 65.2 (H) 40.0 - 54.0 %    MCV 81 (L) 82 - 98 fL    MCH 25.3 (L) 27.0 - 31.0 pg    MCHC 31.1 (L) 32.0 - 36.0 g/dL    RDW 21.9 (H) 11.5 - 14.5 %    Platelets 347 150 - 450 K/uL    MPV SEE COMMENT 9.2 - 12.9 fL    Immature Granulocytes 0.2 0.0 - 0.5 %    Gran # (ANC) 3.8 1.8 - 7.7 K/uL    Immature Grans (Abs) 0.02 0.00 - 0.04 K/uL    Lymph # 2.1 1.0 - 4.8 K/uL    Mono # 1.6 (H) 0.3 - 1.0 K/uL    Eos # 0.6 (H) 0.0 - 0.5 K/uL    Baso # 0.12 0.00 - 0.20 K/uL    nRBC 0 0 /100 WBC    Gran % 46.2 38.0 - 73.0 %    Lymph % 25.7 18.0 - 48.0 %    Mono % 19.5 (H) 4.0 - 15.0 %    Eosinophil % 6.9 0.0 - 8.0 %    Basophil % 1.5 0.0 - 1.9 %    Differential Method Automated    Comprehensive metabolic panel    Collection Time: 11/07/22  9:03 PM   Result Value Ref Range    Sodium 159 (H) 136 - 145 mmol/L    Potassium 6.7 (HH) 3.5 - 5.1 mmol/L    Chloride >130 " (HH) 95 - 110 mmol/L    CO2 14 (L) 23 - 29 mmol/L    Glucose 94 70 - 110 mg/dL     (H) 8 - 23 mg/dL    Creatinine 4.6 (H) 0.5 - 1.4 mg/dL    Calcium 10.1 8.7 - 10.5 mg/dL    Total Protein 9.4 (H) 6.0 - 8.4 g/dL    Albumin 3.3 (L) 3.5 - 5.2 g/dL    Total Bilirubin 0.4 0.1 - 1.0 mg/dL    Alkaline Phosphatase 137 (H) 55 - 135 U/L    AST 48 (H) 10 - 40 U/L    ALT 50 (H) 10 - 44 U/L    Anion Gap Unable to calculate 8 - 16 mmol/L    eGFR 12 (A) >60 mL/min/1.73 m^2   Magnesium    Collection Time: 11/07/22  9:03 PM   Result Value Ref Range    Magnesium 4.1 (H) 1.6 - 2.6 mg/dL   Troponin I    Collection Time: 11/07/22  9:03 PM   Result Value Ref Range    Troponin I 0.021 0.000 - 0.026 ng/mL   Lactic acid, plasma    Collection Time: 11/07/22  9:40 PM   Result Value Ref Range    Lactate (Lactic Acid) 1.6 0.5 - 2.2 mmol/L   Brain natriuretic peptide    Collection Time: 11/07/22  9:40 PM   Result Value Ref Range    BNP 12 0 - 99 pg/mL   Urinalysis, Reflex to Urine Culture Urine, Clean Catch    Collection Time: 11/07/22 10:11 PM    Specimen: Urine   Result Value Ref Range    Specimen UA Urine, Catheterized     Color, UA Yellow Yellow, Straw, [place]    Appearance, UA Hazy (A) Clear    pH, UA 6.0 5.0 - 8.0    Specific Gravity, UA 1.025 1.005 - 1.030    Protein, UA 2+ (A) Negative    Glucose, UA Negative Negative    Ketones, UA Negative Negative    Bilirubin (UA) Negative Negative    Occult Blood UA Negative Negative    Nitrite, UA Negative Negative    Urobilinogen, UA Negative <2.0 EU/dL    Leukocytes, UA Negative Negative   Urinalysis Microscopic    Collection Time: 11/07/22 10:11 PM   Result Value Ref Range    RBC, UA 3 0 - 4 /hpf    WBC, UA 6 (H) 0 - 5 /hpf    WBC Clumps, UA Rare None-Rare    Bacteria Few (A) None-Occ /hpf    Yeast, UA None None    Squam Epithel, UA 2 /hpf    Hyaline Casts, UA 5 (A) 0-1/lpf /lpf    Ammonium Urate Maral, UA Rare None-Moderate    Microscopic Comment SEE COMMENT    POCT COVID-19 Rapid  Screening    Collection Time: 11/08/22 12:46 AM   Result Value Ref Range    POC Rapid COVID Negative Negative     Acceptable Yes    Phosphorus    Collection Time: 11/08/22  6:17 AM   Result Value Ref Range    Phosphorus 5.9 (H) 2.7 - 4.5 mg/dL   Magnesium    Collection Time: 11/08/22  6:17 AM   Result Value Ref Range    Magnesium 3.5 (H) 1.6 - 2.6 mg/dL   Basic Metabolic Panel    Collection Time: 11/08/22  6:17 AM   Result Value Ref Range    Sodium 159 (H) 136 - 145 mmol/L    Potassium 5.6 (H) 3.5 - 5.1 mmol/L    Chloride >130 (HH) 95 - 110 mmol/L    CO2 9 (LL) 23 - 29 mmol/L    Glucose 74 70 - 110 mg/dL     (H) 8 - 23 mg/dL    Creatinine 4.5 (H) 0.5 - 1.4 mg/dL    Calcium 9.3 8.7 - 10.5 mg/dL    Anion Gap Unable to calculate 8 - 16 mmol/L    eGFR 13 (A) >60 mL/min/1.73 m^2   CBC Auto Differential    Collection Time: 11/08/22  6:17 AM   Result Value Ref Range    WBC 9.82 3.90 - 12.70 K/uL    RBC 6.79 (H) 4.60 - 6.20 M/uL    Hemoglobin 17.3 14.0 - 18.0 g/dL    Hematocrit 58.1 (H) 40.0 - 54.0 %    MCV 86 82 - 98 fL    MCH 25.5 (L) 27.0 - 31.0 pg    MCHC 29.8 (L) 32.0 - 36.0 g/dL    RDW 21.7 (H) 11.5 - 14.5 %    Platelets 347 150 - 450 K/uL    MPV SEE COMMENT 9.2 - 12.9 fL    Immature Granulocytes 0.3 0.0 - 0.5 %    Gran # (ANC) 5.1 1.8 - 7.7 K/uL    Immature Grans (Abs) 0.03 0.00 - 0.04 K/uL    Lymph # 2.1 1.0 - 4.8 K/uL    Mono # 1.8 (H) 0.3 - 1.0 K/uL    Eos # 0.6 (H) 0.0 - 0.5 K/uL    Baso # 0.15 0.00 - 0.20 K/uL    nRBC 0 0 /100 WBC    Gran % 51.5 38.0 - 73.0 %    Lymph % 21.6 18.0 - 48.0 %    Mono % 18.6 (H) 4.0 - 15.0 %    Eosinophil % 6.5 0.0 - 8.0 %    Basophil % 1.5 0.0 - 1.9 %    Platelet Estimate Appears normal     Aniso Slight     Poly Occasional     Ovalocytes Occasional     Target Cells Occasional     Differential Method Automated    POCT glucose    Collection Time: 11/08/22  7:17 AM   Result Value Ref Range    POCT Glucose 87 70 - 110 mg/dL   CBC Auto Differential    Collection  Time: 11/08/22  8:18 AM   Result Value Ref Range    WBC 9.38 3.90 - 12.70 K/uL    RBC 6.34 (H) 4.60 - 6.20 M/uL    Hemoglobin 16.5 14.0 - 18.0 g/dL    Hematocrit 52.6 40.0 - 54.0 %    MCV 83 82 - 98 fL    MCH 26.0 (L) 27.0 - 31.0 pg    MCHC 31.4 (L) 32.0 - 36.0 g/dL    RDW 21.1 (H) 11.5 - 14.5 %    Platelets 336 150 - 450 K/uL    MPV SEE COMMENT 9.2 - 12.9 fL    Immature Granulocytes 0.4 0.0 - 0.5 %    Gran # (ANC) 4.9 1.8 - 7.7 K/uL    Immature Grans (Abs) 0.04 0.00 - 0.04 K/uL    Lymph # 2.0 1.0 - 4.8 K/uL    Mono # 1.7 (H) 0.3 - 1.0 K/uL    Eos # 0.6 (H) 0.0 - 0.5 K/uL    Baso # 0.12 0.00 - 0.20 K/uL    nRBC 0 0 /100 WBC    Gran % 52.4 38.0 - 73.0 %    Lymph % 20.8 18.0 - 48.0 %    Mono % 18.3 (H) 4.0 - 15.0 %    Eosinophil % 6.8 0.0 - 8.0 %    Basophil % 1.3 0.0 - 1.9 %    Differential Method Automated    Comprehensive Metabolic Panel    Collection Time: 11/08/22  8:18 AM   Result Value Ref Range    Sodium 161 (HH) 136 - 145 mmol/L    Potassium 5.2 (H) 3.5 - 5.1 mmol/L    Chloride >130 (HH) 95 - 110 mmol/L    CO2 16 (L) 23 - 29 mmol/L    Glucose 86 70 - 110 mg/dL     (H) 8 - 23 mg/dL    Creatinine 4.7 (H) 0.5 - 1.4 mg/dL    Calcium 9.6 8.7 - 10.5 mg/dL    Total Protein 8.1 6.0 - 8.4 g/dL    Albumin 3.0 (L) 3.5 - 5.2 g/dL    Total Bilirubin 0.3 0.1 - 1.0 mg/dL    Alkaline Phosphatase 120 55 - 135 U/L    AST 37 10 - 40 U/L    ALT 45 (H) 10 - 44 U/L    Anion Gap Unable to calculate 8 - 16 mmol/L    eGFR 12 (A) >60 mL/min/1.73 m^2   Folate    Collection Time: 11/08/22  8:18 AM   Result Value Ref Range    Folate 9.3 4.0 - 24.0 ng/mL   Vitamin B12    Collection Time: 11/08/22  8:18 AM   Result Value Ref Range    Vitamin B-12 1242 (H) 210 - 950 pg/mL   TSH    Collection Time: 11/08/22  8:18 AM   Result Value Ref Range    TSH 1.632 0.400 - 4.000 uIU/mL   ISTAT PROCEDURE    Collection Time: 11/08/22  8:40 AM   Result Value Ref Range    POC PH 7.318 (L) 7.35 - 7.45    POC PCO2 26.3 (LL) 35 - 45 mmHg    POC PO2 80  80 - 100 mmHg    POC HCO3 13.5 (L) 24 - 28 mmol/L    POC BE -11 -2 to 2 mmol/L    POC SATURATED O2 95 95 - 100 %    POC TCO2 14 (L) 23 - 27 mmol/L    Sample ARTERIAL     Site RR     Allens Test Pass     DelSys Room Air    Ammonia    Collection Time: 11/08/22  9:11 AM   Result Value Ref Range    Ammonia 26 10 - 50 umol/L   POCT glucose    Collection Time: 11/08/22 11:41 AM   Result Value Ref Range    POCT Glucose 106 70 - 110 mg/dL   Basic Metabolic Panel    Collection Time: 11/08/22 11:48 AM   Result Value Ref Range    Sodium 162 (HH) 136 - 145 mmol/L    Potassium 5.2 (H) 3.5 - 5.1 mmol/L    Chloride >130 (HH) 95 - 110 mmol/L    CO2 17 (L) 23 - 29 mmol/L    Glucose 102 70 - 110 mg/dL     (H) 8 - 23 mg/dL    Creatinine 4.2 (H) 0.5 - 1.4 mg/dL    Calcium 9.1 8.7 - 10.5 mg/dL    Anion Gap Unable to calculate 8 - 16 mmol/L    eGFR 14 (A) >60 mL/min/1.73 m^2   POCT glucose    Collection Time: 11/08/22  3:39 PM   Result Value Ref Range    POCT Glucose 96 70 - 110 mg/dL               Diagnostic Tests:     Procedure Component Value Units Date/Time   CT Head Without Contrast [[id number]] (Abnormal) Resulted: 11/07/22 2057   Order Status: Completed Updated: 11/07/22 2059   Narrative:     EXAMINATION:   CT HEAD WITHOUT CONTRAST     CLINICAL HISTORY:   New onset dysphagia;     TECHNIQUE:   Low dose axial images were obtained through the head.  Coronal and sagittal reformations were also performed. Contrast was not administered.     COMPARISON:   CT head 10/22/2022.  MRI brain 10/25/2022     FINDINGS:   New worsening diffuse abnormal parenchymal hypoattenuation is seen throughout the left cerebral hemisphere consistent with acute/recent infarction.  This involves the left temporal, parietal, and occipital lobes and to lesser degree the posterior aspect of the left frontal lobe.  Findings are more extensive compared to left-sided region of acute infarction seen on recent MRI.  There is chronic microvascular ischemic  disease.  Additional small remote area of infarction is seen within the posterior right frontal lobe.  No evidence of intraparenchymal hemorrhage.  The ventricular system is normal in size and configuration with no evidence of hydrocephalus. No effacement of the skull-base cisterns. No abnormal extra-axial fluid collections or blood products. Visualized paranasal sinuses and mastoid air cells are clear. The calvarium shows no significant abnormality.    Impression:       Large regions of new worsening parenchymal hypoattenuation within the left cerebral hemisphere consistent with acute/recent infarction.  Findings appear more extensive compared to recent left-sided acute infarction seen on MRI from 10/25/2022.     This report was flagged in Epic as abnormal.       Electronically signed by: Stella Deleon MD   Date: 11/07/2022   Time: 20:57   X-Ray Chest AP Portable [[id number]] Resulted: 11/07/22 2021   Order Status: Completed Updated: 11/07/22 2024   Narrative:     EXAMINATION:   XR CHEST AP PORTABLE     CLINICAL HISTORY:   Dysphagia, unspecified     TECHNIQUE:   Single frontal view of the chest was performed.     COMPARISON:   10/09/2022.     FINDINGS:   Cardiac silhouette is normal in size.  Lungs are symmetrically expanded.  No evidence of new focal consolidative process, pneumothorax, or significant pleural effusion.  No acute osseous abnormality identified.    Impression:       No acute cardiopulmonary process identified.       Electronically signed by: Stella Deleon MD   Date: 11/07/2022   Time: 20:21        Assessment/Plan:     Leonides Burns is a 77 y.o. male admitted with:     1.jaylene. on ckd3. Dry. Cont hydration.  2.hypernatremia. bolus. Adjust ivfs.  3.met acidosis.diarrhea. cont repletion.  4.htn.no ace/arb.  5.cva. cont tx per primary.  6.hyperphospatemia.  chi level. Ck pth.  7.proteinuria. ck level.      Saed De Paz

## 2022-11-08 NOTE — HPI
"From H&P: "This is a 77 year old male with a PMHx of CVA, CKD3, HLD, HTN, gout, tobacco use who presents with failure to thrive.      Patient is non verbal. Presented from a nursing home with inability to tolerate PO for 5 days duration. Per documentation, patient chokes on liquids, and honey thick liquids. In the ED, he was hypotensive (90/55), tachypnic, but otherwise stable. Labs showed hypernatremia (159), hyperkalemia (6.7), elevated creatinine (4.6- baseline of 1.6), negative troponin, negative lactic acid, polycythemia (20.3). CXR was negative. CT head showed Large regions of new worsening parenchymal hypoattenuation within the left cerebral hemisphere consistent with acute/recent infarction. He was given insulin/albuterol and fluids. The patient was admitted for further management. "     Palliative medicine consulted for advance care planning and continuity of care; Met with pt at bedside; for details of visit, see advance care planning section of plan.     "

## 2022-11-08 NOTE — PLAN OF CARE
[facility] [place] - Telemetry  Initial Discharge Assessment       Primary Care Provider: Suzette Akhtar MD    Admission Diagnosis: Hyperkalemia [E87.5]  Hypernatremia [E87.0]  Failure to thrive in adult [R62.7]  Dysphagia [R13.10]  Chest pain [R07.9]  Renal failure, unspecified chronicity [N19]    Admission Date: 11/7/2022  Expected Discharge Date:          Payor: HU[employer]NA MANAGED MEDICARE / Plan: HU[employer]NA MEDICARE [facility] / Product Type: Medicare Advantage /     Extended Emergency Contact Information  Primary Emergency Contact: Leonides Burns Jr   [place]  Mobile Phone: [phone]  Relation: Son  Secondary Emergency Contact: Santi Burns  Mobile Phone: [phone]  Relation: Son    Discharge Plan A: Return to nursing home  Discharge Plan B: Return to Nursing Home      [id number] - [place] - [address] AT [facility]  [address] [place] [address]  Phone: [phone] Fax: [phone]    [facility] - [facility] [address]  [address] [facility] [address] [address]  Phone: [phone] Fax: [phone]    [facility][id number] - [facility] [address]  [address]  [facility] [address]  Phone: [phone] Fax: [phone]    [id number] - [place], [place] - [address] [facility] [address]  [address]  [facility] [address]  Phone: [phone] Fax: [phone]      Initial Assessment (most recent)       Adult Discharge Assessment - 11/08/22 1451          Discharge Assessment    Assessment Type Discharge Planning Assessment     Confirmed/corrected address, phone number and insurance Yes     Confirmed Demographics Correct on Facesheet     Source of Information other (see comments)   [employer] ([place])    If unable to respond/provide information was family/caregiver contacted? No Contact  Information Available     Communicated DEVIKA with patient/caregiver Date not available/Unable to determine     Reason For Admission Hyperkalemia     Lives With facility resident     Facility Arrived From: [facility].     Do you expect to return to your current living situation? Yes     Do you have help at home or someone to help you manage your care at home? No     Equipment Currently Used at Home other (see comments)   TBD    Readmission within 30 days? No     Patient currently being followed by outpatient case management? No     Do you currently have service(s) that help you manage your care at home? No     Do you take prescription medications? Yes     Do you have prescription coverage? Yes     Coverage [employer]     Do you have any problems affording any of your prescribed medications? No     Is the patient taking medications as prescribed? yes     Who [facility] you get [facility]     How do you get to doctors appointments? other (see comments)   [facility]    Are you on dialysis? No     Do you take coumadin? No     Discharge Plan A Return to nursing home     Discharge Plan B Return to Nursing Home     DME Needed Upon Discharge  other (see comments)   TBD    Discharge Plan discussed with: --   Luis Angel ([place])       Relationship/Environment    Name(s) of Who Lives With Patient Leonides Burns Jr (Son)   [phone]                      spoke with Jaskaran ([facility]) who confirmed patient is a resident at nursing home, and patient is able to return. Jaskaran stated patient is not able to return with an NG tube.

## 2022-11-09 LAB
ALBUMIN SERPL BCP-MCNC: 2.6 G/DL (ref 3.5–5.2)
ALP SERPL-CCNC: 107 U/L (ref 55–135)
ALT SERPL W/O P-5'-P-CCNC: 35 U/L (ref 10–44)
ANION GAP SERPL CALC-SCNC: 10 MMOL/L (ref 8–16)
ANION GAP SERPL CALC-SCNC: 12 MMOL/L (ref 8–16)
ANION GAP SERPL CALC-SCNC: 13 MMOL/L (ref 8–16)
ANION GAP SERPL CALC-SCNC: 8 MMOL/L (ref 8–16)
ANION GAP SERPL CALC-SCNC: 9 MMOL/L (ref 8–16)
ANION GAP SERPL CALC-SCNC: 9 MMOL/L (ref 8–16)
ANION GAP SERPL CALC-SCNC: ABNORMAL MMOL/L (ref 8–16)
AST SERPL-CCNC: 33 U/L (ref 10–40)
BASOPHILS # BLD AUTO: 0.11 K/UL (ref 0–0.2)
BASOPHILS NFR BLD: 1.6 % (ref 0–1.9)
BILIRUB SERPL-MCNC: 0.3 MG/DL (ref 0.1–1)
BUN SERPL-MCNC: 101 MG/DL (ref 8–23)
BUN SERPL-MCNC: 68 MG/DL (ref 8–23)
BUN SERPL-MCNC: 71 MG/DL (ref 8–23)
BUN SERPL-MCNC: 83 MG/DL (ref 8–23)
BUN SERPL-MCNC: 86 MG/DL (ref 8–23)
BUN SERPL-MCNC: 90 MG/DL (ref 8–23)
BUN SERPL-MCNC: 90 MG/DL (ref 8–23)
CALCIUM SERPL-MCNC: 8.4 MG/DL (ref 8.7–10.5)
CALCIUM SERPL-MCNC: 8.5 MG/DL (ref 8.7–10.5)
CALCIUM SERPL-MCNC: 8.5 MG/DL (ref 8.7–10.5)
CALCIUM SERPL-MCNC: 8.6 MG/DL (ref 8.7–10.5)
CALCIUM SERPL-MCNC: 8.7 MG/DL (ref 8.7–10.5)
CHLORIDE SERPL-SCNC: 125 MMOL/L (ref 95–110)
CHLORIDE SERPL-SCNC: 127 MMOL/L (ref 95–110)
CHLORIDE SERPL-SCNC: 128 MMOL/L (ref 95–110)
CHLORIDE SERPL-SCNC: 129 MMOL/L (ref 95–110)
CHLORIDE SERPL-SCNC: >130 MMOL/L (ref 95–110)
CO2 SERPL-SCNC: 17 MMOL/L (ref 23–29)
CO2 SERPL-SCNC: 20 MMOL/L (ref 23–29)
CO2 SERPL-SCNC: 22 MMOL/L (ref 23–29)
CO2 SERPL-SCNC: 23 MMOL/L (ref 23–29)
CO2 SERPL-SCNC: 23 MMOL/L (ref 23–29)
CO2 SERPL-SCNC: 24 MMOL/L (ref 23–29)
CO2 SERPL-SCNC: 24 MMOL/L (ref 23–29)
CREAT SERPL-MCNC: 2.4 MG/DL (ref 0.5–1.4)
CREAT SERPL-MCNC: 2.5 MG/DL (ref 0.5–1.4)
CREAT SERPL-MCNC: 2.8 MG/DL (ref 0.5–1.4)
CREAT SERPL-MCNC: 2.8 MG/DL (ref 0.5–1.4)
CREAT SERPL-MCNC: 3 MG/DL (ref 0.5–1.4)
CREAT SERPL-MCNC: 3 MG/DL (ref 0.5–1.4)
CREAT SERPL-MCNC: 3.2 MG/DL (ref 0.5–1.4)
DIFFERENTIAL METHOD: ABNORMAL
EOSINOPHIL # BLD AUTO: 0.8 K/UL (ref 0–0.5)
EOSINOPHIL NFR BLD: 11.7 % (ref 0–8)
ERYTHROCYTE [DISTWIDTH] IN BLOOD BY AUTOMATED COUNT: 20.2 % (ref 11.5–14.5)
EST. GFR  (NO RACE VARIABLE): 19 ML/MIN/1.73 M^2
EST. GFR  (NO RACE VARIABLE): 21 ML/MIN/1.73 M^2
EST. GFR  (NO RACE VARIABLE): 21 ML/MIN/1.73 M^2
EST. GFR  (NO RACE VARIABLE): 22 ML/MIN/1.73 M^2
EST. GFR  (NO RACE VARIABLE): 22 ML/MIN/1.73 M^2
EST. GFR  (NO RACE VARIABLE): 26 ML/MIN/1.73 M^2
EST. GFR  (NO RACE VARIABLE): 27 ML/MIN/1.73 M^2
GLUCOSE SERPL-MCNC: 100 MG/DL (ref 70–110)
GLUCOSE SERPL-MCNC: 100 MG/DL (ref 70–110)
GLUCOSE SERPL-MCNC: 101 MG/DL (ref 70–110)
GLUCOSE SERPL-MCNC: 102 MG/DL (ref 70–110)
GLUCOSE SERPL-MCNC: 98 MG/DL (ref 70–110)
GLUCOSE SERPL-MCNC: 98 MG/DL (ref 70–110)
GLUCOSE SERPL-MCNC: 99 MG/DL (ref 70–110)
HCT VFR BLD AUTO: 46 % (ref 40–54)
HGB BLD-MCNC: 14.1 G/DL (ref 14–18)
IMM GRANULOCYTES # BLD AUTO: 0.02 K/UL (ref 0–0.04)
IMM GRANULOCYTES NFR BLD AUTO: 0.3 % (ref 0–0.5)
LYMPHOCYTES # BLD AUTO: 2.1 K/UL (ref 1–4.8)
LYMPHOCYTES NFR BLD: 29.7 % (ref 18–48)
MAGNESIUM SERPL-MCNC: 2.6 MG/DL (ref 1.6–2.6)
MAGNESIUM SERPL-MCNC: 2.9 MG/DL (ref 1.6–2.6)
MCH RBC QN AUTO: 25 PG (ref 27–31)
MCHC RBC AUTO-ENTMCNC: 30.7 G/DL (ref 32–36)
MCV RBC AUTO: 82 FL (ref 82–98)
MONOCYTES # BLD AUTO: 1.2 K/UL (ref 0.3–1)
MONOCYTES NFR BLD: 17.7 % (ref 4–15)
NEUTROPHILS # BLD AUTO: 2.7 K/UL (ref 1.8–7.7)
NEUTROPHILS NFR BLD: 39 % (ref 38–73)
NRBC BLD-RTO: 0 /100 WBC
PHOSPHATE SERPL-MCNC: 4.3 MG/DL (ref 2.7–4.5)
PLATELET # BLD AUTO: 323 K/UL (ref 150–450)
PMV BLD AUTO: ABNORMAL FL (ref 9.2–12.9)
POCT GLUCOSE: 92 MG/DL (ref 70–110)
POCT GLUCOSE: 93 MG/DL (ref 70–110)
POCT GLUCOSE: 96 MG/DL (ref 70–110)
POCT GLUCOSE: 99 MG/DL (ref 70–110)
POTASSIUM SERPL-SCNC: 4.1 MMOL/L (ref 3.5–5.1)
POTASSIUM SERPL-SCNC: 4.1 MMOL/L (ref 3.5–5.1)
POTASSIUM SERPL-SCNC: 4.2 MMOL/L (ref 3.5–5.1)
POTASSIUM SERPL-SCNC: 4.2 MMOL/L (ref 3.5–5.1)
POTASSIUM SERPL-SCNC: 4.4 MMOL/L (ref 3.5–5.1)
POTASSIUM SERPL-SCNC: 4.5 MMOL/L (ref 3.5–5.1)
POTASSIUM SERPL-SCNC: 4.6 MMOL/L (ref 3.5–5.1)
PROT SERPL-MCNC: 6.6 G/DL (ref 6–8.4)
RBC # BLD AUTO: 5.63 M/UL (ref 4.6–6.2)
RPR SER QL: NORMAL
SODIUM SERPL-SCNC: 159 MMOL/L (ref 136–145)
SODIUM SERPL-SCNC: 160 MMOL/L (ref 136–145)
SODIUM SERPL-SCNC: 161 MMOL/L (ref 136–145)
SODIUM SERPL-SCNC: 162 MMOL/L (ref 136–145)
WBC # BLD AUTO: 6.9 K/UL (ref 3.9–12.7)

## 2022-11-09 PROCEDURE — 80048 BASIC METABOLIC PNL TOTAL CA: CPT | Mod: 91,XB | Performed by: HOSPITALIST

## 2022-11-09 PROCEDURE — 92526 ORAL FUNCTION THERAPY: CPT

## 2022-11-09 PROCEDURE — 83735 ASSAY OF MAGNESIUM: CPT | Mod: 91 | Performed by: HOSPITALIST

## 2022-11-09 PROCEDURE — 99497 PR ADVNCD CARE PLAN 30 MIN: ICD-10-PCS | Mod: ,,, | Performed by: REGISTERED NURSE

## 2022-11-09 PROCEDURE — 25000003 PHARM REV CODE 250: Performed by: INTERNAL MEDICINE

## 2022-11-09 PROCEDURE — 36415 COLL VENOUS BLD VENIPUNCTURE: CPT | Performed by: HOSPITALIST

## 2022-11-09 PROCEDURE — 85025 COMPLETE CBC W/AUTO DIFF WBC: CPT | Performed by: HOSPITALIST

## 2022-11-09 PROCEDURE — 84100 ASSAY OF PHOSPHORUS: CPT | Performed by: INTERNAL MEDICINE

## 2022-11-09 PROCEDURE — 99233 PR SUBSEQUENT HOSPITAL CARE,LEVL III: ICD-10-PCS | Mod: ,,, | Performed by: REGISTERED NURSE

## 2022-11-09 PROCEDURE — 99497 ADVNCD CARE PLAN 30 MIN: CPT | Mod: ,,, | Performed by: REGISTERED NURSE

## 2022-11-09 PROCEDURE — 27000207 HC ISOLATION

## 2022-11-09 PROCEDURE — 80053 COMPREHEN METABOLIC PANEL: CPT | Performed by: HOSPITALIST

## 2022-11-09 PROCEDURE — 80048 BASIC METABOLIC PNL TOTAL CA: CPT | Mod: XB | Performed by: HOSPITALIST

## 2022-11-09 PROCEDURE — 21400001 HC TELEMETRY ROOM

## 2022-11-09 PROCEDURE — 83735 ASSAY OF MAGNESIUM: CPT | Performed by: INTERNAL MEDICINE

## 2022-11-09 PROCEDURE — 63600175 PHARM REV CODE 636 W HCPCS: Performed by: HOSPITALIST

## 2022-11-09 PROCEDURE — 63600175 PHARM REV CODE 636 W HCPCS: Performed by: STUDENT IN AN ORGANIZED HEALTH CARE EDUCATION/TRAINING PROGRAM

## 2022-11-09 PROCEDURE — 99233 SBSQ HOSP IP/OBS HIGH 50: CPT | Mod: ,,, | Performed by: REGISTERED NURSE

## 2022-11-09 RX ORDER — DEXTROSE MONOHYDRATE 50 MG/ML
INJECTION, SOLUTION INTRAVENOUS CONTINUOUS
Status: DISCONTINUED | OUTPATIENT
Start: 2022-11-09 | End: 2022-11-15 | Stop reason: HOSPADM

## 2022-11-09 RX ADMIN — SODIUM BICARBONATE: 84 INJECTION, SOLUTION INTRAVENOUS at 06:11

## 2022-11-09 RX ADMIN — HEPARIN SODIUM 5000 UNITS: 5000 INJECTION INTRAVENOUS; SUBCUTANEOUS at 05:11

## 2022-11-09 RX ADMIN — HEPARIN SODIUM 5000 UNITS: 5000 INJECTION INTRAVENOUS; SUBCUTANEOUS at 03:11

## 2022-11-09 RX ADMIN — HEPARIN SODIUM 5000 UNITS: 5000 INJECTION INTRAVENOUS; SUBCUTANEOUS at 09:11

## 2022-11-09 RX ADMIN — SODIUM BICARBONATE: 84 INJECTION, SOLUTION INTRAVENOUS at 09:11

## 2022-11-09 RX ADMIN — LEVETIRACETAM INJECTION 1000 MG: 10 INJECTION INTRAVENOUS at 09:11

## 2022-11-09 RX ADMIN — FLUCONAZOLE 200 MG: 2 INJECTION, SOLUTION INTRAVENOUS at 03:11

## 2022-11-09 NOTE — PT/OT/SLP PROGRESS
Physical Therapy      Patient Name:  Leonides Bruns   MRN:  [id number]    Patient not seen today secondary to  (Pt appropriate for Hospice care, no skilled PT or DME needs.  Hospice to supply all necessary DME.). PT to sign off.

## 2022-11-09 NOTE — PROGRESS NOTES
Date of Admission:11/7/2022    SUBJECTIVE: not able to eat  Diarrhea better per rn    Current Facility-Administered Medications   Medication    acetaminophen tablet 650 mg    acetaminophen tablet 650 mg    albuterol-ipratropium 2.5 mg-0.5 mg/3 mL nebulizer solution 3 mL    aluminum-magnesium hydroxide-simethicone 200-200-20 mg/5 mL suspension 30 mL    atorvastatin tablet 40 mg    bisacodyL suppository 10 mg    calcium gluconate 1 g in NS IVPB (premixed)    carvediloL tablet 25 mg    dextrose 10% bolus 125 mL    dextrose 10% bolus 125 mL    dextrose 10% bolus 250 mL    dextrose 10% bolus 250 mL    dextrose 5 % infusion    fluconazole (DIFLUCAN) IVPB 200 mg    glucagon (human recombinant) injection 1 mg    glucose chewable tablet 16 g    glucose chewable tablet 24 g    heparin (porcine) injection 5,000 Units    levETIRAcetam in NaCl (iso-os) IVPB 1,000 mg    melatonin tablet 6 mg    naloxone 0.4 mg/mL injection 0.02 mg    ondansetron injection 4 mg    polyethylene glycol packet 17 g    prochlorperazine injection Soln 5 mg    simethicone chewable tablet 80 mg    sodium bicarbonate 50 mEq in dextrose 5 % 1,000 mL infusion    sodium chloride 0.9% flush 10 mL       Wt Readings from Last 3 Encounters:   11/08/22 73.5 kg (162 lb 0.6 oz)   10/28/22 82.3 kg (181 lb 7 oz)   10/09/22 92.5 kg (204 lb)     Temp Readings from Last 3 Encounters:   11/09/22 98.2 °F (36.8 °C) (Oral)   10/28/22 97.6 °F (36.4 °C) (Oral)   10/09/22 97.7 °F (36.5 °C)     BP Readings from Last 3 Encounters:   11/09/22 (!) 152/69   10/28/22 125/75   10/09/22 (!) 163/77     Pulse Readings from Last 3 Encounters:   11/09/22 84   10/28/22 (!) 54   10/09/22 74       Intake/Output Summary (Last 24 hours) at 11/9/2022 1623  Last data filed at 11/9/2022 1148  Gross per 24 hour   Intake 0 ml   Output --   Net 0 ml       PE:  GEN:wd male in nad  HEENT:ncat,eomi,mmm  CVS:[id number]  PULM:ctab  ABD:bs,soft  EXT:no leg edema  NEURO:awake    Recent Labs   Lab  11/09/22  0436 11/09/22  0759 11/09/22  1200     100   < > 98   *  161*   < > 161*   K 4.1  4.1   < > 4.2   *  129*   < > 129*   CO2 23  23   < > 20*   BUN 90*  90*   < > 83*   CREATININE 3.0*  3.0*   < > 2.8*   CALCIUM 8.5*  8.5*   < > 8.7   MG 2.9*  --   --     < > = values in this interval not displayed.       Lab Results   Component Value Date    .5 (H) 11/08/2022    CALCIUM 8.7 11/09/2022    PHOS 4.3 11/09/2022       Recent Labs   Lab 11/09/22 0436   WBC 6.90   RBC 5.63   HGB 14.1   HCT 46.0      MCV 82   MCH 25.0*   MCHC 30.7*           A/P:   1.jaylene. on ckd3. Dry. Cont hydration. Still very ill.  2.hypernatremia. adjust ivfs.  3.met acidosis.diarrhea better. cont repletion. Doing better.  4.htn.no ace/arb.  5.cva. cont tx per primary.  6.hyperphospatemia.  chi level better.  7.proteinuria. ck level.  8.2nd hyperpara. Ck vit d.

## 2022-11-09 NOTE — ASSESSMENT & PLAN NOTE
-Noted one large episode 11/8 and nursing felt it smelt of C.diff  -No further diarrhea  -Ordered stool wbc, culture and C.diff on 11/8 but after 24 hours no further diarrhea.

## 2022-11-09 NOTE — PLAN OF CARE
Consistent overt s/s of aspiration of thin liquids. Better tolerance of X3 ST controlled cup sips of NECTAR THICK LIQUIDS, would not accept puree trials despite max encouragement.

## 2022-11-09 NOTE — ASSESSMENT & PLAN NOTE
-Palliative care consulted and input appreciated.  -Family wishes for patient to be DNR and to have active medical treatment at this time.  Their highest priority is prevent suffering and they not wish for NGT or PEG placement.  They are very hopeful that he will improve with IV fluids and be able to eat and drink again.  Discussed at length that I too am hopeful, but do not expect that to occur.  Family actively participating in discussions.    -Suspect we will ultimately need to move towards hospice care in nursing home at discharge   -Spent 25 minutes in goals of care and ACP planning 11/9.

## 2022-11-09 NOTE — SUBJECTIVE & OBJECTIVE
Interval History: No acute events overnight.  Patient alert and appears comfortable.  Remains non-verbal.  Discussed at length with patient's son and daughter today.  All questions answered and patient had no further complaints.  All questions answered and patient had no further complaints.    Review of Systems   Unable to perform ROS: Mental status change   Objective:     Vital Signs (Most Recent):  Temp: 98.2 °F (36.8 °C) (11/09/22 1148)  Pulse: 84 (11/09/22 1148)  Resp: 20 (11/09/22 1148)  BP: (!) 152/69 (11/09/22 1148)  SpO2: 96 % (11/09/22 1148) Vital Signs (24h Range):  Temp:  [97.6 °F (36.4 °C)-98.6 °F (37 °C)] 98.2 °F (36.8 °C)  Pulse:  [66-84] 84  Resp:  [18-20] 20  SpO2:  [96 %-98 %] 96 %  BP: (130-157)/(63-76) 152/69     Weight: 73.5 kg (162 lb 0.6 oz)  Body mass index is 24.64 kg/m².    Intake/Output Summary (Last 24 hours) at 11/9/2022 1644  Last data filed at 11/9/2022 1148  Gross per 24 hour   Intake 0 ml   Output --   Net 0 ml        Physical Exam  Vitals and nursing note reviewed.   Constitutional:       General: He is not in acute distress.     Appearance: Normal appearance. He is ill-appearing. He is not toxic-appearing.   HENT:      Head: Normocephalic and atraumatic.      Right Ear: External ear normal.      Left Ear: External ear normal.      Nose: Nose normal.      Mouth/Throat:      Mouth: Mucous membranes are moist.   Eyes:      Extraocular Movements: Extraocular movements intact.      Conjunctiva/sclera: Conjunctivae normal.   Cardiovascular:      Rate and Rhythm: Normal rate and regular rhythm.      Pulses: Normal pulses.      Heart sounds: Murmur heard.   Pulmonary:      Effort: Pulmonary effort is normal. No respiratory distress.   Abdominal:      General: Abdomen is flat.      Palpations: Abdomen is soft.      Tenderness: There is no abdominal tenderness.   Musculoskeletal:      Cervical back: No rigidity.      Right lower leg: No edema.      Left lower leg: No edema.   Skin:      General: Skin is warm.      Capillary Refill: Capillary refill takes less than 2 seconds.   Neurological:      Comments: Alert.  Follows simple commands.  Severe RUE weakness.    Unable to produce speech.       Significant Labs: All pertinent labs within the past 24 hours have been reviewed.    Significant Imaging: I have reviewed all pertinent imaging results/findings within the past 24 hours.

## 2022-11-09 NOTE — PROGRESS NOTES
[facility] [place]  Palliative Medicine  Progress Note    Patient Name: Leonides Burns  MRN: [id number]  Admission Date: 11/7/2022  Hospital Length of Stay: 2 days  Code Status: DNR   Attending Provider: Emilio Hyatt MD  Consulting Provider: Emelia Horton NP  Primary Care Physician: Suzette Akhtar MD  Principal Problem:Failure to thrive in adult    Patient information was obtained from relative(s) and primary team.      Assessment/Plan:   Advance Care Planning   Palliative Encounter  Date: 11/9/2022   - interval chart reviewed in detail; discussed pt with primary and SLP  - met with son Christiano and his wife at bedside and then in family waiting area for continued GOC/ACP discussion  - in depth discussion of pt's care up to this point and assisting family in talking through the decision making process   - end of life nutritional need discussion/education; upon further family discussion family agrees to no artifical means for nutrition but are open to comfort feeds once under hospice care   - family has decided that care at home would not be in pt's best interest due to care needs at this time, opting for NH with hospice but do not wish to return to previous nursing home, [place], due to concerns for care provided and visiting restrictions since pt will be transitioning to hospice care; discussed option of inpatient hospice since not proceeding with any artificial means of nutrition and pt's acuity of care, pt may be appropriate for inpatient hospice, which family was very relieved and comforted to hear; ongoing communication with family and CM/SW about NH placement options otherwise   - emotional support provided; allowed time for questions/concerns all addressed   - on going communication with primary, CM/SW, and family throughout the day regarding coordination of transition to hospice; awaiting inpatient hospice approval or alternate NH with hospice placement     Palliative Consult:  Date:  11/08/2022  - consult received; pt known to myself and palliative team from previous admit 10/2022; interval chart reviewed in detail; discussed pt with Dr. Hyatt (primary)   - pt was discharged at the end of Oct. to SNF for post stroke rehab; now returns following nurses's concern for chocking while eating/drinking; pt also required ED care on 10/31 for low O2 sats  - met with patient and son LeonidesJr. ag, at bedside; introduction to palliative medicine team and role in current care and admission; Christiano (son) is en route from [place] currently; began GOC/ACP discussion with Zander Vu, with understanding that no decisions would be made without pt's other son Christiano and daughter Felix being included/updated   - discussed pt/progress/decline since discharge; Leonides Vu. With good insight that pt's condition is decline and he is not progressing as well as MDT and family had hoped in rehab; Leonides shared that he is aware that this is not a life pt would wish for himself as his independence was very important to him; he is concerned since he has seen pt's decline since stroke in person, that his understanding of this differs from his other siblings but is hopeful that they will have better understanding once present and provided with medical updates   - in depth discussion of trajectory of pt's condition and concerns that continued aggressive treatment and rehab would lead to more burden on pt, without potential for meaningful rehab to previous independent state; discussed risks associated with artificial feeding options for pt and end of life nutritional needs   - planned for continued GOC/ACP discussion, including code status and recommendation for DNR once son, Christiano, arrives  - emotional support provided   - Allowed time for questions/concerns; all addressed; expressed availability of myself/palliative team for additional questions/concerns     Failure to thrive in adult  - Presented with inability to tolerate po  intake (possibly related to thrush? See below), renal failure and hyperkalemia   - SLP consulted/follow; recommendations for continued NPO   - see ACP discussion above   - noted; management per primary with support from palliative on GOC with family      EDGAR (acute kidney injury)  Hyperkalemia  - minimal PO fluid intake per son; now with loose stools as well   - nephrology consulted/following; treating dehydration with fluids   - will plan to include continued GOC/ACP discussion with family      Seizure disorder  - experienced sezires during last admit, no known episodes of seizures or seizure like activity since discharge   - continuing Keppra  - noted; management per primary      CVA (cerebral vascular accident)  - History of CVA, recent hospitalization for an acute stroke (10/9-10/28) with discharge to SNF for rehab  - CT head: Large regions of new worsening parenchymal hypoattenuation within the left cerebral hemisphere consistent with acute/recent infarction  - Neurology consulted   - SLP, PT, OT consulted/following   - transparent conversation with Leonides Blackmon (son) about my concern for new/worseing infarctions on CT scan and decline in functional status since last discharge; he has good insight into this (see ACP above)   - noted; management per primary and neurology      Oral Candidiasis  - IV fluconazole as SLP recommending against swish and swallow at this time  - possibly contributing to pt's decreased PO intake  - son reports pt grimaces with PO intake for past week or more     Possible C-diff  - episode of large volume of loose stool during visit that was consistent in odor and appearance with c-diff; communicated to primary recommendation for testing  - contact precautions initiated by nursing staff      Chronic gout without tophu  Hyperlipidemia  - chronic histories noted; management per primary     Thank you for your consult. I will follow-up with patient. Please contact us if you have any additional  "questions.    Subjective:     HPI:   From H&P: "This is a 77 year old male with a PMHx of CVA, CKD3, HLD, HTN, gout, tobacco use who presents with failure to thrive.      Patient is non verbal. Presented from a nursing home with inability to tolerate PO for 5 days duration. Per documentation, patient chokes on liquids, and honey thick liquids. In the ED, he was hypotensive (90/55), tachypnic, but otherwise stable. Labs showed hypernatremia (159), hyperkalemia (6.7), elevated creatinine (4.6- baseline of 1.6), negative troponin, negative lactic acid, polycythemia (20.3). CXR was negative. CT head showed Large regions of new worsening parenchymal hypoattenuation within the left cerebral hemisphere consistent with acute/recent infarction. He was given insulin/albuterol and fluids. The patient was admitted for further management. "     Palliative medicine consulted for advance care planning and continuity of care; Met with pt at bedside; for details of visit, see advance care planning section of plan.       Hospital Course:  No notes on file    Past Medical History:   Diagnosis Date    Disorder of kidney and ureter     Elevated PSA     Glaucoma     Hyperlipidemia     Hypertension     Psoriasis      Past Surgical History:   Procedure Laterality Date    FUNCTIONAL ENDOSCOPIC SINUS SURGERY (FESS) USING COMPUTER-ASSISTED NAVIGATION N/A 2/28/2019    Procedure: FESS, USING COMPUTER-ASSISTED NAVIGATION (ADD ON );  Surgeon: Mikael Serrano MD;  Location: [facility] OR;  Service: ENT;  Laterality: N/A;  (ADD ON )    NASAL SEPTOPLASTY N/A 2/28/2019    Procedure: SEPTOPLASTY, NOSE;  Surgeon: Mikael Serrano MD;  Location: [facility] OR;  Service: ENT;  Laterality: N/A;     Review of patient's allergies indicates:   Allergen Reactions    Latex, natural rubber Rash    Iodine and iodide containing products Rash     Medications:  Continuous Infusions:   dextrose 5 % 100 mL/hr at 11/09/22 0900    sodium bicarbonate drip " 50 mL/hr at 11/09/22 0953     Scheduled Meds:   atorvastatin  40 mg Oral QHS    carvediloL  25 mg Oral BID    fluconazole  200 mg Intravenous Q24H    heparin (porcine)  5,000 Units Subcutaneous Q8H    levetiracetam IV  1,000 mg Intravenous Q12H    polyethylene glycol  17 g Oral Daily     PRN Meds:acetaminophen, acetaminophen, albuterol-ipratropium, aluminum-magnesium hydroxide-simethicone, bisacodyL, [COMPLETED] calcium gluconate IVPB **AND** calcium gluconate IVPB, dextrose 10%, dextrose 10%, dextrose 10%, dextrose 10%, glucagon (human recombinant), glucose, glucose, melatonin, naloxone, ondansetron, prochlorperazine, simethicone, sodium chloride 0.9%    Family History       Problem Relation (Age of Onset)    Anemia Sister, Daughter    Cancer Brother    Diabetes Mother    KEVIN disease Sister    Hypertension Mother, Sister    No Known Problems Father, Son          Tobacco Use    Smoking status: Some Days     Packs/day: 0.50     Years: 40.00     Pack years: 20.00     Types: Cigarettes    Smokeless tobacco: Current   Substance and Sexual Activity    Alcohol use: Yes     Alcohol/week: 9.0 standard drinks     Types: 9 Cans of beer per week    Drug use: No    Sexual activity: Yes     Partners: Female     Review of Systems   Unable to perform ROS: Other (advanced dysphagia related to stroke)   Objective:     Vital Signs (Most Recent):  Temp: 98.2 °F (36.8 °C) (11/09/22 1148)  Pulse: 84 (11/09/22 1148)  Resp: 20 (11/09/22 1148)  BP: (!) 152/69 (11/09/22 1148)  SpO2: 96 % (11/09/22 1148)   Vital Signs (24h Range):  Temp:  [97.6 °F (36.4 °C)-98.6 °F (37 °C)] 98.2 °F (36.8 °C)  Pulse:  [65-84] 84  Resp:  [18-20] 20  SpO2:  [96 %-98 %] 96 %  BP: (126-157)/(60-76) 152/69     Weight: 73.5 kg (162 lb 0.6 oz)  Body mass index is 24.64 kg/m².    Physical Exam  Vitals and nursing note reviewed.   HENT:      Head: Normocephalic and atraumatic.      Mouth/Throat:      Pharynx: Oropharyngeal exudate and posterior  oropharyngeal erythema present.      Comments: thrush  Pulmonary:      Effort: Pulmonary effort is normal.      Comments: RA  Musculoskeletal:      Right lower leg: No edema.      Left lower leg: No edema.   Neurological:      Mental Status: He is alert.      Comments: Unable to fully assess due to dysphagia and minimally verbal.  Will occasionally nod head yes/no and attempt to say 1 word statements.        Significant Labs: All pertinent labs within the past 24 hours have been reviewed.  CBC:   Recent Labs   Lab 11/09/22  0436   WBC 6.90   HGB 14.1   HCT 46.0   MCV 82          BMP:  Recent Labs   Lab 11/09/22  0436 11/09/22  0759     100 99   *  161* 160*   K 4.1  4.1 4.2   *  129* 128*   CO2 23  23 22*   BUN 90*  90* 86*   CREATININE 3.0*  3.0* 2.8*   CALCIUM 8.5*  8.5* 8.7   MG 2.9*  --        LFT:  Lab Results   Component Value Date    AST 33 11/09/2022    ALKPHOS 107 11/09/2022    BILITOT 0.3 11/09/2022     Albumin:   Albumin   Date Value Ref Range Status   11/09/2022 2.6 (L) 3.5 - 5.2 g/dL Final     Protein:   Total Protein   Date Value Ref Range Status   11/09/2022 6.6 6.0 - 8.4 g/dL Final     Lactic acid:   Lab Results   Component Value Date    LACTATE 1.6 11/07/2022       Significant Imaging: I have reviewed all pertinent imaging results/findings within the past 24 hours.        Total visit time: 65 minutes    > 50% of  35 min visit spent in chart review, face to face discussion of symptom assessment, coordination of care with other specialists, and discharge planning.    30 min ACP time spent: goals of care, emotional support, formulating and communicating prognosis, exploring burden/ benefit of various approaches of treatment.     Emelia Horton NP  Palliative Medicine  [facility]

## 2022-11-09 NOTE — PT/OT/SLP PROGRESS
Speech Language Pathology Treatment    Patient Name:  Leonides Burns   MRN:  [id number]  Admitting Diagnosis: CVA    Recommendations:                 General Recommendations:  Dysphagia therapy  Diet recommendations:   , Liquid Diet Level: NPO, Other (Comment) (except ice)   Aspiration Precautions: continue good oral care  General Precautions: Standard,    Communication strategies:  yes/no questions only    Subjective   Nursing reporting Pt has been resistant to lingual oral care. Per palliative family does not which for long term means of nutrition and is opting for hospice placement. Pt readily woke for ST, required moderate cuing to maintain alertness however did not readily participate in po trials. Pt remained non verbal throughout at did not attempt automatic speech acts.   Patient goals: comfort per family.     Pain/Comfort:  Pain Rating 1: 0/10    Respiratory Status: Room air    Objective:     Has the patient been evaluated by SLP for swallowing?   No  Keep patient NPO? No     Pt repositioned upright in bed for dysphagia therapy. Pt refused ice chip trials via spoon. Pt refused thin liquid trials via straw. Pt was encouraged to self feed with left hand cup sips of thin liquids, swallow delay with tongue thrust and immediate weak breathy coughing noted for 3 of 3 trials. Nectar thick liquids self presented with min assist by ST for volume control revealed persistent swallow delay and tongue thrust however no overt s/s of aspiration. ST offered puree trials Pt refused despite max encouragement.     Assessment:     Leonides Burns is a 77 y.o. male with dx of evolution of known CVA he presents with oropharyngeal dysphagia of a yet to be determined severity negatively impacted by sustained attention, fatigue and confusion.      Goals:   Multidisciplinary Problems       SLP Goals          Problem: SLP    Goal Priority Disciplines Outcome   SLP Goal    Medium SLP Ongoing, Progressing   Description: Pt will participate  in ongoing assessment of swallow  Pt will participate in cognitive linguistic eval.                        Plan:     Patient to be seen:  3 x/week   Plan of Care expires:  11/17/22  Plan of Care reviewed with:  patient, son   SLP Follow-Up:  Yes       Discharge recommendations:  per report family is opting for hospice  Barriers to Discharge:  None    Time Tracking:     SLP Treatment Date:   11/09/22  Speech Start Time:  1528  Speech Stop Time:  1549     Speech Total Time (min):  21 min    Billable Minutes: Treatment Swallowing Dysfunction 21 11/09/2022

## 2022-11-09 NOTE — PLAN OF CARE
Recommendations  Initiate TF of Novasource renal at 20ml/hr progressing 10ml q3h until reach goal rate of 40ml/hr  To receive: 1925kcal, 89 g protein, 174g CHO, 681 mL FF  Hold for residuals >400mL  250 mL FWF q6h. Additional recs per MD.   Monitor nutrition related labs, I/Os, weights  Re-consult if SLP able to progress diet  Goals:  Pt to receive nutrition by RD f/u  Labs to trend toward target ranges  Nutrition Goal Status: new  Communication of RD Recs: other (comment) (poc)    Ector Vilchis, MPH, RDN, LDN

## 2022-11-09 NOTE — ASSESSMENT & PLAN NOTE
-Admitted to inpatient status  -Noted recent strokes and over last 5 days not eating at all  -Noted to have Hypernatremia, hyperkalemia, renal failure and significant dysphagia  -High risk for terminal condition - and hospice likely appropriate  -Palliative care consulted and input appreciated.  -Specific treatment as below

## 2022-11-09 NOTE — CONSULTS
"[facility] - Telemetry  Adult Nutrition  Progress Note    SUMMARY       Recommendations  Initiate TF of Novasource renal at 20ml/hr progressing 10ml q3h until reach goal rate of 40ml/hr  To receive: 1925kcal, 89 g protein, 174g CHO, 681 mL FF  Hold for residuals >400mL  250 mL FWF q6h. Additional recs per MD.   Monitor nutrition related labs, I/Os, weights  Re-consult if SLP able to progress diet  Goals:  Pt to receive nutrition by RD f/u  Labs to trend toward target ranges  Nutrition Goal Status: new  Communication of RD Recs: other (comment) (poc)    Assessment and Plan  Nutrition Problem  Inadequate energy intake    Related to (etiology):   Dysphagia    Signs and Symptoms (as evidenced by):   No PO intake in >5 days  Current NPO status    Interventions/Recommendations (treatment strategy):  Collaboration of care with other providers  Commercial beverage    Nutrition Diagnosis Status:   New      Reason for Assessment    Reason For Assessment: consult  Diagnosis:  (Failure to thrive)  Relevant Medical History: .prob  Interdisciplinary Rounds: did not attend  General Information Comments: Pt presents with failure to thrive and no PO intake in >5 days. Failed nectar thick and liquid evaluation. Likely will need TF initiation. Pt with fluctuating weights-- unsure of accuracy. Pt with recent previous hospitalizations and was discharged on texture modified diet. Continue to monitor I/os, weights, and labs.   Nutrition Discharge Planning: Pending clinical course    Nutrition Risk Screen    Nutrition Risk Screen: dysphagia or difficulty swallowing    Nutrition/Diet History    Spiritual, Cultural Beliefs, Presybeterian Practices, Values that Affect Care: no    Anthropometrics    Temp: 98 °F (36.7 °C)  Height Method: Stated  Height: 5' 8" (172.7 cm)  Height (inches): 68 in  Weight Method: Bed Scale  Weight: 73.5 kg (162 lb 0.6 oz)  Weight (lb): 162.04 lb  Ideal Body Weight (IBW), Male: 154 lb  % Ideal Body Weight, Male (lb): " 105.22 %  BMI (Calculated): 24.6       Lab/Procedures/Meds    Pertinent Labs Reviewed: reviewed  Pertinent Labs Comments: CBC:  Recent Labs   Lab 11/08/22 0818   WBC 9.38   HGB 16.5   HCT 52.6        CMP:  Recent Labs   Lab 11/08/22 0818 11/08/22  1148 11/08/22 2017   CALCIUM 9.6   < > 8.8   ALBUMIN 3.0*  --   --    PROT 8.1  --   --    *   < > 161*   K 5.2*   < > 4.8   CO2 16*   < > 18*   CL >130*   < > >130*   *   < > 106*   CREATININE 4.7*   < > 3.5*   ALKPHOS 120  --   --    ALT 45*  --   --    AST 37  --   --    BILITOT 0.3  --   --     < > = values in this interval not displayed.       Pertinent Medications Reviewed: reviewed  Pertinent Medications Comments: Scheduled Meds:   atorvastatin  40 mg Oral QHS    carvediloL  25 mg Oral BID    fluconazole  200 mg Intravenous Q24H    heparin (porcine)  5,000 Units Subcutaneous Q8H    levetiracetam IV  1,000 mg Intravenous Q12H    polyethylene glycol  17 g Oral Daily     Continuous Infusions:   sodium bicarbonate drip 100 mL/hr at 11/08/22 1131     PRN Meds:.acetaminophen, acetaminophen, albuterol-ipratropium, aluminum-magnesium hydroxide-simethicone, bisacodyL, [COMPLETED] calcium gluconate IVPB **AND** calcium gluconate IVPB, dextrose 10%, dextrose 10%, dextrose 10%, dextrose 10%, glucagon (human recombinant), glucose, glucose, melatonin, naloxone, ondansetron, prochlorperazine, simethicone, sodium chloride 0.9%        Estimated/Assessed Needs    Weight Used For Calorie Calculations: 73.5 kg (162 lb 0.6 oz)  Energy Calorie Requirements (kcal): 1838-2205kcal/day (25-30)  Energy Need Method: Kcal/kg  Protein Requirements: 59-74g/day (.8-1.0 (CKD w/o dialysis))  Weight Used For Protein Calculations: 73.5 kg (162 lb 0.6 oz)  Fluid Requirements (mL): 1mL per kcal or per MD     RDA Method (mL): 1838  CHO Requirement: 230 g      Nutrition Prescription Ordered    Current Diet Order: NPO    Evaluation of Received Nutrient/Fluid Intake    I/O: +1.92L  since admit  Energy Calories Required: not meeting needs  Protein Required: not meeting needs  Fluid Required: not meeting needs  Comments: LBM 11/7  % Intake of Estimated Energy Needs: 0 - 25 %  % Meal Intake: NPO    Nutrition Risk    Level of Risk/Frequency of Follow-up: moderate - high     Monitor and Evaluation    Food and Nutrient Intake: energy intake, food and beverage intake, enteral nutrition intake, parenteral nutrition intake  Food and Nutrient Adminstration: enteral and parenteral nutrition administration  Knowledge/Beliefs/Attitudes: food and nutrition knowledge/skill  Physical Activity and Function: nutrition-related ADLs and IADLs  Anthropometric Measurements: height/length, weight, weight change, body mass index  Biochemical Data, Medical Tests and Procedures: electrolyte and renal panel, gastrointestinal profile, glucose/endocrine profile, inflammatory profile, lipid profile  Nutrition-Focused Physical Findings: overall appearance     Nutrition Follow-Up    RD Follow-up?: Yes    Ector Vilchis, MPH, RDN, LDN

## 2022-11-09 NOTE — ASSESSMENT & PLAN NOTE
-Noted history of CVA with recent hospitalization for an acute stroke (D/c 10/28) Plan was for plavix x 21 days, aspirin and statin  -CT head showed large regions of new worsening parenchymal hypoattenuation within the left cerebral hemisphere consistent with acute/recent infarction  -Consulted PT, OT, SLP and neurology.  Appreciate consultants assistance  -No new recommendations from neurology - noted evolution of prior stroke and continued dysphagia  -Presently unable to take oral meds - so holding aspirin, plavix and statin  -Resume home medications when able

## 2022-11-09 NOTE — PT/OT/SLP PROGRESS
"Occupational Therapy      Patient Name:  Leonides Burns   MRN:  [id number]    Initial OT orders received. NP palliative care update today with pt "awaiting inpatient hospice approval or alternate NH with hospice placement." No skilled OT nor DME needs. OT to sign off. Please re-consult if situation changes. Thank you.     11/9/2022  "

## 2022-11-09 NOTE — SUBJECTIVE & OBJECTIVE
Past Medical History:   Diagnosis Date    Disorder of kidney and ureter     Elevated PSA     Glaucoma     Hyperlipidemia     Hypertension     Psoriasis      Past Surgical History:   Procedure Laterality Date    FUNCTIONAL ENDOSCOPIC SINUS SURGERY (FESS) USING COMPUTER-ASSISTED NAVIGATION N/A 2/28/2019    Procedure: FESS, USING COMPUTER-ASSISTED NAVIGATION (ADD ON );  Surgeon: Mikael Serrano MD;  Location: [facility];  Service: ENT;  Laterality: N/A;  (ADD ON )    NASAL SEPTOPLASTY N/A 2/28/2019    Procedure: SEPTOPLASTY, NOSE;  Surgeon: Mikael Serrano MD;  Location: [facility];  Service: ENT;  Laterality: N/A;     Review of patient's allergies indicates:   Allergen Reactions    Latex, natural rubber Rash    Iodine and iodide containing products Rash     Medications:  Continuous Infusions:   dextrose 5 % 100 mL/hr at 11/09/22 0900    sodium bicarbonate drip 50 mL/hr at 11/09/22 0953     Scheduled Meds:   atorvastatin  40 mg Oral QHS    carvediloL  25 mg Oral BID    fluconazole  200 mg Intravenous Q24H    heparin (porcine)  5,000 Units Subcutaneous Q8H    levetiracetam IV  1,000 mg Intravenous Q12H    polyethylene glycol  17 g Oral Daily     PRN Meds:acetaminophen, acetaminophen, albuterol-ipratropium, aluminum-magnesium hydroxide-simethicone, bisacodyL, [COMPLETED] calcium gluconate IVPB **AND** calcium gluconate IVPB, dextrose 10%, dextrose 10%, dextrose 10%, dextrose 10%, glucagon (human recombinant), glucose, glucose, melatonin, naloxone, ondansetron, prochlorperazine, simethicone, sodium chloride 0.9%    Family History       Problem Relation (Age of Onset)    Anemia Sister, Daughter    Cancer Brother    Diabetes Mother    KEVIN disease Sister    Hypertension Mother, Sister    No Known Problems Father, Son          Tobacco Use    Smoking status: Some Days     Packs/day: 0.50     Years: 40.00     Pack years: 20.00     Types: Cigarettes    Smokeless tobacco: Current   Substance and Sexual Activity     Alcohol use: Yes     Alcohol/week: 9.0 standard drinks     Types: 9 Cans of beer per week    Drug use: No    Sexual activity: Yes     Partners: Female     Review of Systems   Unable to perform ROS: Other (advanced dysphagia related to stroke)   Objective:     Vital Signs (Most Recent):  Temp: 98.2 °F (36.8 °C) (11/09/22 1148)  Pulse: 84 (11/09/22 1148)  Resp: 20 (11/09/22 1148)  BP: (!) 152/69 (11/09/22 1148)  SpO2: 96 % (11/09/22 1148)   Vital Signs (24h Range):  Temp:  [97.6 °F (36.4 °C)-98.6 °F (37 °C)] 98.2 °F (36.8 °C)  Pulse:  [65-84] 84  Resp:  [18-20] 20  SpO2:  [96 %-98 %] 96 %  BP: (126-157)/(60-76) 152/69     Weight: 73.5 kg (162 lb 0.6 oz)  Body mass index is 24.64 kg/m².    Physical Exam  Vitals and nursing note reviewed.   HENT:      Head: Normocephalic and atraumatic.      Mouth/Throat:      Pharynx: Oropharyngeal exudate and posterior oropharyngeal erythema present.      Comments: thrush  Pulmonary:      Effort: Pulmonary effort is normal.      Comments: RA  Musculoskeletal:      Right lower leg: No edema.      Left lower leg: No edema.   Neurological:      Mental Status: He is alert.      Comments: Unable to fully assess due to dysphagia and minimally verbal.  Will occasionally nod head yes/no and attempt to say 1 word statements.        Significant Labs: All pertinent labs within the past 24 hours have been reviewed.  CBC:   Recent Labs   Lab 11/09/22 0436   WBC 6.90   HGB 14.1   HCT 46.0   MCV 82          BMP:  Recent Labs   Lab 11/09/22 0436 11/09/22  0759     100 99   *  161* 160*   K 4.1  4.1 4.2   *  129* 128*   CO2 23  23 22*   BUN 90*  90* 86*   CREATININE 3.0*  3.0* 2.8*   CALCIUM 8.5*  8.5* 8.7   MG 2.9*  --        LFT:  Lab Results   Component Value Date    AST 33 11/09/2022    ALKPHOS 107 11/09/2022    BILITOT 0.3 11/09/2022     Albumin:   Albumin   Date Value Ref Range Status   11/09/2022 2.6 (L) 3.5 - 5.2 g/dL Final     Protein:   Total Protein    Date Value Ref Range Status   11/09/2022 6.6 6.0 - 8.4 g/dL Final     Lactic acid:   Lab Results   Component Value Date    LACTATE 1.6 11/07/2022       Significant Imaging: I have reviewed all pertinent imaging results/findings within the past 24 hours.

## 2022-11-09 NOTE — ASSESSMENT & PLAN NOTE
-Present at times since recent stroke and now severe  -Seems to be struggling to even control his own secretions  -SLP consulted and discussed with them today.  Did take some sips of thickened liquids, but no solids.    -STRICT NPO for now.  -Discussed options with family at length - TPN, PEG, NGT vs temporary IV fluids and possible hospice.  Family above all wishes to prevent any suffering for patient.  They do not want PEG or NGT placement.  We discussed that IV fluids and treatment of his thrush with fluconazole is unlikely to correct his dysphagia.  Still, they are hopeful and wish to give it more time to see if it will help - this is reasonable.  I discussed with them that I strongly suspect his dysphagia will remain and that hospice care is most likely the appropriate care.  They remain open to that and are participating in planning for hospice at discharge in a NH.

## 2022-11-09 NOTE — PLAN OF CARE
LACY met with patient's son and another family member at bedside re: patient return back to [facility] with Hospice services. Patient's son explained to SW that patient is not being cared for at [facility], and nursing [facility] does not allow multiple people (family) to visit patient at a time. SW voiced understanding. Patient stated he does not want his father to return back to [facility]. SW explained to patient's son that it would be difficult to find another NH to accept patient as patient would be a hospice placement. Patient's son and family stated that patient is suppose to be receiving hospice services. SW explained that patient can get hospice services. SW would have to locate/find another nursing home placement for patient and find out who the nursing home has hospice contracts with. SW inquired that patient can go home with hospice services? Patient son stated patient return home would not work because patient would not be able to get 24 hr care. SW explained to patient's son that family would have to pay for nursing home placement as patient does not have Medicaid. Patient's other family and son stated that [facility] applied for Medicaid. SW explained that at this moment patient does not have Medicaid and whichever NH that accept patient will have to apply for Medicaid. LACY explained during that window period family would be responsible for the bill at nursing home because patient would be staying there. Patient's son and family member express frustration because they feel SW was not assisting them with process of find another nursing home placement. LACY explained to patient's son and family member that he is not able to determine cost of nursing home placement because each nursing home has different cost. LACY explained that he will talk with MD and Palliative N.P. to see if patient would qualify for inpatient hospice. Patient's son/family asked for SW to try finding another nursing home.     LACY met with MD and provided  updated and inquired about patient meeting inpatient hospice? MD stated patient may possibly meet inpatient hospice. SW will follow up with Palliative N.P.     LACY spoke with RAUL ([facility]) re: inpatient hospice for patient. SW was informed that patient might qualify for inpatient hospice and to send patient's information to [facility] for follow up.     LACY spoke with Sondra ([facility]) who informed SW that patient does not meet inpatient hospice and if patient was receiving some type IV treatment, patient maybe considered.     LACY spoke with Livia ([facility]) to see if any chance patient would be considered for inpatient hospice.   Livia informed SW that patient does not meet inpatient hospice at this moment but will possible meet in a few weeks.     SW was not able to contact patient's son Santi or leave a voice message.     LACY spoke with Luis Angel ([facility]) re: patient's son concern with visitation, and patient's care. Jaskaran stated she will talk it over with her DON and contact SW back.     SW received call back from Luis Angel Kyle[facility]) who informed SW that DON is only allowing 2 visitor at a time for 1 hour, and SARA said patient would have to be passing away. LACY explained to Luis Angel that patient would be gone back with hospice services and could the DON make consideration. LACY stated he will send patient's information to Luis Angel for DON to review and hope to reconsider. Luis Angel stated that would be fine.     SW received call from Luis Angel ([facility]) that patient's family picked up all of patient belongings and that patient is not returning to that facility.

## 2022-11-09 NOTE — PROGRESS NOTES
[facility] Medicine  Progress Note    Patient Name: Leonides Burns  MRN: [id number]  Patient Class: IP- Inpatient   Admission Date: 11/7/2022  Length of Stay: 2 days  Attending Physician: Emilio Hyatt MD  Primary Care Provider: Suzette Akhtar MD        Subjective:     Principal Problem:Failure to thrive in adult        HPI:  This is a 77 year old male with a PMHx of CVA, CKD3, HLD, HTN, gout, tobacco use who presents with failure to thrive.     Patient is non verbal. Presented from a nursing home with inability to tolerate PO for 5 days duration. Per documentation, patient chokes on liquids, and honey thick liquids. In the ED, he was hypotensive (90/55), tachypnic, but otherwise stable. Labs showed hypernatremia (159), hyperkalemia (6.7), elevated creatinine (4.6- baseline of 1.6), negative troponin, negative lactic acid, polycythemia (20.3). CXR was negative. CT head showed Large regions of new worsening parenchymal hypoattenuation within the left cerebral hemisphere consistent with acute/recent infarction. He was given insulin/albuterol and fluids. The patient was admitted for further management.       Overview/Hospital Course:  No notes on file    Interval History: No acute events overnight.  Patient alert and appears comfortable.  Remains non-verbal.  Discussed at length with patient's son and daughter today.  All questions answered and patient had no further complaints.  All questions answered and patient had no further complaints.    Review of Systems   Unable to perform ROS: Mental status change   Objective:     Vital Signs (Most Recent):  Temp: 98.2 °F (36.8 °C) (11/09/22 1148)  Pulse: 84 (11/09/22 1148)  Resp: 20 (11/09/22 1148)  BP: (!) 152/69 (11/09/22 1148)  SpO2: 96 % (11/09/22 1148) Vital Signs (24h Range):  Temp:  [97.6 °F (36.4 °C)-98.6 °F (37 °C)] 98.2 °F (36.8 °C)  Pulse:  [66-84] 84  Resp:  [18-20] 20  SpO2:  [96 %-98 %] 96 %  BP: (130-157)/(63-76) 152/69      Weight: 73.5 kg (162 lb 0.6 oz)  Body mass index is 24.64 kg/m².    Intake/Output Summary (Last 24 hours) at 11/9/2022 1644  Last data filed at 11/9/2022 1148  Gross per 24 hour   Intake 0 ml   Output --   Net 0 ml        Physical Exam  Vitals and nursing note reviewed.   Constitutional:       General: He is not in acute distress.     Appearance: Normal appearance. He is ill-appearing. He is not toxic-appearing.   HENT:      Head: Normocephalic and atraumatic.      Right Ear: External ear normal.      Left Ear: External ear normal.      Nose: Nose normal.      Mouth/Throat:      Mouth: Mucous membranes are moist.   Eyes:      Extraocular Movements: Extraocular movements intact.      Conjunctiva/sclera: Conjunctivae normal.   Cardiovascular:      Rate and Rhythm: Normal rate and regular rhythm.      Pulses: Normal pulses.      Heart sounds: Murmur heard.   Pulmonary:      Effort: Pulmonary effort is normal. No respiratory distress.   Abdominal:      General: Abdomen is flat.      Palpations: Abdomen is soft.      Tenderness: There is no abdominal tenderness.   Musculoskeletal:      Cervical back: No rigidity.      Right lower leg: No edema.      Left lower leg: No edema.   Skin:     General: Skin is warm.      Capillary Refill: Capillary refill takes less than 2 seconds.   Neurological:      Comments: Alert.  Follows simple commands.  Severe RUE weakness.    Unable to produce speech.       Significant Labs: All pertinent labs within the past 24 hours have been reviewed.    Significant Imaging: I have reviewed all pertinent imaging results/findings within the past 24 hours.      Assessment/Plan:      * Failure to thrive in adult  -Admitted to inpatient status  -Noted recent strokes and over last 5 days not eating at all  -Noted to have Hypernatremia, hyperkalemia, renal failure and significant dysphagia  -High risk for terminal condition - and hospice likely appropriate  -Palliative care consulted and input  appreciated.  -Specific treatment as below    CVA (cerebral vascular accident)  -Noted history of CVA with recent hospitalization for an acute stroke (D/c 10/28) Plan was for plavix x 21 days, aspirin and statin  -CT head showed large regions of new worsening parenchymal hypoattenuation within the left cerebral hemisphere consistent with acute/recent infarction  -Consulted PT, OT, SLP and neurology.  Appreciate consultants assistance  -No new recommendations from neurology - noted evolution of prior stroke and continued dysphagia  -Presently unable to take oral meds - so holding aspirin, plavix and statin  -Resume home medications when able    Dysphagia  -Present at times since recent stroke and now severe  -Seems to be struggling to even control his own secretions  -SLP consulted and discussed with them today.  Did take some sips of thickened liquids, but no solids.    -STRICT NPO for now.  -Discussed options with family at length - TPN, PEG, NGT vs temporary IV fluids and possible hospice.  Family above all wishes to prevent any suffering for patient.  They do not want PEG or NGT placement.  We discussed that IV fluids and treatment of his thrush with fluconazole is unlikely to correct his dysphagia.  Still, they are hopeful and wish to give it more time to see if it will help - this is reasonable.  I discussed with them that I strongly suspect his dysphagia will remain and that hospice care is most likely the appropriate care.  They remain open to that and are participating in planning for hospice at discharge in a NH.    Acute renal failure superimposed on stage 3 chronic kidney disease  -Baseline Cr around 1.6  -Cr on admit 4.1 and now down to 2.8  -On admit had hyperkalemia, hypernatremia and anion-gap acidosis   -Anion gap has normalized.  Remains hypernatremic.  NAGMA improving.  K now normal  -Avoid nephrotoxic agents and renally dose meds  -Consulted nephrology and input appreciated  -Continue D5W and  bicarb infusions  -Monitor BMP q4h  -Consult nephrology.    Hyperkalemia  -Treatment as above.    Hypernatremia  -Due to severe dehydration and diminished oral intake  -Treat as above.    Diarrhea  -Noted one large episode 11/8 and nursing felt it smelt of C.diff  -No further diarrhea  -Ordered stool wbc, culture and C.diff on 11/8 but after 24 hours no further diarrhea.    ACP (advance care planning)  -Palliative care consulted and input appreciated.  -Family wishes for patient to be DNR and to have active medical treatment at this time.  Their highest priority is prevent suffering and they not wish for NGT or PEG placement.  They are very hopeful that he will improve with IV fluids and be able to eat and drink again.  Discussed at length that I too am hopeful, but do not expect that to occur.  Family actively participating in discussions.    -Suspect we will ultimately need to move towards hospice care in nursing home at discharge   -Spent 25 minutes in goals of care and ACP planning 11/9.    DNR (do not resuscitate)  -Noted as above.    Seizure disorder  -Continue IV keppra    Chronic gout without tophus  -Resume home allopurinol when/if able    Hyperlipidemia  -Resume statin when/if able      VTE Risk Mitigation (From admission, onward)         Ordered     heparin (porcine) injection 5,000 Units  Every 8 hours         11/07/22 2306     IP VTE HIGH RISK PATIENT  Once         11/07/22 2306     Place sequential compression device  Until discontinued         11/07/22 2306                Discharge Planning   DEVIKA:      Code Status: DNR   Is the patient medically ready for discharge?:     Reason for patient still in hospital (select all that apply): Treatment  Discharge Plan A: Return to nursing home                  Emilio Hyatt MD  Department of Hospital Medicine   [facility] - Telemetry

## 2022-11-10 LAB
ALBUMIN SERPL BCP-MCNC: 2.5 G/DL (ref 3.5–5.2)
ALP SERPL-CCNC: 96 U/L (ref 55–135)
ALT SERPL W/O P-5'-P-CCNC: 35 U/L (ref 10–44)
ANION GAP SERPL CALC-SCNC: 12 MMOL/L (ref 8–16)
ANION GAP SERPL CALC-SCNC: 12 MMOL/L (ref 8–16)
ANION GAP SERPL CALC-SCNC: 6 MMOL/L (ref 8–16)
ANION GAP SERPL CALC-SCNC: 7 MMOL/L (ref 8–16)
ANION GAP SERPL CALC-SCNC: 8 MMOL/L (ref 8–16)
ANION GAP SERPL CALC-SCNC: 8 MMOL/L (ref 8–16)
AST SERPL-CCNC: 42 U/L (ref 10–40)
BASOPHILS # BLD AUTO: 0.1 K/UL (ref 0–0.2)
BASOPHILS NFR BLD: 1.3 % (ref 0–1.9)
BILIRUB SERPL-MCNC: 0.3 MG/DL (ref 0.1–1)
BUN SERPL-MCNC: 38 MG/DL (ref 8–23)
BUN SERPL-MCNC: 45 MG/DL (ref 8–23)
BUN SERPL-MCNC: 47 MG/DL (ref 8–23)
BUN SERPL-MCNC: 52 MG/DL (ref 8–23)
BUN SERPL-MCNC: 52 MG/DL (ref 8–23)
BUN SERPL-MCNC: 56 MG/DL (ref 8–23)
CALCIUM SERPL-MCNC: 8.3 MG/DL (ref 8.7–10.5)
CALCIUM SERPL-MCNC: 8.3 MG/DL (ref 8.7–10.5)
CALCIUM SERPL-MCNC: 8.4 MG/DL (ref 8.7–10.5)
CALCIUM SERPL-MCNC: 9 MG/DL (ref 8.7–10.5)
CHLORIDE SERPL-SCNC: 121 MMOL/L (ref 95–110)
CHLORIDE SERPL-SCNC: 122 MMOL/L (ref 95–110)
CHLORIDE SERPL-SCNC: 122 MMOL/L (ref 95–110)
CHLORIDE SERPL-SCNC: 126 MMOL/L (ref 95–110)
CO2 SERPL-SCNC: 22 MMOL/L (ref 23–29)
CO2 SERPL-SCNC: 23 MMOL/L (ref 23–29)
CO2 SERPL-SCNC: 25 MMOL/L (ref 23–29)
CO2 SERPL-SCNC: 27 MMOL/L (ref 23–29)
CREAT SERPL-MCNC: 2 MG/DL (ref 0.5–1.4)
CREAT SERPL-MCNC: 2 MG/DL (ref 0.5–1.4)
CREAT SERPL-MCNC: 2.1 MG/DL (ref 0.5–1.4)
CREAT SERPL-MCNC: 2.2 MG/DL (ref 0.5–1.4)
DIFFERENTIAL METHOD: ABNORMAL
EOSINOPHIL # BLD AUTO: 0.8 K/UL (ref 0–0.5)
EOSINOPHIL NFR BLD: 10.3 % (ref 0–8)
ERYTHROCYTE [DISTWIDTH] IN BLOOD BY AUTOMATED COUNT: 19.6 % (ref 11.5–14.5)
EST. GFR  (NO RACE VARIABLE): 30 ML/MIN/1.73 M^2
EST. GFR  (NO RACE VARIABLE): 32 ML/MIN/1.73 M^2
EST. GFR  (NO RACE VARIABLE): 34 ML/MIN/1.73 M^2
EST. GFR  (NO RACE VARIABLE): 34 ML/MIN/1.73 M^2
GLUCOSE SERPL-MCNC: 102 MG/DL (ref 70–110)
GLUCOSE SERPL-MCNC: 103 MG/DL (ref 70–110)
GLUCOSE SERPL-MCNC: 110 MG/DL (ref 70–110)
GLUCOSE SERPL-MCNC: 97 MG/DL (ref 70–110)
GLUCOSE SERPL-MCNC: 99 MG/DL (ref 70–110)
GLUCOSE SERPL-MCNC: 99 MG/DL (ref 70–110)
HCT VFR BLD AUTO: 42.8 % (ref 40–54)
HGB BLD-MCNC: 13.8 G/DL (ref 14–18)
IMM GRANULOCYTES # BLD AUTO: 0.02 K/UL (ref 0–0.04)
IMM GRANULOCYTES NFR BLD AUTO: 0.3 % (ref 0–0.5)
LYMPHOCYTES # BLD AUTO: 2.4 K/UL (ref 1–4.8)
LYMPHOCYTES NFR BLD: 32.4 % (ref 18–48)
MCH RBC QN AUTO: 26.4 PG (ref 27–31)
MCHC RBC AUTO-ENTMCNC: 32.2 G/DL (ref 32–36)
MCV RBC AUTO: 82 FL (ref 82–98)
MONOCYTES # BLD AUTO: 1.6 K/UL (ref 0.3–1)
MONOCYTES NFR BLD: 21 % (ref 4–15)
NEUTROPHILS # BLD AUTO: 2.6 K/UL (ref 1.8–7.7)
NEUTROPHILS NFR BLD: 34.7 % (ref 38–73)
NRBC BLD-RTO: 0 /100 WBC
PLATELET # BLD AUTO: 293 K/UL (ref 150–450)
PMV BLD AUTO: ABNORMAL FL (ref 9.2–12.9)
POCT GLUCOSE: 103 MG/DL (ref 70–110)
POCT GLUCOSE: 86 MG/DL (ref 70–110)
POCT GLUCOSE: 93 MG/DL (ref 70–110)
POCT GLUCOSE: 95 MG/DL (ref 70–110)
POTASSIUM SERPL-SCNC: 3.9 MMOL/L (ref 3.5–5.1)
POTASSIUM SERPL-SCNC: 3.9 MMOL/L (ref 3.5–5.1)
POTASSIUM SERPL-SCNC: 4.2 MMOL/L (ref 3.5–5.1)
PROT SERPL-MCNC: 6.6 G/DL (ref 6–8.4)
RBC # BLD AUTO: 5.23 M/UL (ref 4.6–6.2)
SODIUM SERPL-SCNC: 151 MMOL/L (ref 136–145)
SODIUM SERPL-SCNC: 154 MMOL/L (ref 136–145)
SODIUM SERPL-SCNC: 154 MMOL/L (ref 136–145)
SODIUM SERPL-SCNC: 156 MMOL/L (ref 136–145)
SODIUM SERPL-SCNC: 157 MMOL/L (ref 136–145)
SODIUM SERPL-SCNC: 157 MMOL/L (ref 136–145)
WBC # BLD AUTO: 7.47 K/UL (ref 3.9–12.7)

## 2022-11-10 PROCEDURE — 25000003 PHARM REV CODE 250: Performed by: INTERNAL MEDICINE

## 2022-11-10 PROCEDURE — 85025 COMPLETE CBC W/AUTO DIFF WBC: CPT | Performed by: HOSPITALIST

## 2022-11-10 PROCEDURE — 99233 PR SUBSEQUENT HOSPITAL CARE,LEVL III: ICD-10-PCS | Mod: ,,, | Performed by: REGISTERED NURSE

## 2022-11-10 PROCEDURE — 25000003 PHARM REV CODE 250: Performed by: STUDENT IN AN ORGANIZED HEALTH CARE EDUCATION/TRAINING PROGRAM

## 2022-11-10 PROCEDURE — 21400001 HC TELEMETRY ROOM

## 2022-11-10 PROCEDURE — 99497 ADVNCD CARE PLAN 30 MIN: CPT | Mod: ,,, | Performed by: REGISTERED NURSE

## 2022-11-10 PROCEDURE — 63600175 PHARM REV CODE 636 W HCPCS: Performed by: STUDENT IN AN ORGANIZED HEALTH CARE EDUCATION/TRAINING PROGRAM

## 2022-11-10 PROCEDURE — 80048 BASIC METABOLIC PNL TOTAL CA: CPT | Mod: XB | Performed by: HOSPITALIST

## 2022-11-10 PROCEDURE — 63600175 PHARM REV CODE 636 W HCPCS: Performed by: REGISTERED NURSE

## 2022-11-10 PROCEDURE — 63600175 PHARM REV CODE 636 W HCPCS: Performed by: HOSPITALIST

## 2022-11-10 PROCEDURE — 92526 ORAL FUNCTION THERAPY: CPT

## 2022-11-10 PROCEDURE — 80048 BASIC METABOLIC PNL TOTAL CA: CPT | Mod: 91,XB | Performed by: HOSPITALIST

## 2022-11-10 PROCEDURE — 36415 COLL VENOUS BLD VENIPUNCTURE: CPT | Performed by: HOSPITALIST

## 2022-11-10 PROCEDURE — 99497 PR ADVNCD CARE PLAN 30 MIN: ICD-10-PCS | Mod: ,,, | Performed by: REGISTERED NURSE

## 2022-11-10 PROCEDURE — 80053 COMPREHEN METABOLIC PANEL: CPT | Performed by: HOSPITALIST

## 2022-11-10 PROCEDURE — 99233 SBSQ HOSP IP/OBS HIGH 50: CPT | Mod: ,,, | Performed by: REGISTERED NURSE

## 2022-11-10 RX ORDER — HYDROMORPHONE HYDROCHLORIDE 1 MG/ML
0.2 INJECTION, SOLUTION INTRAMUSCULAR; INTRAVENOUS; SUBCUTANEOUS
Status: DISCONTINUED | OUTPATIENT
Start: 2022-11-10 | End: 2022-11-15 | Stop reason: HOSPADM

## 2022-11-10 RX ORDER — LORAZEPAM 2 MG/ML
1 INJECTION INTRAMUSCULAR
Status: DISCONTINUED | OUTPATIENT
Start: 2022-11-10 | End: 2022-11-15 | Stop reason: HOSPADM

## 2022-11-10 RX ADMIN — SODIUM BICARBONATE: 84 INJECTION, SOLUTION INTRAVENOUS at 03:11

## 2022-11-10 RX ADMIN — LORAZEPAM 1 MG: 2 INJECTION INTRAMUSCULAR; INTRAVENOUS at 11:11

## 2022-11-10 RX ADMIN — CARVEDILOL 25 MG: 12.5 TABLET, FILM COATED ORAL at 08:11

## 2022-11-10 RX ADMIN — HEPARIN SODIUM 5000 UNITS: 5000 INJECTION INTRAVENOUS; SUBCUTANEOUS at 09:11

## 2022-11-10 RX ADMIN — HEPARIN SODIUM 5000 UNITS: 5000 INJECTION INTRAVENOUS; SUBCUTANEOUS at 02:11

## 2022-11-10 RX ADMIN — HEPARIN SODIUM 5000 UNITS: 5000 INJECTION INTRAVENOUS; SUBCUTANEOUS at 05:11

## 2022-11-10 RX ADMIN — FLUCONAZOLE 200 MG: 2 INJECTION, SOLUTION INTRAVENOUS at 03:11

## 2022-11-10 RX ADMIN — LEVETIRACETAM INJECTION 1000 MG: 10 INJECTION INTRAVENOUS at 09:11

## 2022-11-10 RX ADMIN — SODIUM BICARBONATE: 84 INJECTION, SOLUTION INTRAVENOUS at 08:11

## 2022-11-10 RX ADMIN — DEXTROSE: 5 SOLUTION INTRAVENOUS at 11:11

## 2022-11-10 NOTE — NURSING
Made aware that patient had a 10 beat run of vtach.   Patient assessed and vitals obtained.  MD made aware. New orders received.

## 2022-11-10 NOTE — PROGRESS NOTES
Per family desires of no alternative means of nutrition - SLP recs of NPO at this time.  Family currently in works for NH with hospice placement. Continue current comfort measures.  No further nutritional recommendations at this time. Please re-consult if further needs arise.  Nutrition to sign off at this time.  Anusha Ramirez, BS, RDN, LDN

## 2022-11-10 NOTE — ASSESSMENT & PLAN NOTE
-Present at times since recent stroke and now severe  -Seems to be struggling to even control his own secretions  -SLP consulted and discussed with them today.  Did take some sips of thickened liquids, but no solids.    -STRICT NPO for now.  -On 11/9 I discussed options with family at length - TPN, PEG, NGT vs temporary IV fluids and possible hospice.  Family above all wishes to prevent any suffering for patient.  They do not want PEG or NGT placement.  We discussed that IV fluids and treatment of his thrush with fluconazole is unlikely to correct his dysphagia.  Still, they are hopeful and wish to give it more time to see if it will help - this is reasonable.  I discussed with them that I strongly suspect his dysphagia will remain and that hospice care is most likely the appropriate care.  They remain open to that and are participating in planning for hospice at discharge in a NH.

## 2022-11-10 NOTE — PT/OT/SLP PROGRESS
Speech Language Pathology Treatment    Patient Name:  Leonides Burns   MRN:  [id number]  Admitting Diagnosis: Failure to thrive in adult    Recommendations:                 General Recommendations:  Dysphagia therapy  Diet recommendations:  NPO, Liquid Diet Level: NPO   Aspiration Precautions: Frequent oral care and Ice chips sparingly   General Precautions: Standard,    Communication strategies:  yes/no questions only    Subjective     Pt was lying in bed- asleep upon ST entrance, with dtr present at b/s. Pt's dtr stating pt with fluctuating alertness today, with pt arousing to dtr earlier and smiling/ mumbling upon dtr's questions. Pt required max verbal and tactile cues to arouse and to sustain alertness throughout tx session. Pt only able to sustain alertness for ~1 minute increments.     Patient goals: Per dtr, establish some form of pleasure feeds 2/2 d/c with hospice     Pain/Comfort:  Pain Rating 1:  (Unable to obtain 2/2 pt's dcr alertness and expressive deficits)    Respiratory Status: Room air    Objective:     Has the patient been evaluated by SLP for swallowing?   Yes  Keep patient NPO? Yes   Current Respiratory Status:         presented the follow po trials to the pt providing total A: x3 ice chips, x3 droplet sips of water, and x3 half tsp bites of pudding. Pt was accepting of initial ice chip presentation and demo ability to manipulate 2/3 ice chips properly within the oral cavity, however, last ice chip remained in the oral cavity until melted with no attempt to manipulate within the oral cavity. Anterior spillage noted with droplet straw attempts, however, pt attempted to create a labial seal with increased attempts. No overt s/s of aspiration with thin liquids noted. Pt with delayed oral prep with pureed boluses, as well as oral holding with pureed presentations. Pt required max verbal and tactile cues to clear the oral cavity of all pureed boluses. No overt s/s of aspiration with pureed trials.      ST discussed recs with the pt's dtr which included remain NPO at this time 2/2 pt's cont dcr alertness and dcr acceptance of po intake. Dtr in agreement with diet recs at this time.     Assessment:     Leonides Burns is a 77 y.o. male with a dx of Failure to thrive in adult & extension of CVA, who presents with oropharyngeal dysphagia of a yet to be determined severity, which is impacted by pt's severe deficits in alertness and cognition. ST recs pt remain NPO at this time, with non-oral meds/hydration. ST will cont to follow.     Goals:   Multidisciplinary Problems       SLP Goals          Problem: SLP    Goal Priority Disciplines Outcome   SLP Goal    Medium SLP Ongoing, Progressing   Description: Goals:  1. Pt will participate in ongoing assessment of swallow  2. Pt will participate in cognitive linguistic eval                       Plan:     Patient to be seen:  3 x/week   Plan of Care expires:  11/17/22  Plan of Care reviewed with:  patient, daughter   SLP Follow-Up:  Yes       Discharge recommendations:  other (see comments) (TBD) -per dtr home with hospice  Barriers to Discharge:  None    Time Tracking:     SLP Treatment Date:   11/10/22  Speech Start Time:  1304  Speech Stop Time:  1321     Speech Total Time (min):  17 min    Billable Minutes: Treatment Swallowing Dysfunction 17    11/10/2022

## 2022-11-10 NOTE — PLAN OF CARE
Problem: Adult Inpatient Plan of Care  Goal: Plan of Care Review  Outcome: Ongoing, Progressing     Problem: Fluid and Electrolyte Imbalance (Acute Kidney Injury/Impairment)  Goal: Fluid and Electrolyte Balance  Outcome: Ongoing, Progressing     Problem: Infection  Goal: Absence of Infection Signs and Symptoms  Outcome: Ongoing, Progressing

## 2022-11-10 NOTE — SUBJECTIVE & OBJECTIVE
Interval History: No acute events overnight.  Patient alert and appears comfortable.  Remains non-verbal.      Review of Systems   Unable to perform ROS: Mental status change   Objective:     Vital Signs (Most Recent):  Temp: 98.9 °F (37.2 °C) (11/10/22 1543)  Pulse: 61 (11/10/22 1543)  Resp: 19 (11/10/22 1543)  BP: (!) 122/56 (11/10/22 1543)  SpO2: (!) 94 % (11/10/22 1543) Vital Signs (24h Range):  Temp:  [98.3 °F (36.8 °C)-98.9 °F (37.2 °C)] 98.9 °F (37.2 °C)  Pulse:  [56-68] 61  Resp:  [16-19] 19  SpO2:  [94 %-99 %] 94 %  BP: (122-162)/(56-86) 122/56     Weight: 73.5 kg (162 lb 0.6 oz)  Body mass index is 24.64 kg/m².    Intake/Output Summary (Last 24 hours) at 11/10/2022 1700  Last data filed at 11/10/2022 1650  Gross per 24 hour   Intake --   Output 1225 ml   Net -1225 ml        Physical Exam  Vitals and nursing note reviewed.   Constitutional:       General: He is not in acute distress.     Appearance: Normal appearance. He is ill-appearing. He is not toxic-appearing.   HENT:      Head: Normocephalic and atraumatic.      Right Ear: External ear normal.      Left Ear: External ear normal.      Nose: Nose normal.      Mouth/Throat:      Mouth: Mucous membranes are moist.   Eyes:      Extraocular Movements: Extraocular movements intact.      Conjunctiva/sclera: Conjunctivae normal.   Cardiovascular:      Rate and Rhythm: Normal rate and regular rhythm.      Pulses: Normal pulses.      Heart sounds: Murmur heard.   Pulmonary:      Effort: Pulmonary effort is normal. No respiratory distress.   Abdominal:      General: Abdomen is flat.      Palpations: Abdomen is soft.      Tenderness: There is no abdominal tenderness.   Musculoskeletal:      Cervical back: No rigidity.      Right lower leg: No edema.      Left lower leg: No edema.   Skin:     General: Skin is warm.      Capillary Refill: Capillary refill takes less than 2 seconds.   Neurological:      Comments: Alert.  Follows simple commands.  Severe RUE weakness.     Unable to produce speech.       Significant Labs: All pertinent labs within the past 24 hours have been reviewed.    Significant Imaging: I have reviewed all pertinent imaging results/findings within the past 24 hours.

## 2022-11-10 NOTE — NURSING
Report received from night nurse LAURO Young. Visualized patient and assessed patient's overall condition and appearance. No acute distress noted. Will continue to monitor

## 2022-11-10 NOTE — ASSESSMENT & PLAN NOTE
-Admitted to inpatient status  -Noted recent strokes and over last 5 days not eating at all  -Noted to have Hypernatremia, hyperkalemia, renal failure and significant dysphagia  -High risk for terminal condition - and hospice likely appropriate  -Palliative care consulted and input appreciated.  -Specific treatment as below  -Will most likely need hospice care in a nursing facility

## 2022-11-10 NOTE — ASSESSMENT & PLAN NOTE
-Baseline Cr around 1.6  -Cr on admit 4.1 and now down to 2.0  -On admit had hyperkalemia, hypernatremia and anion-gap acidosis   -Anion gap has normalized.  Remains hypernatremic but improving.  NAGMA improving.  K now normal  -Avoid nephrotoxic agents and renally dose meds  -Consulted nephrology and input appreciated  -Continue D5W and bicarb infusions  -Monitor BMP   -Consult nephrology.

## 2022-11-10 NOTE — PLAN OF CARE
[facility] - Telemetry  Discharge Reassessment    Primary Care Provider: Suzette Akhtar MD    Expected Discharge Date: 11/11/2022    Reassessment (most recent)       Discharge Reassessment - 11/10/22 1221          Discharge Reassessment    Assessment Type Discharge Planning Reassessment     Discharge Plan discussed with: Adult children     Discharge Plan A Other   NH with Hospice services    Discharge Plan B Other   NH w/ Hospice services    DME Needed Upon Discharge  other (see comments)   TBD    Discharge Barriers Identified None     Why the patient remains in the hospital Requires continued medical care        Post-Acute Status    Coverage [employer]     Discharge Delays None known at this time                   SW spoke with patient's son (Santi ) via phone. SW explained there is 3 NH that's interested in patient and provided names: [facility], [facility] and [facility]. Patient's son (Santi) stated family is currently taking a tour at [facility]. SW explained to patient's son that these are the 3 [facility] that accepted patient and he would have to chose from the 3. Patient's son stated the 3 facilities are the one that had bad reviews from previous admit. SW explained that he sent patient's NH referral to [facility] in the [facility]. Patient's son inquired if [employer] will pay for NH? SW explained to patient's son [employer] will not be paying for NH placement, and NH admission person will discuss further with son. Patient stated he thought patient will going with hospice services? SW explained that can go with hospice services to NH, and hospice agency will bill patient's Medicare for hospice services. Patient's son inquired about Medicare paying for NH? SW explained that the NH admission person will explained the process with family. Patient's son voiced understanding and that he will contact LACY back.

## 2022-11-10 NOTE — SUBJECTIVE & OBJECTIVE
Past Medical History:   Diagnosis Date    Disorder of kidney and ureter     Elevated PSA     Glaucoma     Hyperlipidemia     Hypertension     Psoriasis      Past Surgical History:   Procedure Laterality Date    FUNCTIONAL ENDOSCOPIC SINUS SURGERY (FESS) USING COMPUTER-ASSISTED NAVIGATION N/A 2/28/2019    Procedure: FESS, USING COMPUTER-ASSISTED NAVIGATION (ADD ON );  Surgeon: Mikael Serrano MD;  Location: [facility];  Service: ENT;  Laterality: N/A;  (ADD ON )    NASAL SEPTOPLASTY N/A 2/28/2019    Procedure: SEPTOPLASTY, NOSE;  Surgeon: Mikael Serrano MD;  Location: [facility];  Service: ENT;  Laterality: N/A;     Review of patient's allergies indicates:   Allergen Reactions    Latex, natural rubber Rash    Iodine and iodide containing products Rash     Medications:  Continuous Infusions:   dextrose 5 % 100 mL/hr at 11/09/22 0900    sodium bicarbonate drip 50 mL/hr at 11/10/22 0841     Scheduled Meds:   atorvastatin  40 mg Oral QHS    carvediloL  25 mg Oral BID    fluconazole  200 mg Intravenous Q24H    heparin (porcine)  5,000 Units Subcutaneous Q8H    levetiracetam IV  1,000 mg Intravenous Q12H    polyethylene glycol  17 g Oral Daily     PRN Meds:acetaminophen, acetaminophen, albuterol-ipratropium, aluminum-magnesium hydroxide-simethicone, bisacodyL, [COMPLETED] calcium gluconate IVPB **AND** calcium gluconate IVPB, dextrose 10%, dextrose 10%, dextrose 10%, dextrose 10%, glucagon (human recombinant), glucose, glucose, HYDROmorphone, lorazepam, melatonin, naloxone, ondansetron, prochlorperazine, simethicone, sodium chloride 0.9%    Family History       Problem Relation (Age of Onset)    Anemia Sister, Daughter    Cancer Brother    Diabetes Mother    KEVIN disease Sister    Hypertension Mother, Sister    No Known Problems Father, Son          Tobacco Use    Smoking status: Some Days     Packs/day: 0.50     Years: 40.00     Pack years: 20.00     Types: Cigarettes    Smokeless tobacco: Current   Substance  and Sexual Activity    Alcohol use: Yes     Alcohol/week: 9.0 standard drinks     Types: 9 Cans of beer per week    Drug use: No    Sexual activity: Yes     Partners: Female     Review of Systems   Unable to perform ROS: Other (advanced dysphagia related to stroke)   Objective:     Vital Signs (Most Recent):  Temp: 98.4 °F (36.9 °C) (11/10/22 0745)  Pulse: 65 (11/10/22 0745)  Resp: 19 (11/10/22 0745)  BP: (!) 128/59 (11/10/22 0745)  SpO2: 95 % (11/10/22 0745)   Vital Signs (24h Range):  Temp:  [97.5 °F (36.4 °C)-98.4 °F (36.9 °C)] 98.4 °F (36.9 °C)  Pulse:  [56-84] 65  Resp:  [16-20] 19  SpO2:  [95 %-99 %] 95 %  BP: (128-162)/(59-86) 128/59     Weight: 73.5 kg (162 lb 0.6 oz)  Body mass index is 24.64 kg/m².    Physical Exam  Vitals and nursing note reviewed.   HENT:      Head: Normocephalic and atraumatic.      Mouth/Throat:      Pharynx: Oropharyngeal exudate and posterior oropharyngeal erythema present.      Comments: thrush  Pulmonary:      Effort: Pulmonary effort is normal.      Comments: RA  Musculoskeletal:      Right lower leg: No edema.      Left lower leg: No edema.   Neurological:      Mental Status: He is alert.      Comments: Unable to fully assess due to dysphagia and minimally verbal.  Will occasionally nod head yes/no and attempt to say 1 word statements.        Significant Labs: All pertinent labs within the past 24 hours have been reviewed.  CBC:   Recent Labs   Lab 11/10/22  0411   WBC 7.47   HGB 13.8*   HCT 42.8   MCV 82          BMP:  Recent Labs   Lab 11/09/22  2014 11/10/22  0026 11/10/22  0802   GLU 98   < > 103   *   < > 154*   K 4.5   < > 3.9   *   < > 121*   CO2 24   < > 25   BUN 68*   < > 47*   CREATININE 2.4*   < > 2.1*   CALCIUM 8.6*   < > 8.4*   MG 2.6  --   --     < > = values in this interval not displayed.       LFT:  Lab Results   Component Value Date    AST 42 (H) 11/10/2022    ALKPHOS 96 11/10/2022    BILITOT 0.3 11/10/2022     Albumin:   Albumin   Date Value  Ref Range Status   11/10/2022 2.5 (L) 3.5 - 5.2 g/dL Final     Protein:   Total Protein   Date Value Ref Range Status   11/10/2022 6.6 6.0 - 8.4 g/dL Final     Lactic acid:   Lab Results   Component Value Date    LACTATE 1.6 11/07/2022       Significant Imaging: I have reviewed all pertinent imaging results/findings within the past 24 hours.

## 2022-11-10 NOTE — PROGRESS NOTES
[facility] Medicine  Progress Note    Patient Name: Leonides Burns  MRN: [id number]  Patient Class: IP- Inpatient   Admission Date: 11/7/2022  Length of Stay: 3 days  Attending Physician: Emilio Hyatt MD  Primary Care Provider: Suzette Akhtar MD        Subjective:     Principal Problem:Failure to thrive in adult        HPI:  This is a 77 year old male with a PMHx of CVA, CKD3, HLD, HTN, gout, tobacco use who presents with failure to thrive.     Patient is non verbal. Presented from a nursing home with inability to tolerate PO for 5 days duration. Per documentation, patient chokes on liquids, and honey thick liquids. In the ED, he was hypotensive (90/55), tachypnic, but otherwise stable. Labs showed hypernatremia (159), hyperkalemia (6.7), elevated creatinine (4.6- baseline of 1.6), negative troponin, negative lactic acid, polycythemia (20.3). CXR was negative. CT head showed Large regions of new worsening parenchymal hypoattenuation within the left cerebral hemisphere consistent with acute/recent infarction. He was given insulin/albuterol and fluids. The patient was admitted for further management.       Overview/Hospital Course:  No notes on file    Interval History: No acute events overnight.  Patient alert and appears comfortable.  Remains non-verbal.      Review of Systems   Unable to perform ROS: Mental status change   Objective:     Vital Signs (Most Recent):  Temp: 98.9 °F (37.2 °C) (11/10/22 1543)  Pulse: 61 (11/10/22 1543)  Resp: 19 (11/10/22 1543)  BP: (!) 122/56 (11/10/22 1543)  SpO2: (!) 94 % (11/10/22 1543) Vital Signs (24h Range):  Temp:  [98.3 °F (36.8 °C)-98.9 °F (37.2 °C)] 98.9 °F (37.2 °C)  Pulse:  [56-68] 61  Resp:  [16-19] 19  SpO2:  [94 %-99 %] 94 %  BP: (122-162)/(56-86) 122/56     Weight: 73.5 kg (162 lb 0.6 oz)  Body mass index is 24.64 kg/m².    Intake/Output Summary (Last 24 hours) at 11/10/2022 1700  Last data filed at 11/10/2022 1650  Gross per 24 hour    Intake --   Output 1225 ml   Net -1225 ml        Physical Exam  Vitals and nursing note reviewed.   Constitutional:       General: He is not in acute distress.     Appearance: Normal appearance. He is ill-appearing. He is not toxic-appearing.   HENT:      Head: Normocephalic and atraumatic.      Right Ear: External ear normal.      Left Ear: External ear normal.      Nose: Nose normal.      Mouth/Throat:      Mouth: Mucous membranes are moist.   Eyes:      Extraocular Movements: Extraocular movements intact.      Conjunctiva/sclera: Conjunctivae normal.   Cardiovascular:      Rate and Rhythm: Normal rate and regular rhythm.      Pulses: Normal pulses.      Heart sounds: Murmur heard.   Pulmonary:      Effort: Pulmonary effort is normal. No respiratory distress.   Abdominal:      General: Abdomen is flat.      Palpations: Abdomen is soft.      Tenderness: There is no abdominal tenderness.   Musculoskeletal:      Cervical back: No rigidity.      Right lower leg: No edema.      Left lower leg: No edema.   Skin:     General: Skin is warm.      Capillary Refill: Capillary refill takes less than 2 seconds.   Neurological:      Comments: Alert.  Follows simple commands.  Severe RUE weakness.    Unable to produce speech.       Significant Labs: All pertinent labs within the past 24 hours have been reviewed.    Significant Imaging: I have reviewed all pertinent imaging results/findings within the past 24 hours.      Assessment/Plan:      * Failure to thrive in adult  -Admitted to inpatient status  -Noted recent strokes and over last 5 days not eating at all  -Noted to have Hypernatremia, hyperkalemia, renal failure and significant dysphagia  -High risk for terminal condition - and hospice likely appropriate  -Palliative care consulted and input appreciated.  -Specific treatment as below  -Will most likely need hospice care in a nursing facility    CVA (cerebral vascular accident)  -Noted history of CVA with recent  hospitalization for an acute stroke (D/c 10/28) Plan was for plavix x 21 days, aspirin and statin  -CT head showed large regions of new worsening parenchymal hypoattenuation within the left cerebral hemisphere consistent with acute/recent infarction  -Consulted PT, OT, SLP and neurology.  Appreciate consultants assistance  -No new recommendations from neurology - noted evolution of prior stroke and continued dysphagia  -Presently unable to take oral meds - so holding aspirin, plavix and statin  -Resume home medications when able    Dysphagia  -Present at times since recent stroke and now severe  -Seems to be struggling to even control his own secretions  -SLP consulted and discussed with them today.  Did take some sips of thickened liquids, but no solids.    -STRICT NPO for now.  -On 11/9 I discussed options with family at length - TPN, PEG, NGT vs temporary IV fluids and possible hospice.  Family above all wishes to prevent any suffering for patient.  They do not want PEG or NGT placement.  We discussed that IV fluids and treatment of his thrush with fluconazole is unlikely to correct his dysphagia.  Still, they are hopeful and wish to give it more time to see if it will help - this is reasonable.  I discussed with them that I strongly suspect his dysphagia will remain and that hospice care is most likely the appropriate care.  They remain open to that and are participating in planning for hospice at discharge in a NH.    Acute renal failure superimposed on stage 3 chronic kidney disease  -Baseline Cr around 1.6  -Cr on admit 4.1 and now down to 2.0  -On admit had hyperkalemia, hypernatremia and anion-gap acidosis   -Anion gap has normalized.  Remains hypernatremic but improving.  NAGMA improving.  K now normal  -Avoid nephrotoxic agents and renally dose meds  -Consulted nephrology and input appreciated  -Continue D5W and bicarb infusions  -Monitor BMP   -Consult nephrology.    Hyperkalemia  -Treatment as  above.    Hypernatremia  -Due to severe dehydration and diminished oral intake  -Treat as above.    Diarrhea  -Noted one large episode 11/8 and nursing felt it smelt of C.diff  -No further diarrhea  -Ordered stool wbc, culture and C.diff on 11/8 but after 24 hours no further diarrhea.    ACP (advance care planning)  -Palliative care consulted and input appreciated.  -Family wishes for patient to be DNR and to have active medical treatment at this time.  Their highest priority is prevent suffering and they not wish for NGT or PEG placement.  They are very hopeful that he will improve with IV fluids and be able to eat and drink again.  Discussed at length that I too am hopeful, but do not expect that to occur.  Family actively participating in discussions.    -Suspect we will ultimately need to move towards hospice care in nursing home at discharge   -Spent 25 minutes in goals of care and ACP planning 11/9.    DNR (do not resuscitate)  -Noted as above.    Seizure disorder  -Continue IV keppra    Chronic gout without tophus  -Resume home allopurinol when/if able    Hyperlipidemia  -Resume statin when/if able      VTE Risk Mitigation (From admission, onward)         Ordered     heparin (porcine) injection 5,000 Units  Every 8 hours         11/07/22 2306     IP VTE HIGH RISK PATIENT  Once         11/07/22 2306     Place sequential compression device  Until discontinued         11/07/22 2306                Discharge Planning   DEVIKA: 11/11/2022     Code Status: DNR   Is the patient medically ready for discharge?:     Reason for patient still in hospital (select all that apply): Treatment  Discharge Plan A: Other (NH with Hospice services)   Discharge Delays: None known at this time              Emilio Hyatt MD  Department of Hospital Medicine   [facility] - Telemetry

## 2022-11-10 NOTE — PLAN OF CARE
LACY spoke with patient's son (Santi) via phone for follow up with NH w/ Hospice decision. Patient's son stated he does not like [facility] and [facility] is kind of far for his family. LACY explained to patient's son that he will need a choice by tomorrow as patient will be medically cleared from MD. Patient's son inquired that patient is suppose to be going hospice? LACY voiced understanding, and explained to patient son that patient can to a nursing home with hospice but need family to make a decision from the 3 accepting NH before arranging hospice services. Patient's son asked about more NH choices. LACY explained to patient's son that SW sent out patient's information to 40 Nursing Homes in the [facility] and only 3 nursing homes medically accepted patient. LACY explained to patient's son that the other option is for patient to return home with home hospice. Patient's son stated that patient is not able to return home. Patient's son inquired about inpatient hospice? LACY explained that patient does not meet inpatient hospice criteria. Patient's son stated what you mean patient does not meet inpatient criteria because he is needing hospice? LACY explained to patient's son that patient is not actively passing away. Patient's son stated then why is patient needing hospice? LACY explained that patient can still have hospice services while living and that patient's can live 2 or more months. Patient's son stated he does not understand. LACY explained he will have the N.P. meet with patient to discuss further.        LACY received call from Yahaira ([employer] ). LACY asked for Yahaira ([place] ) to contact patient's family to schedule a tour. Yahaira stated she will contact patient's family to arrange tour.    LACY met with patient's family with N.P. N.P. explained to patient's family active and passive passing re: hospice. Patient's son inquired about NH not accepting patient due to patient being a smoker. LACY  explained to patent's son that Nursing home do not have to give a reason for not accepting patient. Patient's son stated the last time patient was here SW asked about patient being a smoker? ALCY stated yeah, the SNF placement did asks if patient was a smoker. Patient's son stated if it document in patient's chart that he is a smoker then the Nursing Homes will not accept patient. N.P. stated that she will document in patient's chart that patient is not a smoker anymore and will discuss with MD to document that patient is not a smoker to see if it would assist with Nursing Home placement. Patient's son also asked for SW to provide list of places that denied NH placement so he is able to contact the Nursing Homes directly. Patient's family member asked for a list of Nursing Homes also.     SW provided patient's family with a senior resource Guide that has list of nursing homes and the nursing home denial list from [facility].

## 2022-11-10 NOTE — PLAN OF CARE
Problem: SLP  Goal: SLP Goal  Description: Goals:  1. Pt will participate in ongoing assessment of swallow  2. Pt will participate in cognitive linguistic eval  Outcome: Ongoing, Progressing    Pt should remain NPO, with non oral meds/hydration. Pt with cont dcr alertness and is not safe for po intake at this time.

## 2022-11-10 NOTE — PROGRESS NOTES
[place] [facility]  Palliative Medicine  Progress Note    Patient Name: Leonides Burns  MRN: [id number]  Admission Date: 11/7/2022  Hospital Length of Stay: 3 days  Code Status: DNR   Attending Provider: Emilio Hyatt MD  Consulting Provider: Emelia Horton NP  Primary Care Physician: Suzette Akhtar MD  Principal Problem:Failure to thrive in adult    Patient information was obtained from relative(s) and primary team.      Assessment/Plan:   Advance Care Planning   Palliative Encounter  Date: 11/10/2022   - interval chart reviewed in detail; discussed pt with primary, nephrology, and CM/SW; pt was not accepted to inpatient hospice however was medically accepted to 2 additional NH for detention placement; CM/SW has been unable to reach family to discuss this option further; family wishes to continue medical optimization for the next few days per family while coordinating detention placement with CM   - assessed pt and attempted to discuss discharge plans with sons but no family present at bedside at that time   - PRN comfort medications, Ativan and dilaudid, ordered if needed while still inpatient and awaiting transition to hospice, pt continues to appear free from non-verbal signs of distress/discomfort   - later placed call to patient's son Christiano to discuss ongoing plan of care for alternative NH placement with hospice, was unable to reach but will continue to try and follow up with family tomorrow   - emotional support provided; allowed time for questions/concerns, all addressed     Addendum:   - Met with pt's son, daughter, and daughter in law upon request of pt's nurse; Son concerned of options for placement; most family lives out of state and they have not been able to establish a plan for home hospice that they feel is sufficient care for pt's needs; son expressed frustration with process and has concerns that pt's smoking status is affecting his acceptance to facilities as the patient  no longer smokes   - in depth conversation with family and case management to work through placement difficulties  - reassured family that the pt is currently and will continue while present at facility, receiving the care he needs  - per families request notified Dr. Hyatt (primary), Rabia Mayer (CM supervisor), and Zahraa Duran (patient relations) of families concerns; smoking status updated to non-smoker per primary; will allow case  and patient relations to discuss further with family      Palliative Encounter  Date: 11/9/2022   - interval chart reviewed in detail; discussed pt with primary and SLP  - met with son Christiano and his wife at bedside and then in family waiting area for continued GOC/ACP discussion  - in depth discussion of pt's care up to this point and assisting family in talking through the decision making process   - end of life nutritional need discussion/education; upon further family discussion family agrees to no artifical means for nutrition but are open to comfort feeds once under hospice care   - family has decided that care at home would not be in pt's best interest due to care needs at this time, opting for NH with hospice but do not wish to return to previous nursing home, [facility], due to concerns for care provided and visiting restrictions since pt will be transitioning to hospice care; discussed option of inpatient hospice since not proceeding with any artificial means of nutrition and pt's acuity of care, pt may be appropriate for inpatient hospice, which family was very relieved and comforted to hear; ongoing communication with family and CM/SW about NH placement options otherwise   - emotional support provided; allowed time for questions/concerns all addressed   - on going communication with primary, CM/SW, and family throughout the day regarding coordination of transition to hospice; awaiting inpatient hospice approval or alternate NH with hospice placement      Palliative Consult:  Date: 11/08/2022  - consult received; pt known to myself and palliative team from previous admit 10/2022; interval chart reviewed in detail; discussed pt with Dr. Hyatt (primary)   - pt was discharged at the end of Oct. to SNF for post stroke rehab; now returns following nurses's concern for chocking while eating/drinking; pt also required ED care on 10/31 for low O2 sats  - met with patient and son LeonidesJr., at bedside; introduction to palliative medicine team and role in current care and admission; Christiano (son) is en route from [place] currently; began GOC/ACP discussion with Zander Vu, with understanding that no decisions would be made without pt's other son Christiano and daughter Felix being included/updated   - discussed pt/progress/decline since discharge; Leonides Vu. With good insight that pt's condition is decline and he is not progressing as well as MDT and family had hoped in rehab; Leonides shared that he is aware that this is not a life pt would wish for himself as his independence was very important to him; he is concerned since he has seen pt's decline since stroke in person, that his understanding of this differs from his other siblings but is hopeful that they will have better understanding once present and provided with medical updates   - in depth discussion of trajectory of pt's condition and concerns that continued aggressive treatment and rehab would lead to more burden on pt, without potential for meaningful rehab to previous independent state; discussed risks associated with artificial feeding options for pt and end of life nutritional needs   - planned for continued GOC/ACP discussion, including code status and recommendation for DNR once sonChristiano, arrives  - emotional support provided   - Allowed time for questions/concerns; all addressed; expressed availability of myself/palliative team for additional questions/concerns     Failure to thrive in adult  - Presented  "with inability to tolerate po intake (possibly related to thrush? See below), renal failure and hyperkalemia   - SLP consulted/follow; recommendations for continued NPO   - see ACP discussion above   - noted; management per primary with support from palliative on GOC with family      EDGAR (acute kidney injury)  Hyperkalemia  - minimal continued PO liquid intake, refused this AM per nurse; loose stools resolved   - nephrology consulted/following; treating dehydration with fluids   - will plan to include continued GOC/ACP discussion with family      Seizure disorder  - experienced sezires during last admit, no known episodes of seizures or seizure like activity since discharge   - continuing Keppra  - noted; management per primary      CVA (cerebral vascular accident)  - History of CVA, recent hospitalization for an acute stroke (10/9-10/28) with discharge to SNF for rehab  - CT head: Large regions of new worsening parenchymal hypoattenuation within the left cerebral hemisphere consistent with acute/recent infarction  - Neurology consulted   - SLP, PT, OT consulted/following   - transparent conversation with Leonides Blackmon (son) about my concern for new/worseing infarctions on CT scan and decline in functional status since last discharge; he has good insight into this (see ACP above)   - noted; management per primary and neurology      Oral Candidiasis  - IV fluconazole as SLP recommending against swish and swallow at this time  - possibly contributing to pt's decreased PO intake  - son reports pt grimaces with PO intake for past week or more      Chronic gout without tophu  Hyperlipidemia  - chronic histories noted; management per primary     Thank you for your consult. I will follow-up with patient. Please contact us if you have any additional questions.    Subjective:     HPI:   From H&P: "This is a 77 year old male with a PMHx of CVA, CKD3, HLD, HTN, gout, tobacco use who presents with failure to thrive.      Patient " "is non verbal. Presented from a nursing home with inability to tolerate PO for 5 days duration. Per documentation, patient chokes on liquids, and honey thick liquids. In the ED, he was hypotensive (90/55), tachypnic, but otherwise stable. Labs showed hypernatremia (159), hyperkalemia (6.7), elevated creatinine (4.6- baseline of 1.6), negative troponin, negative lactic acid, polycythemia (20.3). CXR was negative. CT head showed Large regions of new worsening parenchymal hypoattenuation within the left cerebral hemisphere consistent with acute/recent infarction. He was given insulin/albuterol and fluids. The patient was admitted for further management. "     Palliative medicine consulted for advance care planning and continuity of care; Met with pt at bedside; for details of visit, see advance care planning section of plan.       Hospital Course:  No notes on file    Past Medical History:   Diagnosis Date    Disorder of kidney and ureter     Elevated PSA     Glaucoma     Hyperlipidemia     Hypertension     Psoriasis      Past Surgical History:   Procedure Laterality Date    FUNCTIONAL ENDOSCOPIC SINUS SURGERY (FESS) USING COMPUTER-ASSISTED NAVIGATION N/A 2/28/2019    Procedure: FESS, USING COMPUTER-ASSISTED NAVIGATION (ADD ON );  Surgeon: Mikeal Serrano MD;  Location: [facility];  Service: ENT;  Laterality: N/A;  (ADD ON )    NASAL SEPTOPLASTY N/A 2/28/2019    Procedure: SEPTOPLASTY, NOSE;  Surgeon: Mikael Serrano MD;  Location: [facility] [place] [facility];  Service: ENT;  Laterality: N/A;     Review of patient's allergies indicates:   Allergen Reactions    Latex, natural rubber Rash    Iodine and iodide containing products Rash     Medications:  Continuous Infusions:   dextrose 5 % 100 mL/hr at 11/09/22 0900    sodium bicarbonate drip 50 mL/hr at 11/10/22 0841     Scheduled Meds:   atorvastatin  40 mg Oral QHS    carvediloL  25 mg Oral BID    fluconazole  200 mg Intravenous Q24H    heparin (porcine)  5,000 Units " Subcutaneous Q8H    levetiracetam IV  1,000 mg Intravenous Q12H    polyethylene glycol  17 g Oral Daily     PRN Meds:acetaminophen, acetaminophen, albuterol-ipratropium, aluminum-magnesium hydroxide-simethicone, bisacodyL, [COMPLETED] calcium gluconate IVPB **AND** calcium gluconate IVPB, dextrose 10%, dextrose 10%, dextrose 10%, dextrose 10%, glucagon (human recombinant), glucose, glucose, HYDROmorphone, lorazepam, melatonin, naloxone, ondansetron, prochlorperazine, simethicone, sodium chloride 0.9%    Family History       Problem Relation (Age of Onset)    Anemia Sister, Daughter    Cancer Brother    Diabetes Mother    KEVIN disease Sister    Hypertension Mother, Sister    No Known Problems Father, Son          Tobacco Use    Smoking status: Some Days     Packs/day: 0.50     Years: 40.00     Pack years: 20.00     Types: Cigarettes    Smokeless tobacco: Current   Substance and Sexual Activity    Alcohol use: Yes     Alcohol/week: 9.0 standard drinks     Types: 9 Cans of beer per week    Drug use: No    Sexual activity: Yes     Partners: Female     Review of Systems   Unable to perform ROS: Other (advanced dysphagia related to stroke)   Objective:     Vital Signs (Most Recent):  Temp: 98.4 °F (36.9 °C) (11/10/22 0745)  Pulse: 65 (11/10/22 0745)  Resp: 19 (11/10/22 0745)  BP: (!) 128/59 (11/10/22 0745)  SpO2: 95 % (11/10/22 0745)   Vital Signs (24h Range):  Temp:  [97.5 °F (36.4 °C)-98.4 °F (36.9 °C)] 98.4 °F (36.9 °C)  Pulse:  [56-84] 65  Resp:  [16-20] 19  SpO2:  [95 %-99 %] 95 %  BP: (128-162)/(59-86) 128/59     Weight: 73.5 kg (162 lb 0.6 oz)  Body mass index is 24.64 kg/m².    Physical Exam  Vitals and nursing note reviewed.   HENT:      Head: Normocephalic and atraumatic.      Mouth/Throat:      Pharynx: Oropharyngeal exudate and posterior oropharyngeal erythema present.      Comments: thrush  Pulmonary:      Effort: Pulmonary effort is normal.      Comments: RA  Musculoskeletal:      Right lower leg: No edema.       Left lower leg: No edema.   Neurological:      Mental Status: He is alert.      Comments: Unable to fully assess due to dysphagia and minimally verbal.  Will occasionally nod head yes/no and attempt to say 1 word statements.        Significant Labs: All pertinent labs within the past 24 hours have been reviewed.  CBC:   Recent Labs   Lab 11/10/22  0411   WBC 7.47   HGB 13.8*   HCT 42.8   MCV 82          BMP:  Recent Labs   Lab 11/09/22  2014 11/10/22  0026 11/10/22  0802   GLU 98   < > 103   *   < > 154*   K 4.5   < > 3.9   *   < > 121*   CO2 24   < > 25   BUN 68*   < > 47*   CREATININE 2.4*   < > 2.1*   CALCIUM 8.6*   < > 8.4*   MG 2.6  --   --     < > = values in this interval not displayed.       LFT:  Lab Results   Component Value Date    AST 42 (H) 11/10/2022    ALKPHOS 96 11/10/2022    BILITOT 0.3 11/10/2022     Albumin:   Albumin   Date Value Ref Range Status   11/10/2022 2.5 (L) 3.5 - 5.2 g/dL Final     Protein:   Total Protein   Date Value Ref Range Status   11/10/2022 6.6 6.0 - 8.4 g/dL Final     Lactic acid:   Lab Results   Component Value Date    LACTATE 1.6 11/07/2022       Significant Imaging: I have reviewed all pertinent imaging results/findings within the past 24 hours.      Total visit time: 65 minutes    > 50% of  35 min visit spent in chart review, face to face discussion of symptom assessment, coordination of care with other specialists, and discharge planning.    30 min ACP time spent: goals of care, emotional support, formulating and communicating prognosis, exploring burden/ benefit of various approaches of treatment.    Emelia Horton NP  Palliative Medicine  [facility]

## 2022-11-10 NOTE — PLAN OF CARE
LACY spoke with Sondra ([facility]) who informed SW that IV fluids for rehydration would not qualify patient for inpatient hospice.     LACY received call from Tr ([facility]) asking who she can reach out to discuss finances with and when patient would be ready for dc. LACY provided Tr with patient's son names and contact # and that patient would be ready for dc today/tomorrow. Tr explained that she will contact patient's family.     LACY informed by Livia ([facility] that she is scheduled to meet with patient today.

## 2022-11-11 LAB
ALBUMIN SERPL BCP-MCNC: 2.6 G/DL (ref 3.5–5.2)
ALP SERPL-CCNC: 96 U/L (ref 55–135)
ALT SERPL W/O P-5'-P-CCNC: 33 U/L (ref 10–44)
ANION GAP SERPL CALC-SCNC: 10 MMOL/L (ref 8–16)
ANION GAP SERPL CALC-SCNC: 11 MMOL/L (ref 8–16)
ANION GAP SERPL CALC-SCNC: 11 MMOL/L (ref 8–16)
ANION GAP SERPL CALC-SCNC: 6 MMOL/L (ref 8–16)
AST SERPL-CCNC: 47 U/L (ref 10–40)
BASOPHILS # BLD AUTO: 0.1 K/UL (ref 0–0.2)
BASOPHILS NFR BLD: 1.2 % (ref 0–1.9)
BILIRUB SERPL-MCNC: 0.6 MG/DL (ref 0.1–1)
BUN SERPL-MCNC: 27 MG/DL (ref 8–23)
BUN SERPL-MCNC: 31 MG/DL (ref 8–23)
BUN SERPL-MCNC: 32 MG/DL (ref 8–23)
BUN SERPL-MCNC: 36 MG/DL (ref 8–23)
CALCIUM SERPL-MCNC: 8.5 MG/DL (ref 8.7–10.5)
CALCIUM SERPL-MCNC: 8.6 MG/DL (ref 8.7–10.5)
CALCIUM SERPL-MCNC: 8.7 MG/DL (ref 8.7–10.5)
CALCIUM SERPL-MCNC: 9.4 MG/DL (ref 8.7–10.5)
CHLORIDE SERPL-SCNC: 113 MMOL/L (ref 95–110)
CHLORIDE SERPL-SCNC: 115 MMOL/L (ref 95–110)
CHLORIDE SERPL-SCNC: 115 MMOL/L (ref 95–110)
CHLORIDE SERPL-SCNC: 118 MMOL/L (ref 95–110)
CO2 SERPL-SCNC: 21 MMOL/L (ref 23–29)
CO2 SERPL-SCNC: 22 MMOL/L (ref 23–29)
CO2 SERPL-SCNC: 23 MMOL/L (ref 23–29)
CO2 SERPL-SCNC: 26 MMOL/L (ref 23–29)
CREAT SERPL-MCNC: 1.7 MG/DL (ref 0.5–1.4)
CREAT SERPL-MCNC: 1.8 MG/DL (ref 0.5–1.4)
CREAT SERPL-MCNC: 2 MG/DL (ref 0.5–1.4)
CREAT SERPL-MCNC: 2 MG/DL (ref 0.5–1.4)
DIFFERENTIAL METHOD: ABNORMAL
EOSINOPHIL # BLD AUTO: 0.8 K/UL (ref 0–0.5)
EOSINOPHIL NFR BLD: 9.4 % (ref 0–8)
ERYTHROCYTE [DISTWIDTH] IN BLOOD BY AUTOMATED COUNT: 19.6 % (ref 11.5–14.5)
EST. GFR  (NO RACE VARIABLE): 34 ML/MIN/1.73 M^2
EST. GFR  (NO RACE VARIABLE): 34 ML/MIN/1.73 M^2
EST. GFR  (NO RACE VARIABLE): 38 ML/MIN/1.73 M^2
EST. GFR  (NO RACE VARIABLE): 41 ML/MIN/1.73 M^2
GLUCOSE SERPL-MCNC: 100 MG/DL (ref 70–110)
GLUCOSE SERPL-MCNC: 102 MG/DL (ref 70–110)
GLUCOSE SERPL-MCNC: 87 MG/DL (ref 70–110)
GLUCOSE SERPL-MCNC: 96 MG/DL (ref 70–110)
HCT VFR BLD AUTO: 45.7 % (ref 40–54)
HGB BLD-MCNC: 14 G/DL (ref 14–18)
IMM GRANULOCYTES # BLD AUTO: 0.03 K/UL (ref 0–0.04)
IMM GRANULOCYTES NFR BLD AUTO: 0.4 % (ref 0–0.5)
LYMPHOCYTES # BLD AUTO: 2.3 K/UL (ref 1–4.8)
LYMPHOCYTES NFR BLD: 28 % (ref 18–48)
MCH RBC QN AUTO: 25.4 PG (ref 27–31)
MCHC RBC AUTO-ENTMCNC: 30.6 G/DL (ref 32–36)
MCV RBC AUTO: 83 FL (ref 82–98)
MONOCYTES # BLD AUTO: 1.5 K/UL (ref 0.3–1)
MONOCYTES NFR BLD: 18.5 % (ref 4–15)
NEUTROPHILS # BLD AUTO: 3.5 K/UL (ref 1.8–7.7)
NEUTROPHILS NFR BLD: 42.5 % (ref 38–73)
NRBC BLD-RTO: 0 /100 WBC
PLATELET # BLD AUTO: 213 K/UL (ref 150–450)
PMV BLD AUTO: ABNORMAL FL (ref 9.2–12.9)
POCT GLUCOSE: 92 MG/DL (ref 70–110)
POCT GLUCOSE: 92 MG/DL (ref 70–110)
POCT GLUCOSE: 97 MG/DL (ref 70–110)
POCT GLUCOSE: 99 MG/DL (ref 70–110)
POTASSIUM SERPL-SCNC: 4 MMOL/L (ref 3.5–5.1)
POTASSIUM SERPL-SCNC: 4.1 MMOL/L (ref 3.5–5.1)
POTASSIUM SERPL-SCNC: 4.3 MMOL/L (ref 3.5–5.1)
POTASSIUM SERPL-SCNC: 4.3 MMOL/L (ref 3.5–5.1)
PROT SERPL-MCNC: 6.8 G/DL (ref 6–8.4)
RBC # BLD AUTO: 5.51 M/UL (ref 4.6–6.2)
SODIUM SERPL-SCNC: 144 MMOL/L (ref 136–145)
SODIUM SERPL-SCNC: 147 MMOL/L (ref 136–145)
SODIUM SERPL-SCNC: 149 MMOL/L (ref 136–145)
SODIUM SERPL-SCNC: 151 MMOL/L (ref 136–145)
WBC # BLD AUTO: 8.15 K/UL (ref 3.9–12.7)

## 2022-11-11 PROCEDURE — 63600175 PHARM REV CODE 636 W HCPCS: Performed by: HOSPITALIST

## 2022-11-11 PROCEDURE — 85025 COMPLETE CBC W/AUTO DIFF WBC: CPT | Performed by: HOSPITALIST

## 2022-11-11 PROCEDURE — 25000003 PHARM REV CODE 250: Performed by: INTERNAL MEDICINE

## 2022-11-11 PROCEDURE — 30200315 PPD INTRADERMAL TEST REV CODE 302: Performed by: HOSPITALIST

## 2022-11-11 PROCEDURE — 21400001 HC TELEMETRY ROOM

## 2022-11-11 PROCEDURE — 99233 PR SUBSEQUENT HOSPITAL CARE,LEVL III: ICD-10-PCS | Mod: ,,, | Performed by: REGISTERED NURSE

## 2022-11-11 PROCEDURE — 25000003 PHARM REV CODE 250: Performed by: HOSPITALIST

## 2022-11-11 PROCEDURE — 80053 COMPREHEN METABOLIC PANEL: CPT | Performed by: HOSPITALIST

## 2022-11-11 PROCEDURE — 63600175 PHARM REV CODE 636 W HCPCS: Performed by: REGISTERED NURSE

## 2022-11-11 PROCEDURE — 63600175 PHARM REV CODE 636 W HCPCS: Performed by: STUDENT IN AN ORGANIZED HEALTH CARE EDUCATION/TRAINING PROGRAM

## 2022-11-11 PROCEDURE — 86580 TB INTRADERMAL TEST: CPT | Performed by: HOSPITALIST

## 2022-11-11 PROCEDURE — 36415 COLL VENOUS BLD VENIPUNCTURE: CPT | Performed by: HOSPITALIST

## 2022-11-11 PROCEDURE — 99233 SBSQ HOSP IP/OBS HIGH 50: CPT | Mod: ,,, | Performed by: REGISTERED NURSE

## 2022-11-11 PROCEDURE — 80048 BASIC METABOLIC PNL TOTAL CA: CPT | Mod: XB | Performed by: HOSPITALIST

## 2022-11-11 PROCEDURE — 92526 ORAL FUNCTION THERAPY: CPT

## 2022-11-11 RX ORDER — SCOLOPAMINE TRANSDERMAL SYSTEM 1 MG/1
1 PATCH, EXTENDED RELEASE TRANSDERMAL
Status: DISCONTINUED | OUTPATIENT
Start: 2022-11-11 | End: 2022-11-15 | Stop reason: HOSPADM

## 2022-11-11 RX ADMIN — DEXTROSE: 5 SOLUTION INTRAVENOUS at 09:11

## 2022-11-11 RX ADMIN — FLUCONAZOLE 200 MG: 2 INJECTION, SOLUTION INTRAVENOUS at 01:11

## 2022-11-11 RX ADMIN — LEVETIRACETAM INJECTION 1000 MG: 10 INJECTION INTRAVENOUS at 09:11

## 2022-11-11 RX ADMIN — HEPARIN SODIUM 5000 UNITS: 5000 INJECTION INTRAVENOUS; SUBCUTANEOUS at 05:11

## 2022-11-11 RX ADMIN — HEPARIN SODIUM 5000 UNITS: 5000 INJECTION INTRAVENOUS; SUBCUTANEOUS at 09:11

## 2022-11-11 RX ADMIN — SCOPOLAMINE 1 PATCH: 1 SYSTEM TRANSDERMAL at 11:11

## 2022-11-11 RX ADMIN — LORAZEPAM 1 MG: 2 INJECTION INTRAMUSCULAR; INTRAVENOUS at 02:11

## 2022-11-11 RX ADMIN — TUBERCULIN PURIFIED PROTEIN DERIVATIVE 5 UNITS: 5 INJECTION, SOLUTION INTRADERMAL at 12:11

## 2022-11-11 RX ADMIN — HEPARIN SODIUM 5000 UNITS: 5000 INJECTION INTRAVENOUS; SUBCUTANEOUS at 01:11

## 2022-11-11 NOTE — SUBJECTIVE & OBJECTIVE
Interval History: No acute events overnight.  Patient alert and appears comfortable but still struggling to control secretions and cough/swallowing often.  Remains non-verbal but follows commands.  Daughter at bedside.  All questions answered and patient had no further complaints.    Review of Systems   Unable to perform ROS: Mental status change   Objective:     Vital Signs (Most Recent):  Temp: 97.8 °F (36.6 °C) (11/11/22 0752)  Pulse: 64 (11/11/22 0752)  Resp: 19 (11/11/22 0752)  BP: (!) 146/72 (11/11/22 0752)  SpO2: (!) 93 % (11/11/22 0752) Vital Signs (24h Range):  Temp:  [97.8 °F (36.6 °C)-98.9 °F (37.2 °C)] 97.8 °F (36.6 °C)  Pulse:  [60-66] 64  Resp:  [16-20] 19  SpO2:  [93 %-98 %] 93 %  BP: (122-175)/(56-86) 146/72     Weight: 73.5 kg (162 lb 0.6 oz)  Body mass index is 24.64 kg/m².    Intake/Output Summary (Last 24 hours) at 11/11/2022 1120  Last data filed at 11/11/2022 1007  Gross per 24 hour   Intake 5104.48 ml   Output 1000 ml   Net 4104.48 ml        Physical Exam  Vitals and nursing note reviewed.   Constitutional:       General: He is not in acute distress.     Appearance: Normal appearance. He is ill-appearing. He is not toxic-appearing.   HENT:      Head: Normocephalic and atraumatic.      Right Ear: External ear normal.      Left Ear: External ear normal.      Nose: Nose normal.      Mouth/Throat:      Mouth: Mucous membranes are moist.   Eyes:      Extraocular Movements: Extraocular movements intact.      Conjunctiva/sclera: Conjunctivae normal.   Cardiovascular:      Rate and Rhythm: Normal rate and regular rhythm.      Pulses: Normal pulses.      Heart sounds: Murmur heard.   Pulmonary:      Effort: Pulmonary effort is normal. No respiratory distress.   Abdominal:      General: Abdomen is flat.      Palpations: Abdomen is soft.      Tenderness: There is no abdominal tenderness.   Musculoskeletal:      Cervical back: No rigidity.      Right lower leg: No edema.      Left lower leg: No edema.    Skin:     General: Skin is warm.      Capillary Refill: Capillary refill takes less than 2 seconds.   Neurological:      Comments: Alert.  Follows simple commands.  Severe RUE weakness.    Unable to produce speech.       Significant Labs: All pertinent labs within the past 24 hours have been reviewed.    Significant Imaging: I have reviewed all pertinent imaging results/findings within the past 24 hours.

## 2022-11-11 NOTE — ASSESSMENT & PLAN NOTE
-Noted history of CVA with recent hospitalization for an acute stroke (D/c 10/28) Plan was for plavix x 21 days, aspirin and statin  -CT head showed large regions of new worsening parenchymal hypoattenuation within the left cerebral hemisphere consistent with acute/recent infarction  -Consulted PT, OT, SLP and neurology.  Appreciate consultants assistance  -No new recommendations from neurology - noted evolution of prior stroke and continued dysphagia  -Remains unable to take oral meds - so holding aspirin, plavix and statin.  -Resume home medications when/if able, but unlikely that he will regain this function

## 2022-11-11 NOTE — PLAN OF CARE
"Received call from Jina with [facility] stated patient medically accepted and has attempted to contact patient's son, with no answer.     TN to contact pt's son to notified of acceptance.     11:02am Spoke with Jina with [facility], pt's son Santi visited the facility and stated he will discuss with family for decision. TN to continue to follow up.    11:24am Spoke with patient's son, Santi, stated he is accepting to NH placement with [facility] and is completing admission paperwork this morning. Physician notified. Per physician, sodium elevated and will need at least another day of IV fluids. TN to confirm with [facility] if patient could admit over the weekend. TN to continue to follow.     1:26pm [facility] request chest X-Ray or PPD, PASSR , 142, Covid Vaccination records, if available, 2 Covid negatives (if you have one from admission & one within 24 hours of his admit here)  Last 7 day MARS, signed DC orders need to say the following: "Discharge to [facility], Consult Hospice, Admit if appropriate"     Placed call to Jina with [facility] to inquired if pt could admit over weekend, left detailed voice message    1:52pm Received return call from Jina with [facility] stated cannot admit over weekend, and plan for admission on Monday.   "

## 2022-11-11 NOTE — ASSESSMENT & PLAN NOTE
-Noted one large episode 11/8 and nursing felt it smelt of C.diff  -No further diarrhea  -Ordered stool wbc, culture and C.diff on 11/8 but after 24 hours no further diarrhea.   Home Discharged

## 2022-11-11 NOTE — HOSPITAL COURSE
77 year old man with CVA, CKDIII, HTN, HLD, and Gout who presented from outside SNF for evaluation of diminished oral intake.  He was diagnosed with severe recurrent dysphagia due to recent stroke, oral thrush, acute on chronic kidney disease, hyperkalemia, hypernatremia and severe dehydration.  His dysphagia was present prior to SNF due to his stroke, but unfortunately it is so severe he can barely manage his own secretions.  SLP, Neuro, nephrology and palliative care consulted.  Treated with IV fluids and acidosis and hyperkalemia have resolved.  Na and Cr are much improved.  He is DNR status and family wishes to prioritize comfort and avoidance of suffering.  Plan to discharge back to nursing home with hospice care.

## 2022-11-11 NOTE — PT/OT/SLP PROGRESS
Speech Language Pathology Treatment    Patient Name:  Leonides Burns   MRN:  [id number]  Admitting Diagnosis: Failure to thrive in adult    Recommendations:                 General Recommendations:  Dysphagia therapy  Diet recommendations:  NPO, Pleasure Feeding, Liquid Diet Level: NPO   Aspiration Precautions: Frequent oral care and Ice chips sparingly   General Precautions: Standard,    Communication strategies:  yes/no questions only    Subjective     Upon ST entrance, pt was alert and awake with dtr and son at b/s. ST noted pt's improvement in alertness, with pt able to sustain alertness for increased increments of time. Pt's family stating has been awake and attempting to get out of bed all morning.     Patient goals: Per family, establish pleasure feeds      Pain/Comfort:  Pain Rating 1: 0/10    Respiratory Status: Room air    Objective:     Has the patient been evaluated by SLP for swallowing?   Yes  Keep patient NPO? Yes   Current Respiratory Status:        The pt consumed the following po items: x2 ice chips, X2 droplet sips of water, and x2 tsp bites of pudding. Pt with dcr acceptance across all trials, requiring max verbal cues to encourage intake. Pt with anterior spillage noted with ice chip and water trials 2/2 R sided facial weakness. ST noted pt holding pureed trials in the oral cavity, with resultant anterior spillage 1/2 times with pudding. Pt minimally receptive to ST's verbal prompts to manipulate all boluses in the oral cavity and initiate swallows. ST deferred remainder of po intake 2/2 pt's cont dcr acceptance.     ST provided extensive education (ST spent ~30 minutes providing education) to the pt's son and dtr regarding recs to initiate some pureed pleasure feds, although, emphasis was on pt not being able to eat orally at this time to sustain nutritional level. ST discussed feeding strategies when attempting ice chips, water, and pureed items, with emphasis on not forcing the pt to eat/drink  if he is not accepting. Both son and dtr verbalized an understanding and were agreeable with cont NPO recs, with initiation of some pureed pleasure feeds.    MD notified of recs for remaining NPO with pureed for pleasure sparingly.     Assessment:     Leonides Burns is a 77 y.o. male with a dx of Failure to thrive in adult, and extension of previous CVA who presents with dcr acceptance and motivation to consume po intake. Pt also with severe oral dysphagia which impacts safety with po intake. ST recs cont NPO, with meds/hydration via IV. Pt may have pureed items and ice chips sparingly for pleasure. ST will cont to follow.     Goals:   Multidisciplinary Problems       SLP Goals          Problem: SLP    Goal Priority Disciplines Outcome   SLP Goal    Medium SLP Ongoing, Progressing   Description: Goals:  1. Pt will participate in ongoing assessment of swallow  2. Pt will participate in cognitive linguistic eval- Discont 11/11                       Plan:     Patient to be seen:  3 x/week   Plan of Care expires:  11/17/22  Plan of Care reviewed with:  patient, daughter   SLP Follow-Up:  Yes       Discharge recommendations:  other (see comments) (TBD)   Barriers to Discharge:  None    Time Tracking:     SLP Treatment Date:   11/11/22  Speech Start Time:  1305  Speech Stop Time:  1350     Speech Total Time (min):  45 min    Billable Minutes: Treatment Swallowing Dysfunction 15 and Self Care/Home Management Training 30    11/11/2022

## 2022-11-11 NOTE — ASSESSMENT & PLAN NOTE
-Present at times since recent stroke and now severe  -Seems to be struggling to even control his own secretions  -SLP consulted and discussed with them today.  Did take some sips of thickened liquids, but no solids.    -STRICT NPO for now.  -On 11/9 I discussed options with family at length - TPN, PEG, NGT vs temporary IV fluids and possible hospice.  Family above all wishes to prevent any suffering for patient.  They do not want PEG or NGT placement.  We discussed that IV fluids and treatment of his thrush with fluconazole is unlikely to correct his dysphagia.  Still, they are hopeful and wish to give it more time to see if it will help - this is reasonable.  I discussed with them that I strongly suspect his dysphagia will remain and that hospice care is most likely the appropriate care.  They remain open to that and are participating in planning for hospice at discharge in a NH.  -Add scopolamine patch

## 2022-11-11 NOTE — PLAN OF CARE
Long conversation with decision maker:  Pt son Santi Burns and his sig other (Blank) [phone].     Son lives in [place], but is willing to stay with pt as long as necessary.      Explained to son that inpatient hospice is not an option at this time as pt is not actively dying.  IP Hosp[facility] at [facility] and [facility] have denied to accept pt. All questions answered, pt verbalizes understanding.    Prior to [facility] pt lived at [facility] on [address].  Son is checking into options to live with pt at his previous Assisted Living with home hospice as plan A.     Plan B will be Nursing Home with hospice.  Son is not agreeable to [facility] due to their limitations on visiting hours.  Explained to son that all nursing homes will have restrictions and do not allow family members to stay 24/7 with residents.  Questions answered and son verbalized understanding.       At this time there are 3 accepting NH:    [facility] - son is considering  [facility] - son planning to tour [facility] - family refuses    Family requests secondary calls to see if pt could be considered at:  [facility]  [facility]  CRISTINA Olmos will call these facilities and chart updates.    LAURO Lee Case Manager will followup with pt son today to see what progress has been made regarding family choice of facility.     CM to continue to follow.

## 2022-11-11 NOTE — ASSESSMENT & PLAN NOTE
-Palliative care consulted and input appreciated.  -Family wishes for patient to be DNR and to have active medical treatment at this time.  Their highest priority is prevent suffering and they not wish for NGT or PEG placement.  They are very hopeful that he will improve with IV fluids and be able to eat and drink again.  Discussed at length that I too am hopeful, but do not expect that to occur.  Family actively participating in discussions and reviewing nursing home options.    -Plan is for hospice in nursing home at discharge  -Spent 25 minutes in goals of care and ACP planning 11/9.

## 2022-11-11 NOTE — ASSESSMENT & PLAN NOTE
-Admitted to inpatient status  -Noted recent strokes and over last 5 days not eating at all  -Noted to have Hypernatremia, hyperkalemia, renal failure and significant dysphagia  -High risk for terminal condition - and hospice likely appropriate  -Palliative care consulted and input appreciated.  -Specific treatment as below  -Working to discharge to hospice in nursing home.

## 2022-11-11 NOTE — PROGRESS NOTES
[facility] Medicine  Progress Note    Patient Name: Leonides Burns  MRN: [id number]  Patient Class: IP- Inpatient   Admission Date: 11/7/2022  Length of Stay: 4 days  Attending Physician: Emilio Hyatt MD  Primary Care Provider: Suzette Akhtar MD        Subjective:     Principal Problem:Failure to thrive in adult        HPI:  This is a 77 year old male with a PMHx of CVA, CKD3, HLD, HTN, gout, tobacco use who presents with failure to thrive.     Patient is non verbal. Presented from a nursing home with inability to tolerate PO for 5 days duration. Per documentation, patient chokes on liquids, and honey thick liquids. In the ED, he was hypotensive (90/55), tachypnic, but otherwise stable. Labs showed hypernatremia (159), hyperkalemia (6.7), elevated creatinine (4.6- baseline of 1.6), negative troponin, negative lactic acid, polycythemia (20.3). CXR was negative. CT head showed Large regions of new worsening parenchymal hypoattenuation within the left cerebral hemisphere consistent with acute/recent infarction. He was given insulin/albuterol and fluids. The patient was admitted for further management.       Overview/Hospital Course:  No notes on file    Interval History: No acute events overnight.  Patient alert and appears comfortable but still struggling to control secretions and cough/swallowing often.  Remains non-verbal but follows commands.  Daughter at bedside.  All questions answered and patient had no further complaints.    Review of Systems   Unable to perform ROS: Mental status change   Objective:     Vital Signs (Most Recent):  Temp: 97.8 °F (36.6 °C) (11/11/22 0752)  Pulse: 64 (11/11/22 0752)  Resp: 19 (11/11/22 0752)  BP: (!) 146/72 (11/11/22 0752)  SpO2: (!) 93 % (11/11/22 0752) Vital Signs (24h Range):  Temp:  [97.8 °F (36.6 °C)-98.9 °F (37.2 °C)] 97.8 °F (36.6 °C)  Pulse:  [60-66] 64  Resp:  [16-20] 19  SpO2:  [93 %-98 %] 93 %  BP: (122-175)/(56-86) 146/72      Weight: 73.5 kg (162 lb 0.6 oz)  Body mass index is 24.64 kg/m².    Intake/Output Summary (Last 24 hours) at 11/11/2022 1120  Last data filed at 11/11/2022 1007  Gross per 24 hour   Intake 5104.48 ml   Output 1000 ml   Net 4104.48 ml        Physical Exam  Vitals and nursing note reviewed.   Constitutional:       General: He is not in acute distress.     Appearance: Normal appearance. He is ill-appearing. He is not toxic-appearing.   HENT:      Head: Normocephalic and atraumatic.      Right Ear: External ear normal.      Left Ear: External ear normal.      Nose: Nose normal.      Mouth/Throat:      Mouth: Mucous membranes are moist.   Eyes:      Extraocular Movements: Extraocular movements intact.      Conjunctiva/sclera: Conjunctivae normal.   Cardiovascular:      Rate and Rhythm: Normal rate and regular rhythm.      Pulses: Normal pulses.      Heart sounds: Murmur heard.   Pulmonary:      Effort: Pulmonary effort is normal. No respiratory distress.   Abdominal:      General: Abdomen is flat.      Palpations: Abdomen is soft.      Tenderness: There is no abdominal tenderness.   Musculoskeletal:      Cervical back: No rigidity.      Right lower leg: No edema.      Left lower leg: No edema.   Skin:     General: Skin is warm.      Capillary Refill: Capillary refill takes less than 2 seconds.   Neurological:      Comments: Alert.  Follows simple commands.  Severe RUE weakness.    Unable to produce speech.       Significant Labs: All pertinent labs within the past 24 hours have been reviewed.    Significant Imaging: I have reviewed all pertinent imaging results/findings within the past 24 hours.      Assessment/Plan:      * Failure to thrive in adult  -Admitted to inpatient status  -Noted recent strokes and over last 5 days not eating at all  -Noted to have Hypernatremia, hyperkalemia, renal failure and significant dysphagia  -High risk for terminal condition - and hospice likely appropriate  -Palliative care  consulted and input appreciated.  -Specific treatment as below  -Working to discharge to hospice in nursing home.    CVA (cerebral vascular accident)  -Noted history of CVA with recent hospitalization for an acute stroke (D/c 10/28) Plan was for plavix x 21 days, aspirin and statin  -CT head showed large regions of new worsening parenchymal hypoattenuation within the left cerebral hemisphere consistent with acute/recent infarction  -Consulted PT, OT, SLP and neurology.  Appreciate consultants assistance  -No new recommendations from neurology - noted evolution of prior stroke and continued dysphagia  -Remains unable to take oral meds - so holding aspirin, plavix and statin.  -Resume home medications when/if able, but unlikely that he will regain this function    Dysphagia  -Present at times since recent stroke and now severe  -Seems to be struggling to even control his own secretions  -SLP consulted and discussed with them today.  Did take some sips of thickened liquids, but no solids.    -STRICT NPO for now.  -On 11/9 I discussed options with family at length - TPN, PEG, NGT vs temporary IV fluids and possible hospice.  Family above all wishes to prevent any suffering for patient.  They do not want PEG or NGT placement.  We discussed that IV fluids and treatment of his thrush with fluconazole is unlikely to correct his dysphagia.  Still, they are hopeful and wish to give it more time to see if it will help - this is reasonable.  I discussed with them that I strongly suspect his dysphagia will remain and that hospice care is most likely the appropriate care.  They remain open to that and are participating in planning for hospice at discharge in a NH.  -Add scopolamine patch    Acute renal failure superimposed on stage 3 chronic kidney disease  -Baseline Cr around 1.6  -Cr on admit 4.1 and now down to 1.8  -On admit had hyperkalemia, hypernatremia and anion-gap acidosis   -Anion gap has normalized.  Remains  hypernatremic but improving - now 147.  NAGMA resolved as of 11/11.  K remains normal.  -Avoid nephrotoxic agents and renally dose meds  -Consulted nephrology and input appreciated  -Continue D5W and bicarb infusions  -Monitor BMP     Hyperkalemia  -Treatment as above.    Hypernatremia  -Due to severe dehydration and diminished oral intake  -Treat as above.    Diarrhea  -Noted one large episode 11/8 and nursing felt it smelt of C.diff  -No further diarrhea  -Ordered stool wbc, culture and C.diff on 11/8 but after 24 hours no further diarrhea.    ACP (advance care planning)  -Palliative care consulted and input appreciated.  -Family wishes for patient to be DNR and to have active medical treatment at this time.  Their highest priority is prevent suffering and they not wish for NGT or PEG placement.  They are very hopeful that he will improve with IV fluids and be able to eat and drink again.  Discussed at length that I too am hopeful, but do not expect that to occur.  Family actively participating in discussions and reviewing nursing home options.    -Plan is for hospice in nursing home at discharge  -Spent 25 minutes in goals of care and ACP planning 11/9.    DNR (do not resuscitate)  -Noted as above.    Seizure disorder  -Continue IV keppra    Chronic gout without tophus  -Resume home allopurinol when/if able    Hyperlipidemia  -Resume statin when/if able      VTE Risk Mitigation (From admission, onward)         Ordered     heparin (porcine) injection 5,000 Units  Every 8 hours         11/07/22 2306     IP VTE HIGH RISK PATIENT  Once         11/07/22 2306     Place sequential compression device  Until discontinued         11/07/22 2306                Discharge Planning   DEVIKA: 11/11/2022     Code Status: DNR   Is the patient medically ready for discharge?:     Reason for patient still in hospital (select all that apply): Treatment  Discharge Plan A: Other (NH with Hospice services)   Discharge Delays: None known at  this time              Emilio Hyatt MD  Department of Hospital Medicine   [facility] - Telemetry

## 2022-11-11 NOTE — NURSING
Bedside Report given to night nurse LAURO Young. Walking rounds completed. Visualized and assessed patient NAD noted. Safety precautions maintained and call light within reach.     Chart check completed.

## 2022-11-11 NOTE — PLAN OF CARE
Problem: Adult Inpatient Plan of Care  Goal: Plan of Care Review  Outcome: Ongoing, Progressing  Goal: Patient-Specific Goal (Individualized)  Outcome: Ongoing, Progressing  Goal: Optimal Comfort and Wellbeing  Outcome: Ongoing, Progressing  Goal: Readiness for Transition of Care  Outcome: Ongoing, Progressing     Problem: Coping Ineffective  Goal: Effective Coping  Outcome: Ongoing, Progressing     Problem: Fluid and Electrolyte Imbalance (Acute Kidney Injury/Impairment)  Goal: Fluid and Electrolyte Balance  Outcome: Ongoing, Progressing     Problem: Renal Function Impairment (Acute Kidney Injury/Impairment)  Goal: Effective Renal Function  Outcome: Ongoing, Progressing     Problem: Fall Injury Risk  Goal: Absence of Fall and Fall-Related Injury  Outcome: Ongoing, Progressing

## 2022-11-11 NOTE — PROGRESS NOTES
Nursing Home Placement with Hospice :    [facility] - Willing to accept.   (Patient son stated that facility is kind of far for him and his family)    [facility] - Willing to accept.   (Patient son stated that he does not like this facility)    [facility] - Interested but need more info,   requesting some lab results. [facility] to send when collected     [facility] - Spoke with Leyda who stated   that Pat has been trying to reach out to patient family to   discuss financials. Said they will call again this morning.     [facility] -    [place] - [place] with Dinorah in admissions, will e-mail   referral to her this morning.     [facility] - Spoke with Ind[facility], stated referral currently in review     [facility] - Denied due to care needs exceeding current capacity     [facility] - No avail bed     [facility] - No residential hospice beds    [facility] - No avail beds     [facility] - Unable to provide Nursing Home Placement     [facility]  - No avail bed    [facility] - Denied, care needs exceed current capacity    [facility] - Denied, unable to meet needs    [facility] - No avail beds    [facility] - No residential beds    [facility] - Denied, care needs exceed current capacity    [facility] - No avail beds    [facility] -

## 2022-11-11 NOTE — SUBJECTIVE & OBJECTIVE
Past Medical History:   Diagnosis Date    Disorder of kidney and ureter     Elevated PSA     Glaucoma     Hyperlipidemia     Hypertension     Psoriasis      Past Surgical History:   Procedure Laterality Date    FUNCTIONAL ENDOSCOPIC SINUS SURGERY (FESS) USING COMPUTER-ASSISTED NAVIGATION N/A 2/28/2019    Procedure: FESS, USING COMPUTER-ASSISTED NAVIGATION (ADD ON );  Surgeon: Mikael Serrano MD;  Location: [facility];  Service: ENT;  Laterality: N/A;  (ADD ON )    NASAL SEPTOPLASTY N/A 2/28/2019    Procedure: SEPTOPLASTY, NOSE;  Surgeon: Mikael Serrano MD;  Location: [facility];  Service: ENT;  Laterality: N/A;     Review of patient's allergies indicates:   Allergen Reactions    Latex, natural rubber Rash    Iodine and iodide containing products Rash     Medications:  Continuous Infusions:   dextrose 5 % 100 mL/hr at 11/10/22 1149     Scheduled Meds:   fluconazole  200 mg Intravenous Q24H    heparin (porcine)  5,000 Units Subcutaneous Q8H    levetiracetam IV  1,000 mg Intravenous Q12H    scopolamine  1 patch Transdermal Q3 Days     PRN Meds:albuterol-ipratropium, bisacodyL, dextrose 10%, dextrose 10%, dextrose 10%, dextrose 10%, glucagon (human recombinant), glucose, glucose, HYDROmorphone, lorazepam, naloxone, ondansetron, prochlorperazine, sodium chloride 0.9%    Family History       Problem Relation (Age of Onset)    Anemia Sister, Daughter    Cancer Brother    Diabetes Mother    KEVIN disease Sister    Hypertension Mother, Sister    No Known Problems Father, Son          Tobacco Use    Smoking status: Former     Years: 40.00     Types: Cigarettes    Smokeless tobacco: Current   Substance and Sexual Activity    Alcohol use: Yes     Alcohol/week: 9.0 standard drinks     Types: 9 Cans of beer per week    Drug use: No    Sexual activity: Yes     Partners: Female     Review of Systems   Unable to perform ROS: Other (advanced dysphagia related to stroke)   Objective:     Vital Signs (Most Recent):  Temp: 98.4  °F (36.9 °C) (11/11/22 1131)  Pulse: 64 (11/11/22 1131)  Resp: 19 (11/11/22 1131)  BP: 126/78 (11/11/22 1131)  SpO2: 95 % (11/11/22 1131)   Vital Signs (24h Range):  Temp:  [97.8 °F (36.6 °C)-98.9 °F (37.2 °C)] 98.4 °F (36.9 °C)  Pulse:  [60-66] 64  Resp:  [16-20] 19  SpO2:  [93 %-98 %] 95 %  BP: (122-175)/(56-86) 126/78     Weight: 73.5 kg (162 lb 0.6 oz)  Body mass index is 24.64 kg/m².    Physical Exam  Vitals and nursing note reviewed.   HENT:      Head: Normocephalic and atraumatic.      Mouth/Throat:      Pharynx: Oropharyngeal exudate and posterior oropharyngeal erythema present.      Comments: thrush  Pulmonary:      Effort: Pulmonary effort is normal.      Comments: RA  Musculoskeletal:      Right lower leg: No edema.      Left lower leg: No edema.   Neurological:      Mental Status: He is alert.      Comments: Unable to fully assess due to dysphagia and minimally verbal.  Will occasionally nod head yes/no and attempt to say 1 word statements.        Significant Labs: All pertinent labs within the past 24 hours have been reviewed.  CBC:   Recent Labs   Lab 11/11/22  0441   WBC 8.15   HGB 14.0   HCT 45.7   MCV 83          BMP:  Recent Labs   Lab 11/11/22  0809      *   K 4.0   *   CO2 26   BUN 31*   CREATININE 1.8*   CALCIUM 8.5*       LFT:  Lab Results   Component Value Date    AST 47 (H) 11/11/2022    ALKPHOS 96 11/11/2022    BILITOT 0.6 11/11/2022     Albumin:   Albumin   Date Value Ref Range Status   11/11/2022 2.6 (L) 3.5 - 5.2 g/dL Final     Protein:   Total Protein   Date Value Ref Range Status   11/11/2022 6.8 6.0 - 8.4 g/dL Final     Lactic acid:   Lab Results   Component Value Date    LACTATE 1.6 11/07/2022       Significant Imaging: I have reviewed all pertinent imaging results/findings within the past 24 hours.

## 2022-11-11 NOTE — PLAN OF CARE
Problem: SLP  Goal: SLP Goal  Description: Goals:  1. Pt will participate in ongoing assessment of swallow  2. Pt will participate in cognitive linguistic eval- Discont 11/11  Outcome: Ongoing, Progressing    Pt can begin pureed for pleasure at this time. Present po intake only if pt is accepting.

## 2022-11-11 NOTE — PROGRESS NOTES
Date of Admission:11/7/2022    SUBJECTIVE:sleeping comfortably in bed  Children at bedside    Current Facility-Administered Medications   Medication    albuterol-ipratropium 2.5 mg-0.5 mg/3 mL nebulizer solution 3 mL    bisacodyL suppository 10 mg    dextrose 10% bolus 125 mL    dextrose 10% bolus 125 mL    dextrose 10% bolus 250 mL    dextrose 10% bolus 250 mL    dextrose 5 % infusion    fluconazole (DIFLUCAN) IVPB 200 mg    glucagon (human recombinant) injection 1 mg    glucose chewable tablet 16 g    glucose chewable tablet 24 g    heparin (porcine) injection 5,000 Units    HYDROmorphone injection 0.2 mg    levETIRAcetam in NaCl (iso-os) IVPB 1,000 mg    LORazepam injection 1 mg    naloxone 0.4 mg/mL injection 0.02 mg    ondansetron injection 4 mg    prochlorperazine injection Soln 5 mg    scopolamine 1.3-1.5 mg (1 mg over 3 days) 1 patch    sodium bicarbonate 50 mEq in dextrose 5 % 1,000 mL infusion    sodium chloride 0.9% flush 10 mL    tuberculin injection 5 Units       Wt Readings from Last 3 Encounters:   11/08/22 73.5 kg (162 lb 0.6 oz)   10/28/22 82.3 kg (181 lb 7 oz)   10/09/22 92.5 kg (204 lb)     Temp Readings from Last 3 Encounters:   11/11/22 98.4 °F (36.9 °C)   10/28/22 97.6 °F (36.4 °C) (Oral)   10/09/22 97.7 °F (36.5 °C)     BP Readings from Last 3 Encounters:   11/11/22 126/78   10/28/22 125/75   10/09/22 (!) 163/77     Pulse Readings from Last 3 Encounters:   11/11/22 64   10/28/22 (!) 54   10/09/22 74       Intake/Output Summary (Last 24 hours) at 11/11/2022 1132  Last data filed at 11/11/2022 1007  Gross per 24 hour   Intake 5104.48 ml   Output 1000 ml   Net 4104.48 ml         PE:  GEN:wd male in nad  HEENT:ncat,eyes closed,mm  CVS:[id number]  PULM:ctab  ABD:bs,soft  EXT:no leg edema  NEURO:somnolent    Recent Labs   Lab 11/11/22  0809      *   K 4.0   *   CO2 26   BUN 31*   CREATININE 1.8*   CALCIUM 8.5*         Lab Results   Component Value Date    .5 (H) 11/08/2022     CALCIUM 8.5 (L) 11/11/2022    PHOS 4.3 11/09/2022       Recent Labs   Lab 11/11/22  0441   WBC 8.15   RBC 5.51   HGB 14.0   HCT 45.7      MCV 83   MCH 25.4*   MCHC 30.6*             A/P:   1.jaylene. on ckd3. Dry. Cont hydration. Better.  2.hypernatremia. Cont ivfs. Sodium down.  3.met acidosis.diarrhea better. Resolved.  4.htn.no ace/arb.  5.cva. cont tx per primary.  6.hyperphospatemia.  chi level better.  7.proteinuria. ck level pending.  8.2nd hyperpara. Ck vit d.

## 2022-11-11 NOTE — ASSESSMENT & PLAN NOTE
-Baseline Cr around 1.6  -Cr on admit 4.1 and now down to 1.8  -On admit had hyperkalemia, hypernatremia and anion-gap acidosis   -Anion gap has normalized.  Remains hypernatremic but improving - now 147.  NAGMA resolved as of 11/11.  K remains normal.  -Avoid nephrotoxic agents and renally dose meds  -Consulted nephrology and input appreciated  -Continue D5W and bicarb infusions  -Monitor BMP

## 2022-11-11 NOTE — PROGRESS NOTES
[facility]  Palliative Medicine  Progress Note    Patient Name: Leonides Burns  MRN: [id number]  Admission Date: 11/7/2022  Hospital Length of Stay: 4 days  Code Status: DNR   Attending Provider: Emilio Hyatt MD  Consulting Provider: Emelia Horton NP  Primary Care Physician: Suzette Akhtar MD  Principal Problem:Failure to thrive in adult    Patient information was obtained from relative(s) and primary team.      Assessment/Plan:   Advance Care Planning   Palliative Encounter  Date: 11/11/2022   - interval chart reviewed in detail; discussed pt with primary, CM, and patient relation   - met with pt, daughter, and son at bedside; pt participating in speech therapy   - daughter stated her brother, Santi was coordinating with NH   - followed up with son Santi later in day to confirm things were going well with establishing [facility] nursing care and after meeting with CM team today; Santi was feeling much relief and confidence in new plan for transfer to [facility] for [facility] NH with Hospice; he confirms he has signed hospice paperwork and is confident in that POC as well  - allowed time for questions and concerns regarding hospice care; all addressed   - emotional support provided     Palliative Encounter  Date: 11/10/2022   - interval chart reviewed in detail; discussed pt with primary, nephrology, and CM/SW; pt was not accepted to inpatient hospice however was medically accepted to 2 additional NH for [facility] placement; CM/SW has been unable to reach family to discuss this option further; family wishes to continue medical optimization for the next few days per family while coordinating [facility] placement with CM   - assessed pt and attempted to discuss discharge plans with sons but no family present at bedside at that time   - PRN comfort medications, Ativan and dilaudid, ordered if needed while still inpatient and awaiting transition to hospice, pt continues to  appear free from non-verbal signs of distress/discomfort   - later placed call to patient's son Christiano to discuss ongoing plan of care for alternative NH placement with hospice, was unable to reach but will continue to try and follow up with family tomorrow   - emotional support provided; allowed time for questions/concerns, all addressed     Addendum:   - Met with pt's son, daughter, and daughter in law upon request of pt's nurse; Son concerned of options for placement; most family lives out of state and they have not been able to establish a plan for home hospice that they feel is sufficient care for pt's needs; son expressed frustration with process and has concerns that pt's smoking status is affecting his acceptance to facilities as the patient no longer smokes   - in depth conversation with family and case management to work through placement difficulties  - reassured family that the pt is currently and will continue while present at facility, receiving the care he needs  - per families request notified Dr. Hyatt (primary), Rabia Mayer (CM supervisor), and Zahraa Duran (patient relations) of families concerns; smoking status updated to non-smoker per primary; will allow case  and patient relations to discuss further with family      Palliative Encounter  Date: 11/9/2022   - interval chart reviewed in detail; discussed pt with primary and SLP  - met with son Christiano and his wife at bedside and then in family waiting area for continued GOC/ACP discussion  - in depth discussion of pt's care up to this point and assisting family in talking through the decision making process   - end of life nutritional need discussion/education; upon further family discussion family agrees to no artifical means for nutrition but are open to comfort feeds once under hospice care   - family has decided that care at home would not be in pt's best interest due to care needs at this time, opting for NH with  hospice but do not wish to return to previous nursing home, [facility], due to concerns for care provided and visiting restrictions since pt will be transitioning to hospice care; discussed option of inpatient hospice since not proceeding with any artificial means of nutrition and pt's acuity of care, pt may be appropriate for inpatient hospice, which family was very relieved and comforted to hear; ongoing communication with family and CM/SW about NH placement options otherwise   - emotional support provided; allowed time for questions/concerns all addressed   - on going communication with primary, CM/SW, and family throughout the day regarding coordination of transition to hospice; awaiting inpatient hospice approval or alternate NH with hospice placement     Palliative Consult:  Date: 11/08/2022  - consult received; pt known to myself and palliative team from previous admit 10/2022; interval chart reviewed in detail; discussed pt with Dr. Hyatt (primary)   - pt was discharged at the end of Oct. to SNF for post stroke rehab; now returns following nurses's concern for chocking while eating/drinking; pt also required ED care on 10/31 for low O2 sats  - met with patient and son Jr. Leonides, at bedside; introduction to palliative medicine team and role in current care and admission; Christiano (son) is en route from [place] currently; began GOC/ACP discussion with Zander Vu, with understanding that no decisions would be made without pt's other son Christiano and daughter Felix being included/updated   - discussed pt/progress/decline since discharge; Leonides Blackmon With good insight that pt's condition is decline and he is not progressing as well as MDT and family had hoped in rehab; Leonides shared that he is aware that this is not a life pt would wish for himself as his independence was very important to him; he is concerned since he has seen pt's decline since stroke in person, that his understanding of this differs from his other  siblings but is hopeful that they will have better understanding once present and provided with medical updates   - in depth discussion of trajectory of pt's condition and concerns that continued aggressive treatment and rehab would lead to more burden on pt, without potential for meaningful rehab to previous independent state; discussed risks associated with artificial feeding options for pt and end of life nutritional needs   - planned for continued GOC/ACP discussion, including code status and recommendation for DNR once son, Christiano, arrives  - emotional support provided   - Allowed time for questions/concerns; all addressed; expressed availability of myself/palliative team for additional questions/concerns     Failure to thrive in adult  - Presented with inability to tolerate po intake (possibly related to thrush? See below), renal failure and hyperkalemia   - SLP consulted/follow; recommendations for continued NPO   - see ACP discussion above   - noted; management per primary with support from palliative on GOC with family      EDGAR (acute kidney injury)  Hyperkalemia  - minimal continued PO liquid intake, refused this AM per nurse; loose stools resolved   - nephrology consulted/following; treating dehydration with fluids   - will plan to include continued GOC/ACP discussion with family      Seizure disorder  - experienced sezires during last admit, no known episodes of seizures or seizure like activity since discharge   - continuing Keppra  - noted; management per primary      CVA (cerebral vascular accident)  - History of CVA, recent hospitalization for an acute stroke (10/9-10/28) with discharge to SNF for rehab  - CT head: Large regions of new worsening parenchymal hypoattenuation within the left cerebral hemisphere consistent with acute/recent infarction  - Neurology consulted   - SLP, PT, OT consulted/following   - transparent conversation with Leonides Blackmon (son) about my concern for new/worseing infarctions  "on CT scan and decline in functional status since last discharge; he has good insight into this (see ACP above)   - noted; management per primary and neurology      Oral Candidiasis  - IV fluconazole as SLP recommending against swish and swallow at this time  - possibly contributing to pt's decreased PO intake  - son reports pt grimaces with PO intake for past week or more      Chronic gout without tophu  Hyperlipidemia  - chronic histories noted; management per primary     Thank you for your consult. I will follow-up with patient. Please contact us if you have any additional questions.    Subjective:     HPI:   From H&P: "This is a 77 year old male with a PMHx of CVA, CKD3, HLD, HTN, gout, tobacco use who presents with failure to thrive.      Patient is non verbal. Presented from a nursing home with inability to tolerate PO for 5 days duration. Per documentation, patient chokes on liquids, and honey thick liquids. In the ED, he was hypotensive (90/55), tachypnic, but otherwise stable. Labs showed hypernatremia (159), hyperkalemia (6.7), elevated creatinine (4.6- baseline of 1.6), negative troponin, negative lactic acid, polycythemia (20.3). CXR was negative. CT head showed Large regions of new worsening parenchymal hypoattenuation within the left cerebral hemisphere consistent with acute/recent infarction. He was given insulin/albuterol and fluids. The patient was admitted for further management. "     Palliative medicine consulted for advance care planning and continuity of care; Met with pt at bedside; for details of visit, see advance care planning section of plan.       Hospital Course:  No notes on file    Past Medical History:   Diagnosis Date    Disorder of kidney and ureter     Elevated PSA     Glaucoma     Hyperlipidemia     Hypertension     Psoriasis      Past Surgical History:   Procedure Laterality Date    FUNCTIONAL ENDOSCOPIC SINUS SURGERY (FESS) USING COMPUTER-ASSISTED NAVIGATION N/A 2/28/2019 "    Procedure: FESS, USING COMPUTER-ASSISTED NAVIGATION (ADD ON );  Surgeon: Mikael Serrano MD;  Location: [facility] OR;  Service: ENT;  Laterality: N/A;  (ADD ON )    NASAL SEPTOPLASTY N/A 2/28/2019    Procedure: SEPTOPLASTY, NOSE;  Surgeon: Mikael Serrano MD;  Location: [facility] OR;  Service: ENT;  Laterality: N/A;     Review of patient's allergies indicates:   Allergen Reactions    Latex, natural rubber Rash    Iodine and iodide containing products Rash     Medications:  Continuous Infusions:   dextrose 5 % 100 mL/hr at 11/10/22 1149     Scheduled Meds:   fluconazole  200 mg Intravenous Q24H    heparin (porcine)  5,000 Units Subcutaneous Q8H    levetiracetam IV  1,000 mg Intravenous Q12H    scopolamine  1 patch Transdermal Q3 Days     PRN Meds:albuterol-ipratropium, bisacodyL, dextrose 10%, dextrose 10%, dextrose 10%, dextrose 10%, glucagon (human recombinant), glucose, glucose, HYDROmorphone, lorazepam, naloxone, ondansetron, prochlorperazine, sodium chloride 0.9%    Family History       Problem Relation (Age of Onset)    Anemia Sister, Daughter    Cancer Brother    Diabetes Mother    KEVIN disease Sister    Hypertension Mother, Sister    No Known Problems Father, Son          Tobacco Use    Smoking status: Former     Years: 40.00     Types: Cigarettes    Smokeless tobacco: Current   Substance and Sexual Activity    Alcohol use: Yes     Alcohol/week: 9.0 standard drinks     Types: 9 Cans of beer per week    Drug use: No    Sexual activity: Yes     Partners: Female     Review of Systems   Unable to perform ROS: Other (advanced dysphagia related to stroke)   Objective:     Vital Signs (Most Recent):  Temp: 98.4 °F (36.9 °C) (11/11/22 1131)  Pulse: 64 (11/11/22 1131)  Resp: 19 (11/11/22 1131)  BP: 126/78 (11/11/22 1131)  SpO2: 95 % (11/11/22 1131)   Vital Signs (24h Range):  Temp:  [97.8 °F (36.6 °C)-98.9 °F (37.2 °C)] 98.4 °F (36.9 °C)  Pulse:  [60-66] 64  Resp:  [16-20] 19  SpO2:  [93 %-98 %]  95 %  BP: (122-175)/(56-86) 126/78     Weight: 73.5 kg (162 lb 0.6 oz)  Body mass index is 24.64 kg/m².    Physical Exam  Vitals and nursing note reviewed.   HENT:      Head: Normocephalic and atraumatic.      Mouth/Throat:      Pharynx: Oropharyngeal exudate and posterior oropharyngeal erythema present.      Comments: thrush  Pulmonary:      Effort: Pulmonary effort is normal.      Comments: RA  Musculoskeletal:      Right lower leg: No edema.      Left lower leg: No edema.   Neurological:      Mental Status: He is alert.      Comments: Unable to fully assess due to dysphagia and minimally verbal.  Will occasionally nod head yes/no and attempt to say 1 word statements.        Significant Labs: All pertinent labs within the past 24 hours have been reviewed.  CBC:   Recent Labs   Lab 11/11/22  0441   WBC 8.15   HGB 14.0   HCT 45.7   MCV 83          BMP:  Recent Labs   Lab 11/11/22  0809      *   K 4.0   *   CO2 26   BUN 31*   CREATININE 1.8*   CALCIUM 8.5*       LFT:  Lab Results   Component Value Date    AST 47 (H) 11/11/2022    ALKPHOS 96 11/11/2022    BILITOT 0.6 11/11/2022     Albumin:   Albumin   Date Value Ref Range Status   11/11/2022 2.6 (L) 3.5 - 5.2 g/dL Final     Protein:   Total Protein   Date Value Ref Range Status   11/11/2022 6.8 6.0 - 8.4 g/dL Final     Lactic acid:   Lab Results   Component Value Date    LACTATE 1.6 11/07/2022       Significant Imaging: I have reviewed all pertinent imaging results/findings within the past 24 hours.      Total visit time: 35 minutes    > 50% of  35 min visit spent in chart review, face to face discussion of symptom assessment, coordination of care with other specialists, and discharge planning.    ACP time spent: goals of care, emotional support, formulating and communicating prognosis, exploring burden/ benefit of various approaches of treatment included with initial visit time.    Emelia Horton NP  Palliative Medicine  [facility] -  Telemetry

## 2022-11-12 LAB
25(OH)D3+25(OH)D2 SERPL-MCNC: 10 NG/ML (ref 30–96)
ANION GAP SERPL CALC-SCNC: 7 MMOL/L (ref 8–16)
ANION GAP SERPL CALC-SCNC: 8 MMOL/L (ref 8–16)
BUN SERPL-MCNC: 23 MG/DL (ref 8–23)
BUN SERPL-MCNC: 24 MG/DL (ref 8–23)
CALCIUM SERPL-MCNC: 8.5 MG/DL (ref 8.7–10.5)
CALCIUM SERPL-MCNC: 8.7 MG/DL (ref 8.7–10.5)
CHLORIDE SERPL-SCNC: 112 MMOL/L (ref 95–110)
CHLORIDE SERPL-SCNC: 113 MMOL/L (ref 95–110)
CO2 SERPL-SCNC: 21 MMOL/L (ref 23–29)
CO2 SERPL-SCNC: 21 MMOL/L (ref 23–29)
CREAT SERPL-MCNC: 1.7 MG/DL (ref 0.5–1.4)
CREAT SERPL-MCNC: 1.8 MG/DL (ref 0.5–1.4)
EST. GFR  (NO RACE VARIABLE): 38 ML/MIN/1.73 M^2
EST. GFR  (NO RACE VARIABLE): 41 ML/MIN/1.73 M^2
GLUCOSE SERPL-MCNC: 89 MG/DL (ref 70–110)
GLUCOSE SERPL-MCNC: 94 MG/DL (ref 70–110)
MAGNESIUM SERPL-MCNC: 1.8 MG/DL (ref 1.6–2.6)
PHOSPHATE SERPL-MCNC: 2.6 MG/DL (ref 2.7–4.5)
POTASSIUM SERPL-SCNC: 3.9 MMOL/L (ref 3.5–5.1)
POTASSIUM SERPL-SCNC: 4.1 MMOL/L (ref 3.5–5.1)
SODIUM SERPL-SCNC: 141 MMOL/L (ref 136–145)
SODIUM SERPL-SCNC: 141 MMOL/L (ref 136–145)

## 2022-11-12 PROCEDURE — 63600175 PHARM REV CODE 636 W HCPCS: Performed by: STUDENT IN AN ORGANIZED HEALTH CARE EDUCATION/TRAINING PROGRAM

## 2022-11-12 PROCEDURE — 63600175 PHARM REV CODE 636 W HCPCS: Performed by: REGISTERED NURSE

## 2022-11-12 PROCEDURE — 80048 BASIC METABOLIC PNL TOTAL CA: CPT | Performed by: HOSPITALIST

## 2022-11-12 PROCEDURE — 94760 N-INVAS EAR/PLS OXIMETRY 1: CPT

## 2022-11-12 PROCEDURE — 21400001 HC TELEMETRY ROOM

## 2022-11-12 PROCEDURE — 25000242 PHARM REV CODE 250 ALT 637 W/ HCPCS: Performed by: STUDENT IN AN ORGANIZED HEALTH CARE EDUCATION/TRAINING PROGRAM

## 2022-11-12 PROCEDURE — 25000003 PHARM REV CODE 250: Performed by: INTERNAL MEDICINE

## 2022-11-12 PROCEDURE — 92526 ORAL FUNCTION THERAPY: CPT

## 2022-11-12 PROCEDURE — 84100 ASSAY OF PHOSPHORUS: CPT | Performed by: INTERNAL MEDICINE

## 2022-11-12 PROCEDURE — 36415 COLL VENOUS BLD VENIPUNCTURE: CPT | Performed by: INTERNAL MEDICINE

## 2022-11-12 PROCEDURE — 83735 ASSAY OF MAGNESIUM: CPT | Performed by: INTERNAL MEDICINE

## 2022-11-12 PROCEDURE — 94640 AIRWAY INHALATION TREATMENT: CPT

## 2022-11-12 PROCEDURE — 82306 VITAMIN D 25 HYDROXY: CPT | Performed by: INTERNAL MEDICINE

## 2022-11-12 PROCEDURE — 36415 COLL VENOUS BLD VENIPUNCTURE: CPT | Performed by: HOSPITALIST

## 2022-11-12 PROCEDURE — 63600175 PHARM REV CODE 636 W HCPCS: Performed by: HOSPITALIST

## 2022-11-12 RX ADMIN — IPRATROPIUM BROMIDE AND ALBUTEROL SULFATE 3 ML: 2.5; .5 SOLUTION RESPIRATORY (INHALATION) at 11:11

## 2022-11-12 RX ADMIN — LEVETIRACETAM INJECTION 1000 MG: 10 INJECTION INTRAVENOUS at 09:11

## 2022-11-12 RX ADMIN — HEPARIN SODIUM 5000 UNITS: 5000 INJECTION INTRAVENOUS; SUBCUTANEOUS at 09:11

## 2022-11-12 RX ADMIN — DEXTROSE: 5 SOLUTION INTRAVENOUS at 11:11

## 2022-11-12 RX ADMIN — DEXTROSE: 5 SOLUTION INTRAVENOUS at 09:11

## 2022-11-12 RX ADMIN — SODIUM PHOSPHATE, MONOBASIC, MONOHYDRATE 20.01 MMOL: 276; 142 INJECTION, SOLUTION INTRAVENOUS at 10:11

## 2022-11-12 RX ADMIN — HEPARIN SODIUM 5000 UNITS: 5000 INJECTION INTRAVENOUS; SUBCUTANEOUS at 01:11

## 2022-11-12 RX ADMIN — LEVETIRACETAM INJECTION 1000 MG: 10 INJECTION INTRAVENOUS at 08:11

## 2022-11-12 RX ADMIN — LORAZEPAM 1 MG: 2 INJECTION INTRAMUSCULAR; INTRAVENOUS at 09:11

## 2022-11-12 RX ADMIN — HEPARIN SODIUM 5000 UNITS: 5000 INJECTION INTRAVENOUS; SUBCUTANEOUS at 05:11

## 2022-11-12 RX ADMIN — FLUCONAZOLE 200 MG: 2 INJECTION, SOLUTION INTRAVENOUS at 02:11

## 2022-11-12 NOTE — PLAN OF CARE
Problem: SLP  Goal: SLP Goal  Description: Goals:  1. Pt will participate in ongoing assessment of swallow  2. Pt will participate in cognitive linguistic eval- Discont 11/11  Outcome: Ongoing, Progressing     ST will follow for ongoing dysphagia tx.

## 2022-11-12 NOTE — PLAN OF CARE
Problem: Adult Inpatient Plan of Care  Goal: Plan of Care Review  Outcome: Ongoing, Progressing  Goal: Patient-Specific Goal (Individualized)  Outcome: Ongoing, Progressing  Goal: Absence of Hospital-Acquired Illness or Injury  Outcome: Ongoing, Progressing  Goal: Optimal Comfort and Wellbeing  Outcome: Ongoing, Progressing     Problem: Infection  Goal: Absence of Infection Signs and Symptoms  Outcome: Ongoing, Progressing     Problem: Skin Injury Risk Increased  Goal: Skin Health and Integrity  Outcome: Ongoing, Progressing     Problem: Fall Injury Risk  Goal: Absence of Fall and Fall-Related Injury  Outcome: Ongoing, Progressing     POC reviewed with patient and son. All questions and concerns addressed with son. Fall/safety precautions implemented and maintained. Incontinent care performed. IVF maintained. No acute events noted this shift. Please see flowsheet for full assessment and vitals. Bed locked in lowest position. Side rails up x2. Call bell within reach. Will continue to monitor.

## 2022-11-12 NOTE — PLAN OF CARE
Problem: Adult Inpatient Plan of Care  Goal: Plan of Care Review  Outcome: Ongoing, Progressing  Goal: Patient-Specific Goal (Individualized)  Outcome: Ongoing, Progressing  Goal: Absence of Hospital-Acquired Illness or Injury  Outcome: Ongoing, Progressing  Goal: Optimal Comfort and Wellbeing  Outcome: Ongoing, Progressing  Goal: Readiness for Transition of Care  Outcome: Ongoing, Progressing     Problem: Coping Ineffective  Goal: Effective Coping  Outcome: Ongoing, Progressing     Problem: Fluid and Electrolyte Imbalance (Acute Kidney Injury/Impairment)  Goal: Fluid and Electrolyte Balance  Outcome: Ongoing, Progressing     Problem: Oral Intake Inadequate (Acute Kidney Injury/Impairment)  Goal: Optimal Nutrition Intake  Outcome: Ongoing, Progressing     Problem: Renal Function Impairment (Acute Kidney Injury/Impairment)  Goal: Effective Renal Function  Outcome: Ongoing, Progressing     Problem: Infection  Goal: Absence of Infection Signs and Symptoms  Outcome: Ongoing, Progressing     Problem: Skin Injury Risk Increased  Goal: Skin Health and Integrity  Outcome: Ongoing, Progressing     Problem: Fall Injury Risk  Goal: Absence of Fall and Fall-Related Injury  Outcome: Ongoing, Progressing

## 2022-11-12 NOTE — NURSING
Patient remains NPO no  signs of discomfort. Na+Bicarb infusing at 100 ml/hr Skin intact bed low call light in reach oral care completed.Family remains at bedside.

## 2022-11-12 NOTE — PT/OT/SLP PROGRESS
Speech Language Pathology Treatment    Patient Name:  Leonides Burns   MRN:  [id number]  Admitting Diagnosis: Failure to thrive in adult    Recommendations:                 General Recommendations:  Dysphagia therapy  Diet recommendations:  NPO, Pleasure Feeding, Liquid Diet Level: NPO   Aspiration Precautions: Frequent oral care, HOB to 90 degrees, and Ice chips sparingly   General Precautions: Standard,    Communication strategies:  yes/no questions only    Subjective     Pt with family present at [facility], he was alert and sustained MELY for pleasure feedings. He used gestures to request water in ST's hand.     Respiratory Status: Room air    Objective:     Has the patient been evaluated by SLP for swallowing?   Yes  Keep patient NPO? Yes   Current Respiratory Status:    Room Air    Moistened oral toothette applied x3 at onset of session, small ice chips x5, and 1/4 tsp bites of puree x5 were presented to pt at [facility] this date. Pt demo delayed overt s/s of aspiration x1 s/p puree trial suspected d/t significantly delayed swallow trigger response following single puree trial. No addt'l overt s/s of aspiration observed. Oral care support provided at conclusion of session, pt's thrush appeared to be improving per his son.    Assessment:     Leonides Burns is a 77 y.o. male with a medical diagnosis of  Failure to thrive in adult and medical hx of CVA. He presents with severe dysphagia, which negatively impacts his ability to safely consume a PO diet. ST recs continue NPO, with meds/hydration via IV. ST will follow.     Goals:   Multidisciplinary Problems       SLP Goals          Problem: SLP    Goal Priority Disciplines Outcome   SLP Goal    Medium SLP Ongoing, Progressing   Description: Goals:  1. Pt will participate in ongoing assessment of swallow  2. Pt will participate in cognitive linguistic eval- Discont 11/11                       Plan:     Patient to be seen:  3 x/week   Plan of Care expires:  11/17/22  Plan of Care  reviewed with:  patient, daughter   SLP Follow-Up:  Yes       Discharge recommendations:  other (see comments) (TBD)   Barriers to Discharge:  None    Time Tracking:     SLP Treatment Date:   11/12/22  Speech Start Time:  1224  Speech Stop Time:  1236     Speech Total Time (min):  12 min    Billable Minutes: Treatment Swallowing Dysfunction 12min    11/12/2022

## 2022-11-12 NOTE — SUBJECTIVE & OBJECTIVE
Interval History: No acute events overnight.  Patient alert and appears comfortable but still struggling to control secretions and cough/swallowing often.  Remains non-verbal. Family at bedside and questions answered.    Review of Systems   Unable to perform ROS: Patient nonverbal   Objective:     Vital Signs (Most Recent):  Temp: 97.9 °F (36.6 °C) (11/12/22 0730)  Pulse: 65 (11/12/22 0730)  Resp: 18 (11/12/22 0730)  BP: 130/75 (11/12/22 0730)  SpO2: 98 % (11/12/22 0730) Vital Signs (24h Range):  Temp:  [97.9 °F (36.6 °C)-98.8 °F (37.1 °C)] 97.9 °F (36.6 °C)  Pulse:  [64-69] 65  Resp:  [18-20] 18  SpO2:  [94 %-98 %] 98 %  BP: (125-140)/(66-78) 130/75     Weight: 73.5 kg (162 lb 0.6 oz)  Body mass index is 24.64 kg/m².    Intake/Output Summary (Last 24 hours) at 11/12/2022 0940  Last data filed at 11/12/2022 0538  Gross per 24 hour   Intake 3637.82 ml   Output 700 ml   Net 2937.82 ml      Physical Exam  Constitutional:       General: He is not in acute distress.     Appearance: Normal appearance. He is ill-appearing. He is not toxic-appearing.   HENT:      Head: Normocephalic and atraumatic.      Right Ear: External ear normal.      Left Ear: External ear normal.      Nose: Nose normal.      Mouth/Throat:      Mouth: Mucous membranes are moist.   Pulmonary:      Effort: Pulmonary effort is normal. No respiratory distress.   Abdominal:      General: Abdomen is flat.      Palpations: Abdomen is soft.      Tenderness: There is no abdominal tenderness.   Musculoskeletal:      Cervical back: No rigidity.      Right lower leg: No edema.      Left lower leg: No edema.   Skin:     General: Skin is warm.      Capillary Refill: Capillary refill takes less than 2 seconds.   Neurological:      Comments: Alert.   Significant Labs: All pertinent labs within the past 24 hours have been reviewed.    Significant Imaging: I have reviewed all pertinent imaging results/findings within the past 24 hours.

## 2022-11-12 NOTE — NURSING
Pt lying in bed with eyes closed. Family at bedside. Bed locked in lowest position. Call light within reach.

## 2022-11-12 NOTE — PROGRESS NOTES
Renal Progress Note    Date of Admission:  11/7/2022  7:29 PM    Length of Stay: 5  Days    Subjective: n/a    Objective:    Current Facility-Administered Medications   Medication    albuterol-ipratropium 2.5 mg-0.5 mg/3 mL nebulizer solution 3 mL    bisacodyL suppository 10 mg    dextrose 10% bolus 125 mL    dextrose 10% bolus 125 mL    dextrose 10% bolus 250 mL    dextrose 10% bolus 250 mL    dextrose 5 % infusion    fluconazole (DIFLUCAN) IVPB 200 mg    glucagon (human recombinant) injection 1 mg    glucose chewable tablet 16 g    glucose chewable tablet 24 g    heparin (porcine) injection 5,000 Units    HYDROmorphone injection 0.2 mg    levETIRAcetam in NaCl (iso-os) IVPB 1,000 mg    LORazepam injection 1 mg    naloxone 0.4 mg/mL injection 0.02 mg    ondansetron injection 4 mg    prochlorperazine injection Soln 5 mg    scopolamine 1.3-1.5 mg (1 mg over 3 days) 1 patch    sodium chloride 0.9% flush 10 mL       Vitals:    11/11/22 1927 11/11/22 2340 11/12/22 0411 11/12/22 0730   BP:  125/66 134/70 130/75   BP Location:  Left arm Left arm Left arm   Patient Position:  Lying Lying Lying   Pulse: 65 67 69 65   Resp:  18 20 18   Temp:  98.7 °F (37.1 °C) 98.8 °F (37.1 °C) 97.9 °F (36.6 °C)   TempSrc:  Oral Axillary Oral   SpO2:  95% (!) 94% 98%   Weight:       Height:           I/O last 3 completed shifts:  In: 8742.3 [I.V.:7595.1; IV Piggyback:1147.2]  Out: 700 [Urine:700]  No intake/output data recorded.      Physical Exam:     General: elderly male NAD  Neck: supple  Heart: RRR  Lungs: unlabored breathing  Abdomen: n/a  Limbs: n/a  Neurologic: Asleep      Laboratories:    No results for input(s): WBC, RBC, HGB, HCT, PLT, MCV, MCH, MCHC in the last 24 hours.    Recent Labs   Lab 11/12/22  0751   CALCIUM 8.7      K 4.1   CO2 21*   *   BUN 24*   CREATININE 1.8*       No results for input(s): COLORU, CLARITYU, SPECGRAV, PHUR, PROTEINUA, GLUCOSEU, BLOODU, WBCU, RBCU, BACTERIA, MUCUS in  the last 24 hours.    Invalid input(s):  BILIRUBINCON    Microbiology Results (last 7 days)       Procedure Component Value Units Date/Time    Clostridium difficile EIA [[id number]]     Order Status: Canceled Specimen: Stool     Stool culture [[id number]]     Order Status: No result Specimen: Stool               Diagnostic Tests: n/a        Assessment:    78 y/o male admitted with:    - Failure to thrive  - HyperNa+ RESOLVED  - HyperK+  RESOLVED  - Mild NAGMA same  - HypoPO4-  - EDGAR  IMPROVING  - Hx. Of CKD-3  - Recent L-CVA  - Dysphagia  - 2nd. HPT  - Seizure disorder  - Gout  - HLD  - Pte. Is DNR            Plan:    - Conservative care   - IVFs for hydration  - Replace PO4- as needed and follow  - No need for Q8H BMP switch to daily  - Palliative care following: plans for NH w/ Hospice noted  - Will follow Pqn-hapgv-kzkwa-acid/base and I&O      Updated Family present.

## 2022-11-12 NOTE — PROGRESS NOTES
[facility] Medicine  Progress Note    Patient Name: Leonides Burns  MRN: [id number]  Patient Class: IP- Inpatient   Admission Date: 11/7/2022  Length of Stay: 5 days  Attending Physician: Srinivasa Andujar III, MD  Primary Care Provider: Suzette Akhtar MD        Subjective:     Principal Problem:Failure to thrive in adult        HPI:  This is a 77 year old male with a PMHx of CVA, CKD3, HLD, HTN, gout, tobacco use who presents with failure to thrive.     Patient is non verbal. Presented from a nursing home with inability to tolerate PO for 5 days duration. Per documentation, patient chokes on liquids, and honey thick liquids. In the ED, he was hypotensive (90/55), tachypnic, but otherwise stable. Labs showed hypernatremia (159), hyperkalemia (6.7), elevated creatinine (4.6- baseline of 1.6), negative troponin, negative lactic acid, polycythemia (20.3). CXR was negative. CT head showed Large regions of new worsening parenchymal hypoattenuation within the left cerebral hemisphere consistent with acute/recent infarction. He was given insulin/albuterol and fluids. The patient was admitted for further management.       Overview/Hospital Course:  77 year old man with CVA, CKDIII, HTN, HLD, and Gout who presented from outside SNF for evaluation of diminished oral intake.  He was diagnosed with severe recurrent dysphagia due to recent stroke, oral thrush, acute on chronic kidney disease, hyperkalemia, hypernatremia and severe dehydration.  His dysphagia was present prior to SNF due to his stroke, but unfortunately it is so severe he can barely manage his own secretions.  SLP, Neuro, nephrology and palliative care consulted.  Treated with IV fluids and acidosis and hyperkalemia have resolved.  Na and Cr are much improved and possibly by tomorrow back to normal.  He is DNR status and family wishes to prioritize comfort and avoidance of suffering.  Once sodium has normalized we plan to pursue  hospice care in nursing home.  We have a facility and he will likely discharge on Monday with hospice to NH.        Interval History: No acute events overnight.  Patient alert and appears comfortable but still struggling to control secretions and cough/swallowing often.  Remains non-verbal. Family at bedside and questions answered.    Review of Systems   Unable to perform ROS: Patient nonverbal   Objective:     Vital Signs (Most Recent):  Temp: 97.9 °F (36.6 °C) (11/12/22 0730)  Pulse: 65 (11/12/22 0730)  Resp: 18 (11/12/22 0730)  BP: 130/75 (11/12/22 0730)  SpO2: 98 % (11/12/22 0730) Vital Signs (24h Range):  Temp:  [97.9 °F (36.6 °C)-98.8 °F (37.1 °C)] 97.9 °F (36.6 °C)  Pulse:  [64-69] 65  Resp:  [18-20] 18  SpO2:  [94 %-98 %] 98 %  BP: (125-140)/(66-78) 130/75     Weight: 73.5 kg (162 lb 0.6 oz)  Body mass index is 24.64 kg/m².    Intake/Output Summary (Last 24 hours) at 11/12/2022 0940  Last data filed at 11/12/2022 0538  Gross per 24 hour   Intake 3637.82 ml   Output 700 ml   Net 2937.82 ml      Physical Exam  Constitutional:       General: He is not in acute distress.     Appearance: Normal appearance. He is ill-appearing. He is not toxic-appearing.   HENT:      Head: Normocephalic and atraumatic.      Right Ear: External ear normal.      Left Ear: External ear normal.      Nose: Nose normal.      Mouth/Throat:      Mouth: Mucous membranes are moist.   Pulmonary:      Effort: Pulmonary effort is normal. No respiratory distress.   Abdominal:      General: Abdomen is flat.      Palpations: Abdomen is soft.      Tenderness: There is no abdominal tenderness.   Musculoskeletal:      Cervical back: No rigidity.      Right lower leg: No edema.      Left lower leg: No edema.   Skin:     General: Skin is warm.      Capillary Refill: Capillary refill takes less than 2 seconds.   Neurological:      Comments: Alert.   Significant Labs: All pertinent labs within the past 24 hours have been reviewed.    Significant  Imaging: I have reviewed all pertinent imaging results/findings within the past 24 hours.      Assessment/Plan:      * Failure to thrive in adult  -Admitted to inpatient status  -Noted recent strokes and over last 5 days not eating at all  -Noted to have Hypernatremia, hyperkalemia, renal failure and significant dysphagia  -High risk for terminal condition - and hospice likely appropriate  -Palliative care consulted and input appreciated.  -Specific treatment as below  -Working to discharge to hospice in nursing home.    CVA (cerebral vascular accident)  -Noted history of CVA with recent hospitalization for an acute stroke (D/c 10/28) Plan was for plavix x 21 days, aspirin and statin  -CT head showed large regions of new worsening parenchymal hypoattenuation within the left cerebral hemisphere consistent with acute/recent infarction  -Consulted PT, OT, SLP and neurology.  Appreciate consultants assistance  -No new recommendations from neurology - noted evolution of prior stroke and continued dysphagia  -Remains unable to take oral meds - so holding aspirin, plavix and statin.  -Resume home medications when/if able, but unlikely that he will regain this function    Diarrhea  -Noted one large episode 11/8 and nursing felt it smelt of C.diff  -No further diarrhea  -Ordered stool wbc, culture and C.diff on 11/8 but after 24 hours no further diarrhea.    Hypernatremia  -Due to severe dehydration and diminished oral intake  -Treat as above.    ACP (advance care planning)  -Palliative care consulted and input appreciated.  -Family wishes for patient to be DNR and to have active medical treatment at this time.  Their highest priority is prevent suffering and they not wish for NGT or PEG placement.  They are very hopeful that he will improve with IV fluids and be able to eat and drink again.  Discussed at length that I too am hopeful, but do not expect that to occur.  Family actively participating in discussions and  reviewing nursing home options.    -Plan is for hospice in nursing home at discharge  -Spent 25 minutes in goals of care and ACP planning 11/9.    DNR (do not resuscitate)  -Noted as above.    Dysphagia  -Present at times since recent stroke and now severe  -Seems to be struggling to even control his own secretions  -SLP consulted and discussed with them today.  Did take some sips of thickened liquids, but no solids.    -STRICT NPO for now.  -On 11/9 I discussed options with family at length - TPN, PEG, NGT vs temporary IV fluids and possible hospice.  Family above all wishes to prevent any suffering for patient.  They do not want PEG or NGT placement.  We discussed that IV fluids and treatment of his thrush with fluconazole is unlikely to correct his dysphagia.  Still, they are hopeful and wish to give it more time to see if it will help - this is reasonable.  I discussed with them that I strongly suspect his dysphagia will remain and that hospice care is most likely the appropriate care.  They remain open to that and are participating in planning for hospice at discharge in a NH.  -Add scopolamine patch    Acute renal failure superimposed on stage 3 chronic kidney disease  -Baseline Cr around 1.6  -Cr on admit 4.1 and now down to 1.8  -On admit had hyperkalemia, hypernatremia and anion-gap acidosis   -Anion gap has normalized.  Remains hypernatremic but improving - now 147.  NAGMA resolved as of 11/11.  K remains normal.  -Avoid nephrotoxic agents and renally dose meds  -Consulted nephrology and input appreciated  -Continue D5W and bicarb infusions  -Monitor BMP     Seizure disorder  -Continue IV keppra    Chronic gout without tophus  -Resume home allopurinol when/if able    Hyperkalemia  -Treatment as above.    Hyperlipidemia  -Resume statin when/if able      VTE Risk Mitigation (From admission, onward)         Ordered     heparin (porcine) injection 5,000 Units  Every 8 hours         11/07/22 2306     IP VTE  HIGH RISK PATIENT  Once         11/07/22 2306     Place sequential compression device  Until discontinued         11/07/22 2306                Discharge Planning   DEVIKA: 11/11/2022     Code Status: DNR   Is the patient medically ready for discharge?:     Reason for patient still in hospital (select all that apply): Pending disposition  Discharge Plan A: Other (NH with Hospice services)   Discharge Delays: None known at this time              Srinivasa Andujar III, MD  Department of [facility] Medicine   [facility]

## 2022-11-13 LAB
ANION GAP SERPL CALC-SCNC: 13 MMOL/L (ref 8–16)
BUN SERPL-MCNC: 21 MG/DL (ref 8–23)
CALCIUM SERPL-MCNC: 8.8 MG/DL (ref 8.7–10.5)
CHLORIDE SERPL-SCNC: 108 MMOL/L (ref 95–110)
CO2 SERPL-SCNC: 18 MMOL/L (ref 23–29)
CREAT SERPL-MCNC: 1.8 MG/DL (ref 0.5–1.4)
EST. GFR  (NO RACE VARIABLE): 38 ML/MIN/1.73 M^2
GLUCOSE SERPL-MCNC: 74 MG/DL (ref 70–110)
PHOSPHATE SERPL-MCNC: 3.1 MG/DL (ref 2.7–4.5)
POTASSIUM SERPL-SCNC: 5.1 MMOL/L (ref 3.5–5.1)
SODIUM SERPL-SCNC: 139 MMOL/L (ref 136–145)

## 2022-11-13 PROCEDURE — 25000242 PHARM REV CODE 250 ALT 637 W/ HCPCS: Performed by: STUDENT IN AN ORGANIZED HEALTH CARE EDUCATION/TRAINING PROGRAM

## 2022-11-13 PROCEDURE — 36415 COLL VENOUS BLD VENIPUNCTURE: CPT | Performed by: INTERNAL MEDICINE

## 2022-11-13 PROCEDURE — 94640 AIRWAY INHALATION TREATMENT: CPT

## 2022-11-13 PROCEDURE — 25000003 PHARM REV CODE 250: Performed by: INTERNAL MEDICINE

## 2022-11-13 PROCEDURE — 63600175 PHARM REV CODE 636 W HCPCS: Performed by: HOSPITALIST

## 2022-11-13 PROCEDURE — 84100 ASSAY OF PHOSPHORUS: CPT | Performed by: INTERNAL MEDICINE

## 2022-11-13 PROCEDURE — 94761 N-INVAS EAR/PLS OXIMETRY MLT: CPT

## 2022-11-13 PROCEDURE — 80048 BASIC METABOLIC PNL TOTAL CA: CPT | Performed by: INTERNAL MEDICINE

## 2022-11-13 PROCEDURE — 21400001 HC TELEMETRY ROOM

## 2022-11-13 PROCEDURE — 63600175 PHARM REV CODE 636 W HCPCS: Performed by: STUDENT IN AN ORGANIZED HEALTH CARE EDUCATION/TRAINING PROGRAM

## 2022-11-13 RX ADMIN — HEPARIN SODIUM 5000 UNITS: 5000 INJECTION INTRAVENOUS; SUBCUTANEOUS at 01:11

## 2022-11-13 RX ADMIN — FLUCONAZOLE 200 MG: 2 INJECTION, SOLUTION INTRAVENOUS at 01:11

## 2022-11-13 RX ADMIN — IPRATROPIUM BROMIDE AND ALBUTEROL SULFATE 3 ML: 2.5; .5 SOLUTION RESPIRATORY (INHALATION) at 02:11

## 2022-11-13 RX ADMIN — LEVETIRACETAM INJECTION 1000 MG: 10 INJECTION INTRAVENOUS at 08:11

## 2022-11-13 RX ADMIN — DEXTROSE: 5 SOLUTION INTRAVENOUS at 07:11

## 2022-11-13 RX ADMIN — IPRATROPIUM BROMIDE AND ALBUTEROL SULFATE 3 ML: 2.5; .5 SOLUTION RESPIRATORY (INHALATION) at 07:11

## 2022-11-13 RX ADMIN — HEPARIN SODIUM 5000 UNITS: 5000 INJECTION INTRAVENOUS; SUBCUTANEOUS at 10:11

## 2022-11-13 RX ADMIN — HEPARIN SODIUM 5000 UNITS: 5000 INJECTION INTRAVENOUS; SUBCUTANEOUS at 05:11

## 2022-11-13 NOTE — ASSESSMENT & PLAN NOTE
-Baseline Cr around 1.6  -Cr on admit 4.1 and now down to 1.8  -On admit had hyperkalemia, hypernatremia and anion-gap acidosis   -Anion gap has normalized.  Remains hypernatremic but improving - now 147.  NAGMA resolved as of 11/11.  K remains normal.  -Avoid nephrotoxic agents and renally dose meds  -Consulted nephrology and input appreciated  -Continue D5W  -Monitor BMP

## 2022-11-13 NOTE — PLAN OF CARE
Problem: Adult Inpatient Plan of Care  Goal: Plan of Care Review  Outcome: Ongoing, Progressing  Goal: Patient-Specific Goal (Individualized)  Outcome: Ongoing, Progressing  Goal: Absence of Hospital-Acquired Illness or Injury  Outcome: Ongoing, Progressing  Goal: Optimal Comfort and Wellbeing  Outcome: Ongoing, Progressing  Goal: Readiness for Transition of Care  Outcome: Ongoing, Progressing     Problem: Coping Ineffective  Goal: Effective Coping  Outcome: Ongoing, Progressing     Problem: Fluid and Electrolyte Imbalance (Acute Kidney Injury/Impairment)  Goal: Fluid and Electrolyte Balance  Outcome: Ongoing, Progressing     Problem: Oral Intake Inadequate (Acute Kidney Injury/Impairment)  Goal: Optimal Nutrition Intake  Outcome: Ongoing, Progressing     Problem: Renal Function Impairment (Acute Kidney Injury/Impairment)  Goal: Effective Renal Function  Outcome: Ongoing, Progressing     Problem: Infection  Goal: Absence of Infection Signs and Symptoms  Outcome: Ongoing, Progressing     Problem: Skin Injury Risk Increased  Goal: Skin Health and Integrity  Outcome: Ongoing, Progressing     Problem: Fall Injury Risk  Goal: Absence of Fall and Fall-Related Injury  Outcome: Ongoing, Progressing   Patient remained free from falls, injuries, and pain throughout shift. D5 infusing at 100 mL/hr without complications. Avasys at bedside. Safety precautions maintained. Will continue to monitor

## 2022-11-13 NOTE — PLAN OF CARE
Problem: Adult Inpatient Plan of Care  Goal: Plan of Care Review  Outcome: Ongoing, Progressing  Goal: Patient-Specific Goal (Individualized)  Outcome: Ongoing, Progressing  Goal: Absence of Hospital-Acquired Illness or Injury  Outcome: Ongoing, Progressing  Goal: Optimal Comfort and Wellbeing  Outcome: Ongoing, Progressing  Goal: Readiness for Transition of Care  Outcome: Ongoing, Progressing     Problem: Coping Ineffective  Goal: Effective Coping  Outcome: Ongoing, Progressing     Problem: Fluid and Electrolyte Imbalance (Acute Kidney Injury/Impairment)  Goal: Fluid and Electrolyte Balance  Outcome: Ongoing, Progressing     Problem: Oral Intake Inadequate (Acute Kidney Injury/Impairment)  Goal: Optimal Nutrition Intake  Outcome: Ongoing, Progressing     Problem: Renal Function Impairment (Acute Kidney Injury/Impairment)  Goal: Effective Renal Function  Outcome: Ongoing, Progressing     Problem: Infection  Goal: Absence of Infection Signs and Symptoms  Outcome: Ongoing, Progressing     Problem: Skin Injury Risk Increased  Goal: Skin Health and Integrity  Outcome: Ongoing, Progressing     Problem: Fall Injury Risk  Goal: Absence of Fall and Fall-Related Injury  Outcome: Ongoing, Progressing    Remain SR on telemetry monitor.  No injury during shift, Side rails up x 3, call light by bedside.

## 2022-11-13 NOTE — PROGRESS NOTES
[facility] Medicine  Progress Note    Patient Name: Leonides Burns  MRN: [id number]  Patient Class: IP- Inpatient   Admission Date: 11/7/2022  Length of Stay: 6 days  Attending Physician: Srinivasa Andujra III, MD  Primary Care Provider: Suzette Akhtar MD        Subjective:     Principal Problem:Failure to thrive in adult        HPI:  This is a 77 year old male with a PMHx of CVA, CKD3, HLD, HTN, gout, tobacco use who presents with failure to thrive.     Patient is non verbal. Presented from a nursing home with inability to tolerate PO for 5 days duration. Per documentation, patient chokes on liquids, and honey thick liquids. In the ED, he was hypotensive (90/55), tachypnic, but otherwise stable. Labs showed hypernatremia (159), hyperkalemia (6.7), elevated creatinine (4.6- baseline of 1.6), negative troponin, negative lactic acid, polycythemia (20.3). CXR was negative. CT head showed Large regions of new worsening parenchymal hypoattenuation within the left cerebral hemisphere consistent with acute/recent infarction. He was given insulin/albuterol and fluids. The patient was admitted for further management.       Overview/Hospital Course:  77 year old man with CVA, CKDIII, HTN, HLD, and Gout who presented from outside SNF for evaluation of diminished oral intake.  He was diagnosed with severe recurrent dysphagia due to recent stroke, oral thrush, acute on chronic kidney disease, hyperkalemia, hypernatremia and severe dehydration.  His dysphagia was present prior to SNF due to his stroke, but unfortunately it is so severe he can barely manage his own secretions.  SLP, Neuro, nephrology and palliative care consulted.  Treated with IV fluids and acidosis and hyperkalemia have resolved.  Na and Cr are much improved and possibly by tomorrow back to normal.  He is DNR status and family wishes to prioritize comfort and avoidance of suffering.  Once sodium has normalized we plan to pursue  hospice care in nursing home.  We have a facility and he will likely discharge on Monday with hospice to NH.        Interval History: Pt nonverbal, but alert. Discussed case with pts sister who understands plan for placement likely Monday.     Review of Systems   Unable to perform ROS: Patient nonverbal   Objective:     Vital Signs (Most Recent):  Temp: 97.9 °F (36.6 °C) (11/13/22 0821)  Pulse: 70 (11/13/22 0821)  Resp: 18 (11/13/22 0821)  BP: (!) 171/78 (11/13/22 0821)  SpO2: 95 % (11/13/22 0821)   Vital Signs (24h Range):  Temp:  [97.1 °F (36.2 °C)-99.4 °F (37.4 °C)] 97.9 °F (36.6 °C)  Pulse:  [61-81] 70  Resp:  [18-20] 18  SpO2:  [95 %-98 %] 95 %  BP: (101-171)/(66-90) 171/78     Weight: 73.5 kg (162 lb 0.6 oz)  Body mass index is 24.64 kg/m².    Intake/Output Summary (Last 24 hours) at 11/13/2022 1024  Last data filed at 11/12/2022 1835  Gross per 24 hour   Intake --   Output 400 ml   Net -400 ml      Physical Exam  Constitutional:       General: He is not in acute distress.     Appearance: Normal appearance. He is ill-appearing. He is not toxic-appearing.   HENT:      Head: Normocephalic and atraumatic.      Right Ear: External ear normal.      Left Ear: External ear normal.      Nose: Nose normal.      Mouth/Throat:      Mouth: Mucous membranes are moist.   Pulmonary:      Effort: Pulmonary effort is normal. No respiratory distress.   Abdominal:      General: Abdomen is flat.      Palpations: Abdomen is soft.      Tenderness: There is no abdominal tenderness.   Musculoskeletal:      Cervical back: No rigidity.      Right lower leg: No edema.      Left lower leg: No edema.   Skin:     General: Skin is warm.      Capillary Refill: Capillary refill takes less than 2 seconds.   Neurological:      Comments: Alert.   Significant Labs: All pertinent labs within the past 24 hours have been reviewed.    Significant Imaging: I have reviewed all pertinent imaging results/findings within the past 24  hours.      Assessment/Plan:      * Failure to thrive in adult  -Admitted to inpatient status  -Noted recent strokes and over last 5 days not eating at all  -Noted to have Hypernatremia, hyperkalemia, renal failure and significant dysphagia  -High risk for terminal condition - and hospice likely appropriate  -Palliative care consulted and input appreciated.  -Specific treatment as below  -Working to discharge to hospice in nursing home probably Monday    CVA (cerebral vascular accident)  -Noted history of CVA with recent hospitalization for an acute stroke (D/c 10/28) Plan was for plavix x 21 days, aspirin and statin  -CT head showed large regions of new worsening parenchymal hypoattenuation within the left cerebral hemisphere consistent with acute/recent infarction  -Consulted PT, OT, SLP and neurology.  Appreciate consultants assistance  -No new recommendations from neurology - noted evolution of prior stroke and continued dysphagia  -Remains unable to take oral meds - so holding aspirin, plavix and statin.  -Resume home medications when/if able, but unlikely that he will regain this function    Diarrhea  -Noted one large episode 11/8 and nursing felt it smelt of C.diff  -No further diarrhea  -Ordered stool wbc, culture and C.diff on 11/8 but after 24 hours no further diarrhea.    Hypernatremia  -Due to severe dehydration and diminished oral intake  -Treat as above.    ACP (advance care planning)  -Palliative care consulted and input appreciated.  -Family wishes for patient to be DNR and to have active medical treatment at this time.  Their highest priority is prevent suffering and they not wish for NGT or PEG placement.  They are very hopeful that he will improve with IV fluids and be able to eat and drink again.  Discussed at length that I too am hopeful, but do not expect that to occur.  Family actively participating in discussions and reviewing nursing home options.    -Plan is for hospice in nursing home at  discharge  -Spent 25 minutes in goals of care and ACP planning 11/9.    DNR (do not resuscitate)  -Noted as above.    Dysphagia  -Present at times since recent stroke and now severe  -Seems to be struggling to even control his own secretions  -SLP consulted and discussed with them today.  Did take some sips of thickened liquids, but no solids.    -STRICT NPO for now.  -On 11/9 I discussed options with family at length - TPN, PEG, NGT vs temporary IV fluids and possible hospice.  Family above all wishes to prevent any suffering for patient.  They do not want PEG or NGT placement.  We discussed that IV fluids and treatment of his thrush with fluconazole is unlikely to correct his dysphagia.  Still, they are hopeful and wish to give it more time to see if it will help - this is reasonable.  I discussed with them that I strongly suspect his dysphagia will remain and that hospice care is most likely the appropriate care.  They remain open to that and are participating in planning for hospice at discharge in a NH.  -Add scopolamine patch    Acute renal failure superimposed on stage 3 chronic kidney disease  -Baseline Cr around 1.6  -Cr on admit 4.1 and now down to 1.8  -On admit had hyperkalemia, hypernatremia and anion-gap acidosis   -Anion gap has normalized.  Remains hypernatremic but improving - now 147.  NAGMA resolved as of 11/11.  K remains normal.  -Avoid nephrotoxic agents and renally dose meds  -Consulted nephrology and input appreciated  -Continue D5W  -Monitor BMP     Seizure disorder  -Continue IV keppra    Chronic gout without tophus  -Resume home allopurinol when/if able    Hyperkalemia  -Treatment as above.    Hyperlipidemia  -Resume statin when/if able      VTE Risk Mitigation (From admission, onward)         Ordered     heparin (porcine) injection 5,000 Units  Every 8 hours         11/07/22 2306     IP VTE HIGH RISK PATIENT  Once         11/07/22 2306     Place sequential compression device  Until  discontinued         11/07/22 2306                Discharge Planning   DEVIKA: 11/11/2022     Code Status: DNR   Is the patient medically ready for discharge?:     Reason for patient still in hospital (select all that apply): Pending disposition  Discharge Plan A: Other (NH with Hospice services)   Discharge Delays: None known at this time              Srinivasa Andujar III, MD  Department of Hospital Medicine   [facility]

## 2022-11-13 NOTE — NURSING
PER handoff received from LAURO Schwartz     Pt resting in bed quietly. NAD noted. No c/o pain. Fall and safety precautions maintained. Bed alarm activated and audible.. Bed locked in lowest position, with side rails up x2. Call bell and personal items within reach

## 2022-11-13 NOTE — ASSESSMENT & PLAN NOTE
-Admitted to inpatient status  -Noted recent strokes and over last 5 days not eating at all  -Noted to have Hypernatremia, hyperkalemia, renal failure and significant dysphagia  -High risk for terminal condition - and hospice likely appropriate  -Palliative care consulted and input appreciated.  -Specific treatment as below  -Working to discharge to hospice in nursing home probably Monday

## 2022-11-13 NOTE — SUBJECTIVE & OBJECTIVE
Interval History: Pt nonverbal, but alert. Discussed case with pts sister who understands plan for placement likely Monday.     Review of Systems   Unable to perform ROS: Patient nonverbal   Objective:     Vital Signs (Most Recent):  Temp: 97.9 °F (36.6 °C) (11/13/22 0821)  Pulse: 70 (11/13/22 0821)  Resp: 18 (11/13/22 0821)  BP: (!) 171/78 (11/13/22 0821)  SpO2: 95 % (11/13/22 0821)   Vital Signs (24h Range):  Temp:  [97.1 °F (36.2 °C)-99.4 °F (37.4 °C)] 97.9 °F (36.6 °C)  Pulse:  [61-81] 70  Resp:  [18-20] 18  SpO2:  [95 %-98 %] 95 %  BP: (101-171)/(66-90) 171/78     Weight: 73.5 kg (162 lb 0.6 oz)  Body mass index is 24.64 kg/m².    Intake/Output Summary (Last 24 hours) at 11/13/2022 1024  Last data filed at 11/12/2022 1835  Gross per 24 hour   Intake --   Output 400 ml   Net -400 ml      Physical Exam  Constitutional:       General: He is not in acute distress.     Appearance: Normal appearance. He is ill-appearing. He is not toxic-appearing.   HENT:      Head: Normocephalic and atraumatic.      Right Ear: External ear normal.      Left Ear: External ear normal.      Nose: Nose normal.      Mouth/Throat:      Mouth: Mucous membranes are moist.   Pulmonary:      Effort: Pulmonary effort is normal. No respiratory distress.   Abdominal:      General: Abdomen is flat.      Palpations: Abdomen is soft.      Tenderness: There is no abdominal tenderness.   Musculoskeletal:      Cervical back: No rigidity.      Right lower leg: No edema.      Left lower leg: No edema.   Skin:     General: Skin is warm.      Capillary Refill: Capillary refill takes less than 2 seconds.   Neurological:      Comments: Alert.   Significant Labs: All pertinent labs within the past 24 hours have been reviewed.    Significant Imaging: I have reviewed all pertinent imaging results/findings within the past 24 hours.

## 2022-11-14 PROBLEM — R50.9 FEVER: Status: ACTIVE | Noted: 2022-11-14

## 2022-11-14 PROBLEM — Z51.5 COMFORT MEASURES ONLY STATUS: Status: ACTIVE | Noted: 2022-11-14

## 2022-11-14 LAB
ANION GAP SERPL CALC-SCNC: 7 MMOL/L (ref 8–16)
BACTERIA #/AREA URNS HPF: ABNORMAL /HPF
BASOPHILS # BLD AUTO: 0.05 K/UL (ref 0–0.2)
BASOPHILS NFR BLD: 0.4 % (ref 0–1.9)
BILIRUB UR QL STRIP: NEGATIVE
BUN SERPL-MCNC: 21 MG/DL (ref 8–23)
CALCIUM SERPL-MCNC: 8.4 MG/DL (ref 8.7–10.5)
CHLORIDE SERPL-SCNC: 109 MMOL/L (ref 95–110)
CLARITY UR: ABNORMAL
CO2 SERPL-SCNC: 21 MMOL/L (ref 23–29)
COLOR UR: COLORLESS
CREAT SERPL-MCNC: 2 MG/DL (ref 0.5–1.4)
DIFFERENTIAL METHOD: ABNORMAL
EOSINOPHIL # BLD AUTO: 0.2 K/UL (ref 0–0.5)
EOSINOPHIL NFR BLD: 2 % (ref 0–8)
ERYTHROCYTE [DISTWIDTH] IN BLOOD BY AUTOMATED COUNT: 18.1 % (ref 11.5–14.5)
EST. GFR  (NO RACE VARIABLE): 34 ML/MIN/1.73 M^2
GLUCOSE SERPL-MCNC: 112 MG/DL (ref 70–110)
GLUCOSE UR QL STRIP: NEGATIVE
HCT VFR BLD AUTO: 36.2 % (ref 40–54)
HGB BLD-MCNC: 12 G/DL (ref 14–18)
HGB UR QL STRIP: ABNORMAL
HYALINE CASTS #/AREA URNS LPF: 2 /LPF
IMM GRANULOCYTES # BLD AUTO: 0.08 K/UL (ref 0–0.04)
IMM GRANULOCYTES NFR BLD AUTO: 0.7 % (ref 0–0.5)
INFLUENZA A, MOLECULAR: NEGATIVE
INFLUENZA B, MOLECULAR: NEGATIVE
KETONES UR QL STRIP: NEGATIVE
LEUKOCYTE ESTERASE UR QL STRIP: ABNORMAL
LYMPHOCYTES # BLD AUTO: 1.2 K/UL (ref 1–4.8)
LYMPHOCYTES NFR BLD: 9.8 % (ref 18–48)
MCH RBC QN AUTO: 26.3 PG (ref 27–31)
MCHC RBC AUTO-ENTMCNC: 33.1 G/DL (ref 32–36)
MCV RBC AUTO: 79 FL (ref 82–98)
MICROSCOPIC COMMENT: ABNORMAL
MONOCYTES # BLD AUTO: 2 K/UL (ref 0.3–1)
MONOCYTES NFR BLD: 17.2 % (ref 4–15)
NEUTROPHILS # BLD AUTO: 8.2 K/UL (ref 1.8–7.7)
NEUTROPHILS NFR BLD: 69.9 % (ref 38–73)
NITRITE UR QL STRIP: NEGATIVE
NRBC BLD-RTO: 0 /100 WBC
PH UR STRIP: 6 [PH] (ref 5–8)
PLATELET # BLD AUTO: 214 K/UL (ref 150–450)
PMV BLD AUTO: ABNORMAL FL (ref 9.2–12.9)
POTASSIUM SERPL-SCNC: 3.6 MMOL/L (ref 3.5–5.1)
PROCALCITONIN SERPL IA-MCNC: 16.5 NG/ML
PROT UR QL STRIP: ABNORMAL
RBC # BLD AUTO: 4.57 M/UL (ref 4.6–6.2)
RBC #/AREA URNS HPF: 21 /HPF (ref 0–4)
SARS-COV-2 RDRP RESP QL NAA+PROBE: NEGATIVE
SODIUM SERPL-SCNC: 137 MMOL/L (ref 136–145)
SP GR UR STRIP: <1.005 (ref 1–1.03)
SPECIMEN SOURCE: NORMAL
URN SPEC COLLECT METH UR: ABNORMAL
UROBILINOGEN UR STRIP-ACNC: NEGATIVE EU/DL
WBC # BLD AUTO: 11.69 K/UL (ref 3.9–12.7)
WBC #/AREA URNS HPF: >100 /HPF (ref 0–5)
WBC CLUMPS URNS QL MICRO: ABNORMAL

## 2022-11-14 PROCEDURE — 85025 COMPLETE CBC W/AUTO DIFF WBC: CPT | Performed by: HOSPITALIST

## 2022-11-14 PROCEDURE — 94761 N-INVAS EAR/PLS OXIMETRY MLT: CPT

## 2022-11-14 PROCEDURE — 25000003 PHARM REV CODE 250: Performed by: INTERNAL MEDICINE

## 2022-11-14 PROCEDURE — 81000 URINALYSIS NONAUTO W/SCOPE: CPT | Performed by: HOSPITALIST

## 2022-11-14 PROCEDURE — 87086 URINE CULTURE/COLONY COUNT: CPT | Performed by: HOSPITALIST

## 2022-11-14 PROCEDURE — 87186 SC STD MICRODIL/AGAR DIL: CPT | Performed by: HOSPITALIST

## 2022-11-14 PROCEDURE — 80048 BASIC METABOLIC PNL TOTAL CA: CPT | Performed by: HOSPITALIST

## 2022-11-14 PROCEDURE — 25000003 PHARM REV CODE 250: Performed by: HOSPITALIST

## 2022-11-14 PROCEDURE — 87077 CULTURE AEROBIC IDENTIFY: CPT | Performed by: HOSPITALIST

## 2022-11-14 PROCEDURE — 25000242 PHARM REV CODE 250 ALT 637 W/ HCPCS: Performed by: STUDENT IN AN ORGANIZED HEALTH CARE EDUCATION/TRAINING PROGRAM

## 2022-11-14 PROCEDURE — 87040 BLOOD CULTURE FOR BACTERIA: CPT | Performed by: HOSPITALIST

## 2022-11-14 PROCEDURE — 63600175 PHARM REV CODE 636 W HCPCS: Performed by: STUDENT IN AN ORGANIZED HEALTH CARE EDUCATION/TRAINING PROGRAM

## 2022-11-14 PROCEDURE — 63600175 PHARM REV CODE 636 W HCPCS: Performed by: HOSPITALIST

## 2022-11-14 PROCEDURE — 94640 AIRWAY INHALATION TREATMENT: CPT

## 2022-11-14 PROCEDURE — 87502 INFLUENZA DNA AMP PROBE: CPT | Performed by: HOSPITALIST

## 2022-11-14 PROCEDURE — 63600175 PHARM REV CODE 636 W HCPCS: Performed by: REGISTERED NURSE

## 2022-11-14 PROCEDURE — U0002 COVID-19 LAB TEST NON-CDC: HCPCS | Performed by: HOSPITALIST

## 2022-11-14 PROCEDURE — 84145 PROCALCITONIN (PCT): CPT | Performed by: HOSPITALIST

## 2022-11-14 PROCEDURE — 21400001 HC TELEMETRY ROOM

## 2022-11-14 PROCEDURE — 36415 COLL VENOUS BLD VENIPUNCTURE: CPT | Performed by: HOSPITALIST

## 2022-11-14 PROCEDURE — 87088 URINE BACTERIA CULTURE: CPT | Performed by: HOSPITALIST

## 2022-11-14 RX ORDER — ACETAMINOPHEN 650 MG/1
650 SUPPOSITORY RECTAL EVERY 6 HOURS PRN
Status: DISCONTINUED | OUTPATIENT
Start: 2022-11-14 | End: 2022-11-15 | Stop reason: HOSPADM

## 2022-11-14 RX ORDER — BISACODYL 10 MG
10 SUPPOSITORY, RECTAL RECTAL DAILY PRN
Refills: 0
Start: 2022-11-14

## 2022-11-14 RX ORDER — IPRATROPIUM BROMIDE AND ALBUTEROL SULFATE 2.5; .5 MG/3ML; MG/3ML
3 SOLUTION RESPIRATORY (INHALATION) EVERY 4 HOURS PRN
Qty: 75 ML | Refills: 0
Start: 2022-11-14 | End: 2023-11-14

## 2022-11-14 RX ORDER — SCOLOPAMINE TRANSDERMAL SYSTEM 1 MG/1
1 PATCH, EXTENDED RELEASE TRANSDERMAL
Start: 2022-11-14

## 2022-11-14 RX ADMIN — ONDANSETRON 4 MG: 2 INJECTION INTRAMUSCULAR; INTRAVENOUS at 04:11

## 2022-11-14 RX ADMIN — LEVETIRACETAM INJECTION 1000 MG: 10 INJECTION INTRAVENOUS at 08:11

## 2022-11-14 RX ADMIN — SCOPOLAMINE 1 PATCH: 1 SYSTEM TRANSDERMAL at 11:11

## 2022-11-14 RX ADMIN — IPRATROPIUM BROMIDE AND ALBUTEROL SULFATE 3 ML: 2.5; .5 SOLUTION RESPIRATORY (INHALATION) at 03:11

## 2022-11-14 RX ADMIN — LORAZEPAM 1 MG: 2 INJECTION INTRAMUSCULAR; INTRAVENOUS at 02:11

## 2022-11-14 RX ADMIN — HEPARIN SODIUM 5000 UNITS: 5000 INJECTION INTRAVENOUS; SUBCUTANEOUS at 06:11

## 2022-11-14 RX ADMIN — HEPARIN SODIUM 5000 UNITS: 5000 INJECTION INTRAVENOUS; SUBCUTANEOUS at 10:11

## 2022-11-14 RX ADMIN — FLUCONAZOLE 200 MG: 2 INJECTION, SOLUTION INTRAVENOUS at 01:11

## 2022-11-14 RX ADMIN — IPRATROPIUM BROMIDE AND ALBUTEROL SULFATE 3 ML: 2.5; .5 SOLUTION RESPIRATORY (INHALATION) at 07:11

## 2022-11-14 RX ADMIN — DEXTROSE: 5 SOLUTION INTRAVENOUS at 10:11

## 2022-11-14 RX ADMIN — ACETAMINOPHEN 650 MG: 650 SUPPOSITORY RECTAL at 11:11

## 2022-11-14 RX ADMIN — CEFTRIAXONE 2 G: 2 INJECTION, POWDER, FOR SOLUTION INTRAMUSCULAR; INTRAVENOUS at 04:11

## 2022-11-14 RX ADMIN — HEPARIN SODIUM 5000 UNITS: 5000 INJECTION INTRAVENOUS; SUBCUTANEOUS at 01:11

## 2022-11-14 NOTE — NURSING
PER handoff given to LAURO Schwartz     Pt resting in bed quietly. NAD noted. No c/o pain. Fall and safety precautions maintained. Bed alarm activated and audible.. Bed locked in lowest position, with side rails up x2. Call bell and personal items within reach

## 2022-11-14 NOTE — CARE UPDATE
Discussed with son Santi and after seeing his father he has had a change of heart and wishes to pursue comfort only measures and transfer to hospice tomorrow.  He does not want to draw blood cultures or any other needle sticks for lab work or diagnostic testing.  Will continue current medications and plan for discharge to NH with hospice care tomorrow.

## 2022-11-14 NOTE — DISCHARGE INSTRUCTIONS
Take all medications as prescribed.  Your family member has severe dysphagia from his stroke and is not safe for regular diet.  As he is entering into hospice care, it is ok to attempt small bites and sips for pleasure only.  Follow up with your hospice providers as needed.  Thank you for trusting [facility] and Dr. Hyatt with your care.  We are honored that you entrusted us with your healthcare needs. Your satisfaction is very important to us and we hope you have been very pleased with your experience at [facility]. After your discharge you may receive a survey asking you to rate your hospital experience. We encourage you to take the time to complete the survey as your feedback allows us to identify areas for improvement as well as recognize our staff.   We hope that you have received the very best care possible during your hospitalization at [facility], as your satisfaction is our top priority.

## 2022-11-14 NOTE — PLAN OF CARE
[facility]    NURSING HOME ORDERS    11/15/2022    Discharge to [facility], Consult Hospice, admit if appropriate    Diagnoses:   Active Hospital Problems    Diagnosis  POA    *Failure to thrive in adult [R62.7]  Yes    Fever [R50.9]  No    Comfort measures only status [Z51.5]  Not Applicable    Dysphagia [R13.10]  Yes    DNR (do not resuscitate) [Z66]  Yes    ACP (advance care planning) [Z71.89]  Not Applicable    Hypernatremia [E87.0]  Yes    Diarrhea [R19.7]  Yes    Acute renal failure superimposed on stage 3 chronic kidney disease [N17.9, N18.30]  Yes    Seizure disorder [G40.909]  Yes    CVA (cerebral vascular accident) [I63.9]  Yes    Chronic gout without tophus [M1A.9XX0]  Yes    Hyperkalemia [E87.5]  Yes    Hyperlipidemia [E78.5]  Yes     Chronic      Resolved Hospital Problems    Diagnosis Date Resolved POA    Renal failure [N19] 11/08/2022 Yes       Hospice Qualifying Diagnoses:        Patient has a life expectancy < 6 months due to:  Primary Hospice Diagnosis:  Severe dysphagia after stroke  Comorbid Conditions Contributing to Decline:Stroke, CKDIII, HTN, HLD, Gout    Vital Signs: Routine per Hospice Protocol.    Code Status: DNR    Allergies:   Review of patient's allergies indicates:   Allergen Reactions    Latex, natural rubber Rash    Iodine and iodide containing products Rash       Diet: NPO - but ok for small bites/sips on comfort feeding protocol with strict aspiration precautions.    Activities: As tolerated    Goals of Care Treatment Preferences:  Code Status: DNR          What is most important right now is to focus on avoiding the hospital, symptom/pain control, quality of life, even if it means sacrificing a little time, improvement in condition but with limits to invasive therapies, comfort and QOL .  Accordingly, we have decided that the best plan to meet the patient's goals includes enrolling in hospice care, medical optimization and treating reversible conditions that will  improve QOL and then transition to hospice care.      Nursing: Per Hospice Routine.    Routine Skin for Bedridden Patients: Apply moisture barrier cream to all skin folds and wet areas in perineal area daily and after baths and all bowel movements.    Oxygen: On room air at time of discharge.  OK to add supplemental O2 as needed for patient comfort.    Medications:      Medication List        START taking these medications      albuterol-ipratropium 2.5 mg-0.5 mg/3 mL nebulizer solution  Commonly known as: DUO-NEB  Take 3 mLs by nebulization every 4 (four) hours as needed for Wheezing or Shortness of Breath. Rescue     bisacodyL 10 mg Supp  Commonly known as: DULCOLAX  Place 1 suppository (10 mg total) rectally daily as needed (Until bowel movement if patient has no bowel movement for 2 days).     scopolamine 1.3-1.5 mg (1 mg over 3 days)  Commonly known as: TRANSDERM-SCOP  Place 1 patch onto the skin Every 3 (three) days.            STOP taking these medications      allopurinoL 100 MG tablet  Commonly known as: ZYLOPRIM     amLODIPine 10 MG tablet  Commonly known as: NORVASC     aspirin 81 MG Chew     atorvastatin 40 MG tablet  Commonly known as: LIPITOR     bimatoprost 0.01 % Drop  Commonly known as: LUMIGAN     carvediloL 25 MG tablet  Commonly known as: COREG     clopidogreL 75 mg tablet  Commonly known as: PLAVIX     hydrALAZINE 25 MG tablet  Commonly known as: APRESOLINE     levETIRAcetam 1000 MG tablet  Commonly known as: KEPPRA     lisinopriL 40 MG tablet  Commonly known as: PRINIVIL,ZESTRIL     senna-docusate 8.6-50 mg 8.6-50 mg per tablet  Commonly known as: PERICOLACE              Future Orders:  Hospice Medical Director may dictate new orders for comfortable care measures & sign death certificate.          _________________________________  Celia Cabral MD  11/15/2022

## 2022-11-14 NOTE — PLAN OF CARE
Problem: Adult Inpatient Plan of Care  Goal: Plan of Care Review  Outcome: Ongoing, Not Progressing  Goal: Patient-Specific Goal (Individualized)  Outcome: Ongoing, Not Progressing  Goal: Absence of Hospital-Acquired Illness or Injury  Outcome: Ongoing, Not Progressing  Goal: Optimal Comfort and Wellbeing  Outcome: Ongoing, Not Progressing  Goal: Readiness for Transition of Care  Outcome: Ongoing, Not Progressing     Problem: Coping Ineffective  Goal: Effective Coping  Outcome: Ongoing, Not Progressing     Problem: Fluid and Electrolyte Imbalance (Acute Kidney Injury/Impairment)  Goal: Fluid and Electrolyte Balance  Outcome: Ongoing, Not Progressing     Problem: Oral Intake Inadequate (Acute Kidney Injury/Impairment)  Goal: Optimal Nutrition Intake  Outcome: Ongoing, Not Progressing     Problem: Renal Function Impairment (Acute Kidney Injury/Impairment)  Goal: Effective Renal Function  Outcome: Ongoing, Not Progressing     Problem: Infection  Goal: Absence of Infection Signs and Symptoms  Outcome: Ongoing, Not Progressing     Problem: Skin Injury Risk Increased  Goal: Skin Health and Integrity  Outcome: Ongoing, Not Progressing     Problem: Fall Injury Risk  Goal: Absence of Fall and Fall-Related Injury  Outcome: Ongoing, Not Progressing

## 2022-11-14 NOTE — PROGRESS NOTES
[facility] Medicine  Progress Note    Patient Name: Leonides Burns  MRN: [id number]  Patient Class: IP- Inpatient   Admission Date: 11/7/2022  Length of Stay: 7 days  Attending Physician: Emilio Hyatt MD  Primary Care Provider: Suzette Akhtar MD        Subjective:     Principal Problem:Failure to thrive in adult        HPI:  This is a 77 year old male with a PMHx of CVA, CKD3, HLD, HTN, gout, tobacco use who presents with failure to thrive.     Patient is non verbal. Presented from a nursing home with inability to tolerate PO for 5 days duration. Per documentation, patient chokes on liquids, and honey thick liquids. In the ED, he was hypotensive (90/55), tachypnic, but otherwise stable. Labs showed hypernatremia (159), hyperkalemia (6.7), elevated creatinine (4.6- baseline of 1.6), negative troponin, negative lactic acid, polycythemia (20.3). CXR was negative. CT head showed Large regions of new worsening parenchymal hypoattenuation within the left cerebral hemisphere consistent with acute/recent infarction. He was given insulin/albuterol and fluids. The patient was admitted for further management.       Overview/Hospital Course:  77 year old man with CVA, CKDIII, HTN, HLD, and Gout who presented from outside SNF for evaluation of diminished oral intake.  He was diagnosed with severe recurrent dysphagia due to recent stroke, oral thrush, acute on chronic kidney disease, hyperkalemia, hypernatremia and severe dehydration.  His dysphagia was present prior to SNF due to his stroke, but unfortunately it is so severe he can barely manage his own secretions.  SLP, Neuro, nephrology and palliative care consulted.  Treated with IV fluids and acidosis and hyperkalemia have resolved.  Na and Cr are much improved and possibly by tomorrow back to normal.  He is DNR status and family wishes to prioritize comfort and avoidance of suffering.  Once sodium has normalized we plan to pursue hospice  care in nursing home.  We have a facility and he will likely discharge on Monday with hospice to NH.        Interval History: No acute events overnight, but now with high fever.  Appears more lethargic and less responsive during my visit.  Remains non-vebal.    Review of Systems   Unable to perform ROS: Mental status change   Objective:     Vital Signs (Most Recent):  Temp: (!) 100.5 °F (38.1 °C) (11/14/22 1305)  Pulse: 94 (11/14/22 1110)  Resp: 19 (11/14/22 1110)  BP: 125/61 (11/14/22 1110)  SpO2: 96 % (11/14/22 1110) Vital Signs (24h Range):  Temp:  [97.4 °F (36.3 °C)-101.6 °F (38.7 °C)] 100.5 °F (38.1 °C)  Pulse:  [] 94  Resp:  [17-24] 19  SpO2:  [90 %-97 %] 96 %  BP: (122-172)/(61-89) 125/61     Weight: 73.5 kg (162 lb 0.6 oz)  Body mass index is 24.64 kg/m².    Intake/Output Summary (Last 24 hours) at 11/14/2022 1414  Last data filed at 11/14/2022 0441  Gross per 24 hour   Intake --   Output 1000 ml   Net -1000 ml        Physical Exam  Vitals and nursing note reviewed.   Constitutional:       General: He is not in acute distress.     Appearance: Normal appearance. He is ill-appearing. He is not toxic-appearing.   HENT:      Head: Normocephalic and atraumatic.      Right Ear: External ear normal.      Left Ear: External ear normal.      Nose: Nose normal.      Mouth/Throat:      Mouth: Mucous membranes are moist.   Eyes:      Extraocular Movements: Extraocular movements intact.      Conjunctiva/sclera: Conjunctivae normal.   Cardiovascular:      Rate and Rhythm: Normal rate and regular rhythm.      Pulses: Normal pulses.      Heart sounds: Murmur heard.   Pulmonary:      Effort: Pulmonary effort is normal. No respiratory distress.   Abdominal:      General: Abdomen is flat.      Palpations: Abdomen is soft.      Tenderness: There is no abdominal tenderness.   Musculoskeletal:      Cervical back: No rigidity.      Right lower leg: No edema.      Left lower leg: No edema.   Skin:     General: Skin is warm.       Capillary Refill: Capillary refill takes less than 2 seconds.   Neurological:      Comments: Somnolent, today does not respond to my voice or attempt to speak       Significant Labs: All pertinent labs within the past 24 hours have been reviewed.    Significant Imaging: I have reviewed all pertinent imaging results/findings within the past 24 hours.      Assessment/Plan:      * Failure to thrive in adult  -Admitted to inpatient status  -Noted recent strokes and not eating at all on the 5 days prior to admit  -Noted to have Hypernatremia, hyperkalemia, renal failure and significant dysphagia  -So far no improvement despite treatment of thrush, dehydration and normalization of electrolytes.  This is unfortunately a terminal condition and hospice is appropriate  -Palliative care consulted and input appreciated.  -Specific treatment as below  -Working to discharge to hospice in nursing home.  Had planned discharge today but now with high fevers.    Fever  -Developed high fever 11/14 around 11:30AM.  -Suspected a pneumonia given aspiration risk and some reported hemoptysis over weekend, but CXR is clear  -Influenza is negative  -WBC is higher but remains normal.  -Check UA with reflex to culture  -Check doppler us to ensure no DVT in legs (unlikely given he is on DVT Px with heparin).  -Discussed with patient's son Santi - he wishes to hold the discharge until patient no-longer febrile.  -Will initiate ceftriaxone.    CVA (cerebral vascular accident)  -Noted history of CVA with recent hospitalization for an acute stroke (D/c 10/28) Plan was for plavix x 21 days, aspirin and statin  -CT head showed large regions of new worsening parenchymal hypoattenuation within the left cerebral hemisphere consistent with acute/recent infarction  -Consulted PT, OT, SLP and neurology.  Appreciate consultants assistance  -No new recommendations from neurology - noted evolution of prior stroke and continued dysphagia  -Remains  unable to take oral meds - so holding aspirin, plavix and statin.  -Resume home medications when/if able, but unlikely that he will regain this function    Dysphagia  -Present at times since recent stroke and now severe  -Seems to be struggling to even control his own secretions  -SLP consulted and discussed with them today.  Did take some sips of thickened liquids, but no solids.    -STRICT NPO for now.  -On 11/9 I discussed options with family at length - TPN, PEG, NGT vs temporary IV fluids and possible hospice.  Family above all wishes to prevent any suffering for patient.  They do not want PEG or NGT placement.  We discussed that IV fluids and treatment of his thrush with fluconazole is unlikely to correct his dysphagia.  Still, they are hopeful and wish to give it more time to see if it will help - this is reasonable.  I discussed with them that I strongly suspect his dysphagia will remain and that hospice care is most likely the appropriate care.  They remain open to that and are participating in planning for hospice at discharge in a NH.  -Added scopolamine patch    Acute renal failure superimposed on stage 3 chronic kidney disease  -Baseline Cr around 1.6  -Cr on admit 4.1 and now down to 1.8  -On admit had hyperkalemia, hypernatremia and anion-gap acidosis   -Anion gap and hypernatremia have normalized.  K remains normal.  Mild NAGMA  -Avoid nephrotoxic agents and renally dose meds  -Consulted nephrology and input appreciated  -Continue D5W  -Monitor BMP     Hyperkalemia  -Treatment as above.    Hypernatremia  -Due to severe dehydration and diminished oral intake  -Treat as above.    Diarrhea  -Noted one large episode 11/8 and nursing felt it smelt of C.diff  -No further diarrhea  -Ordered stool wbc, culture and C.diff on 11/8 but after 24 hours no further diarrhea.    ACP (advance care planning)  -Palliative care consulted and input appreciated.  -Family wishes for patient to be DNR and to have active  medical treatment at this time.  Their highest priority is prevent suffering and they not wish for NGT or PEG placement.  They are very hopeful that he will improve with IV fluids and be able to eat and drink again.  Discussed at length that I too am hopeful, but do not expect that to occur.  Family actively participating in discussions and reviewing nursing home options.    -Plan is for hospice in nursing home at discharge  -Spent 25 minutes in goals of care and ACP planning 11/9.    DNR (do not resuscitate)  -Noted as above.    Seizure disorder  -Continue IV keppra    Chronic gout without tophus  -Resume home allopurinol when/if able    Hyperlipidemia  -Resume statin when/if able      VTE Risk Mitigation (From admission, onward)         Ordered     heparin (porcine) injection 5,000 Units  Every 8 hours         11/07/22 2306     IP VTE HIGH RISK PATIENT  Once         11/07/22 2306     Place sequential compression device  Until discontinued         11/07/22 2306                Discharge Planning   DEVIKA: 11/15/2022     Code Status: DNR   Is the patient medically ready for discharge?:     Reason for patient still in hospital (select all that apply): Laboratory test, Treatment and Pending disposition  Discharge Plan A: New Nursing Home placement - shelter care facility   Discharge Delays: None known at this time              Emilio Hyatt MD  Department of Hospital Medicine   [facility] - [place] - Telemetry

## 2022-11-14 NOTE — PLAN OF CARE
Plan for discharge to [facility] for long term nursing home with hospice. Plan of  care orders, mar, ppd, 142/pasrr, covid vaccine and initial rapid covid screen sent to [facility] via care port, pending review. Second requested rapid covid screen in process.     0945am Updated plan of care and rapid covid sent to [facility] via care port. Spoke with pt's son Santi, confirmed plan for discharge to [facility] today, verbalized acceptance.     11:55am Per Jina with [facility] NH stated she had spoken with pt's son to meet at facility this morning to submit payment, awaiting for son to arrive. TN to continue to follow.     12:30pm Received call from Jina with [facility] stated clear for call report and transportation to be requested, pt nurse notified.    11/14/22 0810   Discharge Reassessment   Assessment Type Discharge Planning Reassessment   Did the patient's condition or plan change since previous assessment? No   Communicated DEVIKA with patient/caregiver Yes   Discharge Plan A New Nursing Home placement - long term care facility   Discharge Plan B Hospice/home   DME Needed Upon Discharge  none  (Per NH)   Discharge Barriers Identified None   Why the patient remains in the hospital Placement issues   Post-Acute Status   Post-Acute Authorization Placement   Post-Acute Placement Status Pending payor medical review/second level review   Coverage [employer]   Discharge Delays None known at this time

## 2022-11-14 NOTE — PLAN OF CARE
11/14/22 0945   Medicare Message   Important Message from Medicare regarding Discharge Appeal Rights Given to patient/caregiver;Explained to patient/caregiver;Other (comments)  (TN spoke with patient's son Santi Burns [phone] via phone, verbalized understanding Copy mailed certified to address in chart. [phone])   Date IMM was signed 11/14/22   Time IMM was signed 0945

## 2022-11-14 NOTE — PROGRESS NOTES
Renal Progress Note    Date of Admission:  11/7/2022  7:29 PM    Length of Stay: 7  Days    Subjective: n/a    Objective:    Current Facility-Administered Medications   Medication    albuterol-ipratropium 2.5 mg-0.5 mg/3 mL nebulizer solution 3 mL    bisacodyL suppository 10 mg    dextrose 10% bolus 125 mL    dextrose 10% bolus 125 mL    dextrose 10% bolus 250 mL    dextrose 10% bolus 250 mL    dextrose 5 % infusion    fluconazole (DIFLUCAN) IVPB 200 mg    glucagon (human recombinant) injection 1 mg    glucose chewable tablet 16 g    glucose chewable tablet 24 g    heparin (porcine) injection 5,000 Units    HYDROmorphone injection 0.2 mg    levETIRAcetam in NaCl (iso-os) IVPB 1,000 mg    LORazepam injection 1 mg    naloxone 0.4 mg/mL injection 0.02 mg    ondansetron injection 4 mg    prochlorperazine injection Soln 5 mg    scopolamine 1.3-1.5 mg (1 mg over 3 days) 1 patch    sodium chloride 0.9% flush 10 mL       Vitals:    11/13/22 2014 11/13/22 2324 11/14/22 0345 11/14/22 0509   BP: (!) 144/72 133/89  134/72   BP Location:       Patient Position: Lying Lying  Lying   Pulse: 82 92 89 100   Resp: 20 (!) 21 (!) 24 (!) 21   Temp: 100.1 °F (37.8 °C) 99.9 °F (37.7 °C)  100.2 °F (37.9 °C)   TempSrc: Oral Axillary  Oral   SpO2: (!) 94% (!) 94%  (!) 93%   Weight:       Height:           I/O last 3 completed shifts:  In: -   Out: 1300 [Urine:1300]  No intake/output data recorded.      Physical Exam: deferred      Laboratories:    No results for input(s): WBC, RBC, HGB, HCT, PLT, MCV, MCH, MCHC in the last 24 hours.    No results for input(s): GLUCOSE, CALCIUM, PROT, NA, K, CO2, CL, BUN, CREATININE, ALKPHOS, ALT, AST, BILITOT in the last 24 hours.    Invalid input(s):  ALBUMIN      No results for input(s): COLORU, CLARITYU, SPECGRAV, PHUR, PROTEINUA, GLUCOSEU, BLOODU, WBCU, RBCU, BACTERIA, MUCUS in the last 24 hours.    Invalid input(s):  BILIRUBINCON    Microbiology Results (last 7 days)        Procedure Component Value Units Date/Time    Clostridium difficile EIA [[id number]]     Order Status: Canceled Specimen: Stool     Stool culture [[id number]]     Order Status: No result Specimen: Stool               Diagnostic Tests: n/a        Assessment:    76 y/o male admitted with:    - Failure to thrive  - HyperNa+ RESOLVED  - HyperK+  RESOLVED  - Mild NAGMA same  - HypoPO4- RESOLVED  - EDGAR  improving   - Hx. Of CKD-3  - Recent L-CVA  - Dysphagia  - 2nd. HPT  - Seizure disorder  - Gout  - HLD  - Pte. Is DNR            Plan:    - Conservative care (transfer to Hospice in progress)  - No further recommendations Renalwise    Will sign off the case.

## 2022-11-14 NOTE — PLAN OF CARE
Per patient nurse, pt developed a fever of 101.6 and awaiting stat CBC per physician. TN to continue to follow.     Per physician, discharge canceled, will follow up on tomorrow. TN notified [facility]

## 2022-11-14 NOTE — ASSESSMENT & PLAN NOTE
-Baseline Cr around 1.6  -Cr on admit 4.1 and now down to 1.8  -On admit had hyperkalemia, hypernatremia and anion-gap acidosis   -Anion gap and hypernatremia have normalized.  K remains normal.  Mild NAGMA  -Avoid nephrotoxic agents and renally dose meds  -Consulted nephrology and input appreciated  -Continue D5W  -Monitor BMP

## 2022-11-14 NOTE — ASSESSMENT & PLAN NOTE
-Admitted to inpatient status  -Noted recent strokes and not eating at all on the 5 days prior to admit  -Noted to have Hypernatremia, hyperkalemia, renal failure and significant dysphagia  -So far no improvement despite treatment of thrush, dehydration and normalization of electrolytes.  This is unfortunately a terminal condition and hospice is appropriate  -Palliative care consulted and input appreciated.  -Specific treatment as below  -Working to discharge to hospice in nursing home.  Had planned discharge today but now with high fevers.

## 2022-11-14 NOTE — ASSESSMENT & PLAN NOTE
-Developed high fever 11/14 around 11:30AM.  -Suspected a pneumonia given aspiration risk and some reported hemoptysis over weekend, but CXR is clear  -Influenza is negative  -WBC is higher but remains normal.  -Check UA with reflex to culture  -Check doppler us to ensure no DVT in legs (unlikely given he is on DVT Px with heparin).  -Discussed with patient's son Santi - he wishes to hold the discharge until patient no-longer febrile.  -Will initiate ceftriaxone.

## 2022-11-14 NOTE — SUBJECTIVE & OBJECTIVE
Interval History: No acute events overnight, but now with high fever.  Appears more lethargic and less responsive during my visit.  Remains non-vebal.    Review of Systems   Unable to perform ROS: Mental status change   Objective:     Vital Signs (Most Recent):  Temp: (!) 100.5 °F (38.1 °C) (11/14/22 1305)  Pulse: 94 (11/14/22 1110)  Resp: 19 (11/14/22 1110)  BP: 125/61 (11/14/22 1110)  SpO2: 96 % (11/14/22 1110) Vital Signs (24h Range):  Temp:  [97.4 °F (36.3 °C)-101.6 °F (38.7 °C)] 100.5 °F (38.1 °C)  Pulse:  [] 94  Resp:  [17-24] 19  SpO2:  [90 %-97 %] 96 %  BP: (122-172)/(61-89) 125/61     Weight: 73.5 kg (162 lb 0.6 oz)  Body mass index is 24.64 kg/m².    Intake/Output Summary (Last 24 hours) at 11/14/2022 1414  Last data filed at 11/14/2022 0441  Gross per 24 hour   Intake --   Output 1000 ml   Net -1000 ml        Physical Exam  Vitals and nursing note reviewed.   Constitutional:       General: He is not in acute distress.     Appearance: Normal appearance. He is ill-appearing. He is not toxic-appearing.   HENT:      Head: Normocephalic and atraumatic.      Right Ear: External ear normal.      Left Ear: External ear normal.      Nose: Nose normal.      Mouth/Throat:      Mouth: Mucous membranes are moist.   Eyes:      Extraocular Movements: Extraocular movements intact.      Conjunctiva/sclera: Conjunctivae normal.   Cardiovascular:      Rate and Rhythm: Normal rate and regular rhythm.      Pulses: Normal pulses.      Heart sounds: Murmur heard.   Pulmonary:      Effort: Pulmonary effort is normal. No respiratory distress.   Abdominal:      General: Abdomen is flat.      Palpations: Abdomen is soft.      Tenderness: There is no abdominal tenderness.   Musculoskeletal:      Cervical back: No rigidity.      Right lower leg: No edema.      Left lower leg: No edema.   Skin:     General: Skin is warm.      Capillary Refill: Capillary refill takes less than 2 seconds.   Neurological:      Comments: Somnolent,  today does not respond to my voice or attempt to speak       Significant Labs: All pertinent labs within the past 24 hours have been reviewed.    Significant Imaging: I have reviewed all pertinent imaging results/findings within the past 24 hours.

## 2022-11-14 NOTE — ASSESSMENT & PLAN NOTE
-Present at times since recent stroke and now severe  -Seems to be struggling to even control his own secretions  -SLP consulted and discussed with them today.  Did take some sips of thickened liquids, but no solids.    -STRICT NPO for now.  -On 11/9 I discussed options with family at length - TPN, PEG, NGT vs temporary IV fluids and possible hospice.  Family above all wishes to prevent any suffering for patient.  They do not want PEG or NGT placement.  We discussed that IV fluids and treatment of his thrush with fluconazole is unlikely to correct his dysphagia.  Still, they are hopeful and wish to give it more time to see if it will help - this is reasonable.  I discussed with them that I strongly suspect his dysphagia will remain and that hospice care is most likely the appropriate care.  They remain open to that and are participating in planning for hospice at discharge in a NH.  -Added scopolamine patch

## 2022-11-15 VITALS
RESPIRATION RATE: 20 BRPM | WEIGHT: 162.06 LBS | HEART RATE: 85 BPM | TEMPERATURE: 98 F | BODY MASS INDEX: 24.56 KG/M2 | HEIGHT: 68 IN | SYSTOLIC BLOOD PRESSURE: 106 MMHG | DIASTOLIC BLOOD PRESSURE: 57 MMHG | OXYGEN SATURATION: 97 %

## 2022-11-15 PROCEDURE — 63600175 PHARM REV CODE 636 W HCPCS: Performed by: HOSPITALIST

## 2022-11-15 PROCEDURE — 99900035 HC TECH TIME PER 15 MIN (STAT)

## 2022-11-15 PROCEDURE — 63600175 PHARM REV CODE 636 W HCPCS: Performed by: STUDENT IN AN ORGANIZED HEALTH CARE EDUCATION/TRAINING PROGRAM

## 2022-11-15 PROCEDURE — 94761 N-INVAS EAR/PLS OXIMETRY MLT: CPT

## 2022-11-15 PROCEDURE — 27000221 HC OXYGEN, UP TO 24 HOURS

## 2022-11-15 RX ADMIN — LEVETIRACETAM INJECTION 1000 MG: 10 INJECTION INTRAVENOUS at 09:11

## 2022-11-15 RX ADMIN — HEPARIN SODIUM 5000 UNITS: 5000 INJECTION INTRAVENOUS; SUBCUTANEOUS at 05:11

## 2022-11-15 NOTE — NURSING
Patient escorted to [facility] vehicle via stretcher for transfer to [facility]. No apparent distress noted. Son notified via telephone.

## 2022-11-15 NOTE — PLAN OF CARE
Plan for discharge to [facility] with hospice. Per physician, patient clear to discharge. Updated plan of care orders and dc summary sent to [facility] via care port, pending review.     10:30am Per Jina with [facility],state all clinicals received and reviewed, nurse to call report tp  [phone] going to room 407A and transportation to be requested, pt nurse notified.     Spoke with patient's sonSanti informed of discharge today. Pt's son accepting to discharge to [facility]

## 2022-11-15 NOTE — CONSULTS
Patient was non verbal but indicated he woud like prayer. According to staff family approved pt. moving to comfort measures and likely going home with hospice.  will continue to follow.

## 2022-11-15 NOTE — ACP (ADVANCE CARE PLANNING)
Advance Care Planning     Date: 11/15/2022  Plan for hospice care at nursing home. Plan for discharge today. DNR code status.   Celia Cabral MD  11/15/2022  9:33 AM

## 2022-11-15 NOTE — PLAN OF CARE
Problem: Adult Inpatient Plan of Care  Goal: Plan of Care Review  Outcome: Adequate for Care Transition  Goal: Patient-Specific Goal (Individualized)  Outcome: Adequate for Care Transition  Goal: Absence of Hospital-Acquired Illness or Injury  Outcome: Adequate for Care Transition  Goal: Optimal Comfort and Wellbeing  Outcome: Adequate for Care Transition  Goal: Readiness for Transition of Care  Outcome: Adequate for Care Transition     Problem: Coping Ineffective  Goal: Effective Coping  Outcome: Adequate for Care Transition     Problem: Fluid and Electrolyte Imbalance (Acute Kidney Injury/Impairment)  Goal: Fluid and Electrolyte Balance  Outcome: Adequate for Care Transition     Problem: Oral Intake Inadequate (Acute Kidney Injury/Impairment)  Goal: Optimal Nutrition Intake  Outcome: Adequate for Care Transition     Problem: Renal Function Impairment (Acute Kidney Injury/Impairment)  Goal: Effective Renal Function  Outcome: Adequate for Care Transition     Problem: Infection  Goal: Absence of Infection Signs and Symptoms  Outcome: Adequate for Care Transition     Problem: Skin Injury Risk Increased  Goal: Skin Health and Integrity  Outcome: Adequate for Care Transition     Problem: Fall Injury Risk  Goal: Absence of Fall and Fall-Related Injury  Outcome: Adequate for Care Transition

## 2022-11-15 NOTE — NURSING
PER handoff received from LAURO Schwartz     Pt resting in bed quietly. NAD noted.   Fall and safety precautions maintained. Bed alarm activated and audible.. Bed locked in lowest position, with side rails up x2. Call bell and personal items within reach

## 2022-11-15 NOTE — CONSULTS
[place] - Telemetry  Discharge Final Note    Patient clear to discharge from case management stand point. Reviewed discharge to [facility] with patient's son Santi, verbalized acceptance. Transportation requested.     Primary Care Provider: Suzette Akhtar MD    Expected Discharge Date: 11/15/2022    Final Discharge Note (most recent)       Final Note - 11/15/22 1047          Final Note    Assessment Type Final Discharge Note     Anticipated Discharge Disposition correction Nursing Home   [facility] with hospice    Hospital Resources/Appts/Education Provided Provided patient/caregiver with written discharge plan information        Post-Acute Status    Post-Acute Authorization Placement     Post-Acute Placement Status Set-up Complete/Auth obtained     Patient choice form signed by patient/caregiver List with quality metrics by geographic area provided     Discharge Delays None known at this time                     Important Message from Medicare  Important Message from Medicare regarding Discharge Appeal Rights: Given to patient/caregiver, Explained to patient/caregiver, Other (comments) ([place] spoke with patient's son Santi Burns [phone] via phone, verbalized understanding Copy mailed certified to address in chart. [id number] [phone])     Date IMM was signed: 11/14/22  Time IMM was signed: 0945

## 2022-11-15 NOTE — DISCHARGE SUMMARY
[facility] Medicine  Discharge Summary      Patient Name: Leonides Burns  MRN: [id number]  [facility]: [id number]  Patient Class: IP- Inpatient  Admission Date: 11/7/2022  Hospital Length of Stay: 8 days  Discharge Date and Time:  11/15/2022 9:35 AM  Attending Physician: Celia Cabral MD   Discharging Provider: Celia Cabral MD  Primary Care Provider: Suzette Akhtar MD    Primary Care Team: Networked reference to record PCT     HPI:   This is a 77 year old male with a PMHx of CVA, CKD3, HLD, HTN, gout, tobacco use who presents with failure to thrive.     Patient is non verbal. Presented from a nursing home with inability to tolerate PO for 5 days duration. Per documentation, patient chokes on liquids, and honey thick liquids. In the ED, he was hypotensive (90/55), tachypnic, but otherwise stable. Labs showed hypernatremia (159), hyperkalemia (6.7), elevated creatinine (4.6- baseline of 1.6), negative troponin, negative lactic acid, polycythemia (20.3). CXR was negative. CT head showed Large regions of new worsening parenchymal hypoattenuation within the left cerebral hemisphere consistent with acute/recent infarction. He was given insulin/albuterol and fluids. The patient was admitted for further management.       * No surgery found *      Hospital Course:   77 year old man with CVA, CKDIII, HTN, HLD, and Gout who presented from outside SNF for evaluation of diminished oral intake.  He was diagnosed with severe recurrent dysphagia due to recent stroke, oral thrush, acute on chronic kidney disease, hyperkalemia, hypernatremia and severe dehydration.  His dysphagia was present prior to SNF due to his stroke, but unfortunately it is so severe he can barely manage his own secretions.  SLP, Neuro, nephrology and palliative care consulted.  Treated with IV fluids and acidosis and hyperkalemia have resolved.  Na and Cr are much improved.  He is DNR status and family wishes to prioritize  comfort and avoidance of suffering.  Plan to discharge back to nursing home with hospice care.        Goals of Care Treatment Preferences:  Code Status: DNR          What is most important right now is to focus on avoiding the hospital, symptom/pain control, quality of life, even if it means sacrificing a little time, improvement in condition but with limits to invasive therapies, comfort and QOL .  Accordingly, we have decided that the best plan to meet the patient's goals includes enrolling in hospice care, medical optimization and treating reversible conditions that will improve QOL and then transition to hospice care.      Consults:   Consults (From admission, onward)        Status Ordering Provider     Inpatient consult to Social Work  Once        Provider:  (Not yet assigned)    Acknowledged FRANCIS GARCIA     Inpatient consult to Spiritual Care  Once        Provider:  (Not yet assigned)    Completed VIVIAN GARCIA     Inpatient consult to Nephrology  Once        Provider:  Sade De Paz MD    Completed FRANCIS GARCIA     Inpatient consult to Registered Dietitian/Nutritionist  Once        Provider:  (Not yet assigned)    Completed JERED MOSS     Inpatient consult to Palliative Care  Once        Provider:  Chelita Wolff NP    Completed RAMIRO HITCHCOCK     Inpatient consult to Neurology  Once        Provider:  Tiff Franklin MD    Completed RAMIRO HITCHCOCK     Inpatient consult to PICC team ([facility])  Once        Provider:  (Not yet assigned)    Acknowledged YAO WELCH          No new Assessment & Plan notes have been filed under this hospital service since the last note was generated.  Service: Hospital Medicine    Final Active Diagnoses:    Diagnosis Date Noted POA    PRINCIPAL PROBLEM:  Failure to thrive in adult [R62.7] 11/07/2022 Yes    Fever [R50.9] 11/14/2022 No    Comfort measures only status [Z51.5] 11/14/2022 Not Applicable    Dysphagia [R13.10] 11/08/2022 Yes    DNR (do not  resuscitate) [Z66] 11/08/2022 Yes    ACP (advance care planning) [Z71.89] 11/08/2022 Not Applicable    Hypernatremia [E87.0] 11/08/2022 Yes    Diarrhea [R19.7] 11/08/2022 Yes    Acute renal failure superimposed on stage 3 chronic kidney disease [N17.9, N18.30] 11/07/2022 Yes    Seizure disorder [G40.909] 10/14/2022 Yes    CVA (cerebral vascular accident) [I63.9] 10/10/2022 Yes    Chronic gout without tophus [M1A.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] 03/23/2016 Yes    Hyperkalemia [E87.5] 12/02/2015 Yes    Hyperlipidemia [E78.5] 02/02/2015 Yes     Chronic      Problems Resolved During this Admission:    Diagnosis Date Noted Date Resolved POA    Renal failure [N19] 11/08/2022 11/08/2022 Yes       Discharged Condition: stable    Disposition: Hospice/Home    Follow Up: with hospice    Patient Instructions:      Diet NPO     Notify your health care provider if you experience any of the following:   Order Comments: Notify hospice of any issues     Activity as tolerated       Significant Diagnostic Studies: Labs: All labs within the past 24 hours have been reviewed    Pending Diagnostic Studies:     Procedure Component Value Units Date/Time    Rhythm strip [[id number]]     Order Status: Sent Lab Status: No result          Medications:  Reconciled Home Medications:      Medication List      START taking these medications    albuterol-ipratropium 2.5 mg-0.5 mg/3 mL nebulizer solution  Commonly known as: DUO-NEB  Take 3 mLs by nebulization every 4 (four) hours as needed for Wheezing or Shortness of Breath. Rescue     bisacodyL 10 mg Supp  Commonly known as: DULCOLAX  Place 1 suppository (10 mg total) rectally daily as needed (Until bowel movement if patient has no bowel movement for 2 days).     scopolamine 1.3-1.5 mg (1 mg over 3 days)  Commonly known as: TRANSDERM-SCOP  Place 1 patch onto the skin Every 3 (three) days.        STOP taking these medications    allopurinoL 100 MG tablet  Commonly known as: ZYLOPRIM     amLODIPine 10 MG  tablet  Commonly known as: NORVASC     aspirin 81 MG Chew     atorvastatin 40 MG tablet  Commonly known as: LIPITOR     bimatoprost 0.01 % Drop  Commonly known as: LUMIGAN     carvediloL 25 MG tablet  Commonly known as: COREG     clopidogreL 75 mg tablet  Commonly known as: PLAVIX     hydrALAZINE 25 MG tablet  Commonly known as: APRESOLINE     levETIRAcetam 1000 MG tablet  Commonly known as: KEPPRA     lisinopriL 40 MG tablet  Commonly known as: PRINIVIL,ZESTRIL     senna-docusate 8.6-50 mg 8.6-50 mg per tablet  Commonly known as: PERICOLACE            Indwelling Lines/Drains at time of discharge:   none    Time spent on the discharge of patient: 35 minutes         Celia Cabral MD  Department of Hospital Medicine  [facility] [place] [facility]

## 2022-11-15 NOTE — PLAN OF CARE
ADT 30 order placed for stretcher Transportation.  Requested  time:12:00pm  If transportation does not arrive at ETA time nurse will be instructed to follow protocol for transportation below:  How can I get in touch directly with dispatch, if needed?                 Non-emergent dispatch: [phone]      +++NURSING:  If stretcher does not arrive at requested time please call the above Non Emergent Dispatcher.  If issue not resolved please escalate to your charge nurse for further instructions.    Transport to [address] [address]

## 2022-11-17 LAB
BACTERIA UR CULT: ABNORMAL
BACTERIA UR CULT: ABNORMAL

## 2022-11-18 LAB
BACTERIA BLD CULT: NORMAL
BACTERIA BLD CULT: NORMAL

## 2022-11-21 LAB — POCT GLUCOSE: 164 MG/DL (ref 70–110)

## 2022-12-01 ENCOUNTER — TELEPHONE (OUTPATIENT)
Dept: NEUROLOGY | Facility: HOSPITAL | Age: 77
End: 2022-12-01
Payer: MEDICARE

## 2023-01-18 NOTE — ED NOTES
Pt given urinal to obtain urine specimen. Will follow up shortly   

## 2023-02-13 PROBLEM — N18.30 ACUTE RENAL FAILURE SUPERIMPOSED ON STAGE 3 CHRONIC KIDNEY DISEASE: Status: RESOLVED | Noted: 2022-11-07 | Resolved: 2023-02-13

## 2023-02-13 PROBLEM — N17.9 ACUTE RENAL FAILURE SUPERIMPOSED ON STAGE 3 CHRONIC KIDNEY DISEASE: Status: RESOLVED | Noted: 2022-11-07 | Resolved: 2023-02-13

## 2023-02-13 PROBLEM — I63.9 CVA (CEREBRAL VASCULAR ACCIDENT): Status: RESOLVED | Noted: 2022-10-10 | Resolved: 2023-02-13

## 2023-06-13 NOTE — PROGRESS NOTES
Pt. ID: Leonides Burns is a 73 y.o. male      Chief complaint:   Chief Complaint   Patient presents with    Hypertension     follow up       HPI: Pt. Here for f/u for HTN and CKD; I reviewed labs dated 7/2/18; kidney fxn was elevated but stable and sees renal; cholesterol was WNL; proteinuria was noted and pt. Is on ramipril; pt. Smokes 6 cigs/day and he does not want smoking cessation course; BP is elevated and pt. Has gained significant amount of weight; he states he did not take morning BP meds today and I advised him to do so when he gets home; he does not want digital HTN program; he does not want colonoscopy but agrees to IFOBT; he is not taking asa 81 mg QD and I asked him to restart      Review of Systems   Constitutional: Negative for chills and fever.   Respiratory: Negative for cough and shortness of breath.    Cardiovascular: Negative for chest pain.   Gastrointestinal: Negative for abdominal pain, nausea and vomiting.   Genitourinary: Negative for dysuria.         Objective:    Physical Exam   Constitutional: He is oriented to person, place, and time.   obese   Eyes: EOM are normal.   Neck: Normal range of motion.   Cardiovascular: Normal rate, regular rhythm and normal heart sounds.    Pulmonary/Chest: Effort normal and breath sounds normal.   Abdominal: Soft. There is no tenderness. There is no rebound and no guarding.   Musculoskeletal: Normal range of motion.   Neurological: He is alert and oriented to person, place, and time.   Skin: No rash noted.   Vitals reviewed.        Health Maintenance   Topic Date Due    Influenza Vaccine  08/01/2018    Lipid Panel  07/02/2023    TETANUS VACCINE  01/08/2028    Colonoscopy  01/08/2028    Zoster Vaccine  Addressed    Abdominal Aortic Aneurysm Screening  Completed    Pneumococcal (65+)  Excluded         Assessment:     1. Essential hypertension Sub-optimally controlled   2. Hyperlipidemia, unspecified hyperlipidemia type Well controlled   3. Chronic  kidney disease, stage III (moderate) Stable   4. Proteinuria, unspecified type Active   5. Tobacco dependence Sub-optimally controlled   6. Class 1 obesity with serious comorbidity and body mass index (BMI) of 30.0 to 30.9 in adult, unspecified obesity type Sub-optimally controlled   7. Colon cancer screening Active   8. Preventative health care Active   9. Abnormal finding of blood chemistry  Active         Plan: Essential hypertension  Comments:  asked pt. to take  AM BP meds when he gets home and encouraged low na diet and weight loss; repeat BP on f/u in 1 month   Orders:  -     CBC auto differential; Future; Expected date: 10/09/2018  -     Comprehensive metabolic panel; Future; Expected date: 10/09/2018    Hyperlipidemia, unspecified hyperlipidemia type  Comments:  continue current regimen and encouraged diet modification; restart asa 81 mg QD    Chronic kidney disease, stage III (moderate)  Comments:  monitor and avoid NSAIDs; get HGBA1C to screen for DM  Orders:  -     Comprehensive metabolic panel; Future; Expected date: 10/09/2018    Proteinuria, unspecified type  Comments:  continue ACE    Tobacco dependence  Comments:  encouraged cessation and explained risks     Class 1 obesity with serious comorbidity and body mass index (BMI) of 30.0 to 30.9 in adult, unspecified obesity type  Comments:  encouraged diet and explained risks     Colon cancer screening  -     Fecal Immunochemical Test (iFOBT); Future; Expected date: 07/09/2018    Preventative health care  -     CBC auto differential; Future; Expected date: 10/09/2018  -     Comprehensive metabolic panel; Future; Expected date: 10/09/2018  -     Hemoglobin A1c; Future; Expected date: 10/09/2018    Abnormal finding of blood chemistry   -     Hemoglobin A1c; Future; Expected date: 10/09/2018    Other orders  -     aspirin 81 MG Chew; Take 1 tablet (81 mg total) by mouth once daily.; Refill: 0        Problem List Items Addressed This Visit         Cardiac/Vascular    Hypertension - Primary (Chronic)    Relevant Orders    CBC auto differential    Comprehensive metabolic panel    Hyperlipidemia (Chronic)       Renal/    Proteinuria    Chronic kidney disease, stage III (moderate) (Chronic)    Relevant Orders    Comprehensive metabolic panel       Endocrine    Class 1 obesity with serious comorbidity and body mass index (BMI) of 30.0 to 30.9 in adult       GI    Colon cancer screening    Relevant Orders    Fecal Immunochemical Test (iFOBT)       Other    Abnormal finding of blood chemistry     Relevant Orders    Hemoglobin A1c    Tobacco dependence (Chronic)      Other Visit Diagnoses     Preventative health care   (Active)      Relevant Orders    CBC auto differential    Comprehensive metabolic panel    Hemoglobin A1c         [id number]

## 2024-02-16 NOTE — ASSESSMENT & PLAN NOTE
2 seizures occurred on 10/13 prompting step up to ICU  - Neurology following  - on keppra 1g BID  - no further seizure activity noted    ambulatory

## 2025-04-29 NOTE — ASSESSMENT & PLAN NOTE
-Baseline Cr around 1.6  -Cr on admit 4.1 and now down to 2.8  -On admit had hyperkalemia, hypernatremia and anion-gap acidosis   -Anion gap has normalized.  Remains hypernatremic.  NAGMA improving.  K now normal  -Avoid nephrotoxic agents and renally dose meds  -Consulted nephrology and input appreciated  -Continue D5W and bicarb infusions  -Monitor BMP q4h  -Consult nephrology.   Detail Level: Zone Detail Level: Simple

## (undated) DEVICE — Device

## (undated) DEVICE — SPLINT REUTER BIVALVE 0.5MM

## (undated) DEVICE — SUT 4/0 18IN CHROMIC GUT PS

## (undated) DEVICE — SUT SILK 2-0 BLK BR KS 30 I

## (undated) DEVICE — SOL 9P NACL IRR PIC IL

## (undated) DEVICE — KIT SINUS ENDOSCOPY PACKNG

## (undated) DEVICE — SPLINT NASAL PRECUT

## (undated) DEVICE — TRACKER ENT INSTRUMENT

## (undated) DEVICE — SUT SILK 2-0 BLK BR FSL 18

## (undated) DEVICE — RAD 40 CVD SINUS BLADE

## (undated) DEVICE — SOL IRR NACL .9% 3000ML

## (undated) DEVICE — BLADE QUADCUT STRAIGHT 4.3MM

## (undated) DEVICE — SPLINT NASAL AIRWAY SEPTAL SIL

## (undated) DEVICE — PACKING NASAL POSTERIOR W/DRAW

## (undated) DEVICE — POSITIONER IV ARMBOARD FOAM

## (undated) DEVICE — SEE MEDLINE ITEM 153151

## (undated) DEVICE — CORD BIPOLAR 12 FOOT

## (undated) DEVICE — SKIN MARKER DEVON 160

## (undated) DEVICE — CONTAINER SPECIMEN STRL 4OZ

## (undated) DEVICE — GLOVE BIOGEL SKINSENSE PI 8.0

## (undated) DEVICE — GLOVE BIOGEL SKINSENSE PI 7.0

## (undated) DEVICE — SEE MEDLINE ITEM 157110

## (undated) DEVICE — SYR 30CC LUER LOCK

## (undated) DEVICE — SOL NACL 0.9% INJ 500ML BG

## (undated) DEVICE — TRACKER PATIENT NON INVASIVE

## (undated) DEVICE — SEE MEDLINE ITEM 152622

## (undated) DEVICE — SEE MEDLINE ITEM 156934

## (undated) DEVICE — UNDERGLOVES BIOGEL PI SZ 7 LF

## (undated) DEVICE — POSITIONER HEAD DONUT 9IN FOAM

## (undated) DEVICE — SUT PLN GUT 4-0 SC-1SC-1 1

## (undated) DEVICE — SUT SILK 2.0 BLK 18

## (undated) DEVICE — DRESSING TRANS 2X2 TEGADERM